# Patient Record
Sex: FEMALE | Race: WHITE | NOT HISPANIC OR LATINO | Employment: OTHER | URBAN - METROPOLITAN AREA
[De-identification: names, ages, dates, MRNs, and addresses within clinical notes are randomized per-mention and may not be internally consistent; named-entity substitution may affect disease eponyms.]

---

## 2017-01-31 ENCOUNTER — TELEPHONE (OUTPATIENT)
Dept: FAMILY MEDICINE | Facility: CLINIC | Age: 77
End: 2017-01-31

## 2017-01-31 ENCOUNTER — TRANSFERRED RECORDS (OUTPATIENT)
Dept: HEALTH INFORMATION MANAGEMENT | Facility: CLINIC | Age: 77
End: 2017-01-31

## 2017-01-31 NOTE — TELEPHONE ENCOUNTER
Reason for Call:  Other appointment    Detailed comments: Pt is Dr. Freitas and he is booked out until March.   Pt needs to be seen for a wound check within 48 hours.     Phone Number Patient can be reached at: Home number on file [phone] (home)    Best Time: anytime    Can we leave a detailed message on this number? YES    Call taken on 1/31/2017 at 2:34 PM by Kiya Ernandez

## 2017-01-31 NOTE — TELEPHONE ENCOUNTER
Call back to patient.  Patient reports she was in the kitchen and cut her right thumb near the base.  She states she went to the ED in [place] near where she lives this morning because it would not stop bleeding.  Patient reports that it was a cut unable to be stitched d/t the type of cut.  Patient reports she was advised to have a wound check within 48 hours with PCP office.  Patient wanting to schedule, team appointment made for Friday morning per patient request.  Patient has no additional concerns at this time.  Afsaneh Berry RN

## 2017-02-03 ENCOUNTER — OFFICE VISIT (OUTPATIENT)
Dept: FAMILY MEDICINE | Facility: CLINIC | Age: 77
End: 2017-02-03
Payer: COMMERCIAL

## 2017-02-03 VITALS
WEIGHT: 181 LBS | HEIGHT: 67 IN | HEART RATE: 66 BPM | TEMPERATURE: 97.1 F | SYSTOLIC BLOOD PRESSURE: 141 MMHG | BODY MASS INDEX: 28.41 KG/M2 | OXYGEN SATURATION: 99 % | DIASTOLIC BLOOD PRESSURE: 76 MMHG

## 2017-02-03 DIAGNOSIS — S61.209A AVULSION OF SKIN OF FINGER, INITIAL ENCOUNTER: Primary | ICD-10-CM

## 2017-02-03 PROCEDURE — 99212 OFFICE O/P EST SF 10 MIN: CPT | Performed by: NURSE PRACTITIONER

## 2017-02-03 NOTE — NURSING NOTE
"Chief Complaint   Patient presents with     Laceration     wound check.  Cut thumb, seen ER in [place], unable to stitch. TDAP 2/2015       Initial /76 mmHg  Pulse 66  Temp(Src) 97.1  F (36.2  C) (Oral)  Ht 5' 7\" (1.702 m)  Wt 181 lb (82.101 kg)  BMI 28.34 kg/m2  SpO2 99%  LMP 07/07/1992 Estimated body mass index is 28.34 kg/(m^2) as calculated from the following:    Height as of this encounter: 5' 7\" (1.702 m).    Weight as of this encounter: 181 lb (82.101 kg).  BP completed using cuff size: regular    "

## 2017-02-03 NOTE — PROGRESS NOTES
HPI      SUBJECTIVE:                                                    Jeannine Rod is a 76 year old female who presents to clinic today for the following health issues:      Cut right thumb on Monday, 4 days ago, on a mandolin   Went to ED next day as it continued to bleed. 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 Please review for accuracy     Past Surgical History   Procedure Laterality Date     Biopsy  8/29/2007     Right Breast Biopsy     Biopsy  9/12/2007     Right Breast Biopsy x2     Biopsy  9/13/2007     Left Breast Biopsy     Breast surgery  10/2/2007     Bilateral Mastectomy     Breast surgery  3/2008     Breast Reconstruction Surgery     Arthroplasty hip  3/5/2012     Procedure:ARTHROPLASTY HIP; LEFT TOTAL HIP ARTHROPLASTY (BIOMET)^; Surgeon:SABRINA CALLEJAS; Location:[facility] OR     Ent surgery       salivary gland removal     Colonoscopy  2011     repeat in 5 years     Social History   Substance Use Topics     Smoking status: Former Smoker -- 20 years     Types: Cigarettes     Quit date: 01/01/1982     Smokeless tobacco: Never Used     Alcohol Use: 0.0 oz/week     0 Standard drinks or equivalent per week      Comment: red wine socially, 2-3x week     Current Outpatient Prescriptions   Medication Sig Dispense Refill     simvastatin (ZOCOR) 40 MG tablet Take 1 tablet (40 mg) by mouth At Bedtime 90 tablet 3     Cholecalciferol (VITAMIN D3 PO) Take 1,000 Units by mouth daily       hydroxychloroquine (PLAQUENIL) 200 MG tablet Take 200 mg  "by mouth 2 times daily       OMEPRAZOLE PO Take 20 mg by mouth daily        calcium-vitamin D (OSCAL) 250-125 MG-UNIT TABS per tablet Take 1 tablet by mouth daily       AMLODIPINE BESYLATE PO Take 5 mg by mouth At Bedtime       Estrogens, Conjugated (PREMARIN VA) Place 0.5 applicators vaginally twice a week At bedtime       Multiple Vitamin (MULTI-VITAMIN) per tablet Take 1 tablet by mouth daily.       Coenzyme Q10 (CO Q 10 PO) Take 200 mg by mouth At Bedtime        ASPIRIN PO Take 81 mg by mouth At Bedtime       Allergies   Allergen Reactions     Other [Seasonal Allergies]      Preservative in eye drops       Reviewed PMH, med list and allergies.      ROS  Detailed as above       /76 mmHg  Pulse 66  Temp(Src) 97.1  F (36.2  C) (Oral)  Ht 5' 7\" (1.702 m)  Wt 181 lb (82.101 kg)  BMI 28.34 kg/m2  SpO2 99%  LMP 07/07/1992      Physical Exam   Constitutional: She is well-developed, well-nourished, and in no distress.   Pulmonary/Chest: Effort normal.   Neurological: She is alert.   Skin: Skin is warm and dry.   Small superficial avulsion of radial aspect of distal right thumb without sign of infection    Psychiatric: Mood and affect normal.   Vitals reviewed.      Assessment and Plan:       ICD-10-CM    1. Avulsion of skin of finger, initial encounter S61.209A        Wound check. Looks great. Continue great wound care. rtc prn       Jolene Hu APRN, CNP  [facility]    "

## 2017-03-14 DIAGNOSIS — I10 BENIGN ESSENTIAL HYPERTENSION: Primary | ICD-10-CM

## 2017-03-14 NOTE — TELEPHONE ENCOUNTER
AMLODIPINE BESYLATE PO      Last Written Prescription Date: ?  Last Fill Quantity: ?, # refills: ?  Last Office Visit with [facility] prescribing provider: 02/03/2017       Potassium   Date Value Ref Range Status   10/06/2016 4.5 3.4 - 5.3 mmol/L Final     Creatinine   Date Value Ref Range Status   10/06/2016 0.73 0.52 - 1.04 mg/dL Final     BP Readings from Last 3 Encounters:   02/03/17 141/76   10/06/16 129/76   10/14/14 116/66

## 2017-03-15 RX ORDER — AMLODIPINE BESYLATE 2.5 MG/1
5 TABLET ORAL AT BEDTIME
Qty: 90 TABLET | Refills: 3 | Status: SHIPPED | OUTPATIENT
Start: 2017-03-15 | End: 2017-10-09

## 2017-03-15 NOTE — TELEPHONE ENCOUNTER
Routing refill request to provider for review/approval because:  Medication is reported/historical  Regina Lock RN

## 2017-05-23 ENCOUNTER — TELEPHONE (OUTPATIENT)
Dept: FAMILY MEDICINE | Facility: CLINIC | Age: 77
End: 2017-05-23

## 2017-05-23 NOTE — TELEPHONE ENCOUNTER
Typically retesting UA is not needed if symptoms have resolved.  If symptom have not resolved, then office visit appointment would be most helpful, ok to use same day slot

## 2017-05-23 NOTE — TELEPHONE ENCOUNTER
PCP:    Called and spoke with the Pt. The Pt denies any ongoing UTI symptoms, however does report a new symptom: itching on the vulva.   No unusual discharge or odor present. No other symptoms. Pt did just complete antibiotics.   Continue to monitor? Start OTC yeast infection medication? Script?  Please advise.     Tanisha Maria RN

## 2017-05-23 NOTE — TELEPHONE ENCOUNTER
Reason for Call:  Other call back    Detailed comments: seen @ [facility] for uti pt was told to have urine test done to make sure meds worked . Does pt need appt with provider or just lab appt? Please advise    Phone Number Patient can be reached at: Home number on file [phone] (home)    Best Time: any    Can we leave a detailed message on this number? YES    Call taken on 5/23/2017 at 1:46 PM by Jelly Harvey

## 2017-05-26 ENCOUNTER — TELEPHONE (OUTPATIENT)
Dept: FAMILY MEDICINE | Facility: CLINIC | Age: 77
End: 2017-05-26

## 2017-05-26 NOTE — TELEPHONE ENCOUNTER
Called patient with advise from PCP.   Patient will go to URGENT CARE today---doesn't want to wait until next week for appointment.   Has been using OTC Hydrocortisone cream in vaginal area for itchiness----without relief.   Provided patient with URGENT CARE locations and times.     Dhara Jean RN, BSN

## 2017-05-26 NOTE — TELEPHONE ENCOUNTER
WORK-IN APPT TODAY with PCP ?  No TEAM appointments available in clinic today.   Or RX?    Patient developed intense vaginal itchiness while taking abx for UTI (completed abx yesterday).   Had been advised to try OTC vag yeast med.  Patient tried the one day Monistat without any improvement.     Calling today for advise.    Patient PCP message from the other day, if OTC tx failed, patient would need appointment.    No appts available today.      Symptoms:  Vaginal itchiness.  No discharge.      [facility] [id number] - [place], [place] - [address] AT [facility]      Dhara Jean RN, BSN

## 2017-05-26 NOTE — TELEPHONE ENCOUNTER
Reason for call:  Patient reporting a symptom    Symptom or request: yeast infection pt took a dose of monistat now into day and half on does and symptoms have not gotten any better . Should pt repeat dose?    Duration (how long have symptoms been present): few days    Have you been treated for this before? Yes    Additional comments:     Phone Number patient can be reached at:  Home number on file [phone] (home)    Best Time:  any    Can we leave a detailed message on this number:  YES    Call taken on 5/26/2017 at 8:56 AM by Jelly Harvey

## 2017-05-26 NOTE — TELEPHONE ENCOUNTER
It is a little optimistic to think that one day of over the counter cream is going to solve the problem  If she can't stand the symptoms she could be seen in urgent care  Or she could wait and see me or team provider next week for vaginal exam

## 2017-07-24 ENCOUNTER — TELEPHONE (OUTPATIENT)
Dept: FAMILY MEDICINE | Facility: CLINIC | Age: 77
End: 2017-07-24

## 2017-07-24 NOTE — TELEPHONE ENCOUNTER
E. Visit would be appropriate here  She should probably change simvastatin to atorvastatin to avoid drug drug interaction with amlodipine  Amlodipine improves Raynaud's symptoms in dose dependent fashion   Can you call her to arrange E. Visit?

## 2017-07-24 NOTE — TELEPHONE ENCOUNTER
I called patient and spoke with her.   She states that about 10-11 days ago she saw rheumatology specialist and they recommended that patient increase dose of Amlodipine from 5 mg to 10 mg due to Raynaud's    Patient states that following the increased dose; she started to experience muscle weakness and soreness.     Patient read online that taking Simvastatin 20 mg and Amlodipine can cause these symptoms, so yesterday she decreased her Amlodipine dose back down to 5 mg.  She has continued Simvastatin 20 mg daily  Symptoms of muscle weakness and soreness has not resolved since decreasing Amlodipine back down to 5 mg.      Patient wondering what Dr. Freitas would recommend?  Should she adjust the Simvastatin so that she can increase the Amlodipine dose or another option for medication?  Patient said that she does have cold feet due to Raynaud's     PCP: please advise.     Omayra Jeffries RN

## 2017-07-24 NOTE — TELEPHONE ENCOUNTER
Reason for Call:  Other call back    Detailed comments: questions and concerns about her meds. She is   Feeling weak, and a little dizy.    Phone Number Patient can be reached at: Cell number on file:    Telephone Information:   Mobile [phone]       Best Time: anytime     Can we leave a detailed message on this number? yes    Call taken on 7/24/2017 at 9:42 AM by Alyce Chacon

## 2017-07-25 NOTE — TELEPHONE ENCOUNTER
Pt does not have mychart, so would be appropriate for telephone visit. Will route to TC to schedule.  Regina Lock RN

## 2017-08-08 ENCOUNTER — TRANSFERRED RECORDS (OUTPATIENT)
Dept: HEALTH INFORMATION MANAGEMENT | Facility: CLINIC | Age: 77
End: 2017-08-08

## 2017-10-09 ENCOUNTER — OFFICE VISIT (OUTPATIENT)
Dept: FAMILY MEDICINE | Facility: CLINIC | Age: 77
End: 2017-10-09
Payer: COMMERCIAL

## 2017-10-09 VITALS
BODY MASS INDEX: 25.58 KG/M2 | DIASTOLIC BLOOD PRESSURE: 74 MMHG | TEMPERATURE: 97.4 F | SYSTOLIC BLOOD PRESSURE: 141 MMHG | WEIGHT: 163 LBS | HEIGHT: 67 IN | OXYGEN SATURATION: 100 % | HEART RATE: 66 BPM

## 2017-10-09 DIAGNOSIS — I10 BENIGN ESSENTIAL HYPERTENSION: ICD-10-CM

## 2017-10-09 DIAGNOSIS — R01.1 HEART MURMUR: ICD-10-CM

## 2017-10-09 DIAGNOSIS — Z23 NEED FOR PROPHYLACTIC VACCINATION AND INOCULATION AGAINST INFLUENZA: ICD-10-CM

## 2017-10-09 DIAGNOSIS — I73.00 RAYNAUD'S DISEASE WITHOUT GANGRENE: ICD-10-CM

## 2017-10-09 DIAGNOSIS — E78.5 HYPERLIPIDEMIA LDL GOAL <130: ICD-10-CM

## 2017-10-09 DIAGNOSIS — Z91.81 AT RISK FOR FALLING: ICD-10-CM

## 2017-10-09 DIAGNOSIS — C50.919 MALIGNANT NEOPLASM OF FEMALE BREAST, UNSPECIFIED ESTROGEN RECEPTOR STATUS, UNSPECIFIED LATERALITY, UNSPECIFIED SITE OF BREAST (H): ICD-10-CM

## 2017-10-09 DIAGNOSIS — M34.9 SYSTEMIC SCLEROSIS (H): ICD-10-CM

## 2017-10-09 DIAGNOSIS — Z00.00 ROUTINE GENERAL MEDICAL EXAMINATION AT A HEALTH CARE FACILITY: Primary | ICD-10-CM

## 2017-10-09 PROCEDURE — G0008 ADMIN INFLUENZA VIRUS VAC: HCPCS | Performed by: INTERNAL MEDICINE

## 2017-10-09 PROCEDURE — 99397 PER PM REEVAL EST PAT 65+ YR: CPT | Mod: 25 | Performed by: INTERNAL MEDICINE

## 2017-10-09 PROCEDURE — 90662 IIV NO PRSV INCREASED AG IM: CPT | Performed by: INTERNAL MEDICINE

## 2017-10-09 RX ORDER — ATORVASTATIN CALCIUM 20 MG/1
20 TABLET, FILM COATED ORAL DAILY
Qty: 90 TABLET | Refills: 3 | Status: SHIPPED | OUTPATIENT
Start: 2017-10-09 | End: 2018-09-28

## 2017-10-09 RX ORDER — AMLODIPINE BESYLATE 10 MG/1
10 TABLET ORAL AT BEDTIME
Qty: 90 TABLET | Refills: 3 | Status: SHIPPED | OUTPATIENT
Start: 2017-10-09 | End: 2018-12-26

## 2017-10-09 NOTE — PROGRESS NOTES
SUBJECTIVE:   Jeannine Rod is a 76 year old female who presents for Preventive Visit.  Are you in the first 12 months of your Medicare Part B coverage?  No    Healthy Habits:    Do you get at least three servings of calcium containing foods daily (dairy, green leafy vegetables, etc.)? yes    Amount of exercise or daily activities, outside of work: None    Problems taking medications regularly No    Medication side effects: No    Have you had an eye exam in the past two years? yes    Do you see a dentist twice per year? yes    Do you have sleep apnea, excessive snoring or daytime drowsiness?no    COGNITIVE SCREEN  1) Repeat 3 items (Banana, Sunrise, Chair)    2) Clock draw: NORMAL  3) 3 item recall: Recalls 2 objects   Results: NORMAL clock, 1-2 items recalled: COGNITIVE IMPAIRMENT LESS LIKELY    Mini-CogTM Copyright S Melvi. Licensed by the author for use in [facility]; reprinted with permission (delano@[facility]). All rights reserved.    Rheumatology increased amlodipine due to Raynauds symptoms  Had moderate muscle pain related side effects from simvastatin when she increased amlodipine  Resolved when she went back to previous dose of amlodpine    Reviewed and updated as needed this visit by clinical staff  Tobacco  Allergies  Meds  Med Hx  Surg Hx  Fam Hx  Soc Hx        Reviewed and updated as needed this visit by Provider  Tobacco  Med Hx  Surg Hx  Fam Hx  Soc Hx       Social History   Substance Use Topics     Smoking status: Former Smoker     Years: 20.00     Types: Cigarettes     Quit date: 1/1/1982     Smokeless tobacco: Never Used     Alcohol use 0.0 oz/week     0 Standard drinks or equivalent per week      Comment: red wine socially, 2-3x week       The patient does not drink >3 drinks per day nor >7 drinks per week.    Today's PHQ-2 Score:   PHQ-2 ( 1999 [employer]) 10/9/2017 10/6/2016   Q1: Little interest or pleasure in doing things 0 0   Q2: Feeling down, depressed or hopeless 0 0    PHQ-2 Score 0 0         Do you feel safe in your environment - Yes    Do you have a Health Care Directive?: Yes: Advance Directive has been received and scanned.    Current providers sharing in care for this patient include:   Patient Care Team:  Bebeto Freitas MD as PCP - General (Internal Medicine)      Hearing impairment: Yes, wears hearing aids - care managed by specialists    Ability to successfully perform activities of daily living: Yes, no assistance needed     Fall risk:  Fallen 2 or more times in the past year?: Yes  Any fall with injury in the past year?: No  Timed Up and Go Test (>13.5 is fall risk; contact physician) : 5      Home safety:  none identified      The following health maintenance items are reviewed in Epic and correct as of today:  Health Maintenance   Topic Date Due     ADVANCE DIRECTIVE PLANNING Q5 YRS  11/30/1997     FALL RISK ASSESSMENT  11/30/2007     MAMMO SCREEN Q2 YR (SYSTEM ASSIGNED)  09/07/2009     LIPID SCREEN Q5 YR FEMALE (SYSTEM ASSIGNED)  10/06/2021     TETANUS IMMUNIZATION (SYSTEM ASSIGNED)  02/17/2025     COLON CANCER SCREEN (SYSTEM ASSIGNED)  05/26/2025     INFLUENZA VACCINE (SYSTEM ASSIGNED)  Addressed     DEXA SCAN SCREENING (SYSTEM ASSIGNED)  Completed     PNEUMOCOCCAL  Completed     Patient Active Problem List   Diagnosis     Malignant neoplasm of breast (H)     Raynaud's syndrome     Status post hip replacement     Pulmonary embolism and infarction     Guillain-Aurora syndrome (H)     Systemic sclerosis (H)     Gastroesophageal reflux disease with esophagitis     Hyperlipidemia LDL goal <130     Calculus of gallbladder without cholecystitis     Radiculoplexus neuropathy     Past Surgical History:   Procedure Laterality Date     ARTHROPLASTY HIP  3/5/2012    Procedure:ARTHROPLASTY HIP; LEFT TOTAL HIP ARTHROPLASTY (BIOMET)^; Surgeon:SABRINA CALLEJAS; Location:[facility] OR     BIOPSY  8/29/2007    Right Breast Biopsy     BIOPSY  9/12/2007    Right Breast Biopsy x2     BIOPSY   9/13/2007    Left Breast Biopsy     BREAST SURGERY  10/2/2007    Bilateral Mastectomy     BREAST SURGERY  3/2008    Breast Reconstruction Surgery     COLONOSCOPY  2011    repeat in 5 years     ENT SURGERY      salivary gland removal       Social History   Substance Use Topics     Smoking status: Former Smoker     Years: 20.00     Types: Cigarettes     Quit date: 1/1/1982     Smokeless tobacco: Never Used     Alcohol use 0.0 oz/week     0 Standard drinks or equivalent per week      Comment: red wine socially, 2-3x week     Family History   Problem Relation Age of Onset     CANCER Mother      lung age 83     Unknown/Adopted Father      91 years     Eye Disorder Father      Macular Degeneration     Breast Cancer Maternal Grandmother      CANCER Paternal Grandfather      CANCER Paternal Uncle      Asthma Brother      C.A.D. Sister      elevated cholesterol         Current Outpatient Prescriptions   Medication Sig Dispense Refill     amLODIPine (NORVASC) 2.5 MG tablet Take 2 tablets (5 mg) by mouth At Bedtime 90 tablet 3     simvastatin (ZOCOR) 40 MG tablet Take 1 tablet (40 mg) by mouth At Bedtime 90 tablet 3     Cholecalciferol (VITAMIN D3 PO) Take 1,000 Units by mouth daily       hydroxychloroquine (PLAQUENIL) 200 MG tablet Take 200 mg by mouth 2 times daily       OMEPRAZOLE PO Take 20 mg by mouth daily        calcium-vitamin D (OSCAL) 250-125 MG-UNIT TABS per tablet Take 1 tablet by mouth daily       ASPIRIN PO Take 81 mg by mouth At Bedtime       Estrogens, Conjugated (PREMARIN VA) Place 0.5 applicators vaginally twice a week At bedtime       Multiple Vitamin (MULTI-VITAMIN) per tablet Take 1 tablet by mouth daily.       Coenzyme Q10 (CO Q 10 PO) Take 200 mg by mouth At Bedtime        Allergies   Allergen Reactions     Metoclopramide      Other reaction(s): Other - Describe In Comment Field  Severe redness in eyes from the preservative in certain eye medications     Other [Seasonal Allergies]      Preservative in  "eye drops             ROS:  A 12 organ systems ROS is negative    OBJECTIVE:   /74 (BP Location: Right arm, Cuff Size: Adult Regular)  Pulse 66  Temp 97.4  F (36.3  C) (Oral)  Ht 5' 7\" (1.702 m)  Wt 163 lb (73.9 kg)  LMP 07/07/1992  SpO2 100%  BMI 25.53 kg/m2 Estimated body mass index is 25.53 kg/(m^2) as calculated from the following:    Height as of this encounter: 5' 7\" (1.702 m).    Weight as of this encounter: 163 lb (73.9 kg).  EXAM:   GENERAL APPEARANCE: healthy, alert and no distress  EYES: Eyes grossly normal to inspection, PERRL and conjunctivae and sclerae normal  HENT: ear canals and TM's normal, nose and mouth without ulcers or lesions, oropharynx clear and oral mucous membranes moist  NECK: no adenopathy, no asymmetry, masses, or scars and thyroid normal to palpation  RESP: lungs clear to auscultation - no rales, rhonchi or wheezes  BREAST: bilateral implants, no masses adjacent to mastectomy sights   CV: regular rate and rhythm, normal S1 S2, no S3 or S4, unchanged systolic murmur, click or rub, no peripheral edema and peripheral pulses strong  ABDOMEN: soft, nontender, no hepatosplenomegaly, no masses and bowel sounds normal  MS: no musculoskeletal defects are noted and gait is age appropriate without ataxia  SKIN: no suspicious lesions or rashes  NEURO: Normal strength and tone, sensory exam grossly normal, mentation intact and speech normal  PSYCH: mentation appears normal and affect normal/bright    ASSESSMENT / PLAN:       ICD-10-CM    1. Routine general medical examination at a health care facility Z00.00    2. Systemic sclerosis (H) M34.9    3. Raynaud's disease without gangrene I73.00    4. Malignant neoplasm of female breast, unspecified estrogen receptor status, unspecified laterality, unspecified site of breast (H) C50.919    5. Hyperlipidemia LDL goal <130 E78.5 Comprehensive metabolic panel     CBC with platelets     Lipid panel reflex to direct LDL     TSH     atorvastatin " "(LIPITOR) 20 MG tablet   6. At risk for falling Z91.81    7. Benign essential hypertension I10 amLODIPine (NORVASC) 10 MG tablet   8. Heart murmur R01.1 Echocardiogram Complete     Blood pressure not well controlled  Raynauds not well controlled  Need to increase amlodipine  Cannot due to simvastatin interaction  Stop simvastatin  Replace with atorvastatin  Increase amlodipine  Recheck lipids when she returns for physical prior to cataract surgery next month    Cardiac murmur without recent echo, schedule echo           End of Life Planning:  Patient currently has an advanced directive: Yes.  Practitioner is supportive of decision.    COUNSELING:  Reviewed preventive health counseling, as reflected in patient instructions  Special attention given to:       Regular exercise       Healthy diet/nutrition       Immunizations    flu         Colon cancer screening          Estimated body mass index is 25.53 kg/(m^2) as calculated from the following:    Height as of this encounter: 5' 7\" (1.702 m).    Weight as of this encounter: 163 lb (73.9 kg).  Weight management plan: Discussed healthy diet and exercise guidelines and patient will follow up in 12 months in clinic to re-evaluate.   reports that she quit smoking about 35 years ago. Her smoking use included Cigarettes. She quit after 20.00 years of use. She has never used smokeless tobacco.        Appropriate preventive services were discussed with this patient, including applicable screening as appropriate for cardiovascular disease, diabetes, osteopenia/osteoporosis, and glaucoma.  As appropriate for age/gender, discussed screening for colorectal cancer, prostate cancer, breast cancer, and cervical cancer. Checklist reviewing preventive services available has been given to the patient.    Reviewed patients plan of care and provided an AVS. The Basic Care Plan (routine screening as documented in Health Maintenance) for Jeannine meets the Care Plan requirement. This Care Plan " has been established and reviewed with the Patient.    Counseling Resources:  ATP IV Guidelines  Pooled Cohorts Equation Calculator  Breast Cancer Risk Calculator  FRAX Risk Assessment  ICSI Preventive Guidelines  Dietary Guidelines for Americans, 2010  USDA's MyPlate  ASA Prophylaxis  Lung CA Screening    Bebeto Freitas MD  [facility]

## 2017-10-09 NOTE — NURSING NOTE
"Chief Complaint   Patient presents with     Wellness Visit       Initial /74 (BP Location: Right arm, Cuff Size: Adult Regular)  Pulse 66  Temp 97.4  F (36.3  C) (Oral)  Ht 5' 7\" (1.702 m)  Wt 163 lb (73.9 kg)  LMP 07/07/1992  SpO2 100%  BMI 25.53 kg/m2 Estimated body mass index is 25.53 kg/(m^2) as calculated from the following:    Height as of this encounter: 5' 7\" (1.702 m).    Weight as of this encounter: 163 lb (73.9 kg).  Medication Reconciliation: complete  Maryjo Ortega MA      "

## 2017-10-09 NOTE — MR AVS SNAPSHOT
After Visit Summary   10/9/2017    Jeannine Rod    MRN: [id number]           Patient Information     Date Of Birth          1940        Visit Information        Provider Department      10/9/2017 10:00 AM Bebeto Freitas MD [facility]        Today's Diagnoses     Routine general medical examination at a health care facility    -  1    Systemic sclerosis (H)        Raynaud's disease without gangrene        Malignant neoplasm of female breast, unspecified estrogen receptor status, unspecified laterality, unspecified site of breast (H)        Hyperlipidemia LDL goal <130        At risk for falling        Benign essential hypertension        Heart murmur        Need for prophylactic vaccination and inoculation against influenza          Care Instructions      Preventive Health Recommendations    Female Ages 65 +    Yearly exam:     See your health care provider every year in order to  o Review health changes.   o Discuss preventive care.    o Review your medicines if your doctor has prescribed any.      You no longer need a yearly Pap test unless you've had an abnormal Pap test in the past 10 years. If you have vaginal symptoms, such as bleeding or discharge, be sure to talk with your provider about a Pap test.      Every 1 to 2 years, have a mammogram.  If you are over 69, talk with your health care provider about whether or not you want to continue having screening mammograms.      Every 10 years, have a colonoscopy. Or, have a yearly FIT test (stool test). These exams will check for colon cancer.       Have a cholesterol test every 5 years, or more often if your doctor advises it.       Have a diabetes test (fasting glucose) every three years. If you are at risk for diabetes, you should have this test more often.       At age 65, have a bone density scan (DEXA) to check for osteoporosis (brittle bone disease).    Shots:    Get a flu shot each year.    Get a tetanus shot every 10  years.    Talk to your doctor about your pneumonia vaccines. There are now two you should receive - Pneumovax (PPSV 23) and Prevnar (PCV 13).    Talk to your doctor about the shingles vaccine.    Talk to your doctor about the hepatitis B vaccine.    Nutrition:     Eat at least 5 servings of fruits and vegetables each day.      Eat whole-grain bread, whole-wheat pasta and brown rice instead of white grains and rice.      Talk to your provider about Calcium and Vitamin D.     Lifestyle    Exercise at least 150 minutes a week (30 minutes a day, 5 days a week). This will help you control your weight and prevent disease.      Limit alcohol to one drink per day.      No smoking.       Wear sunscreen to prevent skin cancer.       See your dentist twice a year for an exam and cleaning.      See your eye doctor every 1 to 2 years to screen for conditions such as glaucoma, macular degeneration and cataracts.          Follow-ups after your visit        Your next 10 appointments already scheduled     Oct 23, 2017  9:00 AM CDT   [facility]    [address] [facility] [facility] [id number] [phone]              Future tests that were ordered for you today     Open Future Orders        Priority Expected Expires Ordered    Echocardiogram Complete Routine  10/9/2018 10/9/2017            Who to contact     If you have questions or need follow up information about today's clinic visit or your schedule please contact [facility] directly at [phone].  Normal or non-critical lab and imaging results will be communicated to you by MyChart, letter or phone within 4 business days after the clinic has received the results. If you do not hear from us within 7 days, please contact the clinic through MyChart or phone. If you have a critical or abnormal lab result, we will notify you by phone as soon as possible.  Submit refill requests through  "[employer] or call your pharmacy and they will forward the refill request to us. Please allow 3 business days for your refill to be completed.          Additional Information About Your Visit        MyChart Information     [facility] lets you send messages to your doctor, view your test results, renew your prescriptions, schedule appointments and more. To sign up, go to [email or web address] . Click on \"Log in\" on the left side of the screen, which will take you to the Welcome page. Then click on \"Sign up Now\" on the right side of the page.     You will be asked to enter the access code listed below, as well as some personal information. Please follow the directions to create your username and password.     Your access code is: [email or web address]  Expires: 2018 12:25 PM     Your access code will  in 90 days. If you need help or a new code, please call your [place] clinic or [phone].        Care EveryWhere ID     This is your Care EveryWhere ID. This could be used by other organizations to access your [place] medical records  [id number]        Your Vitals Were     Pulse Temperature Height Last Period Pulse Oximetry BMI (Body Mass Index)    66 97.4  F (36.3  C) (Oral) 5' 7\" (1.702 m) 1992 100% 25.53 kg/m2       Blood Pressure from Last 3 Encounters:   10/09/17 141/74   17 141/76   10/06/16 129/76    Weight from Last 3 Encounters:   10/09/17 163 lb (73.9 kg)   17 181 lb (82.1 kg)   10/06/16 181 lb (82.1 kg)              We Performed the Following     ADMIN INFLUENZA (For MEDICARE Patients ONLY) []     CBC with platelets     Comprehensive metabolic panel     FLU VACCINE, INCREASED ANTIGEN, PRESV FREE, AGE 65+ [22712]     Lipid panel reflex to direct LDL     TSH          Today's Medication Changes          These changes are accurate as of: 10/9/17 12:25 PM.  If you have any questions, ask your nurse or doctor.               Start taking these medicines.        Dose/Directions    " atorvastatin 20 MG tablet   Commonly known as:  LIPITOR   Used for:  Hyperlipidemia LDL goal <130   Started by:  Bebeto Freitas MD        Dose:  20 mg   Take 1 tablet (20 mg) by mouth daily   Quantity:  90 tablet   Refills:  3         These medicines have changed or have updated prescriptions.        Dose/Directions    amLODIPine 10 MG tablet   Commonly known as:  NORVASC   This may have changed:    - medication strength  - how much to take   Used for:  Benign essential hypertension   Changed by:  Bebeto Freitas MD        Dose:  10 mg   Take 1 tablet (10 mg) by mouth At Bedtime   Quantity:  90 tablet   Refills:  3         Stop taking these medicines if you haven't already. Please contact your care team if you have questions.     simvastatin 40 MG tablet   Commonly known as:  ZOCOR   Stopped by:  Bebeto Freitas MD                Where to get your medicines      These medications were sent to [id number] - [place], [place] - [address], [place] [address]     Phone:  [phone]     amLODIPine 10 MG tablet    atorvastatin 20 MG tablet                Primary Care Provider Office Phone # Fax #    Bebeto Freitas [phone]       [facility] [address]  [facility] [address]        Equal Access to Services     ERIK CARLTON AH: Hadii aad ku hadasho Soomaali, waaxda luqadaha, qaybta kaalmada adeegyada, waxay idiin hayaan shanell anderson labin fry. So [facility] [phone].    ATENCIÓN: Si habla español, tiene a holley disposición servicios gratuitos de asistencia lingüística. ame al [phone].    We comply with applicable federal civil rights laws and [place] laws. We do not discriminate on the basis of race, color, national origin, age, disability, sex, sexual orientation, or gender identity.            Thank you!     Thank you for choosing [facility]  for your care. Our goal is always to provide you  with excellent care. Hearing back from our patients is one way we can continue to improve our services. Please take a few minutes to complete the written survey that you may receive in the mail after your visit with us. Thank you!             Your Updated Medication List - Protect others around you: Learn how to safely use, store and throw away your medicines at www.disposemymeds.org.          This list is accurate as of: 10/9/17 12:25 PM.  Always use your most recent med list.                   Brand Name Dispense Instructions for use Diagnosis    amLODIPine 10 MG tablet    NORVASC    90 tablet    Take 1 tablet (10 mg) by mouth At Bedtime    Benign essential hypertension       ASPIRIN PO      Take 81 mg by mouth At Bedtime        atorvastatin 20 MG tablet    LIPITOR    90 tablet    Take 1 tablet (20 mg) by mouth daily    Hyperlipidemia LDL goal <130       calcium-vitamin D 250-125 MG-UNIT Tabs per tablet    OSCAL     Take 1 tablet by mouth daily        CO Q 10 PO      Take 200 mg by mouth At Bedtime        hydroxychloroquine 200 MG tablet    PLAQUENIL     Take 200 mg by mouth 2 times daily        Multi-vitamin Tabs tablet   Generic drug:  multivitamin, therapeutic with minerals      Take 1 tablet by mouth daily.        OMEPRAZOLE PO      Take 20 mg by mouth daily        PREMARIN VA      Place 0.5 applicators vaginally twice a week At bedtime        VITAMIN D3 PO      Take 1,000 Units by mouth daily

## 2017-10-09 NOTE — PROGRESS NOTES
Injectable Influenza Immunization Documentation    1.  Is the person to be vaccinated sick today?   No    2. Does the person to be vaccinated have an allergy to a component   of the vaccine?   No    3. Has the person to be vaccinated ever had a serious reaction   to influenza vaccine in the past?   No    4. Has the person to be vaccinated ever had Guillain-Barré syndrome?   No    Form completed by Joie Fernandez CMA  Prior to injection verified patient identity using patient's name and date of birth.

## 2017-10-16 ENCOUNTER — HOSPITAL ENCOUNTER (OUTPATIENT)
Dept: CARDIOLOGY | Facility: CLINIC | Age: 77
Discharge: HOME | End: 2017-10-16
Attending: INTERNAL MEDICINE | Admitting: INTERNAL MEDICINE
Payer: MEDICARE

## 2017-10-16 DIAGNOSIS — R01.1 HEART MURMUR: ICD-10-CM

## 2017-10-16 PROCEDURE — 93306 TTE W/DOPPLER COMPLETE: CPT

## 2017-10-16 PROCEDURE — 93306 TTE W/DOPPLER COMPLETE: CPT | Mod: 26 | Performed by: INTERNAL MEDICINE

## 2017-10-16 NOTE — PROGRESS NOTES
The following letter pertains to your most recent diagnostic tests:    Good news! Your heart ultrasound showed no dangerous explanations for your heart murmur.       Sincerely,    Dr. Freitas

## 2017-10-16 NOTE — LETTER
[facility]  [address] [facility]  [address]  [place]  [address]  Tel: [phone]    2017    Moose Lemus  [address]  [place] [address]        Dear Ms. Lemus,    The following letter pertains to your most recent diagnostic tests:    Good news! Your heart ultrasound showed no dangerous explanations for your heart murmur.     Resulted Orders   Echocardiogram Complete    Narrative    [id number]  [facility]  [id number]  [id number]^MARY^ANGELIKA^NATALYA        [facility]  [facility]  Echocardiography Laboratory  [address]  [address]  [place] [address]  Phone [phone]  Fax [phone]        Name: MOOSE LEMUS  MRN: [id number]  : 1940  Study Date: 10/16/2017 07:41 AM  Age: 76 yrs  Gender: Female  [place] [facility] [place]  Reason For Study: Cardiac murmur  Ordering Physician: ANGELIKA HERNANDEZ  Referring Physician: ANGELIKA HERNANDEZ  Performed By: Leila Acuña RDCS     BSA: 1.9 m2  Height: 67 in  Weight: 163 lb  _____________________________________________________________________________  __        Procedure  Complete Echo Adult.  _____________________________________________________________________________  __        Interpretation Summary     1. The left ventricle is normal in structure, function and size. The visual  ejection fraction is estimated at 60%.  2. The right ventricle is normal in structure, function and size.  3. No valve disease.     No changes from echo 3/2012.  _____________________________________________________________________________  __        Left Ventricle  The left ventricle is normal in structure, function and size. There is normal  left ventricular wall thickness. The visual ejection fraction is estimated at  60%. Normal left ventricular diastolic function. Normal left ventricular wall  motion.     Right Ventricle  The right ventricle is normal in structure, function and size.     Atria  Normal left atrial  size. Right atrial size is normal. There is no atrial shunt  seen.     Mitral Valve  The mitral valve is normal in structure and function. There is no mitral  regurgitation noted.     Tricuspid Valve  There is mild (1+) tricuspid regurgitation. The right ventricular systolic  pressure is approximated at 17mmHg plus the right atrial pressure.        Aortic Valve  The aortic valve is normal in structure and function. No aortic regurgitation  is present. No aortic stenosis is present.     Pulmonic Valve  The pulmonic valve is normal in structure and function.     Vessels  Normal ascending, transverse (arch), and descending aorta. The IVC is normal  in size and reactivity with respiration, suggesting normal central venous  pressure.     Pericardium  There is no pericardial effusion.     Rhythm  Sinus rhythm was noted.     _____________________________________________________________________________  __  MMode/2D Measurements & Calculations  IVSd: 1.1 cm  LVIDd: 3.8 cm  LVIDs: 2.4 cm  LVPWd: 1.1 cm  FS: 36.9 %  EDV(Teich): 61.1 ml  ESV(Teich): 19.8 ml     LV mass(C)d: 136.5 grams  LV mass(C)dI: 73.6 grams/m2  Ao root diam: 3.3 cm  LA dimension: 3.7 cm  LA/Ao: 1.1  LA Volume (BP): 53.9 ml  LA Volume Index (BP): 29.1 ml/m2        Doppler Measurements & Calculations  MV E max woody: 77.6 cm/sec  MV A max woody: 76.2 cm/sec  MV E/A: 1.0  MV dec time: 0.26 sec  TR max woody: 207.7 cm/sec  TR max P.3 mmHg     Lateral E/e': 8.6  Med E to E': 12.7  Medial E/e': 12.7           _____________________________________________________________________________  __           Report approved by: Ira Chen 10/16/2017 08:42 AM            Sincerely,    Dr. Freitas/ du

## 2017-10-23 ENCOUNTER — OFFICE VISIT (OUTPATIENT)
Dept: FAMILY MEDICINE | Facility: CLINIC | Age: 77
End: 2017-10-23
Payer: COMMERCIAL

## 2017-10-23 VITALS
TEMPERATURE: 96.7 F | OXYGEN SATURATION: 97 % | HEART RATE: 63 BPM | RESPIRATION RATE: 16 BRPM | WEIGHT: 163 LBS | SYSTOLIC BLOOD PRESSURE: 123 MMHG | HEIGHT: 67 IN | DIASTOLIC BLOOD PRESSURE: 71 MMHG | BODY MASS INDEX: 25.58 KG/M2

## 2017-10-23 DIAGNOSIS — H26.9 CATARACT OF BOTH EYES, UNSPECIFIED CATARACT TYPE: ICD-10-CM

## 2017-10-23 DIAGNOSIS — I73.00 RAYNAUD'S DISEASE WITHOUT GANGRENE: ICD-10-CM

## 2017-10-23 DIAGNOSIS — I26.99 PULMONARY EMBOLISM WITH INFARCTION (H): ICD-10-CM

## 2017-10-23 DIAGNOSIS — I10 BENIGN ESSENTIAL HYPERTENSION: ICD-10-CM

## 2017-10-23 DIAGNOSIS — C50.919 MALIGNANT NEOPLASM OF FEMALE BREAST, UNSPECIFIED ESTROGEN RECEPTOR STATUS, UNSPECIFIED LATERALITY, UNSPECIFIED SITE OF BREAST (H): ICD-10-CM

## 2017-10-23 DIAGNOSIS — Z01.818 PREOP GENERAL PHYSICAL EXAM: Primary | ICD-10-CM

## 2017-10-23 DIAGNOSIS — M34.9 SYSTEMIC SCLEROSIS (H): ICD-10-CM

## 2017-10-23 DIAGNOSIS — E78.5 HYPERLIPIDEMIA LDL GOAL <130: ICD-10-CM

## 2017-10-23 LAB
ALBUMIN SERPL-MCNC: 4 G/DL (ref 3.4–5)
ALP SERPL-CCNC: 68 U/L (ref 40–150)
ALT SERPL W P-5'-P-CCNC: 30 U/L (ref 0–50)
ANION GAP SERPL CALCULATED.3IONS-SCNC: 10 MMOL/L (ref 3–14)
AST SERPL W P-5'-P-CCNC: 18 U/L (ref 0–45)
BILIRUB SERPL-MCNC: 0.5 MG/DL (ref 0.2–1.3)
BUN SERPL-MCNC: 14 MG/DL (ref 7–30)
CALCIUM SERPL-MCNC: 9.2 MG/DL (ref 8.5–10.1)
CHLORIDE SERPL-SCNC: 103 MMOL/L (ref 94–109)
CHOLEST SERPL-MCNC: 171 MG/DL
CO2 SERPL-SCNC: 25 MMOL/L (ref 20–32)
CREAT SERPL-MCNC: 0.82 MG/DL (ref 0.52–1.04)
ERYTHROCYTE [DISTWIDTH] IN BLOOD BY AUTOMATED COUNT: 13.7 % (ref 10–15)
GFR SERPL CREATININE-BSD FRML MDRD: 68 ML/MIN/1.7M2
GLUCOSE SERPL-MCNC: 84 MG/DL (ref 70–99)
HCT VFR BLD AUTO: 39.7 % (ref 35–47)
HDLC SERPL-MCNC: 87 MG/DL
HGB BLD-MCNC: 12.8 G/DL (ref 11.7–15.7)
LDLC SERPL CALC-MCNC: 70 MG/DL
MCH RBC QN AUTO: 29.6 PG (ref 26.5–33)
MCHC RBC AUTO-ENTMCNC: 32.2 G/DL (ref 31.5–36.5)
MCV RBC AUTO: 92 FL (ref 78–100)
NONHDLC SERPL-MCNC: 84 MG/DL
PLATELET # BLD AUTO: 209 10E9/L (ref 150–450)
POTASSIUM SERPL-SCNC: 4.4 MMOL/L (ref 3.4–5.3)
PROT SERPL-MCNC: 7.3 G/DL (ref 6.8–8.8)
RBC # BLD AUTO: 4.33 10E12/L (ref 3.8–5.2)
SODIUM SERPL-SCNC: 138 MMOL/L (ref 133–144)
TRIGL SERPL-MCNC: 68 MG/DL
WBC # BLD AUTO: 5.5 10E9/L (ref 4–11)

## 2017-10-23 PROCEDURE — 36415 COLL VENOUS BLD VENIPUNCTURE: CPT | Performed by: INTERNAL MEDICINE

## 2017-10-23 PROCEDURE — 85027 COMPLETE CBC AUTOMATED: CPT | Performed by: INTERNAL MEDICINE

## 2017-10-23 PROCEDURE — 80053 COMPREHEN METABOLIC PANEL: CPT | Performed by: INTERNAL MEDICINE

## 2017-10-23 PROCEDURE — 80061 LIPID PANEL: CPT | Performed by: INTERNAL MEDICINE

## 2017-10-23 PROCEDURE — 99214 OFFICE O/P EST MOD 30 MIN: CPT | Performed by: INTERNAL MEDICINE

## 2017-10-23 NOTE — LETTER
[facility]  [address] [place], [place] [address]. [facility]  [address]  [place]  [address]  Tel: [phone]    October 24, 2017    Jeannine Rod  [address]  [place] [address]        Dear Ms. Rod,    The following letter pertains to your most recent diagnostic tests:    -Liver and gallbladder tests are normal for you. (ALT,AST, Alk phos, bilirubin), kidney function is normal for you (Creatinine, GFR), Sodium is normal, Potassium is normal for you, Calcium is normal for you, Glucose (blood sugar) is normal for you.      -Your cholesterol panel looks healthy.    -Your complete blood counts including your hemoglobin returned normal for you.           Bottom line:  Your labs results look stable, healthy and at goal      Follow up:  Schedule an appointment for a physical examination with fasting blood tests in one year's time, or return sooner if new questions, symptoms or problems arise.          Sincerely,    Bebeto Freitas MD/LUX      Enclosure: Lab Results  Results for orders placed or performed in visit on 10/23/17   Lipid panel reflex to direct LDL   Result Value Ref Range    Cholesterol 171 <200 mg/dL    Triglycerides 68 <150 mg/dL    HDL Cholesterol 87 >49 mg/dL    LDL Cholesterol Calculated 70 <100 mg/dL    Non HDL Cholesterol 84 <130 mg/dL   Comprehensive metabolic panel   Result Value Ref Range    Sodium 138 133 - 144 mmol/L    Potassium 4.4 3.4 - 5.3 mmol/L    Chloride 103 94 - 109 mmol/L    Carbon Dioxide 25 20 - 32 mmol/L    Anion Gap 10 3 - 14 mmol/L    Glucose 84 70 - 99 mg/dL    Urea Nitrogen 14 7 - 30 mg/dL    Creatinine 0.82 0.52 - 1.04 mg/dL    GFR Estimate 68 >60 mL/min/1.7m2    GFR Estimate If Black 83 >60 mL/min/1.7m2    Calcium 9.2 8.5 - 10.1 mg/dL    Bilirubin Total 0.5 0.2 - 1.3 mg/dL    Albumin 4.0 3.4 - 5.0 g/dL    Protein Total 7.3 6.8 - 8.8 g/dL    Alkaline Phosphatase 68 40 - 150 U/L    ALT 30 0 - 50 U/L    AST 18 0 - 45 U/L   CBC with platelets   Result Value Ref Range    WBC 5.5 4.0 - 11.0  10e9/L    RBC Count 4.33 3.8 - 5.2 10e12/L    Hemoglobin 12.8 11.7 - 15.7 g/dL    Hematocrit 39.7 35.0 - 47.0 %    MCV 92 78 - 100 fl    MCH 29.6 26.5 - 33.0 pg    MCHC 32.2 31.5 - 36.5 g/dL    RDW 13.7 10.0 - 15.0 %    Platelet Count 209 150 - 450 10e9/L

## 2017-10-23 NOTE — MR AVS SNAPSHOT
After Visit Summary   10/23/2017    Jeannine Rod    MRN: [id number]           Patient Information     Date Of Birth          1940        Visit Information        Provider Department      10/23/2017 9:00 AM Bebeto Freitas MD [facility]        Today's Diagnoses     Preop general physical exam    -  1    Cataract of both eyes, unspecified cataract type        Raynaud's disease without gangrene        Benign essential hypertension        Hyperlipidemia LDL goal <130        Systemic sclerosis (H)        Malignant neoplasm of female breast, unspecified estrogen receptor status, unspecified laterality, unspecified site of breast (H)        Pulmonary embolism and infarction          Care Instructions      Before Your Surgery      Call your surgeon if there is any change in your health. This includes signs of a cold or flu (such as a sore throat, runny nose, cough, rash or fever).    Do not smoke, drink alcohol or take over the counter medicine (unless your surgeon or primary care doctor tells you to) for the 24 hours before and after surgery.    If you take prescribed drugs: Follow your doctor s orders about which medicines to take and which to stop until after surgery.    Eating and drinking prior to surgery: follow the instructions from your surgeon    Take a shower or bath the night before surgery. Use the soap your surgeon gave you to gently clean your skin. If you do not have soap from your surgeon, use your regular soap. Do not shave or scrub the surgery site.  Wear clean pajamas and have clean sheets on your bed.           Follow-ups after your visit        Follow-up notes from your care team     Return in about 1 year (around 10/23/2018) for Physical Exam.      Who to contact     If you have questions or need follow up information about today's clinic visit or your schedule please contact [facility] directly at [phone].  Normal or non-critical lab and imaging  "results will be communicated to you by MyChart, letter or phone within 4 business days after the clinic has received the results. If you do not hear from us within 7 days, please contact the clinic through [facility] or phone. If you have a critical or abnormal lab result, we will notify you by phone as soon as possible.  Submit refill requests through [facility]EMS or call your pharmacy and they will forward the refill request to us. Please allow 3 business days for your refill to be completed.          Additional Information About Your Visit        [place] [place] Information     [facility] lets you send messages to your doctor, view your test results, renew your prescriptions, schedule appointments and more. To sign up, go to [email or web address] . Click on \"Log in\" on the left side of the screen, which will take you to the Welcome page. Then click on \"Sign up Now\" on the right side of the page.     You will be asked to enter the access code listed below, as well as some personal information. Please follow the directions to create your username and password.     Your access code is: [email or web address]  Expires: 2018 12:25 PM     Your access code will  in 90 days. If you need help or a new code, please call your [place] clinic or [phone].        Care EveryWhere ID     This is your Care EveryWhere ID. This could be used by other organizations to access your [place] medical records  [id number]        Your Vitals Were     Pulse Temperature Respirations Height Last Period Pulse Oximetry    63 96.7  F (35.9  C) (Oral) 16 5' 7\" (1.702 m) 1992 97%    BMI (Body Mass Index)                   25.53 kg/m2            Blood Pressure from Last 3 Encounters:   10/23/17 123/71   10/09/17 141/74   17 141/76    Weight from Last 3 Encounters:   10/23/17 163 lb (73.9 kg)   10/09/17 163 lb (73.9 kg)   17 181 lb (82.1 kg)              We Performed the Following     CBC with platelets     Comprehensive metabolic panel     " Lipid panel reflex to direct LDL          Today's Medication Changes          These changes are accurate as of: 10/23/17  9:35 AM.  If you have any questions, ask your nurse or doctor.               Stop taking these medicines if you haven't already. Please contact your care team if you have questions.     ASPIRIN PO   Stopped by:  Bebeto Freitas MD           calcium-vitamin D 250-125 MG-UNIT Tabs per tablet   Commonly known as:  OSCAL   Stopped by:  Bebeto Freitas MD           VITAMIN D3 PO   Stopped by:  Bebeto Freitas MD                    Primary Care Provider Office Phone # Fax #    Bebeto Freitas [phone]       [facility] [address] [facility] [address]  [facility] [address]        Equal Access to Services     [facility] : Hadii denisha ku hadasho Soomaali, waaxda luqadaha, qaybta kaalmada adeegyada, waxay idiin hayfidel gonzalez . So [facility] [phone].    ATENCIÓN: Si habla español, tiene a holley disposición servicios gratuitos de asistencia lingüística. [facility] [phone].    We comply with applicable federal civil rights laws and [place] laws. We do not discriminate on the basis of race, color, national origin, age, disability, sex, sexual orientation, or gender identity.            Thank you!     Thank you for choosing [facility]  for your care. Our goal is always to provide you with excellent care. Hearing back from our patients is one way we can continue to improve our services. Please take a few minutes to complete the written survey that you may receive in the mail after your visit with us. Thank you!             Your Updated Medication List - Protect others around you: Learn how to safely use, store and throw away your medicines at www.disposemymeds.org.          This list is accurate as of: 10/23/17  9:35 AM.  Always use your most recent med list.                   Brand Name Dispense Instructions for use Diagnosis    amLODIPine 10 MG tablet     NORVASC    90 tablet    Take 1 tablet (10 mg) by mouth At Bedtime    Benign essential hypertension       atorvastatin 20 MG tablet    LIPITOR    90 tablet    Take 1 tablet (20 mg) by mouth daily    Hyperlipidemia LDL goal <130       calcium-vitamin D 600-400 MG-UNIT per tablet    CALTRATE     Take 1 tablet by mouth 2 times daily        CO Q 10 PO      Take 200 mg by mouth At Bedtime        hydroxychloroquine 200 MG tablet    PLAQUENIL     Take 200 mg by mouth 2 times daily        Multi-vitamin Tabs tablet   Generic drug:  multivitamin, therapeutic with minerals      Take 1 tablet by mouth daily.        OMEPRAZOLE PO      Take 20 mg by mouth daily        PREMARIN VA      Place 0.5 applicators vaginally twice a week At bedtime

## 2017-10-23 NOTE — NURSING NOTE
"Chief Complaint   Patient presents with     Pre-Op Exam       Initial /71 (BP Location: Right arm, Patient Position: Chair, Cuff Size: Adult Regular)  Pulse 63  Temp 96.7  F (35.9  C) (Oral)  Resp 16  Ht 5' 7\" (1.702 m)  Wt 163 lb (73.9 kg)  LMP 07/07/1992  SpO2 97%  BMI 25.53 kg/m2 Estimated body mass index is 25.53 kg/(m^2) as calculated from the following:    Height as of this encounter: 5' 7\" (1.702 m).    Weight as of this encounter: 163 lb (73.9 kg).  Medication Reconciliation: complete   Marguerite Harman CMA (AAMA)      "

## 2017-10-23 NOTE — PROGRESS NOTES
"[facility]  [address] [address]  [phone]  Dept: [phone]    PRE-OP EVALUATION:  Today's date: 10/23/2017    Jeannine Rod (: 1940) presents for pre-operative evaluation assessment as requested by Dr. Hinson.  She requires evaluation and anesthesia risk assessment prior to undergoing surgery/procedure for treatment of Cataract .  Proposed procedure: Cataract    Date of Surgery/ Procedure: 2017 & 2017  Time of Surgery/ Procedure: [facility] Hospital/Surgical Facility: [facility]  Fax number for surgical facility: [phone]  Primary Physician: Bebeto Freitas  Type of Anesthesia Anticipated: Local    Patient has a Health Care Directive or Living Will:  YES     1. NO - Do you have a history of heart attack, stroke, stent, bypass or surgery on an artery in the head, neck, heart or legs?  2. NO - Do you ever have any pain or discomfort in your chest?  3. NO - Do you have a history of  Heart Failure?  4. YES - ARE YOUR TROUBLED BY SHORTNESS OF BREATH WHEN WALKING ON THE LEVEL, UP A SLIGHT HILL OR AT NIGHT? This is not a new symptom.  She attributes this to a \"lack of exercise\".  When questioned further she can easily 4 METS of physical activity without chest pain or dyspnea.   5. NO - Do you currently have a cold, bronchitis or other respiratory infection?  6. NO - Do you have a cough, shortness of breath or wheezing?  7. NO - Do you sometimes get pains in the calves of your legs when you walk?  8. NO - Do you or anyone in your family have previous history of blood clots?  9. NO - Do you or does anyone in your family have a serious bleeding problem such as prolonged bleeding following surgeries or cuts?  10. NO - Have you ever had problems with anemia or been told to take iron pills?  11. NO - Have you had any abnormal blood loss such as black, tarry or bloody stools, or abnormal vaginal bleeding?  12. NO - Have you ever had a blood " "transfusion?  13. NO - Have you or any of your relatives ever had problems with anesthesia?  14. NO - Do you have sleep apnea, excessive snoring or daytime drowsiness?  15. NO - Do you have any prosthetic heart valves?  16. YES - DO YOU HAVE PROSTHETIC JOINTS?   17. NO - Is there any chance that you may be pregnant?        HPI:                                                      Brief HPI related to upcoming procedure: Progressive vision loss and \"glare\" in both eyes and she has been found to have significant bilateral cataracts.  Symptoms are now severe.      Increased dose of norvasc is helping Raynaud's symptoms and no side effects from changing simvastatin to atorvastatin.      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 Please review for accuracy       Status post hip replacement 03/06/2012     Priority: Medium     (Problem list name updated by automated process. Provider to review and confirm.)       Raynaud's syndrome      Priority: Medium     Malignant neoplasm of breast (H) 03/09/2010     Priority: Medium     Do you wish to do the replacement in the background? yes          Past Medical History:   Diagnosis Date     Arthritis      Benign essential hypertension 10/23/2017     Benign essential hypertension 10/23/2017     Calculus of gallbladder without cholecystitis 10/6/2016     Gastroesophageal reflux disease with " esophagitis 10/6/2016     Hyperlipidemia      Malignant neoplasm (H)     breast bilat mastectomy, no rad, no chemobilat      Pulmonary embolism and infarction 3/9/2012    IMO update changed this record. 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     Raynaud's syndrome      Systemic sclerosis (H) 10/6/2016     Past Surgical History:   Procedure Laterality Date     ARTHROPLASTY HIP  3/5/2012    Procedure:ARTHROPLASTY HIP; LEFT TOTAL HIP ARTHROPLASTY (BIOMET)^; Surgeon:SABRINA CALLEJAS; Location:[facility] OR     BIOPSY  8/29/2007    Right Breast Biopsy     BIOPSY  9/12/2007    Right Breast Biopsy x2     BIOPSY  9/13/2007    Left Breast Biopsy     BREAST SURGERY  10/2/2007    Bilateral Mastectomy     BREAST SURGERY  3/2008    Breast Reconstruction Surgery     COLONOSCOPY  2011    repeat in 5 years     ENT SURGERY      salivary gland removal     Current Outpatient Prescriptions   Medication Sig Dispense Refill     calcium-vitamin D (CALTRATE) 600-400 MG-UNIT per tablet Take 1 tablet by mouth 2 times daily       amLODIPine (NORVASC) 10 MG tablet Take 1 tablet (10 mg) by mouth At Bedtime 90 tablet 3     atorvastatin (LIPITOR) 20 MG tablet Take 1 tablet (20 mg) by mouth daily 90 tablet 3     hydroxychloroquine (PLAQUENIL) 200 MG tablet Take 200 mg by mouth 2 times daily       OMEPRAZOLE PO Take 20 mg by mouth daily        Estrogens, Conjugated (PREMARIN VA) Place 0.5 applicators vaginally twice a week At bedtime       Multiple Vitamin (MULTI-VITAMIN) per tablet Take 1 tablet by mouth daily.       Coenzyme Q10 (CO Q 10 PO) Take 200 mg by mouth At Bedtime          Allergies   Allergen Reactions     Metoclopramide      Other reaction(s): Other - Describe In Comment Field  Severe redness in eyes from the preservative in certain eye medications     Other [Seasonal Allergies]      Preservative in eye drops        Social History   Substance Use Topics     Smoking status: Former Smoker     Years: 20.00     Types:  "Cigarettes     Quit date: 1/1/1982     Smokeless tobacco: Never Used     Alcohol use 0.0 oz/week     0 Standard drinks or equivalent per week      Comment: red wine socially, 2-3x week     History   Drug Use No       REVIEW OF SYSTEMS:                                                    Constitutional, neuro, ENT, endocrine, pulmonary, cardiac, gastrointestinal, genitourinary, musculoskeletal, integument and psychiatric systems are negative, except as otherwise noted.      EXAM:                                                    /71 (BP Location: Right arm, Patient Position: Chair, Cuff Size: Adult Regular)  Pulse 63  Temp 96.7  F (35.9  C) (Oral)  Resp 16  Ht 5' 7\" (1.702 m)  Wt 163 lb (73.9 kg)  LMP 07/07/1992  SpO2 97%  BMI 25.53 kg/m2    GENERAL APPEARANCE: healthy, alert and no distress     EYES: EOMI, PERRL     HENT: ear canals and TM's normal and nose and mouth without ulcers or lesions     NECK: no adenopathy, no asymmetry, masses, or scars and thyroid normal to palpation     RESP: lungs clear to auscultation - no rales, rhonchi or wheezes     CV: regular rates and rhythm, normal S1 S2, no S3 or S4 and unchanged 2/6 systolic murmur heard best at USB, no click or rub     ABDOMEN:  soft, nontender, no HSM or masses and bowel sounds normal     MS: extremities normal- no gross deformities noted, no evidence of inflammation in joints, FROM in all extremities.     SKIN: no suspicious lesions or rashes     NEURO: Normal strength and tone, sensory exam grossly normal, mentation intact and speech normal     PSYCH: mentation appears normal. and affect normal/bright     LYMPHATICS: No axillary, cervical, or supraclavicular nodes    DIAGNOSTICS:                                                    No labs or EKG required for low risk surgery (cataract, skin procedure, breast biopsy, etc)    Recent Labs   Lab Test  10/06/16   1021  10/13/14   1632  11/25/13   0910   03/09/12   0642  03/08/12   0650   HGB  12.7 "   --   13.0   < >  9.4*  9.5*   PLT  194   --   211   < >   --    --    INR   --    --    --    --   1.22*  1.38*   NA  138  141   --    < >   --    --    POTASSIUM  4.5  4.3   --    < >   --    --    CR  0.73  0.66  0.73   < >   --    --     < > = values in this interval not displayed.        IMPRESSION:                                                    Reason for surgery/procedure: Cataracts   Diagnosis/reason for consult: Preoperative evaluation     The proposed surgical procedure is considered LOW risk.    REVISED CARDIAC RISK INDEX  The patient has the following serious cardiovascular risks for perioperative complications such as (MI, PE, VFib and 3  AV Block):  No serious cardiac risks  INTERPRETATION: 0 risks: Class I (very low risk - 0.4% complication rate)    The patient has the following additional risks for perioperative complications:  No identified additional risks      ICD-10-CM    1. Preop general physical exam Z01.818    2. Cataract of both eyes, unspecified cataract type H26.9    3. Raynaud's disease without gangrene I73.00    4. Benign essential hypertension I10    5. Hyperlipidemia LDL goal <130 E78.5    6. Systemic sclerosis (H) M34.9    7. Malignant neoplasm of female breast, unspecified estrogen receptor status, unspecified laterality, unspecified site of breast (H) C50.919    8. Pulmonary embolism and infarction I26.99      Can proceed with cataract procedure  Raynauds is better on higher dose of norvasc  Blood pressure well controlled   On statin therapy without side effects recheck lipids   History of pulmonary embolism after orthopedic surgery in 2012     RECOMMENDATIONS:                                                          --Patient is to take all scheduled medications on the day of surgery     APPROVAL GIVEN to proceed with proposed procedure, without further diagnostic evaluation       Signed Electronically by: Bebeto Freitas MD    Copy of this evaluation report is provided to  requesting physician.    [place] Preop Guidelines

## 2017-11-10 ENCOUNTER — TELEPHONE (OUTPATIENT)
Dept: FAMILY MEDICINE | Facility: CLINIC | Age: 77
End: 2017-11-10

## 2017-11-10 NOTE — TELEPHONE ENCOUNTER
Reason for Call:  Other shingles    Detailed comments: patient was at the eye dr today for post op, had cataract surgery yesterday. she thought she had spider bites by eyes, eye Dr said it was shingles and to call primary.  No available appointments, Please advise.     Phone Number Patient can be reached at: Home number on file [phone] (home)    Best Time: any    Can we leave a detailed message on this number? YES    Call taken on 11/10/2017 at 9:52 AM by Deb Green

## 2017-11-28 ENCOUNTER — OFFICE VISIT (OUTPATIENT)
Dept: FAMILY MEDICINE | Facility: CLINIC | Age: 77
End: 2017-11-28
Payer: COMMERCIAL

## 2017-11-28 VITALS
SYSTOLIC BLOOD PRESSURE: 125 MMHG | WEIGHT: 156 LBS | HEART RATE: 67 BPM | DIASTOLIC BLOOD PRESSURE: 65 MMHG | BODY MASS INDEX: 24.48 KG/M2 | HEIGHT: 67 IN | OXYGEN SATURATION: 100 % | TEMPERATURE: 96.8 F

## 2017-11-28 DIAGNOSIS — H26.9 CATARACT OF LEFT EYE, UNSPECIFIED CATARACT TYPE: ICD-10-CM

## 2017-11-28 DIAGNOSIS — Z01.818 PREOP GENERAL PHYSICAL EXAM: Primary | ICD-10-CM

## 2017-11-28 PROCEDURE — 99214 OFFICE O/P EST MOD 30 MIN: CPT | Performed by: NURSE PRACTITIONER

## 2017-11-28 NOTE — NURSING NOTE
"Chief Complaint   Patient presents with     Pre-Op Exam       Initial /65 (Cuff Size: Adult Regular)  Pulse 67  Temp 96.8  F (36  C) (Oral)  Ht 5' 7\" (1.702 m)  Wt 156 lb (70.8 kg)  LMP 07/07/1992  SpO2 100%  Breastfeeding? No  BMI 24.43 kg/m2 Estimated body mass index is 24.43 kg/(m^2) as calculated from the following:    Height as of this encounter: 5' 7\" (1.702 m).    Weight as of this encounter: 156 lb (70.8 kg).  Medication Reconciliation: complete    "

## 2017-11-28 NOTE — PROGRESS NOTES
[address] [address]  [phone]  Dept: [phone]    PRE-OP EVALUATION:  Today's date: 2017    Jeannine Rod (: 1940) presents for pre-operative evaluation assessment as requested by Dr.Phillip Hewitt.  She requires evaluation and anesthesia risk asseSchneider ssment prior to undergoing surgery/procedure for treatment of Cataract .  Proposed procedure: LEFT Cataract    Date of Surgery/ Procedure: 2017  Time of Surgery/ Procedure: [facility]  Hospital/Surgical Facility: [facility]  Fax number for surgical facility: [phone]  Primary Physician: Bebeto Freitas  Type of Anesthesia Anticipated: Local with MAC    Patient has a Health Care Directive or Living Will:  YES , scanned into EPIC     1. NO - Do you have a history of heart attack, stroke, stent, bypass or surgery on an artery in the head, neck, heart or legs?  2. NO - Do you ever have any pain or discomfort in your chest?  3. NO - Do you have a history of  Heart Failure?  4. NO - Are you troubled by shortness of breath when: walking on the level, up a slight hill or at night? Chronic, completes 4 METS without issues   5. NO - Do you currently have a cold, bronchitis or other respiratory infection?  6. NO - Do you have a cough, shortness of breath or wheezing?  7. NO - Do you sometimes get pains in the calves of your legs when you walk?  8. YES - DO YOU OR ANYONE IN YOUR FAMILY HAVE PREVIOUS HISTORY OF BLOOD CLOTS? Self, PE after hip replacement     9. NO - Do you or does anyone in your family have a serious bleeding problem such as prolonged bleeding following surgeries or cuts?  10. NO - Have you ever had problems with anemia or been told to take iron pills?  11. NO - Have you had any abnormal blood loss such as black, tarry or bloody stools, or abnormal vaginal bleeding?  12. NO - Have you ever had a blood transfusion?  13. NO - Have you or any of your relatives ever  had problems with anesthesia?  14. NO - Do you have sleep apnea, excessive snoring or daytime drowsiness?  15. NO - Do you have any prosthetic heart valves?  16. YES - DO YOU HAVE PROSTHETIC JOINTS? Hip  17. NO - Is there any chance that you may be pregnant?        HPI:                                                      Brief HPI related to upcoming procedure: left cataract.      See problem list for active medical problems.  Problems all longstanding and stable, except as noted/documented.  See ROS for pertinent symptoms related to these conditions.                                                                                                      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 Please review for accuracy       Status post hip replacement 03/06/2012     Priority: Medium     (Problem list name updated by automated process. Provider to review and confirm.)       Raynaud's syndrome      Priority: Medium     Malignant neoplasm of breast (H) 03/09/2010     Priority: Medium     Do you wish to do the replacement in the background? yes          Past Medical History:   Diagnosis Date     Arthritis      Benign essential hypertension 10/23/2017     Benign essential hypertension 10/23/2017     Calculus of gallbladder without cholecystitis 10/6/2016     Gastroesophageal reflux disease with esophagitis 10/6/2016      Hyperlipidemia      Malignant neoplasm (H)     breast bilat mastectomy, no rad, no chemobilat      Pulmonary embolism and infarction 3/9/2012    IMO update changed this record. 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     Raynaud's syndrome      Systemic sclerosis (H) 10/6/2016     Past Surgical History:   Procedure Laterality Date     ARTHROPLASTY HIP  3/5/2012    Procedure:ARTHROPLASTY HIP; LEFT TOTAL HIP ARTHROPLASTY (BIOMET)^; Surgeon:SABRINA CALLEJAS; Location:[facility] OR     BIOPSY  8/29/2007    Right Breast Biopsy     BIOPSY  9/12/2007    Right Breast Biopsy x2     BIOPSY  9/13/2007    Left Breast Biopsy     BREAST SURGERY  10/2/2007    Bilateral Mastectomy     BREAST SURGERY  3/2008    Breast Reconstruction Surgery     COLONOSCOPY  2011    repeat in 5 years     ENT SURGERY      salivary gland removal     Current Outpatient Prescriptions   Medication Sig Dispense Refill     calcium-vitamin D (CALTRATE) 600-400 MG-UNIT per tablet Take 1 tablet by mouth 2 times daily       amLODIPine (NORVASC) 10 MG tablet Take 1 tablet (10 mg) by mouth At Bedtime 90 tablet 3     atorvastatin (LIPITOR) 20 MG tablet Take 1 tablet (20 mg) by mouth daily 90 tablet 3     hydroxychloroquine (PLAQUENIL) 200 MG tablet Take 200 mg by mouth 2 times daily       OMEPRAZOLE PO Take 20 mg by mouth daily        Estrogens, Conjugated (PREMARIN VA) Place 0.5 applicators vaginally twice a week At bedtime       Multiple Vitamin (MULTI-VITAMIN) per tablet Take 1 tablet by mouth daily.       Coenzyme Q10 (CO Q 10 PO) Take 200 mg by mouth At Bedtime        OTC products: None, except as noted above    Allergies   Allergen Reactions     Metoclopramide      Other reaction(s): Other - Describe In Comment Field  Severe redness in eyes from the preservative in certain eye medications     Other [Seasonal Allergies]      Preservative in eye drops      Latex Allergy: NO    Social History   Substance Use Topics     Smoking status: Former  "Smoker     Years: 20.00     Types: Cigarettes     Quit date: 1/1/1982     Smokeless tobacco: Never Used     Alcohol use 0.0 oz/week     0 Standard drinks or equivalent per week      Comment: red wine socially, 2-3x week     History   Drug Use No       REVIEW OF SYSTEMS:                                                    Constitutional, HEENT, cardiovascular, pulmonary, gi and gu systems are negative, except as otherwise noted.      EXAM:                                                    /65 (Cuff Size: Adult Regular)  Pulse 67  Temp 96.8  F (36  C) (Oral)  Ht 5' 7\" (1.702 m)  Wt 156 lb (70.8 kg)  LMP 07/07/1992  SpO2 100%  Breastfeeding? No  BMI 24.43 kg/m2  GENERAL APPEARANCE: healthy, alert and no distress  HENT: ear canals and TM's normal and nose and mouth without ulcers or lesions  RESP: lungs clear to auscultation - no rales, rhonchi or wheezes  CV: regular rate and rhythm, normal S1 S2, no S3 or S4 and no murmur, click or rub   NEURO: Normal strength and tone, sensory exam grossly normal, mentation intact and speech normal    DIAGNOSTICS:                                                    No labs or EKG required for low risk surgery (cataract, skin procedure, breast biopsy, etc)    Recent Labs   Lab Test  10/23/17   0936  10/06/16   1021   03/09/12   0642  03/08/12   0650   HGB  12.8  12.7   < >  9.4*  9.5*   PLT  209  194   < >   --    --    INR   --    --    --   1.22*  1.38*   NA  138  138   < >   --    --    POTASSIUM  4.4  4.5   < >   --    --    CR  0.82  0.73   < >   --    --     < > = values in this interval not displayed.        IMPRESSION:                                                    Reason for surgery/procedure: left cataract   Diagnosis/reason for consult: medical preop    The proposed surgical procedure is considered LOW risk.    REVISED CARDIAC RISK INDEX  The patient has the following serious cardiovascular risks for perioperative complications such as (MI, PE, VFib and 3  " AV Block):  No serious cardiac risks  INTERPRETATION: 0 risks: Class I (very low risk - 0.4% complication rate)    The patient has the following additional risks for perioperative complications:  No identified additional risks      ICD-10-CM    1. Preop general physical exam Z01.818    2. Cataract of left eye, unspecified cataract type H26.9        RECOMMENDATIONS:                                                          --Patient is to take all scheduled medications on the day of surgery EXCEPT for modifications listed below.      APPROVAL GIVEN to proceed with proposed procedure, without further diagnostic evaluation       Signed Electronically by: FLORENCIO Davis CNP    Copy of this evaluation report is provided to requesting physician.    [place] Preop Guidelines

## 2017-11-28 NOTE — MR AVS SNAPSHOT
After Visit Summary   11/28/2017    Jeannine Rod    MRN: [id number]           Patient Information     Date Of Birth          1940        Visit Information        Provider Department      11/28/2017 8:00 AM Jolene Hu APRN CNP [facility]        Today's Diagnoses     Preop general physical exam    -  1    Cataract of left eye, unspecified cataract type          Care Instructions      Before Your Surgery      Call your surgeon if there is any change in your health. This includes signs of a cold or flu (such as a sore throat, runny nose, cough, rash or fever).    Do not smoke, drink alcohol or take over the counter medicine (unless your surgeon or primary care doctor tells you to) for the 24 hours before and after surgery.    If you take prescribed drugs: Follow your doctor s orders about which medicines to take and which to stop until after surgery.    Eating and drinking prior to surgery: follow the instructions from your surgeon    Take a shower or bath the night before surgery. Use the soap your surgeon gave you to gently clean your skin. If you do not have soap from your surgeon, use your regular soap. Do not shave or scrub the surgery site.  Wear clean pajamas and have clean sheets on your bed.           Follow-ups after your visit        Who to contact     If you have questions or need follow up information about today's clinic visit or your schedule please contact [facility] directly at [phone].  Normal or non-critical lab and imaging results will be communicated to you by MyChart, letter or phone within 4 business days after the clinic has received the results. If you do not hear from us within 7 days, please contact the clinic through MyChart or phone. If you have a critical or abnormal lab result, we will notify you by phone as soon as possible.  Submit refill requests through [employer] or call your pharmacy and they will forward the refill request  "to us. Please allow 3 business days for your refill to be completed.          Additional Information About Your Visit        MyChart Information     [employer] lets you send messages to your doctor, view your test results, renew your prescriptions, schedule appointments and more. To sign up, go to [email or web address] . Click on \"Log in\" on the left side of the screen, which will take you to the Welcome page. Then click on \"Sign up Now\" on the right side of the page.     You will be asked to enter the access code listed below, as well as some personal information. Please follow the directions to create your username and password.     Your access code is: [email or web address]  Expires: 2018 11:25 AM     Your access code will  in 90 days. If you need help or a new code, please call your [place] clinic or [phone].        Care EveryWhere ID     This is your [facility] EveryWhere ID. This could be used by other organizations to access your [place] medical records  [id number]        Your Vitals Were     Pulse Temperature Height Last Period Pulse Oximetry Breastfeeding?    67 96.8  F (36  C) (Oral) 5' 7\" (1.702 m) 1992 100% No    BMI (Body Mass Index)                   24.43 kg/m2            Blood Pressure from Last 3 Encounters:   17 125/65   10/23/17 123/71   10/09/17 141/74    Weight from Last 3 Encounters:   17 156 lb (70.8 kg)   10/23/17 163 lb (73.9 kg)   10/09/17 163 lb (73.9 kg)              Today, you had the following     No orders found for display       Primary Care Provider Office Phone # Fax #    Bebeto Freitas [phone]       [facility] [address] [facility] [address]  [facility] [address]        Equal Access to Services     ERIK CARLTON : Judie bailon Somagaly, waaxda luqadaha, qaybta kaalannie frias . [facility] [phone].    ATENCIÓN: Si habla hattieañol, tiene a holley disposición servicios gratuitos de asistencia " lingüística. Thien al [phone].    We comply with applicable federal civil rights laws and [place] laws. We do not discriminate on the basis of race, color, national origin, age, disability, sex, sexual orientation, or gender identity.            Thank you!     Thank you for choosing [facility]  for your care. Our goal is always to provide you with excellent care. Hearing back from our patients is one way we can continue to improve our services. Please take a few minutes to complete the written survey that you may receive in the mail after your visit with us. Thank you!             Your Updated Medication List - Protect others around you: Learn how to safely use, store and throw away your medicines at www.disposemymeds.org.          This list is accurate as of: 11/28/17  4:21 PM.  Always use your most recent med list.                   Brand Name Dispense Instructions for use Diagnosis    amLODIPine 10 MG tablet    NORVASC    90 tablet    Take 1 tablet (10 mg) by mouth At Bedtime    Benign essential hypertension       atorvastatin 20 MG tablet    LIPITOR    90 tablet    Take 1 tablet (20 mg) by mouth daily    Hyperlipidemia LDL goal <130       calcium-vitamin D 600-400 MG-UNIT per tablet    CALTRATE     Take 1 tablet by mouth 2 times daily        CO Q 10 PO      Take 200 mg by mouth At Bedtime        hydroxychloroquine 200 MG tablet    PLAQUENIL     Take 200 mg by mouth 2 times daily        Multi-vitamin Tabs tablet   Generic drug:  multivitamin, therapeutic with minerals      Take 1 tablet by mouth daily.        OMEPRAZOLE PO      Take 20 mg by mouth daily        PREMARIN VA      Place 0.5 applicators vaginally twice a week At bedtime

## 2018-03-28 ENCOUNTER — OFFICE VISIT (OUTPATIENT)
Dept: FAMILY MEDICINE | Facility: CLINIC | Age: 78
End: 2018-03-28
Payer: COMMERCIAL

## 2018-03-28 VITALS
WEIGHT: 155.3 LBS | TEMPERATURE: 96.4 F | BODY MASS INDEX: 24.37 KG/M2 | HEART RATE: 72 BPM | SYSTOLIC BLOOD PRESSURE: 133 MMHG | OXYGEN SATURATION: 100 % | DIASTOLIC BLOOD PRESSURE: 72 MMHG | HEIGHT: 67 IN

## 2018-03-28 DIAGNOSIS — I10 BENIGN ESSENTIAL HYPERTENSION: ICD-10-CM

## 2018-03-28 DIAGNOSIS — R41.3 MEMORY LOSS: Primary | ICD-10-CM

## 2018-03-28 LAB
ANION GAP SERPL CALCULATED.3IONS-SCNC: 10 MMOL/L (ref 3–14)
BUN SERPL-MCNC: 19 MG/DL (ref 7–30)
CALCIUM SERPL-MCNC: 9.1 MG/DL (ref 8.5–10.1)
CHLORIDE SERPL-SCNC: 103 MMOL/L (ref 94–109)
CO2 SERPL-SCNC: 24 MMOL/L (ref 20–32)
CREAT SERPL-MCNC: 0.8 MG/DL (ref 0.52–1.04)
GFR SERPL CREATININE-BSD FRML MDRD: 69 ML/MIN/1.7M2
GLUCOSE SERPL-MCNC: 83 MG/DL (ref 70–99)
POTASSIUM SERPL-SCNC: 4.4 MMOL/L (ref 3.4–5.3)
SODIUM SERPL-SCNC: 137 MMOL/L (ref 133–144)
TSH SERPL DL<=0.005 MIU/L-ACNC: 4.99 MU/L (ref 0.4–4)
VIT B12 SERPL-MCNC: 793 PG/ML (ref 193–986)

## 2018-03-28 PROCEDURE — 36415 COLL VENOUS BLD VENIPUNCTURE: CPT | Performed by: INTERNAL MEDICINE

## 2018-03-28 PROCEDURE — 84443 ASSAY THYROID STIM HORMONE: CPT | Performed by: INTERNAL MEDICINE

## 2018-03-28 PROCEDURE — 99214 OFFICE O/P EST MOD 30 MIN: CPT | Performed by: INTERNAL MEDICINE

## 2018-03-28 PROCEDURE — 82607 VITAMIN B-12: CPT | Performed by: INTERNAL MEDICINE

## 2018-03-28 PROCEDURE — 80048 BASIC METABOLIC PNL TOTAL CA: CPT | Performed by: INTERNAL MEDICINE

## 2018-03-28 NOTE — LETTER
[facility]  [address] [place] [address] [address] [facility]  [address]  [place]  [address]  Tel: [phone]    March 29, 2018    Jeannine Rod  [address]  [place] [address]        Dear Ms. Rod,    The following letter pertains to your most recent diagnostic tests:    -Your vitamin B12 level is normal.     -Your thyroid test is just out of the normal range.  I would not recommend treatment based on this result, but it should be rechecked when you return to see me for your preventive exam.    -Kidney function is normal for you (Creatinine, GFR), Sodium is normal for you, Potassium is normal for you, Calcium is normal for you, Glucose (blood sugar) is normal for you.       Bottom line:  There are no dangerous or easily reversible causes for your memory complaints on these lab results.   We should recheck your thyroid level when you return to see me in a few months.        Follow up:  Schedule memory testing with Dr. Duke as soon as you can.  Return to see me for your preventive exam in October, or return sooner if needed.      Sincerely,    Bebeto Freitas MD/SML          Enclosure: Lab Results  Results for orders placed or performed in visit on 03/28/18   TSH   Result Value Ref Range    TSH 4.99 (H) 0.40 - 4.00 mU/L   Vitamin B12   Result Value Ref Range    Vitamin B12 793 193 - 986 pg/mL   Basic metabolic panel   Result Value Ref Range    Sodium 137 133 - 144 mmol/L    Potassium 4.4 3.4 - 5.3 mmol/L    Chloride 103 94 - 109 mmol/L    Carbon Dioxide 24 20 - 32 mmol/L    Anion Gap 10 3 - 14 mmol/L    Glucose 83 70 - 99 mg/dL    Urea Nitrogen 19 7 - 30 mg/dL    Creatinine 0.80 0.52 - 1.04 mg/dL    GFR Estimate 69 >60 mL/min/1.7m2    GFR Estimate If Black 84 >60 mL/min/1.7m2    Calcium 9.1 8.5 - 10.1 mg/dL

## 2018-03-28 NOTE — MR AVS SNAPSHOT
After Visit Summary   3/28/2018    Jeannine Rod    MRN: [id number]           Patient Information     Date Of Birth          1940        Visit Information        Provider Department      3/28/2018 9:00 AM Bebeto Freitas MD [facility]        Today's Diagnoses     Memory loss    -  1    Benign essential hypertension           Follow-ups after your visit        Additional Services     MEMORY CLINIC REFERRAL       [place] [facility] [phone]    Please answer the following questions to ensure a successful referral:    Has the patient been diagnosed with any positive neuro-cognitive impairment? No    Primary objective or goal of consultation (please provide some detail):  New symptoms     What is the name of the person that should be contacted to schedule the appt?  Jeannine (Patient)     What is the relationship to the patient? Self    What is the best number to reach them at?  Home      The patient/family will be contacted within 1-2 business days to schedule your appointment. If you haven't heard from us within that timeframe, please call the number above.     Please be aware that coverage of these services is subject to the terms and limitations of your health insurance plan.  Call member services at your health plan with any benefit or coverage questions.      Please bring the following to your appointment:  >>   Any x-rays, CTs or MRIs which have been performed.  Contact the facility where they were done to arrange for  prior to your scheduled appointment.  Any new CT, MRI or other procedures ordered by your specialist must be performed at a [place] facility or coordinated by your clinic's referral office.    >>   List of current medications   >>   This referral request   >>   Any documents/labs given to you for this referral                  Who to contact     If you have questions or need follow up information about  "today's clinic visit or your schedule please contact [facility] directly at [phone].  Normal or non-critical lab and imaging results will be communicated to you by MyChart, letter or phone within 4 business days after the clinic has received the results. If you do not hear from us within 7 days, please contact the clinic through [employer]Kick[employer] or phone. If you have a critical or abnormal lab result, we will notify you by phone as soon as possible.  Submit refill requests through [employer] or call your pharmacy and they will forward the refill request to us. Please allow 3 business days for your refill to be completed.          Additional Information About Your Visit        [employer] Information     [employer] lets you send messages to your doctor, view your test results, renew your prescriptions, schedule appointments and more. To sign up, go to [email or web address] . Click on \"Log in\" on the left side of the screen, which will take you to the Welcome page. Then click on \"Sign up Now\" on the right side of the page.     You will be asked to enter the access code listed below, as well as some personal information. Please follow the directions to create your username and password.     Your access code is: [email or web address]  Expires: 2018  9:55 AM     Your access code will  in 90 days. If you need help or a new code, please call your [place] clinic or [phone].        Care EveryWhere ID     This is your Care EveryWhere ID. This could be used by other organizations to access your [place] medical records  [id number]        Your Vitals Were     Pulse Temperature Height Last Period Pulse Oximetry BMI (Body Mass Index)    72 96.4  F (35.8  C) (Oral) 5' 7\" (1.702 m) 1992 100% 24.32 kg/m2       Blood Pressure from Last 3 Encounters:   18 133/72   17 125/65   10/23/17 123/71    Weight from Last 3 Encounters:   18 155 lb 4.8 oz (70.4 kg)   17 156 lb (70.8 kg)   10/23/17 163 lb " (73.9 kg)              We Performed the Following     Basic metabolic panel     MEMORY CLINIC REFERRAL     TSH     Vitamin B12        Primary Care Provider Office Phone # Fax #    Bebeto Freitas [phone]       [facility] [address] [facility] [address]  [facility] [address]        Equal Access to Services     ERIK CARLTON : Hadii aad ku hadasho Soomaali, waaxda luqadaha, qaybta kaalmada adeegyada, waxay idiin hayaan adeeg khfam labin . So [facility] [phone].    ATENCIÓN: Si habla español, tiene a holley disposición servicios gratuitos de asistencia lingüística. Llame al [phone].    We comply with applicable federal civil rights laws and [place] laws. We do not discriminate on the basis of race, color, national origin, age, disability, sex, sexual orientation, or gender identity.            Thank you!     Thank you for choosing [facility]  for your care. Our goal is always to provide you with excellent care. Hearing back from our patients is one way we can continue to improve our services. Please take a few minutes to complete the written survey that you may receive in the mail after your visit with us. Thank you!             Your Updated Medication List - Protect others around you: Learn how to safely use, store and throw away your medicines at www.disposemymeds.org.          This list is accurate as of 3/28/18  9:55 AM.  Always use your most recent med list.                   Brand Name Dispense Instructions for use Diagnosis    amLODIPine 10 MG tablet    NORVASC    90 tablet    Take 1 tablet (10 mg) by mouth At Bedtime    Benign essential hypertension       atorvastatin 20 MG tablet    LIPITOR    90 tablet    Take 1 tablet (20 mg) by mouth daily    Hyperlipidemia LDL goal <130       calcium-vitamin D 600-400 MG-UNIT per tablet    CALTRATE     Take 1 tablet by mouth 2 times daily        CO Q 10 PO      Take 200 mg by mouth At Bedtime        hydroxychloroquine 200 MG tablet     PLAQUENIL     Take 200 mg by mouth 2 times daily        Melatonin ER 1 MG Tbcr      Take 1 mg by mouth nightly as needed    Memory loss       Multi-vitamin Tabs tablet   Generic drug:  multivitamin, therapeutic with minerals      Take 1 tablet by mouth daily.        OMEPRAZOLE PO      Take 20 mg by mouth daily        PREMARIN VA      Place 0.5 applicators vaginally twice a week At bedtime
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
## 2018-03-28 NOTE — NURSING NOTE
"Chief Complaint   Patient presents with     Consult     Memory loss        Initial /72 (BP Location: Right arm, Patient Position: Chair, Cuff Size: Adult Regular)  Pulse 72  Temp 96.4  F (35.8  C) (Oral)  Ht 5' 7\" (1.702 m)  Wt 155 lb 4.8 oz (70.4 kg)  LMP 07/07/1992  SpO2 100%  BMI 24.32 kg/m2 Estimated body mass index is 24.32 kg/(m^2) as calculated from the following:    Height as of this encounter: 5' 7\" (1.702 m).    Weight as of this encounter: 155 lb 4.8 oz (70.4 kg)..    BP completed using cuff size: regular  MEDICATIONS REVIEWED  SOCIAL AND FAMILY HX REVIEWED  Edith Giang CMA  "

## 2018-03-29 NOTE — PROGRESS NOTES
The following letter pertains to your most recent diagnostic tests:    -Your vitamin B12 level is normal.     -Your thyroid test is just out of the normal range.  I would not recommend treatment based on this result, but it should be rechecked when you return to see me for your preventive exam.    -Kidney function is normal for you (Creatinine, GFR), Sodium is normal for you, Potassium is normal for you, Calcium is normal for you, Glucose (blood sugar) is normal for you.       Bottom line:  There are no dangerous or easily reversible causes for your memory complaints on these lab results.   We should recheck your thyroid level when you return to see me in a few months.        Follow up:  Schedule memory testing with Dr. Duke as soon as you can.  Return to see me for your preventive exam in October, or return sooner if needed.        Sincerely,    Dr. Freitas

## 2018-07-03 ENCOUNTER — OFFICE VISIT (OUTPATIENT)
Dept: FAMILY MEDICINE | Facility: CLINIC | Age: 78
End: 2018-07-03
Payer: COMMERCIAL

## 2018-07-03 VITALS
WEIGHT: 155 LBS | BODY MASS INDEX: 24.33 KG/M2 | HEART RATE: 69 BPM | TEMPERATURE: 97.5 F | HEIGHT: 67 IN | DIASTOLIC BLOOD PRESSURE: 73 MMHG | SYSTOLIC BLOOD PRESSURE: 125 MMHG

## 2018-07-03 DIAGNOSIS — Z17.0 MALIGNANT NEOPLASM OF BREAST IN FEMALE, ESTROGEN RECEPTOR POSITIVE, UNSPECIFIED LATERALITY, UNSPECIFIED SITE OF BREAST (H): ICD-10-CM

## 2018-07-03 DIAGNOSIS — R41.3 MEMORY CHANGE: Primary | ICD-10-CM

## 2018-07-03 DIAGNOSIS — N95.2 ATROPHIC VAGINITIS: ICD-10-CM

## 2018-07-03 DIAGNOSIS — C50.919 MALIGNANT NEOPLASM OF BREAST IN FEMALE, ESTROGEN RECEPTOR POSITIVE, UNSPECIFIED LATERALITY, UNSPECIFIED SITE OF BREAST (H): ICD-10-CM

## 2018-07-03 PROCEDURE — 99215 OFFICE O/P EST HI 40 MIN: CPT | Performed by: INTERNAL MEDICINE

## 2018-07-03 NOTE — PROGRESS NOTES
"MEMORY EVALUATION CLINIC - INITIAL EVALUATION    HPI: Jeannine Rod is a pleasant 77yoF patient of Dr. Freitas's referred for recent memory concerns; she estimates 4-5 years of gradual memory changes. Here today by herself. She reports that her kids haven't noticed changes (but doesn't see them often). Also her partner Pepe initially had a couple concerns with her short-term memory leading up to this visit but she reports he now hasn't noticed any new concerns.    She admits to some occasional word finding difficulty and one time put peanut butter jar in the wrong cabinet (but next to where it is supposed to be). Her partner only noticed these two things and it hasn't been pervasive. She also finds its more difficult to multi-task but is still able to function at a high level. Just in the past year she decided to stop doing some part-time jobs b/c didn't want to do it anymore and wanted to relax more and felt she deserved at the age of 77 to be retired.    As an aside, Premarin vaginal cream only used every 2-3 weeks so needs to get back to regularly use it. Having more UTIs recently now that hasn't had the cream. Otherwise Rx 5-6 years. H/o breast cancer dx about 12 years ago and used to see Dr. Cayla Huerta before she retired and she doesn't recall a discussion about Premarin. But she laughs, \"If I'm here for memory then who knows!\". But we discussed quality/quantity of life and she favors being on Premarin despite being ER and NE positive.    Social Hx (Employment/Schooling): Originally from [place] [facility], [facility], [employer] for philosophy, [employer] human resources. Worked as a  and in human relations. 2 children (one local and another [place]) who both work really hard and she doesn't see them regularly. She is  but lives with a male partner now (Pepe Vaughan).  PMH: Reviewed. Most notable for: HTN, Hyperlipidemia, Breast cancer s/p bilateral mastectomies, h/o " "Guillain barre syndrome  Is there a h/o delirium, CVA/TIA, TBI, substances, parkinsonism?: H/o Guillain barre syndrome; none other of the above. No significant alcohol abuse  Family Hx: Paternal grandmother may have had some cognitive impairment per stories  Advanced Directive: On file    ADLs: Independent  Driving: Denies difficulty  Finances: Denies difficulty  Shopping/housekeeping/meal prep: Dislikes doing house work but that is unchanged over the years  Medications: Sometimes forgets AM Omeprazole but no difficulty with evening medications  Home situation: with partner in single family home  Support: Family/Friends  Behaviors/Hallucinations/Delusions: She doesn't think there are any changes    REVIEW OF SYSTEMS: Detailed as above     OBJECTIVE: /73 (BP Location: Right arm, Cuff Size: Adult Regular)  Pulse 69  Temp 97.5  F (36.4  C) (Tympanic)  Ht 5' 7\" (1.702 m)  Wt 155 lb (70.3 kg)  LMP 07/07/1992  BMI 24.28 kg/m2  Bright, pleasant, NAD, well-groomed  Normal speech and gait  No tremor  MoCA 28/30 (5, 3, 2, 1, 3, 2, 1, 2, 3, 6). 3/5 delayed recall but otherwise within normal limits.     INVESTIGATIONS: March 2018 with normal B12 and slightly elevated TSH (normal in the past). No head imaging on file.      ASSESSMENT/PLAN: Jeannine is a pleasant 77 yoF here by herself without corroborative history with some slight memory changes that don't appear at this time to impact her day-to-day functioning. Her MoCA was within normal limits. Likely at this time she has some normal age-related memory changes versus the very start of cognitive impairment in an otherwise well-educated woman who has great cognitive reserve. Discussed keeping mind active as she is already doing and monitoring annually or sooner if questions/concerns. Though her partner Pepe isn't here today, she reports he hasn't been as concerned as he was initially when the concerns were brought up to Dr. Freitas. No additional medications indicated " for cognition at this time. Could also repeat TSH annually as it is trending upwards but that may just be with age too (was 4.99 in March).    Separately, she asked that I refill her topical vaginal premarin cream that has worked well in the past as she has significant vaginal dryness. This may also help to reduce her bladder infections. Noted past h/o breast cancer with bilateral mastectomies and we discussed quality/quantity of life and she favors being on Premarin despite being ER and NH positive.    Patient Instructions   Keep your mind active and we can repeat the cognitive test in about a year (looks good today)    I refilled your Premarin cream to use 1-2 times per week to hopefully help with vaginal dryness and reduction of bladder infections          Thank you for this consultation!    MDM: 45 minutes spent with patient, over 50% time counseling, coordinating care and explaining about nature of the patient's conditions.    Octavia Duke, DO  Internal Medicine and Geriatrics   [facility]

## 2018-07-03 NOTE — MR AVS SNAPSHOT
After Visit Summary   7/3/2018    Jeannine Rod    MRN: [id number]           Patient Information     Date Of Birth          1940        Visit Information        Provider Department      7/3/2018 10:00 AM Octavia Duke, DO [facility]        Today's Diagnoses     Memory change    -  1    Atrophic vaginitis          Care Instructions    Keep your mind active and we can repeat the cognitive test in about a year (looks good today)    I refilled your Premarin cream to use 1-2 times per week to hopefully help with vaginal dryness and reduction of bladder infections              Follow-ups after your visit        Who to contact     If you have questions or need follow up information about today's clinic visit or your schedule please contact [facility] directly at [phone].  Normal or non-critical lab and imaging results will be communicated to you by MyChart, letter or phone within 4 business days after the clinic has received the results. If you do not hear from us within 7 days, please contact the clinic through MyChart or phone. If you have a critical or abnormal lab result, we will notify you by phone as soon as possible.  Submit refill requests through [employer] or call your pharmacy and they will forward the refill request to us. Please allow 3 business days for your refill to be completed.          Additional Information About Your Visit        MyChart Information     [employer] gives you secure access to your electronic health record. If you see a primary care provider, you can also send messages to your care team and make appointments. If you have questions, please call your primary care clinic.  If you do not have a primary care provider, please call [phone] and they will assist you.        Care EveryWhere ID     This is your Care EveryWhere ID. This could be used by other organizations to access your [place] medical records  [id number]        Your Vitals  "Were     Pulse Temperature Height Last Period BMI (Body Mass Index)       69 97.5  F (36.4  C) (Tympanic) 5' 7\" (1.702 m) 07/07/1992 24.28 kg/m2        Blood Pressure from Last 3 Encounters:   07/03/18 125/73   03/28/18 133/72   11/28/17 125/65    Weight from Last 3 Encounters:   07/03/18 155 lb (70.3 kg)   03/28/18 155 lb 4.8 oz (70.4 kg)   11/28/17 156 lb (70.8 kg)              Today, you had the following     No orders found for display         Today's Medication Changes          These changes are accurate as of 7/3/18 11:04 AM.  If you have any questions, ask your nurse or doctor.               These medicines have changed or have updated prescriptions.        Dose/Directions    conjugated estrogens cream   Commonly known as:  PREMARIN   This may have changed:    - medication strength  - how much to take   Used for:  Atrophic vaginitis   Changed by:  Octavia Duke DO        Dose:  0.5 g   Start taking on:  7/5/2018   Place 0.5 g vaginally twice a week At bedtime   Quantity:  30 g   Refills:  1            Where to get your medicines      These medications were sent to [id number] - [place], [place] - [address], [place] [address]     Phone:  [phone]     conjugated estrogens cream                Primary Care Provider Office Phone # Fax #    Bebeto NATALYA Freitas [phone] [address] [address]        Equal Access to Services     [facility] AH: Hadii denisha sampson haddee Somagaly, waaxda luqadaha, qaybta kaalmada ivon, annie fry. So [facility] [phone].    ATENCIÓN: Si habla español, tiene a holley disposición servicios gratuitos de asistencia lingüística. Llame al [phone].    We comply with applicable federal civil rights laws and [place] laws. We do not discriminate on the basis of race, color, national origin, age, disability, sex, sexual orientation, or gender " identity.            Thank you!     Thank you for choosing [facility]  for your care. Our goal is always to provide you with excellent care. Hearing back from our patients is one way we can continue to improve our services. Please take a few minutes to complete the written survey that you may receive in the mail after your visit with us. Thank you!             Your Updated Medication List - Protect others around you: Learn how to safely use, store and throw away your medicines at www.disposemymeds.org.          This list is accurate as of 7/3/18 11:04 AM.  Always use your most recent med list.                   Brand Name Dispense Instructions for use Diagnosis    amLODIPine 10 MG tablet    NORVASC    90 tablet    Take 1 tablet (10 mg) by mouth At Bedtime    Benign essential hypertension       atorvastatin 20 MG tablet    LIPITOR    90 tablet    Take 1 tablet (20 mg) by mouth daily    Hyperlipidemia LDL goal <130       calcium-vitamin D 600-400 MG-UNIT per tablet    CALTRATE     Take 1 tablet by mouth 2 times daily        CO Q 10 PO      Take 200 mg by mouth At Bedtime        conjugated estrogens cream   Start taking on:  7/5/2018    PREMARIN    30 g    Place 0.5 g vaginally twice a week At bedtime    Atrophic vaginitis       hydroxychloroquine 200 MG tablet    PLAQUENIL     Take 200 mg by mouth 2 times daily        Melatonin ER 1 MG Tbcr      Take 1 mg by mouth nightly as needed    Memory loss       Multi-vitamin Tabs tablet   Generic drug:  multivitamin, therapeutic with minerals      Take 1 tablet by mouth daily.        OMEPRAZOLE PO      Take 20 mg by mouth daily

## 2018-07-03 NOTE — Clinical Note
CONRADO, thank you for this nice consult! She did well on MoCA; likely age related memory changes vs the very start of cognitive impairment in an otherwise well-educated woman who has great cognitive reserve.

## 2018-07-03 NOTE — PATIENT INSTRUCTIONS
Keep your mind active and we can repeat the cognitive test in about a year (looks good today)    I refilled your Premarin cream to use 1-2 times per week to hopefully help with vaginal dryness and reduction of bladder infections

## 2018-07-22 PROBLEM — N95.2 ATROPHIC VAGINITIS: Status: ACTIVE | Noted: 2018-07-22

## 2018-09-28 DIAGNOSIS — E78.5 HYPERLIPIDEMIA LDL GOAL <130: ICD-10-CM

## 2018-09-28 RX ORDER — ATORVASTATIN CALCIUM 20 MG/1
TABLET, FILM COATED ORAL
Qty: 90 TABLET | Refills: 0 | Status: SHIPPED | OUTPATIENT
Start: 2018-09-28 | End: 2018-12-26

## 2018-09-28 NOTE — TELEPHONE ENCOUNTER
"Requested Prescriptions   Pending Prescriptions Disp Refills     atorvastatin (LIPITOR) 20 MG tablet [Pharmacy Med Name: ATORVASTATIN 20MG TABLETS] 90 tablet 0    Last Written Prescription Date:  10/9/2017  Last Fill Quantity: 90,  # refills: 3   Last office visit: 7/3/2018 with prescribing provider:  Srikanth   Future Office Visit:     Sig: TAKE 1 TABLET(20 MG) BY MOUTH DAILY    Statins Protocol Passed    9/28/2018  1:56 PM       Passed - LDL on file in past 12 months    Recent Labs   Lab Test  10/23/17   0936   LDL  70            Passed - No abnormal creatine kinase in past 12 months    No lab results found.            Passed - Recent (12 mo) or future (30 days) visit within the authorizing provider's specialty    Patient had office visit in the last 12 months or has a visit in the next 30 days with authorizing provider or within the authorizing provider's specialty.  See \"Patient Info\" tab in inbasket, or \"Choose Columns\" in Meds & Orders section of the refill encounter.           Passed - Patient is age 18 or older       Passed - No active pregnancy on record       Passed - No positive pregnancy test in past 12 months        A 90 day supply is given, patient is due for an office visit.  Please call to  assist the patient in scheduling an appointment.  Jaclyn HAUSER RN  [employer] [facility]        "

## 2018-11-01 ENCOUNTER — OFFICE VISIT (OUTPATIENT)
Dept: FAMILY MEDICINE | Facility: CLINIC | Age: 78
End: 2018-11-01
Payer: COMMERCIAL

## 2018-11-01 VITALS
DIASTOLIC BLOOD PRESSURE: 67 MMHG | WEIGHT: 157 LBS | HEIGHT: 67 IN | TEMPERATURE: 97.3 F | SYSTOLIC BLOOD PRESSURE: 130 MMHG | BODY MASS INDEX: 24.64 KG/M2 | HEART RATE: 68 BPM

## 2018-11-01 DIAGNOSIS — I10 BENIGN ESSENTIAL HYPERTENSION: ICD-10-CM

## 2018-11-01 DIAGNOSIS — N94.10 DYSPAREUNIA IN FEMALE: ICD-10-CM

## 2018-11-01 DIAGNOSIS — C50.919 MALIGNANT NEOPLASM OF BREAST IN FEMALE, ESTROGEN RECEPTOR POSITIVE, UNSPECIFIED LATERALITY, UNSPECIFIED SITE OF BREAST (H): ICD-10-CM

## 2018-11-01 DIAGNOSIS — R10.13 ABDOMINAL PAIN, EPIGASTRIC: ICD-10-CM

## 2018-11-01 DIAGNOSIS — E78.5 HYPERLIPIDEMIA LDL GOAL <100: ICD-10-CM

## 2018-11-01 DIAGNOSIS — K21.00 GASTROESOPHAGEAL REFLUX DISEASE WITH ESOPHAGITIS: ICD-10-CM

## 2018-11-01 DIAGNOSIS — Z23 NEED FOR PROPHYLACTIC VACCINATION AND INOCULATION AGAINST INFLUENZA: ICD-10-CM

## 2018-11-01 DIAGNOSIS — Z17.0 MALIGNANT NEOPLASM OF BREAST IN FEMALE, ESTROGEN RECEPTOR POSITIVE, UNSPECIFIED LATERALITY, UNSPECIFIED SITE OF BREAST (H): ICD-10-CM

## 2018-11-01 DIAGNOSIS — Z00.00 ROUTINE GENERAL MEDICAL EXAMINATION AT A HEALTH CARE FACILITY: Primary | ICD-10-CM

## 2018-11-01 DIAGNOSIS — M34.9 SYSTEMIC SCLEROSIS (H): ICD-10-CM

## 2018-11-01 LAB
ALBUMIN SERPL-MCNC: 4.1 G/DL (ref 3.4–5)
ALP SERPL-CCNC: 67 U/L (ref 40–150)
ALT SERPL W P-5'-P-CCNC: 29 U/L (ref 0–50)
ANION GAP SERPL CALCULATED.3IONS-SCNC: 10 MMOL/L (ref 3–14)
AST SERPL W P-5'-P-CCNC: 16 U/L (ref 0–45)
BILIRUB SERPL-MCNC: 0.5 MG/DL (ref 0.2–1.3)
BUN SERPL-MCNC: 15 MG/DL (ref 7–30)
CALCIUM SERPL-MCNC: 9.4 MG/DL (ref 8.5–10.1)
CHLORIDE SERPL-SCNC: 104 MMOL/L (ref 94–109)
CHOLEST SERPL-MCNC: 167 MG/DL
CO2 SERPL-SCNC: 27 MMOL/L (ref 20–32)
CREAT SERPL-MCNC: 0.75 MG/DL (ref 0.52–1.04)
ERYTHROCYTE [DISTWIDTH] IN BLOOD BY AUTOMATED COUNT: 13.9 % (ref 10–15)
GFR SERPL CREATININE-BSD FRML MDRD: 76 ML/MIN/1.7M2
GLUCOSE SERPL-MCNC: 87 MG/DL (ref 70–99)
HCT VFR BLD AUTO: 39.5 % (ref 35–47)
HDLC SERPL-MCNC: 84 MG/DL
HGB BLD-MCNC: 12.7 G/DL (ref 11.7–15.7)
LDLC SERPL CALC-MCNC: 72 MG/DL
MCH RBC QN AUTO: 29.1 PG (ref 26.5–33)
MCHC RBC AUTO-ENTMCNC: 32.2 G/DL (ref 31.5–36.5)
MCV RBC AUTO: 91 FL (ref 78–100)
NONHDLC SERPL-MCNC: 83 MG/DL
PLATELET # BLD AUTO: 191 10E9/L (ref 150–450)
POTASSIUM SERPL-SCNC: 4 MMOL/L (ref 3.4–5.3)
PROT SERPL-MCNC: 7.4 G/DL (ref 6.8–8.8)
RBC # BLD AUTO: 4.36 10E12/L (ref 3.8–5.2)
SODIUM SERPL-SCNC: 141 MMOL/L (ref 133–144)
TRIGL SERPL-MCNC: 56 MG/DL
WBC # BLD AUTO: 5.4 10E9/L (ref 4–11)

## 2018-11-01 PROCEDURE — 85027 COMPLETE CBC AUTOMATED: CPT | Performed by: INTERNAL MEDICINE

## 2018-11-01 PROCEDURE — 99397 PER PM REEVAL EST PAT 65+ YR: CPT | Mod: 25 | Performed by: INTERNAL MEDICINE

## 2018-11-01 PROCEDURE — 83516 IMMUNOASSAY NONANTIBODY: CPT | Mod: 59 | Performed by: INTERNAL MEDICINE

## 2018-11-01 PROCEDURE — G0008 ADMIN INFLUENZA VIRUS VAC: HCPCS | Performed by: INTERNAL MEDICINE

## 2018-11-01 PROCEDURE — 90662 IIV NO PRSV INCREASED AG IM: CPT | Performed by: INTERNAL MEDICINE

## 2018-11-01 PROCEDURE — 99214 OFFICE O/P EST MOD 30 MIN: CPT | Mod: 25 | Performed by: INTERNAL MEDICINE

## 2018-11-01 PROCEDURE — 80053 COMPREHEN METABOLIC PANEL: CPT | Performed by: INTERNAL MEDICINE

## 2018-11-01 PROCEDURE — 36415 COLL VENOUS BLD VENIPUNCTURE: CPT | Performed by: INTERNAL MEDICINE

## 2018-11-01 PROCEDURE — 80061 LIPID PANEL: CPT | Performed by: INTERNAL MEDICINE

## 2018-11-01 NOTE — LETTER
[facility]  [address] [place] [address] [address] [facility]  [address]  [place]  [address]  Tel: [phone]    November 5, 2018    Jeannine Rod  [address]  [place] [address]        Dear Ms. Rod,    The following letter pertains to your most recent diagnostic tests:    Your tissue transglutaminase antibodies are negative which means you do not have celiac disease (sprue) which is an allergy/ intolerance to wheat gluten.       -Liver and gallbladder tests are normal for you. (ALT,AST, Alk phos, bilirubin), kidney function is normal for you (Creatinine, GFR), Sodium is normal, Potassium is normal for you, Calcium is normal for you, Glucose (blood sugar) is normal for you.      -Your cholesterol panel looks healthy.           Bottom line:  Your lab results look healthy and at goal.  See one of the gynecologist recommended at the time of clinic visit to investigate your new symptoms.   Also schedule the upper GI endoscopy as we discussed.     If you have any further questions or problems, please contact our office.      Sincerely,    Bebeto Freitas MD/ Kristen Ireland CMA  Results for orders placed or performed in visit on 11/01/18   Comprehensive metabolic panel   Result Value Ref Range    Sodium 141 133 - 144 mmol/L    Potassium 4.0 3.4 - 5.3 mmol/L    Chloride 104 94 - 109 mmol/L    Carbon Dioxide 27 20 - 32 mmol/L    Anion Gap 10 3 - 14 mmol/L    Glucose 87 70 - 99 mg/dL    Urea Nitrogen 15 7 - 30 mg/dL    Creatinine 0.75 0.52 - 1.04 mg/dL    GFR Estimate 76 >60 mL/min/1.7m2    GFR Estimate If Black >90 >60 mL/min/1.7m2    Calcium 9.4 8.5 - 10.1 mg/dL    Bilirubin Total 0.5 0.2 - 1.3 mg/dL    Albumin 4.1 3.4 - 5.0 g/dL    Protein Total 7.4 6.8 - 8.8 g/dL    Alkaline Phosphatase 67 40 - 150 U/L    ALT 29 0 - 50 U/L    AST 16 0 - 45 U/L   CBC with platelets   Result Value Ref Range    WBC 5.4 4.0 - 11.0 10e9/L    RBC Count 4.36 3.8 - 5.2 10e12/L    Hemoglobin 12.7 11.7 - 15.7 g/dL    Hematocrit 39.5 35.0 - 47.0 %    MCV  91 78 - 100 fl    MCH 29.1 26.5 - 33.0 pg    MCHC 32.2 31.5 - 36.5 g/dL    RDW 13.9 10.0 - 15.0 %    Platelet Count 191 150 - 450 10e9/L   Lipid panel reflex to direct LDL Fasting   Result Value Ref Range    Cholesterol 167 <200 mg/dL    Triglycerides 56 <150 mg/dL    HDL Cholesterol 84 >49 mg/dL    LDL Cholesterol Calculated 72 <100 mg/dL    Non HDL Cholesterol 83 <130 mg/dL   Tissue transglutaminase srinath IgA and IgG   Result Value Ref Range    Tissue Transglutaminase Antibody IgA <1 <7 U/mL    Tissue Transglutaminase Srinath IgG <1 <7 U/mL               Enclosure: Lab Results

## 2018-11-01 NOTE — PATIENT INSTRUCTIONS

## 2018-11-01 NOTE — MR AVS SNAPSHOT
After Visit Summary   11/1/2018    Jeannine Rod    MRN: [id number]           Patient Information     Date Of Birth          1940        Visit Information        Provider Department      11/1/2018 9:30 AM Bebeto Freitas MD [facility]        Today's Diagnoses     Routine general medical examination at a health care facility    -  1    Systemic sclerosis (H)        Malignant neoplasm of breast in female, estrogen receptor positive, unspecified laterality, unspecified site of breast (H)        Gastroesophageal reflux disease with esophagitis        Benign essential hypertension        Dyspareunia in female        Abdominal pain, epigastric        Hyperlipidemia LDL goal <100        Need for prophylactic vaccination and inoculation against influenza          Care Instructions      Preventive Health Recommendations    See your health care provider every year to    Review health changes.     Discuss preventive care.      Review your medicines if your doctor has prescribed any.      You no longer need a yearly Pap test unless you've had an abnormal Pap test in the past 10 years. If you have vaginal symptoms, such as bleeding or discharge, be sure to talk with your provider about a Pap test.      Every 1 to 2 years, have a mammogram.  If you are over 69, talk with your health care provider about whether or not you want to continue having screening mammograms.      Every 10 years, have a colonoscopy. Or, have a yearly FIT test (stool test). These exams will check for colon cancer.       Have a cholesterol test every 5 years, or more often if your doctor advises it.       Have a diabetes test (fasting glucose) every three years. If you are at risk for diabetes, you should have this test more often.       At age 65, have a bone density scan (DEXA) to check for osteoporosis (brittle bone disease).    Shots:    Get a flu shot each year.    Get a tetanus shot every 10 years.    Talk to your  doctor about your pneumonia vaccines. There are now two you should receive - Pneumovax (PPSV 23) and Prevnar (PCV 13).    Talk to your pharmacist about the shingles vaccine.    Talk to your doctor about the hepatitis B vaccine.    Nutrition:     Eat at least 5 servings of fruits and vegetables each day.      Eat whole-grain bread, whole-wheat pasta and brown rice instead of white grains and rice.      Get adequate Calcium and Vitamin D.     Lifestyle    Exercise at least 150 minutes a week (30 minutes a day, 5 days a week). This will help you control your weight and prevent disease.      Limit alcohol to one drink per day.      No smoking.       Wear sunscreen to prevent skin cancer.       See your dentist twice a year for an exam and cleaning.      See your eye doctor every 1 to 2 years to screen for conditions such as glaucoma, macular degeneration and cataracts.    Personalized Prevention Plan  You are due for the preventive services outlined below.  Your care team is available to assist you in scheduling these services.  If you have already completed any of these items, please share that information with your care team to update in your medical record.  Health Maintenance Due   Topic Date Due     Discuss Advance Directive Planning  11/30/1997     Flu Vaccine (1) 09/01/2018     FALL RISK ASSESSMENT  10/09/2018     Depression Assessment 2 - yearly  11/28/2018             Follow-ups after your visit        Additional Services     GASTROENTEROLOGY ADULT REF PROCEDURE ONLY Other; [facility] [phone]       Last Lab Result: Creatinine (mg/dL)       Date                     Value                 03/28/2018               0.80             ----------  Body mass index is 24.59 kg/(m^2).     Needed:  No  Language:  English    Patient will be contacted to schedule procedure.     Please be aware that coverage of these services is subject to the terms and limitations of your health insurance plan.  Call member  services at your health plan with any benefit or coverage questions.  Any procedures must be performed at a [facility] facility OR coordinated by your clinic's referral office.    Please bring the following with you to your appointment:    (1) Any X-Rays, CTs or MRIs which have been performed.  Contact the facility where they were done to arrange for  prior to your scheduled appointment.    (2) List of current medications   (3) This referral request   (4) Any documents/labs given to you for this referral            OB/GYN REFERRAL       Your provider has referred you to:  [facility]: [facility] [phone]  [email or web address]    Dr. Taran Gardner    Please be aware that coverage of these services is subject to the terms and limitations of your health insurance plan.  Call member services at your health plan with any benefit or coverage questions.      Please bring the following with you to your appointment:    (1) Any X-Rays, CTs or MRIs which have been performed.  Contact the facility where they were done to arrange for  prior to your scheduled appointment.   (2) List of current medications   (3) This referral request   (4) Any documents/labs given to you for this referral                  Follow-up notes from your care team     Return in about 1 year (around 11/1/2019) for Preventive Visit.      Who to contact     If you have questions or need follow up information about today's clinic visit or your schedule please contact [facility] directly at [phone].  Normal or non-critical lab and imaging results will be communicated to you by MyChart, letter or phone within 4 business days after the clinic has received the results. If you do not hear from us within 7 days, please contact the clinic through MyChart or phone. If you have a critical or abnormal lab result, we will notify you by phone as soon as  "possible.  Submit refill requests through [facility] or call your pharmacy and they will forward the refill request to us. Please allow 3 business days for your refill to be completed.          Additional Information About Your Visit        [facility] Information     [employer] gives you secure access to your electronic health record. If you see a primary care provider, you can also send messages to your care team and make appointments. If you have questions, please call your primary care clinic.  If you do not have a primary care provider, please call [phone] and they will assist you.        Care EveryWhere ID     This is your Care EveryWhere ID. This could be used by other organizations to access your [place] medical records  [id number]        Your Vitals Were     Pulse Temperature Height Last Period BMI (Body Mass Index)       68 97.3  F (36.3  C) (Tympanic) 5' 7\" (1.702 m) 07/07/1992 24.59 kg/m2        Blood Pressure from Last 3 Encounters:   11/01/18 130/67   07/03/18 125/73   03/28/18 133/72    Weight from Last 3 Encounters:   11/01/18 157 lb (71.2 kg)   07/03/18 155 lb (70.3 kg)   03/28/18 155 lb 4.8 oz (70.4 kg)              We Performed the Following     CBC with platelets     Comprehensive metabolic panel     GASTROENTEROLOGY ADULT REF PROCEDURE ONLY Other; [facility] [phone]     Lipid panel reflex to direct LDL Fasting     OB/GYN REFERRAL     OFFICE/OUTPT VISIT,EST,LEVL IV     Tissue transglutaminase srinath IgA and IgG        Primary Care Provider Office Phone # Fax #    Bebeto Freitas [phone] [address]  [place] [address]        Equal Access to Services     CHONG CARLTON : Hadii aad camilla Jenkins, waaxda luqadaha, qaybta kaalannie frias. So [facility] [phone].    ATENCIÓN: Si habla español, tiene a holley disposición servicios gratuitos de asistencia lingüística. Llame al [phone].    We comply with applicable federal " civil rights laws and [place] laws. We do not discriminate on the basis of race, color, national origin, age, disability, sex, sexual orientation, or gender identity.            Thank you!     Thank you for choosing [facility]  for your care. Our goal is always to provide you with excellent care. Hearing back from our patients is one way we can continue to improve our services. Please take a few minutes to complete the written survey that you may receive in the mail after your visit with us. Thank you!             Your Updated Medication List - Protect others around you: Learn how to safely use, store and throw away your medicines at www.disposemymeds.org.          This list is accurate as of 11/1/18 10:30 AM.  Always use your most recent med list.                   Brand Name Dispense Instructions for use Diagnosis    amLODIPine 10 MG tablet    NORVASC    90 tablet    Take 1 tablet (10 mg) by mouth At Bedtime    Benign essential hypertension       atorvastatin 20 MG tablet    LIPITOR    90 tablet    TAKE 1 TABLET(20 MG) BY MOUTH DAILY    Hyperlipidemia LDL goal <130       calcium carbonate 600 mg-vitamin D 400 units 600-400 MG-UNIT per tablet    CALTRATE     Take 1 tablet by mouth 2 times daily        CO Q 10 PO      Take 200 mg by mouth At Bedtime        conjugated estrogens cream    PREMARIN    30 g    Place 0.5 g vaginally twice a week At bedtime    Atrophic vaginitis       hydroxychloroquine 200 MG tablet    PLAQUENIL     Take 200 mg by mouth 2 times daily        Melatonin ER 1 MG Tbcr      Take 1 mg by mouth nightly as needed    Memory loss       Multi-vitamin Tabs tablet   Generic drug:  multivitamin, therapeutic with minerals      Take 1 tablet by mouth daily.        OMEPRAZOLE PO      Take 20 mg by mouth daily

## 2018-11-01 NOTE — NURSING NOTE
"Chief Complaint   Patient presents with     Wellness Visit        Initial /67 (BP Location: Right arm, Patient Position: Chair, Cuff Size: Adult Regular)  Pulse 68  Temp 97.3  F (36.3  C) (Tympanic)  Ht 5' 7\" (1.702 m)  Wt 157 lb (71.2 kg)  LMP 07/07/1992  BMI 24.59 kg/m2 Estimated body mass index is 24.59 kg/(m^2) as calculated from the following:    Height as of this encounter: 5' 7\" (1.702 m).    Weight as of this encounter: 157 lb (71.2 kg)..    BP completed using cuff size: regular  MEDICATIONS REVIEWED  SOCIAL AND FAMILY HX REVIEWED  Edith Giang CMA  "

## 2018-11-01 NOTE — PROGRESS NOTES
"    SUBJECTIVE:   Jeannine Rdo is a 77 year old female who presents for Preventive Visit; dyspareunia, abdominal pain     Are you in the first 12 months of your Medicare Part B coverage?  No    Physical Health:    In general, how would you rate your overall physical health? good    Outside of work, how many days during the week do you exercise? none    Outside of work, approximately how many minutes a day do you exercise?not applicable    If you drink alcohol do you typically have >3 drinks per day or >7 drinks per week? No    Do you usually eat at least 4 servings of fruit and vegetables a day, include whole grains & fiber and avoid regularly eating high fat or \"junk\" foods? Yes    Do you have any problems taking medications regularly?  No    Do you have any side effects from medications? none    Needs assistance for the following daily activities: no assistance needed    Which of the following safety concerns are present in your home?:  none identified     Hearing impairment: Yes, Need to ask people to speak up or repeat themselves.    Hearing aids    In the past 6 months, have you been bothered by leaking of urine? no    Mental Health:    In general, how would you rate your overall mental or emotional health? excellent  PHQ-2 Score:      Additional concerns to address?      Several recent UTI's   Started on vaginal estrogen by geriatric consultant  2 months of new dyspareunia without vaginal bleeding or vaginal discharge    Several months of epigastric abdominal pain with nausea without vomiting or diarrhea or weight loss   Symptoms do not seem to worsen with oral intake   No specific triggers  She takes omeprazole she has not tried additional antiacids to address current symptoms   No blood in stools or melena   She does report dysphagia to some solids but not liquids   In 2013 she did have an ultrasound noting cholelithiasis   She does however deny that her current symptom are exacerbated by fatty foods or " any oral intake for that matter     Fall risk:      Fallen 2 or more times in the past year?: No  Any fall with injury in the past year?: No      COGNITIVE SCREEN  1) Repeat 3 items (Leader, Season, Table)    2) Clock draw: NORMAL  3) 3 item recall: Recalls 3 objects  Results: 3 items recalled: COGNITIVE IMPAIRMENT LESS LIKELY    Mini-CogTM Copyright MURTAZA Ogden. Licensed by the author for use in [facility]; reprinted with permission (delano@[facility]). All rights reserved.        Reviewed and updated as needed this visit by clinical staff  Tobacco  Allergies  Meds  Med Hx  Surg Hx  Fam Hx  Soc Hx        Reviewed and updated as needed this visit by Provider  Tobacco  Med Hx  Surg Hx  Fam Hx  Soc Hx       Social History   Substance Use Topics     Smoking status: Former Smoker     Years: 20.00     Types: Cigarettes     Quit date: 1/1/1982     Smokeless tobacco: Never Used     Alcohol use 0.0 oz/week     0 Standard drinks or equivalent per week      Comment: red wine socially, 2-3x week                             Do you feel safe in your environment - Yes    Do you have a Health Care Directive?: Yes: Patient states has Advance Directive and will bring in a copy to clinic.    Current providers sharing in care for this patient include:   Patient Care Team:  Bebeto Freitas MD as PCP - General (Internal Medicine)    The following health maintenance items are reviewed in Epic and correct as of today:  Health Maintenance   Topic Date Due     ADVANCE DIRECTIVE PLANNING Q5 YRS  11/30/1997     INFLUENZA VACCINE (1) 09/01/2018     FALL RISK ASSESSMENT  10/09/2018     PHQ-2 Q1 YR  11/28/2018     TSH W/ FREE T4 REFLEX Q1 YEAR  03/28/2019     LIPID SCREEN Q5 YR FEMALE (SYSTEM ASSIGNED)  10/23/2022     TETANUS IMMUNIZATION (SYSTEM ASSIGNED)  02/17/2025     COLON CANCER SCREEN (SYSTEM ASSIGNED)  05/26/2025     DEXA SCAN SCREENING (SYSTEM ASSIGNED)  Completed     PNEUMOCOCCAL  Completed     Patient Active Problem  List   Diagnosis     Malignant neoplasm of breast (H)     Raynaud's syndrome     Status post hip replacement     Pulmonary embolism and infarction     Systemic sclerosis (H)     Gastroesophageal reflux disease with esophagitis     Hyperlipidemia LDL goal <130     Calculus of gallbladder without cholecystitis     Radiculoplexus neuropathy     Benign essential hypertension     Atrophic vaginitis     Past Surgical History:   Procedure Laterality Date     ARTHROPLASTY HIP  3/5/2012    Procedure:ARTHROPLASTY HIP; LEFT TOTAL HIP ARTHROPLASTY (BIOMET)^; Surgeon:SABRINA CALLEJAS; Location:[facility] OR     BIOPSY  8/29/2007    Right Breast Biopsy     BIOPSY  9/12/2007    Right Breast Biopsy x2     BIOPSY  9/13/2007    Left Breast Biopsy     BREAST SURGERY  10/2/2007    Bilateral Mastectomy     BREAST SURGERY  3/2008    Breast Reconstruction Surgery     COLONOSCOPY  2011    repeat in 5 years     ENT SURGERY      salivary gland removal       Social History   Substance Use Topics     Smoking status: Former Smoker     Years: 20.00     Types: Cigarettes     Quit date: 1/1/1982     Smokeless tobacco: Never Used     Alcohol use 0.0 oz/week     0 Standard drinks or equivalent per week      Comment: red wine socially, 2-3x week     Family History   Problem Relation Age of Onset     Cancer Mother      lung age 83     Unknown/Adopted Father      91 years     Eye Disorder Father      Macular Degeneration     Breast Cancer Maternal Grandmother      Cancer Paternal Grandfather      Cancer Paternal Uncle      Asthma Brother      C.A.D. Sister      elevated cholesterol         Current Outpatient Prescriptions   Medication Sig Dispense Refill     amLODIPine (NORVASC) 10 MG tablet Take 1 tablet (10 mg) by mouth At Bedtime 90 tablet 3     atorvastatin (LIPITOR) 20 MG tablet TAKE 1 TABLET(20 MG) BY MOUTH DAILY 90 tablet 0     calcium-vitamin D (CALTRATE) 600-400 MG-UNIT per tablet Take 1 tablet by mouth 2 times daily       Coenzyme Q10 (CO Q 10 PO)  "Take 200 mg by mouth At Bedtime        conjugated estrogens (PREMARIN) cream Place 0.5 g vaginally twice a week At bedtime 30 g 1     hydroxychloroquine (PLAQUENIL) 200 MG tablet Take 200 mg by mouth 2 times daily       Melatonin ER 1 MG TBCR Take 1 mg by mouth nightly as needed       Multiple Vitamin (MULTI-VITAMIN) per tablet Take 1 tablet by mouth daily.       OMEPRAZOLE PO Take 20 mg by mouth daily        Allergies   Allergen Reactions     Metoclopramide      Other reaction(s): Other - Describe In Comment Field  Severe redness in eyes from the preservative in certain eye medications     Other [Seasonal Allergies]      Preservative in eye drops           ROS:  Constitutional, HEENT, cardiovascular, pulmonary, gi and gu systems are negative, except as otherwise noted.    OBJECTIVE:   /67 (BP Location: Right arm, Patient Position: Chair, Cuff Size: Adult Regular)  Pulse 68  Temp 97.3  F (36.3  C) (Tympanic)  Ht 5' 7\" (1.702 m)  Wt 157 lb (71.2 kg)  LMP 07/07/1992  BMI 24.59 kg/m2 Estimated body mass index is 24.59 kg/(m^2) as calculated from the following:    Height as of this encounter: 5' 7\" (1.702 m).    Weight as of this encounter: 157 lb (71.2 kg).  EXAM:   GENERAL APPEARANCE: healthy, alert and no distress  EYES: Eyes grossly normal to inspection, PERRL and conjunctivae and sclerae normal  HENT: ear canals and TM's normal, nose and mouth without ulcers or lesions, oropharynx clear and oral mucous membranes moist  NECK: no adenopathy, no asymmetry, masses, or scars and thyroid normal to palpation  RESP: lungs clear to auscultation - no rales, rhonchi or wheezes  BREAST:  Declined by patient due to mastectomy status   CV: regular rate and rhythm, normal S1 S2, no S3 or S4, no murmur, click or rub, no peripheral edema and peripheral pulses strong  ABDOMEN: soft, nontender, no hepatosplenomegaly, no masses and bowel sounds normal  MS: no musculoskeletal defects are noted and gait is age appropriate " without ataxia  SKIN: no suspicious lesions or rashes  NEURO: Normal strength and tone, sensory exam grossly normal, mentation intact and speech normal  PSYCH: mentation appears normal and affect normal/bright    Diagnostic Test Results:  Labs pending     ASSESSMENT / PLAN:   1. Routine general medical examination at a health care facility      2. Systemic sclerosis (H)      3. Malignant neoplasm of breast in female, estrogen receptor positive, unspecified laterality, unspecified site of breast (H)      4. Gastroesophageal reflux disease with esophagitis  Could be cause of epigastric pain  EGD to confirm and to get suggestions for symptoms management particularly in light of her history of systemic sclerosis  Until EGD can double omeprazole dose to address symptoms     5. Benign essential hypertension  Under good control     6. Dyspareunia in female  Needs to see GYN for pelvic exam to evaluate for potential dangerous causes   - OB/GYN REFERRAL    7. Abdominal pain, epigastric  Exclude sprue  Check labs  EGD as described above   - Tissue transglutaminase srinath IgA and IgG    8. Hyperlipidemia LDL goal <100    - Comprehensive metabolic panel  - CBC with platelets  - Lipid panel reflex to direct LDL Fasting    9. Need for prophylactic vaccination and inoculation against influenza  Flu shot today       End of Life Planning:  Patient currently has an advanced directive: Yes.  Practitioner is supportive of decision.    COUNSELING:  Reviewed preventive health counseling, as reflected in patient instructions  Special attention given to:       Regular exercise       Healthy diet/nutrition       Immunizations    Vaccinated for:   Flu shot today; she already had shingrix vaccines            Consider lung cancer screening for ages 55-80 years and 30 pack-year smoking history ; quit smoking in 1982       Colon cancer screening; had colonoscopy in 2015; repeat in 2020     BP Readings from Last 1 Encounters:   11/01/18 130/67  "    Estimated body mass index is 24.59 kg/(m^2) as calculated from the following:    Height as of this encounter: 5' 7\" (1.702 m).    Weight as of this encounter: 157 lb (71.2 kg).           reports that she quit smoking about 36 years ago. Her smoking use included Cigarettes. She quit after 20.00 years of use. She has never used smokeless tobacco.      Appropriate preventive services were discussed with this patient, including applicable screening as appropriate for cardiovascular disease, diabetes, osteopenia/osteoporosis, and glaucoma.  As appropriate for age/gender, discussed screening for colorectal cancer, prostate cancer, breast cancer, and cervical cancer. Checklist reviewing preventive services available has been given to the patient.    Reviewed patients plan of care and provided an AVS. The Basic Care Plan (routine screening as documented in Health Maintenance) for Jeannine meets the Care Plan requirement. This Care Plan has been established and reviewed with the Patient.    Counseling Resources:  ATP IV Guidelines  Pooled Cohorts Equation Calculator  Breast Cancer Risk Calculator  FRAX Risk Assessment  ICSI Preventive Guidelines  Dietary Guidelines for Americans, 2010  USDA's MyPlate  ASA Prophylaxis  Lung CA Screening    Bebeto Freitas MD  [facility]  "

## 2018-11-02 LAB
TTG IGA SER-ACNC: <1 U/ML
TTG IGG SER-ACNC: <1 U/ML

## 2018-11-03 NOTE — PROGRESS NOTES
The following letter pertains to your most recent diagnostic tests:    Your tissue transglutaminase antibodies are negative which means you do not have celiac disease (sprue) which is an allergy/ intolerance to wheat gluten.       -Liver and gallbladder tests are normal for you. (ALT,AST, Alk phos, bilirubin), kidney function is normal for you (Creatinine, GFR), Sodium is normal, Potassium is normal for you, Calcium is normal for you, Glucose (blood sugar) is normal for you.      -Your cholesterol panel looks healthy.           Bottom line:  Your lab results look healthy and at goal.  See one of the gynecologist recommended at the time of clinic visit to investigate your new symptoms.   Also schedule the upper GI endoscopy as we discussed.       Follow up:        Sincerely,    Dr. Freitas

## 2018-11-08 ENCOUNTER — TELEPHONE (OUTPATIENT)
Dept: FAMILY MEDICINE | Facility: CLINIC | Age: 78
End: 2018-11-08

## 2018-11-14 ENCOUNTER — OFFICE VISIT (OUTPATIENT)
Dept: OBGYN | Facility: CLINIC | Age: 78
End: 2018-11-14
Payer: COMMERCIAL

## 2018-11-14 VITALS
DIASTOLIC BLOOD PRESSURE: 64 MMHG | HEART RATE: 72 BPM | BODY MASS INDEX: 24.48 KG/M2 | HEIGHT: 67 IN | SYSTOLIC BLOOD PRESSURE: 120 MMHG | WEIGHT: 156 LBS

## 2018-11-14 DIAGNOSIS — N94.10 DYSPAREUNIA IN FEMALE: ICD-10-CM

## 2018-11-14 DIAGNOSIS — N89.8 ITCHING OF VAGINA: Primary | ICD-10-CM

## 2018-11-14 LAB
SPECIMEN SOURCE: ABNORMAL
WET PREP SPEC: ABNORMAL

## 2018-11-14 PROCEDURE — 87210 SMEAR WET MOUNT SALINE/INK: CPT | Performed by: OBSTETRICS & GYNECOLOGY

## 2018-11-14 PROCEDURE — 99203 OFFICE O/P NEW LOW 30 MIN: CPT | Performed by: OBSTETRICS & GYNECOLOGY

## 2018-11-14 RX ORDER — METRONIDAZOLE 7.5 MG/G
1 GEL VAGINAL 2 TIMES DAILY
Qty: 70 G | Refills: 0 | Status: SHIPPED | OUTPATIENT
Start: 2018-11-14 | End: 2018-12-26

## 2018-11-14 NOTE — PROGRESS NOTES
SUBJECTIVE:                                                   Jeannine Rod is a 77 year old female who presents to clinic today for the following health issue(s):  Patient presents with:  Vaginal Problem: Painful IC      Additional information: denies any bleeding, 2-3 UTI and yeast infx this summer, dyspurnia after that    HPI:  Patient has been having some vaginal itching and burning for a few months.   Had UTI x 2, then yeast infection  She mainly feels itching just at outside of vaginal entry and has burning in entry and inside vagina during sex and next couple days after  They have intercourse about 2-3 times a week  Patient has history of breast cancer but is on vaginal estrogen cream 1 gm twice a week, started using it in July, says she has been regular  Has also started using lubricants for sex which is new for her, hasn't needed them in the past  She is aware there is some risk of using estrogen when there is a past history of breast cancer that is receptor + like hers.     Patient's last menstrual period was 07/07/1992..   Patient is sexually active, No obstetric history on file..  Using menopause for contraception.    reports that she quit smoking about 36 years ago. Her smoking use included Cigarettes. She quit after 20.00 years of use. She has never used smokeless tobacco.    STD testing offered?  Declined    Health maintenance updated:  yes    Today's PHQ-2 Score:   PHQ-2 ( 1999 Pfizer) 11/1/2018   Q1: Little interest or pleasure in doing things 0   Q2: Feeling down, depressed or hopeless 0   PHQ-2 Score 0     Today's PHQ-9 Score: No flowsheet data found.  Today's FELIX-7 Score: No flowsheet data found.    Problem list and histories reviewed & adjusted, as indicated.  Additional history: as documented.    Patient Active Problem List   Diagnosis     Malignant neoplasm of breast (H)     Raynaud's syndrome     Status post hip replacement     Pulmonary embolism and infarction     Systemic sclerosis  (H)     Gastroesophageal reflux disease with esophagitis     Hyperlipidemia LDL goal <100     Calculus of gallbladder without cholecystitis     Radiculoplexus neuropathy     Benign essential hypertension     Atrophic vaginitis     Past Surgical History:   Procedure Laterality Date     ARTHROPLASTY HIP  3/5/2012    Procedure:ARTHROPLASTY HIP; LEFT TOTAL HIP ARTHROPLASTY (BIOMET)^; Surgeon:SABRINA CALLEJAS; Location:[facility] OR     BIOPSY  8/29/2007    Right Breast Biopsy     BIOPSY  9/12/2007    Right Breast Biopsy x2     BIOPSY  9/13/2007    Left Breast Biopsy     BREAST SURGERY  10/2/2007    Bilateral Mastectomy     BREAST SURGERY  3/2008    Breast Reconstruction Surgery     COLONOSCOPY  2011    repeat in 5 years     ENT SURGERY      salivary gland removal      Social History   Substance Use Topics     Smoking status: Former Smoker     Years: 20.00     Types: Cigarettes     Quit date: 1/1/1982     Smokeless tobacco: Never Used     Alcohol use 0.0 oz/week     0 Standard drinks or equivalent per week      Comment: red wine socially, 2-3x week      Problem (# of Occurrences) Relation (Name,Age of Onset)    Asthma (1) Brother    Breast Cancer (1) Maternal Grandmother    C.A.D. (1) Sister: elevated cholesterol    Cancer (3) Mother: lung age 83, Paternal Grandfather, Paternal Uncle    Eye Disorder (1) Father: Macular Degeneration    Unknown/Adopted (1) Father: 91 years            Current Outpatient Prescriptions   Medication Sig     amLODIPine (NORVASC) 10 MG tablet Take 1 tablet (10 mg) by mouth At Bedtime     atorvastatin (LIPITOR) 20 MG tablet TAKE 1 TABLET(20 MG) BY MOUTH DAILY     calcium-vitamin D (CALTRATE) 600-400 MG-UNIT per tablet Take 1 tablet by mouth 2 times daily     Coenzyme Q10 (CO Q 10 PO) Take 200 mg by mouth At Bedtime      conjugated estrogens (PREMARIN) cream Place 0.5 g vaginally twice a week At bedtime     hydroxychloroquine (PLAQUENIL) 200 MG tablet Take 200 mg by mouth 2 times daily     Melatonin ER 1  "MG TBCR Take 1 mg by mouth nightly as needed     metroNIDAZOLE (METROGEL) 0.75 % vaginal gel Place 1 applicator (5 g) vaginally 2 times daily for 5 days     Multiple Vitamin (MULTI-VITAMIN) per tablet Take 1 tablet by mouth daily.     OMEPRAZOLE PO Take 20 mg by mouth daily      No current facility-administered medications for this visit.      Allergies   Allergen Reactions     Metoclopramide      Other reaction(s): Other - Describe In Comment Field  Severe redness in eyes from the preservative in certain eye medications     Other [Seasonal Allergies]      Preservative in eye drops       ROS:  12 point review of systems negative other than symptoms noted below.  Genitourinary: Vaginal Dryness and Vaginal Itching    OBJECTIVE:     /64  Pulse 72  Ht 5' 7\" (1.702 m)  Wt 156 lb (70.8 kg)  LMP 07/07/1992  Breastfeeding? No  BMI 24.43 kg/m2  Body mass index is 24.43 kg/(m^2).    Exam:  Constitutional:  Appearance: Well nourished, well developed alert, in no acute distress  Chest:  Respiratory Effort:  Breathing unlabored  Neurologic/Psychiatric:  Mental Status:  Oriented X3   Pelvic Exam:  External Genitalia:     Normal appearance for age, no discharge present, no tenderness present, no inflammatory lesions present, color normal    No cracking or lichen sclerosis seen, pelvic floor muscles didn't seem tender on palpation  Vagina:     Normal vaginal vault without central or paravaginal defects, ATROPHIC   Minimal discharge seen              No significant prolapse seen  Bladder:     Nontender to palpation  Urethra:   Urethral Body:  Urethra palpation normal, urethra structural support normal   Urethral Meatus:  No erythema or lesions present  Cervix:     Appearance healthy, no lesions present, nontender to palpation, no bleeding present  Uterus:     Nontender to palpation, no masses present, position anteflexed, mobility: normal  Adnexa:     No adnexal tenderness present, no adnexal masses present  Perineum:  "    Perineum within normal limits, no evidence of trauma, no rashes or skin lesions present  Inguinal Lymph Nodes:     No lymphadenopathy present       In-Clinic Test Results:  Results for orders placed or performed in visit on 11/14/18 (from the past 24 hour(s))   Wet prep   Result Value Ref Range    Specimen Description Vagina     Wet Prep No Trichomonas seen     Wet Prep Clue cells seen (A)     Wet Prep No yeast seen        ASSESSMENT/PLAN:                                                        ICD-10-CM    1. Itching of vagina N89.8 Wet prep     metroNIDAZOLE (METROGEL) 0.75 % vaginal gel   2. Dyspareunia in female N94.10        Patient had clue cells on wet prep so we will treat with metrogel bid for 5 days  I asked patient to abstain from intercourse for a few weeks to let things heal from vaginitis, increase estrogen to 3x/wk for 1 month then back down to twice a week  Consider trying coconut oil for lubricant when she resumes intercourse as it will last longer that the water soluble types  Discussed atrophic vaginitis in aging women and typical changes with aging    Discussed that dyspareunia is very common in late menopause even with the above measures  She has another neurologic condition but patient doesn't think she feels muscle pain in pelvic floor so that isn't high on the differential for now  If the above measures don't help, I may end up having her see my partner Dr Flores for evaluation of myofacial pain syndrome as a possibility  Sometimes vaginal valium or vaginal botox injections are used in that situation  Face to face 20 minute, greater than 50% counceling      Pat Gardner MD  [facility]

## 2018-11-14 NOTE — MR AVS SNAPSHOT
"              After Visit Summary   11/14/2018    Jeannine Rod    MRN: [id number]           Patient Information     Date Of Birth          1940        Visit Information        Provider Department      11/14/2018 10:30 AM Pat Gardner MD [facility]        Today's Diagnoses     Itching of vagina    -  1    Dyspareunia in female           Follow-ups after your visit        Who to contact     If you have questions or need follow up information about today's clinic visit or your schedule please contact [facility] directly at [phone].  Normal or non-critical lab and imaging results will be communicated to you by Social [employer] 1hart, letter or phone within 4 business days after the clinic has received the results. If you do not hear from us within 7 days, please contact the clinic through Social Strategy 1hart or phone. If you have a critical or abnormal lab result, we will notify you by phone as soon as possible.  Submit refill requests through [employer] or call your pharmacy and they will forward the refill request to us. Please allow 3 business days for your refill to be completed.          Additional Information About Your Visit        MyChart Information     [employer] gives you secure access to your electronic health record. If you see a primary care provider, you can also send messages to your care team and make appointments. If you have questions, please call your primary care clinic.  If you do not have a primary care provider, please call [phone] and they will assist you.        Care EveryWhere ID     This is your Care EveryWhere ID. This could be used by other organizations to access your [facility] medical records  [id number]        Your Vitals Were     Pulse Height Last Period Breastfeeding? BMI (Body Mass Index)       72 5' 7\" (1.702 m) 07/07/1992 No 24.43 kg/m2        Blood Pressure from Last 3 Encounters:   11/14/18 120/64   11/01/18 130/67   07/03/18 125/73    Weight from " Last 3 Encounters:   11/14/18 156 lb (70.8 kg)   11/01/18 157 lb (71.2 kg)   07/03/18 155 lb (70.3 kg)              We Performed the Following     Wet prep          Today's Medication Changes          These changes are accurate as of 11/14/18 11:26 AM.  If you have any questions, ask your nurse or doctor.               Start taking these medicines.        Dose/Directions    metroNIDAZOLE 0.75 % vaginal gel   Commonly known as:  METROGEL   Used for:  Itching of vagina   Started by:  Pat Gardner MD        Dose:  1 applicator   Place 1 applicator (5 g) vaginally 2 times daily for 5 days   Quantity:  70 g   Refills:  0            Where to get your medicines      These medications were sent to [id number] - [place], [place] - [address], [place] [address]     Phone:  [phone]     metroNIDAZOLE 0.75 % vaginal gel                Primary Care Provider Office Phone # Fax #    Bebeto Freitas [phone]  [facility] [address]        Equal Access to Services     [facility] AH: Hadii aad ku hadasho Soomaali, waaxda luqadaha, qaybta kaalmada adeegyada, annie gonzalez . So [facility] [phone].    ATENCIÓN: Si habla español, tiene a holley disposición servicios gratuitos de asistencia lingüística. [facility] [phone].    We comply with applicable federal civil rights laws and [place] laws. We do not discriminate on the basis of race, color, national origin, age, disability, sex, sexual orientation, or gender identity.            Thank you!     Thank you for choosing [facility]  for your care. Our goal is always to provide you with excellent care. Hearing back from our patients is one way we can continue to improve our services. Please take a few minutes to complete the written survey that you may receive in the mail after your visit with us. Thank you!              Your Updated Medication List - Protect others around you: Learn how to safely use, store and throw away your medicines at www.disposemymeds.org.          This list is accurate as of 11/14/18 11:26 AM.  Always use your most recent med list.                   Brand Name Dispense Instructions for use Diagnosis    amLODIPine 10 MG tablet    NORVASC    90 tablet    Take 1 tablet (10 mg) by mouth At Bedtime    Benign essential hypertension       atorvastatin 20 MG tablet    LIPITOR    90 tablet    TAKE 1 TABLET(20 MG) BY MOUTH DAILY    Hyperlipidemia LDL goal <130       calcium carbonate 600 mg-vitamin D 400 units 600-400 MG-UNIT per tablet    CALTRATE     Take 1 tablet by mouth 2 times daily        CO Q 10 PO      Take 200 mg by mouth At Bedtime        conjugated estrogens cream    PREMARIN    30 g    Place 0.5 g vaginally twice a week At bedtime    Atrophic vaginitis       hydroxychloroquine 200 MG tablet    PLAQUENIL     Take 200 mg by mouth 2 times daily        Melatonin ER 1 MG Tbcr      Take 1 mg by mouth nightly as needed    Memory loss       metroNIDAZOLE 0.75 % vaginal gel    METROGEL    70 g    Place 1 applicator (5 g) vaginally 2 times daily for 5 days    Itching of vagina       Multi-vitamin Tabs tablet   Generic drug:  multivitamin, therapeutic with minerals      Take 1 tablet by mouth daily.        OMEPRAZOLE PO      Take 20 mg by mouth daily

## 2018-11-27 ENCOUNTER — TRANSFERRED RECORDS (OUTPATIENT)
Dept: HEALTH INFORMATION MANAGEMENT | Facility: CLINIC | Age: 78
End: 2018-11-27

## 2018-12-26 ENCOUNTER — TELEPHONE (OUTPATIENT)
Dept: OBGYN | Facility: CLINIC | Age: 78
End: 2018-12-26

## 2018-12-26 DIAGNOSIS — E78.5 HYPERLIPIDEMIA LDL GOAL <130: ICD-10-CM

## 2018-12-26 DIAGNOSIS — N89.8 ITCHING OF VAGINA: ICD-10-CM

## 2018-12-26 DIAGNOSIS — I10 BENIGN ESSENTIAL HYPERTENSION: ICD-10-CM

## 2018-12-26 RX ORDER — METRONIDAZOLE 7.5 MG/G
1 GEL VAGINAL 2 TIMES DAILY
Qty: 70 G | Refills: 0 | Status: SHIPPED | OUTPATIENT
Start: 2018-12-26 | End: 2019-01-15

## 2018-12-26 NOTE — TELEPHONE ENCOUNTER
Called pt and informed her of Dr Gardner's response  Metrogel refill sent to preferred pharmacy  Encouraged to complete rx and make an apt if sx's do not resolve after tx.  Pt verbalized understanding and no further questions.

## 2018-12-26 NOTE — TELEPHONE ENCOUNTER
Pt calling with sx recurrence from 11/14/18  Treated with Metrogel at that time  Now c/o Itching, intense fishy odor to vagina, no discharge. Vaginal burning and anal burning    Still has some metrogel left at home  Inquiring if she should be seen again in office or take another round of Metrogel    Routing to Dr Gardner to advise and will call pt back with her response  Preferred pharmacy selected

## 2018-12-27 RX ORDER — ATORVASTATIN CALCIUM 20 MG/1
TABLET, FILM COATED ORAL
Qty: 90 TABLET | Refills: 0 | Status: SHIPPED | OUTPATIENT
Start: 2018-12-27 | End: 2019-04-17

## 2018-12-27 RX ORDER — AMLODIPINE BESYLATE 10 MG/1
TABLET ORAL
Qty: 90 TABLET | Refills: 0 | Status: SHIPPED | OUTPATIENT
Start: 2018-12-27 | End: 2019-03-15

## 2018-12-27 NOTE — TELEPHONE ENCOUNTER
"atorvastatin (LIPITOR) 20 MG tablet 90 tablet 0 9/28/2018     Last Written Prescription Date:  9/28/18  Last Fill Quantity: 90,  # refills: 0   Last office visit: 11/1/2018 with prescribing provider:  Fortino   Future Office Visit:  None    amLODIPine (NORVASC) 10 MG tablet 90 tablet 3 10/9/2017     Last Written Prescription Date:  9/28/18  Last Fill Quantity: 90,  # refills: 0   Last office visit: 11/1/2018 with prescribing provider:  Fortino   Future Office Visit:  None    Requested Prescriptions   Pending Prescriptions Disp Refills     atorvastatin (LIPITOR) 20 MG tablet [Pharmacy Med Name: ATORVASTATIN 20MG TABLETS] 90 tablet 0     Sig: TAKE 1 TABLET BY MOUTH EVERY DAY    Statins Protocol Passed - 12/26/2018 10:38 AM       Passed - LDL on file in past 12 months    Recent Labs   Lab Test 11/01/18  1041   LDL 72            Passed - No abnormal creatine kinase in past 12 months    No lab results found.            Passed - Recent (12 mo) or future (30 days) visit within the authorizing provider's specialty    Patient had office visit in the last 12 months or has a visit in the next 30 days with authorizing provider or within the authorizing provider's specialty.  See \"Patient Info\" tab in inbasket, or \"Choose Columns\" in Meds & Orders section of the refill encounter.             Passed - Patient is age 18 or older       Passed - No active pregnancy on record       Passed - No positive pregnancy test in past 12 months        amLODIPine (NORVASC) 10 MG tablet [Pharmacy Med Name: AMLODIPINE BESYLATE 10MG TABLETS] 90 tablet 0     Sig: TAKE 1 TABLET(10 MG) BY MOUTH AT BEDTIME    Calcium Channel Blockers Protocol  Passed - 12/26/2018 10:38 AM       Passed - Blood pressure under 140/90 in past 12 months    BP Readings from Last 3 Encounters:   11/14/18 120/64   11/01/18 130/67   07/03/18 125/73                Passed - Recent (12 mo) or future (30 days) visit within the authorizing provider's specialty    Patient had office " "visit in the last 12 months or has a visit in the next 30 days with authorizing provider or within the authorizing provider's specialty.  See \"Patient Info\" tab in inbasket, or \"Choose Columns\" in Meds & Orders section of the refill encounter.             Passed - Patient is age 18 or older       Passed - No active pregnancy on record       Passed - Normal serum creatinine on file in past 12 months    Recent Labs   Lab Test 11/01/18  1041   CR 0.75            Passed - No positive pregnancy test in past 12 months        No flowsheet data found.        "

## 2019-01-04 ENCOUNTER — TRANSFERRED RECORDS (OUTPATIENT)
Dept: HEALTH INFORMATION MANAGEMENT | Facility: CLINIC | Age: 79
End: 2019-01-04

## 2019-01-07 DIAGNOSIS — N95.2 ATROPHIC VAGINITIS: ICD-10-CM

## 2019-01-07 NOTE — TELEPHONE ENCOUNTER
"Pending Prescriptions:                       Disp   Refills    PREMARIN 0.625 MG/GM vaginal cream [Pharm*30 g   0            Sig: PLACE 0.5 GRAM VAGINALLY 2 TIMES A WEEK AT           BEDTIME    Last Written Prescription Date:  7/5/18  Last Fill Quantity: 30 g,  # refills: 1   Last office visit: 11/1/2018 with prescribing provider:     Future Office Visit:    Requested Prescriptions   Pending Prescriptions Disp Refills     PREMARIN 0.625 MG/GM vaginal cream [Pharmacy Med Name: PREMARIN VAGINAL CREAM 30GM] 30 g 0     Sig: PLACE 0.5 GRAM VAGINALLY 2 TIMES A WEEK AT BEDTIME    Hormone Replacement Therapy Passed - 1/7/2019 12:00 PM       Passed - Blood pressure under 140/90 in past 12 months    BP Readings from Last 3 Encounters:   11/14/18 120/64   11/01/18 130/67   07/03/18 125/73                Passed - Recent (12 mo) or future (30 days) visit within the authorizing provider's specialty    Patient had office visit in the last 12 months or has a visit in the next 30 days with authorizing provider or within the authorizing provider's specialty.  See \"Patient Info\" tab in inbasket, or \"Choose Columns\" in Meds & Orders section of the refill encounter.             Passed - Medication is active on med list       Passed - Patient is 18 years of age or older       Passed - No active pregnancy on record       Passed - No positive pregnancy test on record in past 12 months          "

## 2019-01-08 RX ORDER — CONJUGATED ESTROGENS 0.62 MG/G
CREAM VAGINAL
Qty: 30 G | Refills: 2 | Status: SHIPPED | OUTPATIENT
Start: 2019-01-08 | End: 2020-05-05

## 2019-01-10 ENCOUNTER — TELEPHONE (OUTPATIENT)
Dept: OBGYN | Facility: CLINIC | Age: 79
End: 2019-01-10

## 2019-01-10 NOTE — TELEPHONE ENCOUNTER
"Pt calling with continued painful intercourse with burning  Had recently been treated for BV which the patient states has cleared up and is no longer a concern    Pt has been taking Premarin as prescribed and feels that it is not helping at all    Pt wondering if Dr. Gardner has any other suggestions  Pt is agreeable to make an OV to discuss if needed.     Routing to Dr. Gardner to advise upon RTC  Rosemarie Jones RN on 1/10/2019 at 9:45 AM      Also FYI pt wanted Dr. Gardner to know she has shingles  again 3 spots on r leg, 5 spots on her r side they are isolated \"dots\" not bothersome to her at this time.    Rosemarie Jones RN on 1/10/2019 at 9:47 AM            "

## 2019-01-11 ENCOUNTER — TELEPHONE (OUTPATIENT)
Dept: FAMILY MEDICINE | Facility: CLINIC | Age: 79
End: 2019-01-11

## 2019-01-11 NOTE — TELEPHONE ENCOUNTER
Prior Authorization Retail Medication Request    Medication/Dose: Premarin 0.625 mg  ICD code (if different than what is on RX):  N95.2  Previously Tried and Failed:  None  Rationale:  Patient with history of recurrent bladder infections that have been preventable with medication    Insurance Name:  [id number]  Insurance ID:  [id number]      Pharmacy Information (if different than what is on RX)  Name:  [id number]  Phone:  [phone]

## 2019-01-13 NOTE — TELEPHONE ENCOUNTER
My last office visit note indicates that if she is still not getting improvement her next step was to make appt with Dr Flores  ( to evaluate for possible vulvodynia or myofascial pain syndrome.)  She is already on vaginal estrogen, using lubricants and was treated for possible infection.     Nothing further that I have to offer for her dyspareunia so please have her schedule consult with Dr Flores

## 2019-01-14 NOTE — TELEPHONE ENCOUNTER
Informed pt of Dr. Gardner's recommendation. Transferred to scheduling to make an appt with Dr. Sandra Jones RN on 1/14/2019 at 11:40 AM

## 2019-01-15 ENCOUNTER — OFFICE VISIT (OUTPATIENT)
Dept: OBGYN | Facility: CLINIC | Age: 79
End: 2019-01-15
Payer: MEDICARE

## 2019-01-15 ENCOUNTER — TELEPHONE (OUTPATIENT)
Dept: OBGYN | Facility: CLINIC | Age: 79
End: 2019-01-15

## 2019-01-15 VITALS
BODY MASS INDEX: 24.64 KG/M2 | HEART RATE: 66 BPM | DIASTOLIC BLOOD PRESSURE: 64 MMHG | SYSTOLIC BLOOD PRESSURE: 112 MMHG | HEIGHT: 67 IN | WEIGHT: 157 LBS

## 2019-01-15 DIAGNOSIS — L90.0 LICHEN SCLEROSUS: Primary | ICD-10-CM

## 2019-01-15 DIAGNOSIS — G62.9 NEUROPATHY: ICD-10-CM

## 2019-01-15 PROCEDURE — 99213 OFFICE O/P EST LOW 20 MIN: CPT | Performed by: OBSTETRICS & GYNECOLOGY

## 2019-01-15 RX ORDER — GABAPENTIN 100 MG/1
100 CAPSULE ORAL 3 TIMES DAILY
Qty: 270 CAPSULE | Refills: 3 | Status: SHIPPED | OUTPATIENT
Start: 2019-01-15 | End: 2019-04-17

## 2019-01-15 RX ORDER — CLOBETASOL PROPIONATE 0.5 MG/G
OINTMENT TOPICAL 2 TIMES DAILY
Qty: 45 G | Refills: 1 | Status: SHIPPED | OUTPATIENT
Start: 2019-01-15 | End: 2019-04-17

## 2019-01-15 ASSESSMENT — MIFFLIN-ST. JEOR: SCORE: 1234.78

## 2019-01-15 NOTE — TELEPHONE ENCOUNTER
Pt is requesting different meds or a PA for both Clobetasol cream and Premarin cream. Clobetasol is in top tier of formulary and would cost $98.00 and Premarin is not on the formulary for her new insurance. Pt will buy meds and use but would like to know if cost could be lower for next refill.  Routing to Dr. Flores.

## 2019-01-15 NOTE — PROGRESS NOTES
SUBJECTIVE:                                                   Jeannine Rod is a 78 year old female who presents to clinic today for the following health issue(s):  Patient presents with:  Consult: Discuss possible vulvodynia or myofascial pain syndrome      HPI:  Patient is seen in consultation regarding potential vulvodynia or myofascial pelvic pain.  Patient has a history of multiple vaginal infections along with itching and prickly pain sensation.  This is worse when she has intercourse.  Is been a problem for especially the past year.  She has been treated with MetroGel vaginal cream as well as been on Premarin vaginal cream.  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  She has had breast cancer.  She also has severe ray nods syndrome.    Patient's last menstrual period was 07/07/1992..   Patient is sexually active, No obstetric history on file..  Using menopause for contraception.    reports that she quit smoking about 37 years ago. Her smoking use included cigarettes. She quit after 20.00 years of use. she has never used smokeless tobacco.    STD testing offered?  Declined    Health maintenance updated:  yes    Problem list and histories reviewed & adjusted, as indicated.  Additional history: as documented.    Patient Active Problem List   Diagnosis     Malignant neoplasm of breast (H)     Raynaud's syndrome     Status post hip replacement     Pulmonary embolism and infarction     Systemic sclerosis (H)     Gastroesophageal reflux disease with esophagitis     Hyperlipidemia LDL goal <100     Calculus of gallbladder without cholecystitis     Radiculoplexus neuropathy     Benign essential hypertension     Atrophic vaginitis     Past Surgical History:   Procedure Laterality Date     ARTHROPLASTY HIP  3/5/2012    Procedure:ARTHROPLASTY HIP; LEFT TOTAL HIP ARTHROPLASTY (BIOMET)^; Surgeon:SABRINA CALLEJAS; Location:[facility]     BIOPSY  8/29/2007    Right Breast Biopsy     BIOPSY  9/12/2007     Right Breast Biopsy x2     BIOPSY  2007    Left Breast Biopsy     BREAST SURGERY  10/2/2007    Bilateral Mastectomy     BREAST SURGERY  3/2008    Breast Reconstruction Surgery     COLONOSCOPY  2011    repeat in 5 years     ENT SURGERY      salivary gland removal      Social History     Tobacco Use     Smoking status: Former Smoker     Years: 20.00     Types: Cigarettes     Last attempt to quit: 1982     Years since quittin.0     Smokeless tobacco: Never Used   Substance Use Topics     Alcohol use: Yes     Alcohol/week: 0.0 oz     Comment: red wine socially, 2-3x week      Problem (# of Occurrences) Relation (Name,Age of Onset)    Asthma (1) Brother    Breast Cancer (1) Maternal Grandmother    C.A.D. (1) Sister: elevated cholesterol    Cancer (3) Mother: lung age 83, Paternal Grandfather, Paternal Uncle    Eye Disorder (1) Father: Macular Degeneration    Unknown/Adopted (1) Father: 91 years            Current Outpatient Medications   Medication Sig     amLODIPine (NORVASC) 10 MG tablet TAKE 1 TABLET(10 MG) BY MOUTH AT BEDTIME     atorvastatin (LIPITOR) 20 MG tablet TAKE 1 TABLET BY MOUTH EVERY DAY     calcium-vitamin D (CALTRATE) 600-400 MG-UNIT per tablet Take 1 tablet by mouth 2 times daily     clobetasol (TEMOVATE) 0.05 % external ointment Apply topically 2 times daily     Coenzyme Q10 (CO Q 10 PO) Take 200 mg by mouth At Bedtime      gabapentin (NEURONTIN) 100 MG capsule Take 1 capsule (100 mg) by mouth 3 times daily     hydroxychloroquine (PLAQUENIL) 200 MG tablet Take 200 mg by mouth 2 times daily     Melatonin ER 1 MG TBCR Take 1 mg by mouth nightly as needed     Multiple Vitamin (MULTI-VITAMIN) per tablet Take 1 tablet by mouth daily.     omeprazole (PRILOSEC) 20 MG DR capsule TK 1 C PO QD 30 MIN B CAESAR AND 30 MIN B DINNER     PREMARIN 0.625 MG/GM vaginal cream PLACE 0.5 GRAM VAGINALLY 2 TIMES A WEEK AT BEDTIME     No current facility-administered medications for this visit.      Allergies  "  Allergen Reactions     Metoclopramide      Other reaction(s): Other - Describe In Comment Field  Severe redness in eyes from the preservative in certain eye medications     Other [Seasonal Allergies]      Preservative in eye drops       ROS:  12 point review of systems negative other than symptoms noted below.  Genitourinary: Vaginal Dryness and Vaginal Itching    OBJECTIVE:     /64   Pulse 66   Ht 1.702 m (5' 7\")   Wt 71.2 kg (157 lb)   LMP 07/07/1992   BMI 24.59 kg/m    Body mass index is 24.59 kg/m .    Exam:  Constitutional:  Appearance: Well nourished, well developed alert, in no acute distress  Chest:  Respiratory Effort:  Breathing unlabored  Skin:General Inspection: Purplish discoloration of fingers and toes.    Genitalia and Groin:  No rashes present, no lesions present, no areas of discoloration, no masses present.  Neurologic/Psychiatric:  Mental Status:  Oriented X3   Pelvic Exam:  External Genitalia:     No evidence of vulvodynia.  Patient does have irritated sensation after exam.    Vagina:     Pelvic sidewalls are nontender.  There is no significant discharge.    Bladder:     Nontender to palpation  Urethra:   Urethral Body:  Urethra palpation normal, urethra structural support normal   Urethral Meatus:  No erythema or lesions present  Perineum:     Perineum within normal limits, no evidence of trauma, no rashes or skin lesions present  Anus:     Anus within normal limits, no hemorrhoids present  Inguinal Lymph Nodes:     No lymphadenopathy present  Pubic Hair:     Normal pubic hair distribution for age  Genitalia and Groin:     No rashes present, no lesions present, no areas of discoloration, no masses present       In-Clinic Test Results:  No results found for this or any previous visit (from the past 24 hour(s)).    ASSESSMENT/PLAN:                                                        ICD-10-CM    1. Lichen sclerosus L90.0 clobetasol (TEMOVATE) 0.05 % external ointment   2. " Neuropathy G62.9 gabapentin (NEURONTIN) 100 MG capsule           Plan: Empirically I believe that the patient has lichen sclerosis.  She has significant itching externally which has not responded to other therapies.  I also believe that she is experiencing a neuropathy in the pelvic vaginal area.  She has taken gabapentin in the past for her leg neuropathy.  We will start with a low-dose of gabapentin.  If not improved will try increasing the dosage.    Chinedu Flores MD  [facility]

## 2019-01-16 NOTE — TELEPHONE ENCOUNTER
I think we can find a compounding pharmacy that will make a cheaper estrogen cream.  The clobetasol she would need to call other pharmacies to see if there is one that sells for less, otherwise she could check with [place] pharmacies.

## 2019-01-16 NOTE — TELEPHONE ENCOUNTER
Central Prior Authorization Team   Phone: [phone]      PA Initiation    Medication: Premarin 0.625 mg-Initiated  Insurance Company: [employer] - Phone [phone] Fax [phone]  Pharmacy Filling the Rx: [id number] - [place], [place] - [address] & [facility]  Filling Pharmacy Phone: [phone]  Filling Pharmacy Fax:    Start Date: 1/16/2019

## 2019-01-16 NOTE — TELEPHONE ENCOUNTER
Prior Authorization Not Needed   Medication: Premarin 0.625 mg-PA NOT NEEDED  Insurance Company: [employer] Review - Phone [phone] Fax [phone]  Expected CoPay:      Pharmacy Filling the Rx: [id number] - [place], [place] - [address] AT [facility]  Pharmacy Notified: Yes  Patient Notified: No    Got a call from [facility] and was informed patient does not have active coverage through them.  Called pharmacy to get new insurance information and was informed that patient's new insurance covered the medication.

## 2019-01-17 NOTE — TELEPHONE ENCOUNTER
Left message for pt regarding Dr. Flores's recommendations regarding medications. [facility] does not make estrogen cream. [facility] pharmacy in [place] [phone] does make estradiol cream for approx 30$ for 30g and 45$ for 40g. Asked pt to call back to discuss  Rosemarie Jones RN on 1/17/2019 at 1:13 PM

## 2019-01-23 NOTE — TELEPHONE ENCOUNTER
Pt does not need a refill of her premarin at this time. When she does she will call the clinic so the refill can be called into [facility]. (see TE 1/17/19)  Pt has already purchased her clobetasol cream so that is not an issue at this time as she feels this will last her quite awhile.  Rosemarie Jones RN on 1/23/2019 at 4:34 PM

## 2019-01-31 ENCOUNTER — TRANSFERRED RECORDS (OUTPATIENT)
Dept: HEALTH INFORMATION MANAGEMENT | Facility: CLINIC | Age: 79
End: 2019-01-31

## 2019-03-11 ENCOUNTER — TELEPHONE (OUTPATIENT)
Dept: FAMILY MEDICINE | Facility: CLINIC | Age: 79
End: 2019-03-11

## 2019-03-11 NOTE — TELEPHONE ENCOUNTER
Reason for Call:  Medication Question    Other request: her Rhumotologist recommend that she switch her BP medications,   she's had a lot of problems with Edma    Can we leave a detailed message on this number? YES    Phone number patient can be reached at: Home number on file [phone] (home)    Best Time: anytime    Call taken on 3/11/2019 at 11:27 AM by Jama Abraham

## 2019-03-11 NOTE — TELEPHONE ENCOUNTER
To PCP:     S: Rheumatologist recommends switching BP meds d/t edema     B: On amlodipine 10mg    Water aerobics 3x/week, walks frequently     A:   Denies SOB, CP, dizziness    Swelling:     Knees down are puffy, bilaterally. Are still puffy when she wakes in the morning.     Shoes feel tight     Ok to use hospital f/u or same day spot or Team for assessment/discussion?     Please advise,   Thank you,   Rand DUBOSE RN

## 2019-03-15 ENCOUNTER — OFFICE VISIT (OUTPATIENT)
Dept: FAMILY MEDICINE | Facility: CLINIC | Age: 79
End: 2019-03-15
Payer: MEDICARE

## 2019-03-15 VITALS
HEIGHT: 67 IN | SYSTOLIC BLOOD PRESSURE: 153 MMHG | BODY MASS INDEX: 24.96 KG/M2 | OXYGEN SATURATION: 99 % | DIASTOLIC BLOOD PRESSURE: 66 MMHG | TEMPERATURE: 97.2 F | HEART RATE: 69 BPM | WEIGHT: 159 LBS

## 2019-03-15 DIAGNOSIS — I10 BENIGN ESSENTIAL HYPERTENSION: ICD-10-CM

## 2019-03-15 PROCEDURE — 99213 OFFICE O/P EST LOW 20 MIN: CPT | Performed by: INTERNAL MEDICINE

## 2019-03-15 RX ORDER — TRIAMTERENE/HYDROCHLOROTHIAZID 37.5-25 MG
1 TABLET ORAL DAILY
Qty: 90 TABLET | Refills: 3 | Status: SHIPPED | OUTPATIENT
Start: 2019-03-15 | End: 2020-02-14

## 2019-03-15 RX ORDER — AMLODIPINE BESYLATE 5 MG/1
5 TABLET ORAL DAILY
Qty: 90 TABLET | Refills: 3 | Status: SHIPPED | OUTPATIENT
Start: 2019-03-15 | End: 2019-05-15

## 2019-03-15 ASSESSMENT — MIFFLIN-ST. JEOR: SCORE: 1243.85

## 2019-03-15 NOTE — PROGRESS NOTES
SUBJECTIVE:   Jeannine Rod is a 78 year old female who presents to clinic today for the following health issues:      Swollen ankles     78-year-old female with hypertension, systemic sclerosis, history of pulmonary embolism, gastroesophageal reflux disease, Raynaud's disease, hyperlipidemia, history of radicular plexus neuropathy, recent history of diagnosis of vaginal lichen sclerosis.  Questionable history of vaginal neuropathy symptoms?  She was started on Neurontin for vaginal complaints by a gynecologist.  The Neurontin seemed to help initially, but her pain is completely resolved now.  She did see her neurologist who agreed with the use of gabapentin if needed.  However, since starting gabapentin, she has had worsening lower extremity edema.  She complained to her rheumatologist about lower extremity edema in January as well.  She denies associated dyspnea on exertion, orthopnea, PND.  She has a history of ray nods for which she has been taking amlodipine.  On her own, she reduced her amlodipine dose from 10 mg to 5 mg daily.  Her blood pressure is high in clinic today.  She has tried to use compression hosiery for her swelling, but finds them intolerable.   She had an echocardiogram in October 2017 showing normal systolic function    Problem list and histories reviewed & adjusted, as indicated.  Additional history: as documented    Patient Active Problem List   Diagnosis     Malignant neoplasm of breast (H)     Raynaud's syndrome     Status post hip replacement     Pulmonary embolism and infarction     Systemic sclerosis (H)     Gastroesophageal reflux disease with esophagitis     Hyperlipidemia LDL goal <100     Calculus of gallbladder without cholecystitis     Radiculoplexus neuropathy     Benign essential hypertension     Atrophic vaginitis     Past Surgical History:   Procedure Laterality Date     ARTHROPLASTY HIP  3/5/2012    Procedure:ARTHROPLASTY HIP; LEFT TOTAL HIP ARTHROPLASTY (BIOMET)^;  Surgeon:SABRINA CALLEJAS; Location:[facility] OR     BIOPSY  2007    Right Breast Biopsy     BIOPSY  2007    Right Breast Biopsy x2     BIOPSY  2007    Left Breast Biopsy     BREAST SURGERY  10/2/2007    Bilateral Mastectomy     BREAST SURGERY  3/2008    Breast Reconstruction Surgery     COLONOSCOPY      repeat in 5 years     ENT SURGERY      salivary gland removal       Social History     Tobacco Use     Smoking status: Former Smoker     Years: 20.00     Types: Cigarettes     Last attempt to quit: 1982     Years since quittin.2     Smokeless tobacco: Never Used   Substance Use Topics     Alcohol use: Yes     Alcohol/week: 0.0 oz     Comment: red wine socially, 2-3x week     Family History   Problem Relation Age of Onset     Cancer Mother         lung age 83     Unknown/Adopted Father         91 years     Eye Disorder Father         Macular Degeneration     Breast Cancer Maternal Grandmother      Cancer Paternal Grandfather      Cancer Paternal Uncle      Asthma Brother      C.A.D. Sister         elevated cholesterol         Current Outpatient Medications   Medication Sig Dispense Refill     amLODIPine (NORVASC) 5 MG tablet Take 1 tablet (5 mg) by mouth daily 90 tablet 3     atorvastatin (LIPITOR) 20 MG tablet TAKE 1 TABLET BY MOUTH EVERY DAY 90 tablet 0     calcium-vitamin D (CALTRATE) 600-400 MG-UNIT per tablet Take 1 tablet by mouth 2 times daily       clobetasol (TEMOVATE) 0.05 % external ointment Apply topically 2 times daily 45 g 1     Coenzyme Q10 (CO Q 10 PO) Take 200 mg by mouth At Bedtime        gabapentin (NEURONTIN) 100 MG capsule Take 1 capsule (100 mg) by mouth 3 times daily 270 capsule 3     hydroxychloroquine (PLAQUENIL) 200 MG tablet Take 200 mg by mouth 2 times daily       Melatonin ER 1 MG TBCR Take 1 mg by mouth nightly as needed       Multiple Vitamin (MULTI-VITAMIN) per tablet Take 1 tablet by mouth daily.       omeprazole (PRILOSEC) 20 MG DR capsule TK 1 C PO QD 30 MIN B  "CAESAR AND 30 MIN B DINNER  5     PREMARIN 0.625 MG/GM vaginal cream PLACE 0.5 GRAM VAGINALLY 2 TIMES A WEEK AT BEDTIME 30 g 2     triamterene-HCTZ (MAXZIDE-25) 37.5-25 MG tablet Take 1 tablet by mouth daily 90 tablet 3     Allergies   Allergen Reactions     Metoclopramide      Other reaction(s): Other - Describe In Comment Field  Severe redness in eyes from the preservative in certain eye medications     Other [Seasonal Allergies]      Preservative in eye drops       Reviewed and updated as needed this visit by clinical staff       Reviewed and updated as needed this visit by Provider         ROS:  Constitutional, HEENT, cardiovascular, pulmonary, gi and gu systems are negative, except as otherwise noted.    OBJECTIVE:     /66 (BP Location: Right arm, Cuff Size: Adult Regular)   Pulse 69   Temp 97.2  F (36.2  C) (Oral)   Ht 1.702 m (5' 7\")   Wt 72.1 kg (159 lb)   LMP 07/07/1992   SpO2 99%   BMI 24.90 kg/m    Body mass index is 24.9 kg/m .  General: This is a well-appearing female in no acute distress.  Extremities: There is trace edema to about mid shin bilaterally in the bilateral lower extremities.    Diagnostic Test Results:  none     ASSESSMENT/PLAN:           1. Benign essential hypertension    Since her pain is resolved, she would like to stop taking gabapentin and believes that this might improve her swelling.  In addition, we will continue with the lower dose of amlodipine for the blood pressure benefit and also to help with her Raynuad's symptoms.  Additionally, we will add triamterene hydrochlorothiazide to further improve blood pressure control and to also help with swelling.  Short-term follow-up will be necessary to recheck blood pressure, follow-up on Raynaud's symptoms and to check basic metabolic panel.    - triamterene-HCTZ (MAXZIDE-25) 37.5-25 MG tablet; Take 1 tablet by mouth daily  Dispense: 90 tablet; Refill: 3  - amLODIPine (NORVASC) 5 MG tablet; Take 1 tablet (5 mg) by mouth daily  " Dispense: 90 tablet; Refill: 3        Bebeto Freitas MD  [facility] [place]

## 2019-04-17 ENCOUNTER — OFFICE VISIT (OUTPATIENT)
Dept: FAMILY MEDICINE | Facility: CLINIC | Age: 79
End: 2019-04-17
Payer: MEDICARE

## 2019-04-17 VITALS
HEART RATE: 75 BPM | BODY MASS INDEX: 24.43 KG/M2 | SYSTOLIC BLOOD PRESSURE: 123 MMHG | OXYGEN SATURATION: 98 % | WEIGHT: 156 LBS | DIASTOLIC BLOOD PRESSURE: 64 MMHG | TEMPERATURE: 97.1 F

## 2019-04-17 DIAGNOSIS — E78.5 HYPERLIPIDEMIA LDL GOAL <100: ICD-10-CM

## 2019-04-17 DIAGNOSIS — I10 BENIGN ESSENTIAL HYPERTENSION: Primary | ICD-10-CM

## 2019-04-17 DIAGNOSIS — C50.919 MALIGNANT NEOPLASM OF BREAST IN FEMALE, ESTROGEN RECEPTOR POSITIVE, UNSPECIFIED LATERALITY, UNSPECIFIED SITE OF BREAST (H): ICD-10-CM

## 2019-04-17 DIAGNOSIS — Z17.0 MALIGNANT NEOPLASM OF BREAST IN FEMALE, ESTROGEN RECEPTOR POSITIVE, UNSPECIFIED LATERALITY, UNSPECIFIED SITE OF BREAST (H): ICD-10-CM

## 2019-04-17 DIAGNOSIS — M34.9 SYSTEMIC SCLEROSIS (H): ICD-10-CM

## 2019-04-17 DIAGNOSIS — I26.99 PULMONARY EMBOLISM WITH INFARCTION (H): ICD-10-CM

## 2019-04-17 DIAGNOSIS — I73.00 RAYNAUD'S DISEASE WITHOUT GANGRENE: ICD-10-CM

## 2019-04-17 DIAGNOSIS — E03.9 HYPOTHYROIDISM, UNSPECIFIED TYPE: ICD-10-CM

## 2019-04-17 PROCEDURE — 80048 BASIC METABOLIC PNL TOTAL CA: CPT | Performed by: INTERNAL MEDICINE

## 2019-04-17 PROCEDURE — 99214 OFFICE O/P EST MOD 30 MIN: CPT | Performed by: INTERNAL MEDICINE

## 2019-04-17 PROCEDURE — 36415 COLL VENOUS BLD VENIPUNCTURE: CPT | Performed by: INTERNAL MEDICINE

## 2019-04-17 PROCEDURE — 84443 ASSAY THYROID STIM HORMONE: CPT | Performed by: INTERNAL MEDICINE

## 2019-04-17 RX ORDER — ATORVASTATIN CALCIUM 20 MG/1
20 TABLET, FILM COATED ORAL DAILY
Qty: 90 TABLET | Refills: 3 | Status: SHIPPED | OUTPATIENT
Start: 2019-04-17 | End: 2020-04-27

## 2019-04-17 NOTE — PROGRESS NOTES
SUBJECTIVE:   Jeannine Rod is a 78 year old female who presents to clinic today for the following   health issues:      Follow UP    Pleasant 78-year-old female here in follow-up after adding triamterene hydrochlorothiazide to amlodipine and reducing amlodipine dose secondary to swelling side effects associated with higher amlodipine dose and uncontrolled hypertension.  She has a history of systemic sclerosis, remote history of breast cancer and pulmonary embolism, hyperlipidemia, raynaud's disease and hypothyroidism characterized by a mildly elevated TSH checked for concerns about cognition about 1 year ago.  She is a non-smoker.  She states that the moderate to severe swelling in her bilateral legs has resolved completely with the reduction in dose of amlodipine and starting triamterene hydrochlorothiazide.  She is very pleased about this.  There have been no new symptoms since last visit.  Again, she denies orthopnea, PND, dyspnea on exertion.  Typically, she has moderate to severe Raynaud's symptoms characterized by pain in her fingertips associated with cold weather and present for many years.  Since reducing her amlodipine dose, there has not been any exacerbation of Raynaud's symptoms.    Additional history: as documented    Reviewed  and updated as needed this visit by clinical staff  Tobacco  Allergies  Meds         Reviewed and updated as needed this visit by Provider         Patient Active Problem List   Diagnosis     Malignant neoplasm of breast (H)     Raynaud's syndrome     Status post hip replacement     Pulmonary embolism and infarction     Systemic sclerosis (H)     Gastroesophageal reflux disease with esophagitis     Hyperlipidemia LDL goal <100     Calculus of gallbladder without cholecystitis     Radiculoplexus neuropathy     Benign essential hypertension     Atrophic vaginitis     Past Surgical History:   Procedure Laterality Date     ARTHROPLASTY HIP  3/5/2012    Procedure:ARTHROPLASTY  HIP; LEFT TOTAL HIP ARTHROPLASTY (BIOMET)^; Surgeon:SABRINA CALLEJAS; Location:[facility]     BIOPSY  2007    Right Breast Biopsy     BIOPSY  2007    Right Breast Biopsy x2     BIOPSY  2007    Left Breast Biopsy     BREAST SURGERY  10/2/2007    Bilateral Mastectomy     BREAST SURGERY  3/2008    Breast Reconstruction Surgery     COLONOSCOPY      repeat in 5 years     ENT SURGERY      salivary gland removal       Social History     Tobacco Use     Smoking status: Former Smoker     Years: 20.00     Types: Cigarettes     Last attempt to quit: 1982     Years since quittin.3     Smokeless tobacco: Never Used   Substance Use Topics     Alcohol use: Yes     Alcohol/week: 0.0 oz     Comment: red wine socially, 2-3x week     Family History   Problem Relation Age of Onset     Cancer Mother         lung age 83     Unknown/Adopted Father         91 years     Eye Disorder Father         Macular Degeneration     Breast Cancer Maternal Grandmother      Cancer Paternal Grandfather      Cancer Paternal Uncle      Asthma Brother      C.A.D. Sister         elevated cholesterol         Current Outpatient Medications   Medication Sig Dispense Refill     amLODIPine (NORVASC) 5 MG tablet Take 1 tablet (5 mg) by mouth daily 90 tablet 3     atorvastatin (LIPITOR) 20 MG tablet Take 1 tablet (20 mg) by mouth daily 90 tablet 3     calcium-vitamin D (CALTRATE) 600-400 MG-UNIT per tablet Take 1 tablet by mouth 2 times daily       hydroxychloroquine (PLAQUENIL) 200 MG tablet Take 200 mg by mouth 2 times daily       Melatonin ER 1 MG TBCR Take 1 mg by mouth nightly as needed       Multiple Vitamin (MULTI-VITAMIN) per tablet Take 1 tablet by mouth daily.       omeprazole (PRILOSEC) 20 MG DR capsule TK 1 C PO QD 30 MIN B CAESAR AND 30 MIN B DINNER  5     PREMARIN 0.625 MG/GM vaginal cream PLACE 0.5 GRAM VAGINALLY 2 TIMES A WEEK AT BEDTIME 30 g 2     triamterene-HCTZ (MAXZIDE-25) 37.5-25 MG tablet Take 1 tablet by mouth daily 90  tablet 3     Allergies   Allergen Reactions     Metoclopramide      Other reaction(s): Other - Describe In Comment Field  Severe redness in eyes from the preservative in certain eye medications     Other [Seasonal Allergies]      Preservative in eye drops       ROS:  Constitutional, HEENT, cardiovascular, pulmonary, gi and gu systems are negative, except as otherwise noted.    OBJECTIVE:     /64 (BP Location: Right arm, Patient Position: Chair, Cuff Size: Adult Regular)   Pulse 75   Temp 97.1  F (36.2  C) (Pulmonary Artery)   Wt 70.8 kg (156 lb)   LMP 07/07/1992   SpO2 98%   BMI 24.43 kg/m    Body mass index is 24.43 kg/m .  General: This is a well-appearing female in no acute distress.  She speaks in full sentences.  Extremities: There is complete resolution of the edema described at her previous visit.    Diagnostic Test Results:  Labs pending    ASSESSMENT/PLAN:       1. Benign essential hypertension  Well-controlled on new regimen, check labs  - Basic metabolic panel    2. Systemic sclerosis (H)  Stable, continue follow-up with rheumatology as directed    3. Malignant neoplasm of breast in female, estrogen receptor positive, unspecified laterality, unspecified site of breast (H)  Remote history of breast cancer    4. Pulmonary embolism with infarction (H)  Remote history of provoked pulmonary embolism following surgery    5. Hyperlipidemia LDL goal <100  On statin therapy, refill Lipitor today, LDL was well controlled in November 2018, recheck in November 2019  - atorvastatin (LIPITOR) 20 MG tablet; Take 1 tablet (20 mg) by mouth daily  Dispense: 90 tablet; Refill: 3    6. Raynaud's disease without gangrene  Stable symptoms despite reduction in amlodipine dose, continue to monitor    7. Hypothyroidism, unspecified type  Recheck TSH as per previous plan secondary to mild elevation noted in March 2018 when checked because of cognition complaints  - TSH WITH FREE T4 REFLEX        Bebeto Freitas,  MD  [facility]

## 2019-04-17 NOTE — LETTER
[address] [facility]  [address]  [place]  [address]  Tel: [phone]    April 18, 2019    Jeannine Rod  [address]  [place] [address]        Dear Ms. Rod,    Good news! -TSH (thyroid stimulating hormone) level is normal which indicates normal circulating thyroid hormone levels.      -Kidney function is normal for you (Creatinine, GFR), Sodium is normal for you, Potassium is normal for you, Calcium is normal for you, Glucose (blood sugar) is normal for you.           Bottom line:  Your labs indicate normal thyroid gland function and that you are tolerating the new blood pressure medication regimen without issues.        Follow up:  Return in November for a routine preventive exam or sooner if needed.   If you have any further questions or problems, please contact our office.      Sincerely,    Bebeto Freitas MD/[facility]          Enclosure: Lab Results  Results for orders placed or performed in visit on 04/17/19   TSH WITH FREE T4 REFLEX   Result Value Ref Range    TSH 3.37 0.40 - 4.00 mU/L   Basic metabolic panel   Result Value Ref Range    Sodium 133 133 - 144 mmol/L    Potassium 4.3 3.4 - 5.3 mmol/L    Chloride 99 94 - 109 mmol/L    Carbon Dioxide 27 20 - 32 mmol/L    Anion Gap 7 3 - 14 mmol/L    Glucose 83 70 - 99 mg/dL    Urea Nitrogen 17 7 - 30 mg/dL    Creatinine 0.93 0.52 - 1.04 mg/dL    GFR Estimate 59 (L) >60 mL/min/[1.73_m2]    GFR Estimate If Black 69 >60 mL/min/[1.73_m2]    Calcium 8.8 8.5 - 10.1 mg/dL

## 2019-04-18 LAB
ANION GAP SERPL CALCULATED.3IONS-SCNC: 7 MMOL/L (ref 3–14)
BUN SERPL-MCNC: 17 MG/DL (ref 7–30)
CALCIUM SERPL-MCNC: 8.8 MG/DL (ref 8.5–10.1)
CHLORIDE SERPL-SCNC: 99 MMOL/L (ref 94–109)
CO2 SERPL-SCNC: 27 MMOL/L (ref 20–32)
CREAT SERPL-MCNC: 0.93 MG/DL (ref 0.52–1.04)
GFR SERPL CREATININE-BSD FRML MDRD: 59 ML/MIN/{1.73_M2}
GLUCOSE SERPL-MCNC: 83 MG/DL (ref 70–99)
POTASSIUM SERPL-SCNC: 4.3 MMOL/L (ref 3.4–5.3)
SODIUM SERPL-SCNC: 133 MMOL/L (ref 133–144)
TSH SERPL DL<=0.005 MIU/L-ACNC: 3.37 MU/L (ref 0.4–4)

## 2019-04-18 NOTE — RESULT ENCOUNTER NOTE
The following letter pertains to your most recent diagnostic tests:    Good news! -TSH (thyroid stimulating hormone) level is normal which indicates normal circulating thyroid hormone levels.      -Kidney function is normal for you (Creatinine, GFR), Sodium is normal for you, Potassium is normal for you, Calcium is normal for you, Glucose (blood sugar) is normal for you.           Bottom line:  Your labs indicate normal thyroid gland function and that you are tolerating the new blood pressure medication regimen without issues.        Follow up:  Return in November for a routine preventive exam or sooner if needed.       Sincerely,    Dr. Freitas

## 2019-05-13 DIAGNOSIS — I10 BENIGN ESSENTIAL HYPERTENSION: ICD-10-CM

## 2019-05-13 NOTE — TELEPHONE ENCOUNTER
Patient calling to request medication refill for Amlodipine & Desylate.     Pharmacy will not supply the medication she needs without new RX.     Per patient This could be an insurance issue.     She would like a call back at [phone].     Ok to leave detailed voicemail at number above.     Patient uses [facility] on [facility] [phone].

## 2019-05-15 RX ORDER — AMLODIPINE BESYLATE 5 MG/1
5 TABLET ORAL DAILY
Qty: 90 TABLET | Refills: 1 | Status: SHIPPED | OUTPATIENT
Start: 2019-05-15 | End: 2019-10-31

## 2019-05-15 NOTE — TELEPHONE ENCOUNTER
"Requested Prescriptions   Pending Prescriptions Disp Refills     amLODIPine (NORVASC) 5 MG tablet 90 tablet 3     Sig: Take 1 tablet (5 mg) by mouth daily       Calcium Channel Blockers Protocol  Passed - 5/13/2019 12:58 PM        Passed - Blood pressure under 140/90 in past 12 months     BP Readings from Last 3 Encounters:   04/17/19 123/64   03/15/19 153/66   01/15/19 112/64                 Passed - Recent (12 mo) or future (30 days) visit within the authorizing provider's specialty     Patient had office visit in the last 12 months or has a visit in the next 30 days with authorizing provider or within the authorizing provider's specialty.  See \"Patient Info\" tab in inbasket, or \"Choose Columns\" in Meds & Orders section of the refill encounter.              Passed - Medication is active on med list        Passed - Patient is age 18 or older        Passed - No active pregnancy on record        Passed - Normal serum creatinine on file in past 12 months     Recent Labs   Lab Test 04/17/19  1137   CR 0.93             Passed - No positive pregnancy test in past 12 months          "

## 2019-05-15 NOTE — TELEPHONE ENCOUNTER
Prescription approved per [facility] Refill Protocol.  Brit MULLINS RN

## 2019-06-04 ENCOUNTER — TELEPHONE (OUTPATIENT)
Dept: OBGYN | Facility: CLINIC | Age: 79
End: 2019-06-04

## 2019-06-04 NOTE — TELEPHONE ENCOUNTER
Fax received from [facility] pharmacy, [facility] that clobetasol is not covered by insurance and to either do a PA or prescribe alternative. Suggested alternatives are betamethasone DP Aug 0.05% or Halobetasol Prop 0.05%. Called the patient to discuss, as she had paid cash previously at the high non-formulary price. (see Tel Enc 1/15/19)  Patient states she has more than enough left, thinks it will last for another year. She is aware that if/when she needs another RX to consider processing for the preferred alternatives.

## 2019-07-08 NOTE — PROGRESS NOTES
Subjective     Jeannine Rod is a 78 year old female who presents to clinic today for the following health issues:    HPI   RESPIRATORY SYMPTOMS      Duration: 4 weeks    Description  nasal congestion, cough and fatigue/malaise    Severity: severe    Accompanying signs and symptoms: None    History (predisposing factors):  none    Precipitating or alleviating factors: None    Therapies tried and outcome:  Sudafed, mucinex, benadryl,        Non productive cough for the past month.  Cough with dyspnea on exertion started about one week ago.  3 days ago woke up breathing heavy  Yesterday was standing in kitchen cooking after 15 minutes felt short of breath and tired  Post nasal drip for the past month causing some nausea no vomiting.  Fatigue began last week.  Denies any sick contact.  Denies fever, chills, headache or ear pain.  Denies any night sweats.      Past Medical History:   Diagnosis Date     Arthritis      Benign essential hypertension 10/23/2017     Benign essential hypertension 10/23/2017     Calculus of gallbladder without cholecystitis 10/6/2016     Gastroesophageal reflux disease with esophagitis 10/6/2016     Hyperlipidemia      Malignant neoplasm (H)     breast bilat mastectomy, no rad, no chemobilat      Pulmonary embolism and infarction 3/9/2012    IMO update changed this record. 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 Sister         elevated cholesterol     Past Surgical History:   Procedure Laterality Date     ARTHROPLASTY HIP  3/5/2012    Procedure:ARTHROPLASTY HIP; LEFT TOTAL HIP ARTHROPLASTY  (BIOMET)^; Surgeon:SABRINA CALLEJAS; Location:[facility] OR     BIOPSY  2007    Right Breast Biopsy     BIOPSY  2007    Right Breast Biopsy x2     BIOPSY  2007    Left Breast Biopsy     BREAST SURGERY  10/2/2007    Bilateral Mastectomy     BREAST SURGERY  3/2008    Breast Reconstruction Surgery     COLONOSCOPY  2011    repeat in 5 years     ENT SURGERY      salivary gland removal     Social History     Tobacco Use     Smoking status: Former Smoker     Years: 20.00     Types: Cigarettes     Last attempt to quit: 1982     Years since quittin.5     Smokeless tobacco: Never Used   Substance Use Topics     Alcohol use: Yes     Alcohol/week: 0.0 oz     Comment: red wine socially, 2-3x week     Current Outpatient Medications   Medication Sig Dispense Refill     amLODIPine (NORVASC) 5 MG tablet Take 1 tablet (5 mg) by mouth daily 90 tablet 1     atorvastatin (LIPITOR) 20 MG tablet Take 1 tablet (20 mg) by mouth daily 90 tablet 3     calcium-vitamin D (CALTRATE) 600-400 MG-UNIT per tablet Take 1 tablet by mouth 2 times daily       hydroxychloroquine (PLAQUENIL) 200 MG tablet Take 200 mg by mouth 2 times daily       Melatonin ER 1 MG TBCR Take 1 mg by mouth nightly as needed       mometasone-formoterol (DULERA) 100-5 MCG/ACT inhaler Inhale 2 puffs into the lungs 2 times daily 8.8 g 0     Multiple Vitamin (MULTI-VITAMIN) per tablet Take 1 tablet by mouth daily.       omeprazole (PRILOSEC) 20 MG DR capsule Take 1 capsule (20 mg) by mouth daily  5     PREMARIN 0.625 MG/GM vaginal cream PLACE 0.5 GRAM VAGINALLY 2 TIMES A WEEK AT BEDTIME 30 g 2     triamterene-HCTZ (MAXZIDE-25) 37.5-25 MG tablet Take 1 tablet by mouth daily 90 tablet 3     fluticasone-vilanterol (BREO ELLIPTA) 100-25 MCG/INH inhaler Inhale 1 puff into the lungs daily 1 Inhaler 0     Allergies   Allergen Reactions     Metoclopramide      Other reaction(s): Other - Describe In Comment Field  Severe redness in eyes from the preservative in certain eye  medications     Other [Seasonal Allergies]      Preservative in eye drops       Reviewed and updated as needed this visit by clinical staff and provider     Review of Systems   Detailed as above     Objective    /73 (BP Location: Right arm, Patient Position: Chair, Cuff Size: Adult Regular)   Pulse 78   Temp 98.2  F (36.8  C) (Oral)   Wt 69.9 kg (154 lb)   LMP 07/07/1992   SpO2 100%   BMI 24.12 kg/m    There is no height or weight on file to calculate BMI.  Physical Exam   Constitutional: She appears well-developed and well-nourished.   HENT:   Head: Normocephalic.   Right Ear: Tympanic membrane, external ear and ear canal normal.   Left Ear: Tympanic membrane, external ear and ear canal normal.   Nose: Rhinorrhea present.   Mouth/Throat: Oropharynx is clear and moist. No oropharyngeal exudate.   Bilateral pale nasal mucosa   Eyes: Conjunctivae are normal.   Neck: Normal range of motion.   Cardiovascular: Normal rate, regular rhythm and normal heart sounds.   Pulmonary/Chest: Effort normal and breath sounds normal. No respiratory distress.   Cough noted   Lymphadenopathy:     She has no cervical adenopathy.   Neurological: She is alert.   Skin: Skin is warm and dry.   Psychiatric: She has a normal mood and affect. Judgment normal.        Assessment and Plan:       ICD-10-CM    1. Cough R05 XR Chest 2 Views     Chest x-ray negative for pneumonia, suspect some aspect of viral illness. For her cough, patient did have previous PFT competed in 2013 that showed minor COPD disease. At that time her percentage of predicted FEV1 was 73%. This current episode maybe an exacerbation of this underling disease. Will start her on a trial of low dose ICS/LABA inhaler. She can also try OTC guaifenesin with dextromethorphan for the cough. With her runny nose and pale nasal membranes recommended she try taking a daily antihistamine such as Claritin.     She should follow up with her PCP to discuss continuation of inhaler,  or sooner for additional concerns or complaints.    Pt seen in conjunction with Desmond Ramos NP Student    FLORENCIO Burleson, CNP  [facility]

## 2019-07-09 ENCOUNTER — ANCILLARY PROCEDURE (OUTPATIENT)
Dept: GENERAL RADIOLOGY | Facility: CLINIC | Age: 79
End: 2019-07-09
Attending: NURSE PRACTITIONER
Payer: MEDICARE

## 2019-07-09 ENCOUNTER — OFFICE VISIT (OUTPATIENT)
Dept: FAMILY MEDICINE | Facility: CLINIC | Age: 79
End: 2019-07-09
Payer: MEDICARE

## 2019-07-09 VITALS
HEART RATE: 78 BPM | SYSTOLIC BLOOD PRESSURE: 135 MMHG | WEIGHT: 154 LBS | TEMPERATURE: 98.2 F | OXYGEN SATURATION: 100 % | DIASTOLIC BLOOD PRESSURE: 73 MMHG | BODY MASS INDEX: 24.12 KG/M2

## 2019-07-09 DIAGNOSIS — R05.9 COUGH: ICD-10-CM

## 2019-07-09 DIAGNOSIS — R05.9 COUGH: Primary | ICD-10-CM

## 2019-07-09 PROCEDURE — 99214 OFFICE O/P EST MOD 30 MIN: CPT | Performed by: NURSE PRACTITIONER

## 2019-07-09 PROCEDURE — 71046 X-RAY EXAM CHEST 2 VIEWS: CPT

## 2019-07-09 NOTE — PATIENT INSTRUCTIONS
Take claritin daily until symptoms resolve    Take Mucinex DM for the cough     Use 1-2 puffs of the inhaler. You can start at 2 puffs twice a day and you can decrease as you improve to 1 puff twice a day then to once a day and can stop

## 2019-07-10 ENCOUNTER — TELEPHONE (OUTPATIENT)
Dept: FAMILY MEDICINE | Facility: CLINIC | Age: 79
End: 2019-07-10

## 2019-07-10 DIAGNOSIS — R05.9 COUGH: Primary | ICD-10-CM

## 2019-07-10 NOTE — TELEPHONE ENCOUNTER
[facility] had sent over fax informing medication is not covered and alternatives were listed. Huddled with Jolene Hu in regards to Dulera not being covered. Will send new Rx for Breo Ellipta to pharmacy for patient.    Jair Orourke CMA on 7/10/2019 at 9:36 AM

## 2019-07-10 NOTE — TELEPHONE ENCOUNTER
Reason for Call:  Medication or medication refill: mometasone-formoterol (DULERA) 100-5 MCG/ACT inhaler    Do you use a [facility]?  Name of the pharmacy and phone number for the current request:         [id number] - [place], [place] - [address]      Name of the medication requested: mometasone-formoterol (DULERA) 100-5 MCG/ACT inhaler    Other request: Pt called stating that her insurance will not cover Rx for mometasone-formoterol (DULERA) 100-5 MCG/ACT inhaler. Pt is requesting a new Rx for medication that would be covered.     Can we leave a detailed message on this number? YES    Phone number patient can be reached at: Home number on file [phone] (home)    Best Time: Anytime     Call taken on 7/10/2019 at 9:11 AM by Ivette Espinoza

## 2019-08-03 DIAGNOSIS — R05.9 COUGH: ICD-10-CM

## 2019-08-03 NOTE — TELEPHONE ENCOUNTER
"Last Written Prescription Date:  7/10/19  Last Fill Quantity: 1 inhaler,  # refills: 0   Last office visit: 7/9/2019 with prescribing provider:  Mayte   Future Office Visit:      No flowsheet data found.    Requested Prescriptions   Pending Prescriptions Disp Refills     BREO ELLIPTA 100-25 MCG/INH inhaler [Pharmacy Med Name: BREO ELLIPTA 100-25 MCG INH]  0     Sig: TAKE 1 PUFF BY MOUTH EVERY DAY       Inhaled Steroids Protocol Passed - 8/3/2019  8:30 AM        Passed - Patient is age 12 or older        Passed - Recent (12 mo) or future (30 days) visit within the authorizing provider's specialty     Patient had office visit in the last 12 months or has a visit in the next 30 days with authorizing provider or within the authorizing provider's specialty.  See \"Patient Info\" tab in inbasket, or \"Choose Columns\" in Meds & Orders section of the refill encounter.              Passed - Medication is active on med list          "

## 2019-08-03 NOTE — LETTER
[place] [address] [address] [facility] [address]  Phone: [phone]        August 5, 2019      Jeannine Rod                                                                                                                                [address]  [place] [address]            Dear Ms. Rod,    We are concerned about your health care.  We recently provided you with a medication refill.  Many medications require routine follow-up with your Doctor.      At this time we ask that:you schedule an appointment with Dr. Freitas to follow up on your inhaler.    Your prescription: Has been refilled for 1 month so you may have time for the above noted follow-up.      Thank you,      Bebeto Freitas MD/ es

## 2019-10-01 ENCOUNTER — HEALTH MAINTENANCE LETTER (OUTPATIENT)
Age: 79
End: 2019-10-01

## 2019-10-31 DIAGNOSIS — I10 BENIGN ESSENTIAL HYPERTENSION: ICD-10-CM

## 2019-10-31 NOTE — TELEPHONE ENCOUNTER
"amLODIPine (NORVASC) 5 MG tablet 90 tablet 1 5/15/2019     Last Written Prescription Date:  5/15/2019  Last Fill Quantity: 90,  # refills: 1   Last office visit: 7/9/2019 with prescribing provider: EJ Hu    Future Office Visit:   Next 5 appointments (look out 90 days)    Nov 25, 2019  1:30 PM CST  PHYSICAL with Bebeto Freitas MD  [facility] [address] [address] [phone]         Requested Prescriptions   Pending Prescriptions Disp Refills     amLODIPine (NORVASC) 5 MG tablet [Pharmacy Med Name: AMLODIPINE BESYLATE 5 MG TAB] 90 tablet 1     Sig: TAKE 1 TABLET BY MOUTH EVERY DAY       Calcium Channel Blockers Protocol  Passed - 10/31/2019  4:16 PM        Passed - Blood pressure under 140/90 in past 12 months     BP Readings from Last 3 Encounters:   07/09/19 135/73   04/17/19 123/64   03/15/19 153/66                 Passed - Recent (12 mo) or future (30 days) visit within the authorizing provider's specialty     Patient has had an office visit with the authorizing provider or a provider within the authorizing providers department within the previous 12 mos or has a future within next 30 days. See \"Patient Info\" tab in inbasket, or \"Choose Columns\" in Meds & Orders section of the refill encounter.              Passed - Medication is active on med list        Passed - Patient is age 18 or older        Passed - No active pregnancy on record        Passed - Normal serum creatinine on file in past 12 months     Recent Labs   Lab Test 04/17/19  1137   CR 0.93             Passed - No positive pregnancy test in past 12 months          "

## 2019-11-01 RX ORDER — AMLODIPINE BESYLATE 5 MG/1
TABLET ORAL
Qty: 90 TABLET | Refills: 2 | Status: SHIPPED | OUTPATIENT
Start: 2019-11-01 | End: 2022-02-18
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
## 2019-11-25 ENCOUNTER — OFFICE VISIT (OUTPATIENT)
Dept: FAMILY MEDICINE | Facility: CLINIC | Age: 79
End: 2019-11-25
Payer: MEDICARE

## 2019-11-25 VITALS
BODY MASS INDEX: 24.17 KG/M2 | TEMPERATURE: 97.8 F | DIASTOLIC BLOOD PRESSURE: 70 MMHG | SYSTOLIC BLOOD PRESSURE: 122 MMHG | WEIGHT: 154 LBS | OXYGEN SATURATION: 98 % | HEART RATE: 76 BPM | HEIGHT: 67 IN

## 2019-11-25 DIAGNOSIS — C50.919 MALIGNANT NEOPLASM OF BREAST IN FEMALE, ESTROGEN RECEPTOR POSITIVE, UNSPECIFIED LATERALITY, UNSPECIFIED SITE OF BREAST (H): ICD-10-CM

## 2019-11-25 DIAGNOSIS — Z17.0 MALIGNANT NEOPLASM OF BREAST IN FEMALE, ESTROGEN RECEPTOR POSITIVE, UNSPECIFIED LATERALITY, UNSPECIFIED SITE OF BREAST (H): ICD-10-CM

## 2019-11-25 DIAGNOSIS — Z00.00 ROUTINE GENERAL MEDICAL EXAMINATION AT A HEALTH CARE FACILITY: Primary | ICD-10-CM

## 2019-11-25 DIAGNOSIS — E78.5 HYPERLIPIDEMIA LDL GOAL <100: ICD-10-CM

## 2019-11-25 DIAGNOSIS — M34.9 SYSTEMIC SCLEROSIS (H): ICD-10-CM

## 2019-11-25 DIAGNOSIS — H91.93 BILATERAL HEARING LOSS, UNSPECIFIED HEARING LOSS TYPE: ICD-10-CM

## 2019-11-25 DIAGNOSIS — K21.00 GASTROESOPHAGEAL REFLUX DISEASE WITH ESOPHAGITIS: ICD-10-CM

## 2019-11-25 DIAGNOSIS — I10 BENIGN ESSENTIAL HYPERTENSION: ICD-10-CM

## 2019-11-25 DIAGNOSIS — I73.00 RAYNAUD'S DISEASE WITHOUT GANGRENE: ICD-10-CM

## 2019-11-25 LAB
ERYTHROCYTE [DISTWIDTH] IN BLOOD BY AUTOMATED COUNT: 14.4 % (ref 10–15)
HCT VFR BLD AUTO: 37.1 % (ref 35–47)
HGB BLD-MCNC: 12 G/DL (ref 11.7–15.7)
MCH RBC QN AUTO: 29.8 PG (ref 26.5–33)
MCHC RBC AUTO-ENTMCNC: 32.3 G/DL (ref 31.5–36.5)
MCV RBC AUTO: 92 FL (ref 78–100)
PLATELET # BLD AUTO: 227 10E9/L (ref 150–450)
RBC # BLD AUTO: 4.03 10E12/L (ref 3.8–5.2)
WBC # BLD AUTO: 7.6 10E9/L (ref 4–11)

## 2019-11-25 PROCEDURE — G0439 PPPS, SUBSEQ VISIT: HCPCS | Performed by: INTERNAL MEDICINE

## 2019-11-25 PROCEDURE — 80053 COMPREHEN METABOLIC PANEL: CPT | Performed by: INTERNAL MEDICINE

## 2019-11-25 PROCEDURE — 36415 COLL VENOUS BLD VENIPUNCTURE: CPT | Performed by: INTERNAL MEDICINE

## 2019-11-25 PROCEDURE — 85027 COMPLETE CBC AUTOMATED: CPT | Performed by: INTERNAL MEDICINE

## 2019-11-25 PROCEDURE — 80061 LIPID PANEL: CPT | Performed by: INTERNAL MEDICINE

## 2019-11-25 ASSESSMENT — MIFFLIN-ST. JEOR: SCORE: 1221.17

## 2019-11-25 ASSESSMENT — ACTIVITIES OF DAILY LIVING (ADL): CURRENT_FUNCTION: NO ASSISTANCE NEEDED

## 2019-11-25 NOTE — PROGRESS NOTES
"SUBJECTIVE:   Jeannine Rod is a 78 year old female who presents for Preventive Visit.    Are you in the first 12 months of your Medicare coverage?  No    Healthy Habits:     In general, how would you rate your overall health?  Very good    Frequency of exercise:  4-5 days/week    Duration of exercise:  30-45 minutes    Do you usually eat at least 4 servings of fruit and vegetables a day, include whole grains    & fiber and avoid regularly eating high fat or \"junk\" foods?  Yes    Taking medications regularly:  Yes    Barriers to taking medications:  None    Medication side effects:  None    Ability to successfully perform activities of daily living:  No assistance needed    Home Safety:  No safety concerns identified    Hearing impairment symptoms: wears hearing aids - hearing is worsening even with hearing aids. Wants referral to hearing specialist.    In the past 6 months, have you been bothered by leaking of urine?  No    In general, how would you rate your overall mental or emotional health?  Very good      PHQ-2 Total Score: 0    Additional concerns today:  Yes    Do you feel safe in your environment? Yes    Have you ever done Advance Care Planning? (For example, a Health Directive, POLST, or a discussion with a medical provider or your loved ones about your wishes): Yes, advance care planning is on file.      Fall risk  Fallen 2 or more times in the past year?: No  Any fall with injury in the past year?: No    Cognitive Screening   1) Repeat 3 items (Leader, Season, Table)    2) Clock draw: NORMAL  3) 3 item recall: Recalls 3 objects  Results: 3 items recalled: COGNITIVE IMPAIRMENT LESS LIKELY    Mini-CogTM Copyright MURTAZA Ogden. Licensed by the author for use in [facility]; reprinted with permission (delano@.[facility] [place]). All rights reserved.      Do you have sleep apnea, excessive snoring or daytime drowsiness?: no    Reviewed and updated as needed this visit by clinical staff  Tobacco  Allergies  " Meds  Med Hx  Surg Hx  Fam Hx  Soc Hx        Reviewed and updated as needed this visit by Provider  Tobacco  Med Hx  Surg Hx  Fam Hx  Soc Hx       Social History     Tobacco Use     Smoking status: Former Smoker     Years: 20.00     Types: Cigarettes     Last attempt to quit: 1982     Years since quittin.9     Smokeless tobacco: Never Used   Substance Use Topics     Alcohol use: Yes     Alcohol/week: 0.0 standard drinks     Comment: red wine socially, 2-3x week     If you drink alcohol do you typically have >3 drinks per day or >7 drinks per week? No    Alcohol Use 2019   Prescreen: >3 drinks/day or >7 drinks/week? No         Current providers sharing in care for this patient include:   Patient Care Team:  Bebeto Freitas MD as PCP - General (Internal Medicine)  Bebeto Freitas MD as Assigned PCP  Danelle Aguayo PA-C as Physician Assistant (Physician Assistant)    The following health maintenance items are reviewed in Epic and correct as of today:  Health Maintenance   Topic Date Due     ADVANCE CARE PLANNING  1940     FALL RISK ASSESSMENT  2019     MEDICARE ANNUAL WELLNESS VISIT  2019     TSH W/FREE T4 REFLEX  2020     COLONOSCOPY  2020     LIPID  2023     DTAP/TDAP/TD IMMUNIZATION (4 - Td) 2025     DEXA  Completed     PHQ-2  Completed     INFLUENZA VACCINE  Completed     PNEUMOCOCCAL IMMUNIZATION 65+ HIGH/HIGHEST RISK  Completed     ZOSTER IMMUNIZATION  Completed     IPV IMMUNIZATION  Aged Out     MENINGITIS IMMUNIZATION  Aged Out     Patient Active Problem List   Diagnosis     Malignant neoplasm of breast (H)     Raynaud's syndrome     Status post hip replacement     Pulmonary embolism and infarction     Systemic sclerosis (H)     Gastroesophageal reflux disease with esophagitis     Hyperlipidemia LDL goal <100     Calculus of gallbladder without cholecystitis     Radiculoplexus neuropathy     Benign essential hypertension     Atrophic  vaginitis     Past Surgical History:   Procedure Laterality Date     ARTHROPLASTY HIP  3/5/2012    Procedure:ARTHROPLASTY HIP; LEFT TOTAL HIP ARTHROPLASTY (BIOMET)^; Surgeon:SABRINA CALLEJAS; Location:[facility] OR     BIOPSY  2007    Right Breast Biopsy     BIOPSY  2007    Right Breast Biopsy x2     BIOPSY  2007    Left Breast Biopsy     BREAST SURGERY  10/2/2007    Bilateral Mastectomy     BREAST SURGERY  3/2008    Breast Reconstruction Surgery     COLONOSCOPY      repeat in 5 years     ENT SURGERY      salivary gland removal       Social History     Tobacco Use     Smoking status: Former Smoker     Years: 20.00     Types: Cigarettes     Last attempt to quit: 1982     Years since quittin.9     Smokeless tobacco: Never Used   Substance Use Topics     Alcohol use: Yes     Alcohol/week: 0.0 standard drinks     Comment: red wine socially, 2-3x week     Family History   Problem Relation Age of Onset     Cancer Mother         lung age 83     Unknown/Adopted Father         91 years     Eye Disorder Father         Macular Degeneration     Breast Cancer Maternal Grandmother      Cancer Paternal Grandfather      Cancer Paternal Uncle      Asthma Brother      C.A.D. Sister         elevated cholesterol         Current Outpatient Medications   Medication Sig Dispense Refill     amLODIPine (NORVASC) 5 MG tablet TAKE 1 TABLET BY MOUTH EVERY DAY 90 tablet 2     atorvastatin (LIPITOR) 20 MG tablet Take 1 tablet (20 mg) by mouth daily 90 tablet 3     calcium-vitamin D (CALTRATE) 600-400 MG-UNIT per tablet Take 1 tablet by mouth 2 times daily       hydroxychloroquine (PLAQUENIL) 200 MG tablet Take 200 mg by mouth 2 times daily 2 tabs daily 5 days per week, 1 tab daily 2 days per week.       Melatonin ER 1 MG TBCR Take 1 mg by mouth nightly as needed       Multiple Vitamin (MULTI-VITAMIN) per tablet Take 1 tablet by mouth daily.       omeprazole (PRILOSEC) 20 MG DR capsule Take 1 capsule (20 mg) by mouth daily  5  "    PREMARIN 0.625 MG/GM vaginal cream PLACE 0.5 GRAM VAGINALLY 2 TIMES A WEEK AT BEDTIME 30 g 2     triamterene-HCTZ (MAXZIDE-25) 37.5-25 MG tablet Take 1 tablet by mouth daily 90 tablet 3     Allergies   Allergen Reactions     Metoclopramide      Other reaction(s): Other - Describe In Comment Field  Severe redness in eyes from the preservative in certain eye medications     No Clinical Screening - See Comments Other (See Comments)     Severe redness in eyes from the preservative in certain eye medications         Review of Systems  Constitutional, HEENT, cardiovascular, pulmonary, gi and gu systems are negative, except as otherwise noted.    OBJECTIVE:   /70 (BP Location: Right arm, Patient Position: Sitting, Cuff Size: Adult Regular)   Pulse 76   Temp 97.8  F (36.6  C) (Tympanic)   Ht 1.702 m (5' 7\")   Wt 69.9 kg (154 lb)   LMP 07/07/1992   SpO2 98%   Breastfeeding No   BMI 24.12 kg/m   Estimated body mass index is 24.12 kg/m  as calculated from the following:    Height as of this encounter: 1.702 m (5' 7\").    Weight as of this encounter: 69.9 kg (154 lb).  Physical Exam  GENERAL APPEARANCE: healthy, alert and no distress  EYES: Eyes grossly normal to inspection, PERRL and conjunctivae and sclerae normal  HENT: ear canals and TM's normal, nose and mouth without ulcers or lesions, oropharynx clear and oral mucous membranes moist with exception of telangiectasia on roof of mouth from CREST   NECK: no adenopathy, no asymmetry, masses, or scars and thyroid normal to palpation  RESP: lungs clear to auscultation - no rales, rhonchi or wheezes  BREAST:  Declined by patient due to mastectomy status   CV: regular rate and rhythm, normal S1 S2, no S3 or S4, unchanged systolic murmur, click or rub, no peripheral edema and peripheral pulses strong  ABDOMEN: soft, nontender, no hepatosplenomegaly, no masses and bowel sounds normal  MS: no musculoskeletal defects are noted and gait is age appropriate without " "ataxia  SKIN: no suspicious lesions or rashes  NEURO: Normal strength and tone, sensory exam grossly normal, mentation intact and speech normal  PSYCH: mentation appears normal and affect normal/bright    Labs pending     ASSESSMENT / PLAN:   1. Routine general medical examination at a health care facility      2. Systemic sclerosis (H)  Stable continue follow up with Dr. Shaffer from rheumatology     3. 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 Benign essential hypertension  Second check blood pressure reading OK    5. Gastroesophageal reflux disease with esophagitis  Stable control of symptoms on PPI     6. Hyperlipidemia LDL goal <100  On statin therapy; recheck lipids today   - Comprehensive metabolic panel  - CBC with platelets  - Lipid panel reflex to direct LDL Fasting    7. Raynaud's disease without gangrene  stable    8. Bilateral hearing loss, unspecified hearing loss type  Referral to ENT   - OTOLARYNGOLOGY REFERRAL    COUNSELING:  Reviewed preventive health counseling, as reflected in patient instructions  Special attention given to:       Regular exercise       Healthy diet/nutrition       Immunizations    Vaccines are up to date          Consider lung cancer screening for ages 55-80 years and 30 pack-year smoking history ; quit smoking 37 years ago       Colon cancer screening; due for follow up exam this spring; discussed with patient; She would like to return to [facility]        Mammography not indicated due to mastectomy status     Estimated body mass index is 24.12 kg/m  as calculated from the following:    Height as of this encounter: 1.702 m (5' 7\").    Weight as of this encounter: 69.9 kg (154 lb).         reports that she quit smoking about 37 years ago. Her smoking use included cigarettes. She quit after 20.00 years of use. She has never used smokeless " tobacco.      Appropriate preventive services were discussed with this patient, including applicable screening as appropriate for cardiovascular disease, diabetes, osteopenia/osteoporosis, and glaucoma.  As appropriate for age/gender, discussed screening for colorectal cancer, prostate cancer, breast cancer, and cervical cancer. Checklist reviewing preventive services available has been given to the patient.    Reviewed patients plan of care and provided an AVS. The Basic Care Plan (routine screening as documented in Health Maintenance) for Jeannine meets the Care Plan requirement. This Care Plan has been established and reviewed with the Patient.    Counseling Resources:  ATP IV Guidelines  Pooled Cohorts Equation Calculator  Breast Cancer Risk Calculator  FRAX Risk Assessment  ICSI Preventive Guidelines  Dietary Guidelines for Americans, 2010  USDA's MyPlate  ASA Prophylaxis  Lung CA Screening    Bebeto Freitas MD  [facility]    Identified Health Risks:

## 2019-11-26 LAB
ALBUMIN SERPL-MCNC: 3.9 G/DL (ref 3.4–5)
ALP SERPL-CCNC: 66 U/L (ref 40–150)
ALT SERPL W P-5'-P-CCNC: 31 U/L (ref 0–50)
ANION GAP SERPL CALCULATED.3IONS-SCNC: 5 MMOL/L (ref 3–14)
AST SERPL W P-5'-P-CCNC: 18 U/L (ref 0–45)
BILIRUB SERPL-MCNC: 0.4 MG/DL (ref 0.2–1.3)
BUN SERPL-MCNC: 24 MG/DL (ref 7–30)
CALCIUM SERPL-MCNC: 9 MG/DL (ref 8.5–10.1)
CHLORIDE SERPL-SCNC: 103 MMOL/L (ref 94–109)
CHOLEST SERPL-MCNC: 204 MG/DL
CO2 SERPL-SCNC: 27 MMOL/L (ref 20–32)
CREAT SERPL-MCNC: 0.98 MG/DL (ref 0.52–1.04)
GFR SERPL CREATININE-BSD FRML MDRD: 56 ML/MIN/{1.73_M2}
GLUCOSE SERPL-MCNC: 91 MG/DL (ref 70–99)
HDLC SERPL-MCNC: 93 MG/DL
LDLC SERPL CALC-MCNC: 98 MG/DL
NONHDLC SERPL-MCNC: 111 MG/DL
POTASSIUM SERPL-SCNC: 4.1 MMOL/L (ref 3.4–5.3)
PROT SERPL-MCNC: 7.1 G/DL (ref 6.8–8.8)
SODIUM SERPL-SCNC: 135 MMOL/L (ref 133–144)
TRIGL SERPL-MCNC: 66 MG/DL

## 2019-11-26 NOTE — RESULT ENCOUNTER NOTE
The following letter pertains to your most recent diagnostic tests:    -Your cholesterol panel looks healthy.     -Liver and gallbladder tests are normal for you. (ALT,AST, Alk phos, bilirubin), kidney function is normal for you (Creatinine, GFR), Sodium is normal, Potassium is normal for you, Calcium is normal for you, Glucose (blood sugar) is normal for you.      -Your complete blood counts including your hemoglobin returned normal for you.         Bottom line:  Lab results look stable and at goal.        Follow up:  Schedule an appointment for a physical examination with fasting blood tests in one year's time, or return sooner if new questions, symptoms or problems arise.       Sincerely,    Dr. Freitas

## 2020-01-16 ENCOUNTER — OFFICE VISIT (OUTPATIENT)
Dept: OBGYN | Facility: CLINIC | Age: 80
End: 2020-01-16
Payer: MEDICARE

## 2020-01-16 VITALS — DIASTOLIC BLOOD PRESSURE: 64 MMHG | SYSTOLIC BLOOD PRESSURE: 136 MMHG | WEIGHT: 152.1 LBS | BODY MASS INDEX: 23.82 KG/M2

## 2020-01-16 DIAGNOSIS — N94.819 VULVODYNIA: Primary | ICD-10-CM

## 2020-01-16 PROCEDURE — 99213 OFFICE O/P EST LOW 20 MIN: CPT | Performed by: OBSTETRICS & GYNECOLOGY

## 2020-01-16 NOTE — PROGRESS NOTES
"  SUBJECTIVE:                                                   Jeannine Rod is a 79 year old female who presents to clinic today for the following health issue(s):  Patient presents with:  Vaginal Problem: Onset 1 month ago with Lt vulvar lump.  Improved but continues.      HPI:  80yo P0 (2 adopted children) describes evolution of painful vulvar \"lump or pimple\" which began approx 1 month ago.  The lump became very tender then drained.  It has largely disappeared now and is no longer tender    Patient's last menstrual period was 1992..   Patient is sexually active, .  Using doesn't need for contraception.     Problem list and histories reviewed & adjusted, as indicated.  Additional history: as documented.    Patient Active Problem List   Diagnosis     Malignant neoplasm of breast (H)     Raynaud's syndrome     Status post hip replacement     Pulmonary embolism and infarction     Systemic sclerosis (H)     Gastroesophageal reflux disease with esophagitis     Hyperlipidemia LDL goal <100     Calculus of gallbladder without cholecystitis     Radiculoplexus neuropathy     Benign essential hypertension     Atrophic vaginitis     Past Surgical History:   Procedure Laterality Date     ARTHROPLASTY HIP  3/5/2012    Procedure:ARTHROPLASTY HIP; LEFT TOTAL HIP ARTHROPLASTY (BIOMET)^; Surgeon:SABRINA CALLEJAS; Location:[facility] OR     BIOPSY  2007    Right Breast Biopsy     BIOPSY  2007    Right Breast Biopsy x2     BIOPSY  2007    Left Breast Biopsy     BREAST SURGERY  10/2/2007    Bilateral Mastectomy     BREAST SURGERY  3/2008    Breast Reconstruction Surgery     COLONOSCOPY      repeat in 5 years     ENT SURGERY      salivary gland removal      Social History     Tobacco Use     Smoking status: Former Smoker     Years: 20.00     Types: Cigarettes     Last attempt to quit: 1982     Years since quittin.0     Smokeless tobacco: Never Used   Substance Use Topics     Alcohol use: Yes     " Alcohol/week: 0.0 standard drinks     Comment: red wine socially, 2-3x week      Problem (# of Occurrences) Relation (Name,Age of Onset)    Asthma (1) Brother    Breast Cancer (1) Maternal Grandmother    C.A.D. (1) Sister: elevated cholesterol    Cancer (3) Mother: lung age 83, Paternal Grandfather, Paternal Uncle    Eye Disorder (1) Father: Macular Degeneration    Unknown/Adopted (1) Father: 91 years            amLODIPine (NORVASC) 5 MG tablet, TAKE 1 TABLET BY MOUTH EVERY DAY  atorvastatin (LIPITOR) 20 MG tablet, Take 1 tablet (20 mg) by mouth daily  calcium-vitamin D (CALTRATE) 600-400 MG-UNIT per tablet, Take 1 tablet by mouth 2 times daily  hydroxychloroquine (PLAQUENIL) 200 MG tablet, Take 200 mg by mouth 2 times daily 2 tabs daily 5 days per week, 1 tab daily 2 days per week.  Melatonin ER 1 MG TBCR, Take 1 mg by mouth nightly as needed  Multiple Vitamin (MULTI-VITAMIN) per tablet, Take 1 tablet by mouth daily.  omeprazole (PRILOSEC) 20 MG DR capsule, Take 1 capsule (20 mg) by mouth daily  PREMARIN 0.625 MG/GM vaginal cream, PLACE 0.5 GRAM VAGINALLY 2 TIMES A WEEK AT BEDTIME  triamterene-HCTZ (MAXZIDE-25) 37.5-25 MG tablet, Take 1 tablet by mouth daily    No current facility-administered medications on file prior to visit.     Allergies   Allergen Reactions     Metoclopramide      Other reaction(s): Other - Describe In Comment Field  Severe redness in eyes from the preservative in certain eye medications     No Clinical Screening - See Comments Other (See Comments)     Severe redness in eyes from the preservative in certain eye medications       ROS:  5 point ROS negative except as noted above in HPI, including Gen., Resp., CV, GI &  system review.    OBJECTIVE:     /64   Wt 69 kg (152 lb 1.6 oz)   LMP 07/07/1992   BMI 23.82 kg/m     BMI: Body mass index is 23.82 kg/m .  General: Alert and oriented, no distress.  Psychiatric: Mood and affect within normal limits.    Vulva:  5mm sebaceous cyst left  labium, non tender, non erythematous, normal female hair distribution, no inguinal adenopathy.    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  No treatment indicated at this time  Patient reassured.    Reviewed normal biology of vagina/vulva     Dennis Narvaez MD  [facility]

## 2020-02-14 DIAGNOSIS — I10 BENIGN ESSENTIAL HYPERTENSION: ICD-10-CM

## 2020-02-14 RX ORDER — TRIAMTERENE/HYDROCHLOROTHIAZID 37.5-25 MG
TABLET ORAL
Qty: 90 TABLET | Refills: 2 | Status: SHIPPED | OUTPATIENT
Start: 2020-02-14 | End: 2020-09-29

## 2020-02-14 NOTE — TELEPHONE ENCOUNTER
"triamterene-HCTZ (MAXZIDE-25) 37.5-25 MG tablet    Last Written Prescription Date:  3/15/2019  Last Fill Quantity: 90,  # refills: 3   Last office visit: 11/25/2019 with prescribing provider:  Dr. Freitas    Future Office Visit:  Unknown     Requested Prescriptions   Pending Prescriptions Disp Refills     triamterene-HCTZ (MAXZIDE-25) 37.5-25 MG tablet [Pharmacy Med Name: TRIAMTERENE-HCTZ 37.5-25 MG TB] 90 tablet 3     Sig: TAKE 1 TABLET BY MOUTH EVERY DAY       Diuretics (Including Combos) Protocol Passed - 2/14/2020  7:37 AM        Passed - Blood pressure under 140/90 in past 12 months     BP Readings from Last 3 Encounters:   01/16/20 136/64   11/25/19 122/70   07/09/19 135/73                 Passed - Recent (12 mo) or future (30 days) visit within the authorizing provider's specialty     Patient has had an office visit with the authorizing provider or a provider within the authorizing providers department within the previous 12 mos or has a future within next 30 days. See \"Patient Info\" tab in inbasket, or \"Choose Columns\" in Meds & Orders section of the refill encounter.              Passed - Medication is active on med list        Passed - Patient is age 18 or older        Passed - No active pregancy on record        Passed - Normal serum creatinine on file in past 12 months     Recent Labs   Lab Test 11/25/19  1425   CR 0.98              Passed - Normal serum potassium on file in past 12 months     Recent Labs   Lab Test 11/25/19  1425   POTASSIUM 4.1                    Passed - Normal serum sodium on file in past 12 months     Recent Labs   Lab Test 11/25/19  1425                 Passed - No positive pregnancy test in past 12 months          "

## 2020-03-05 ENCOUNTER — OFFICE VISIT - HEALTHEAST (OUTPATIENT)
Dept: AUDIOLOGY | Facility: CLINIC | Age: 80
End: 2020-03-05

## 2020-03-05 DIAGNOSIS — H90.3 SENSORINEURAL HEARING LOSS, BILATERAL: ICD-10-CM

## 2020-03-10 DIAGNOSIS — R05.9 COUGH: ICD-10-CM

## 2020-03-10 NOTE — TELEPHONE ENCOUNTER
Mometasone -formoterol (Dulera) not on current med list)     Per [facility]  Dulera not covered and sent in Breo Ellipta    BOTH Dulera & Breo Ellipta taken off med list by rooming staff   11/25/19 1335  Discontinue  Maryjo Ortega, MEGAN  Reason: Therapy completed      Associated Diagnoses   Cough [R05]           Last office visit Dr. Freitas 11-

## 2020-03-12 ENCOUNTER — TELEPHONE (OUTPATIENT)
Dept: FAMILY MEDICINE | Facility: CLINIC | Age: 80
End: 2020-03-12

## 2020-03-12 DIAGNOSIS — J44.9 CHRONIC OBSTRUCTIVE PULMONARY DISEASE, UNSPECIFIED COPD TYPE (H): Primary | ICD-10-CM

## 2020-03-12 RX ORDER — BUDESONIDE AND FORMOTEROL FUMARATE DIHYDRATE 80; 4.5 UG/1; UG/1
2 AEROSOL RESPIRATORY (INHALATION) 2 TIMES DAILY
Qty: 10.2 G | Refills: 3 | Status: SHIPPED | OUTPATIENT
Start: 2020-03-12 | End: 2020-03-17

## 2020-03-12 NOTE — TELEPHONE ENCOUNTER
Per pharmacy insurance prefers Symbicort, Dulera not covered     Disp  Refills  Start  End  AMY    mometasone-formoterol (DULERA) 100-5 MCG/ACT inhaler  8.8 g  0  3/11/2020   No    Sig - Route: Inhale 2 puffs into the lungs 2 times daily - Inhalation

## 2020-03-15 ENCOUNTER — OFFICE VISIT (OUTPATIENT)
Dept: URGENT CARE | Facility: URGENT CARE | Age: 80
End: 2020-03-15
Payer: MEDICARE

## 2020-03-15 VITALS
BODY MASS INDEX: 22.76 KG/M2 | SYSTOLIC BLOOD PRESSURE: 135 MMHG | HEIGHT: 67 IN | TEMPERATURE: 98.3 F | DIASTOLIC BLOOD PRESSURE: 75 MMHG | HEART RATE: 89 BPM | OXYGEN SATURATION: 98 % | WEIGHT: 145 LBS

## 2020-03-15 DIAGNOSIS — B37.0 THRUSH: ICD-10-CM

## 2020-03-15 DIAGNOSIS — L71.0 PERIORAL DERMATITIS: Primary | ICD-10-CM

## 2020-03-15 PROCEDURE — 99214 OFFICE O/P EST MOD 30 MIN: CPT | Performed by: PHYSICIAN ASSISTANT

## 2020-03-15 RX ORDER — NYSTATIN 100000/ML
500000 SUSPENSION, ORAL (FINAL DOSE FORM) ORAL 4 TIMES DAILY
Qty: 140 ML | Refills: 0 | Status: SHIPPED | OUTPATIENT
Start: 2020-03-15 | End: 2020-03-22

## 2020-03-15 RX ORDER — METRONIDAZOLE 7.5 MG/G
GEL TOPICAL 2 TIMES DAILY
Qty: 45 G | Refills: 1 | Status: SHIPPED | OUTPATIENT
Start: 2020-03-15 | End: 2021-01-04

## 2020-03-15 ASSESSMENT — MIFFLIN-ST. JEOR: SCORE: 1167.41

## 2020-03-15 NOTE — PROGRESS NOTES
CHIEF COMPLAINT:   Chief Complaint   Patient presents with     Urgent Care     Pt in clinic to have eval for rash in and around mouth.     Mouth/Lip Problem       HPI: Jeannine Rod is a 79 year old female who presents to clinic today for evaluation of mouth issue. Patient complains of rash around her mouth for the past 10 days. Now for 3-4 days, has burning sensation in mouth of tongue.   No fever or chills. Has not had URI symptoms. No difficulty swallowing.     Past Medical History:   Diagnosis Date     Arthritis      Benign essential hypertension 10/23/2017     Benign essential hypertension 10/23/2017     Calculus of gallbladder without cholecystitis 10/6/2016     Gastroesophageal reflux disease with esophagitis 10/6/2016     Hyperlipidemia      Malignant neoplasm (H)     breast bilat mastectomy, no rad, no chemobilat      Pulmonary embolism and infarction 3/9/2012    IMO update changed this record. 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     Raynaud's syndrome      Systemic sclerosis (H) 10/6/2016     Past Surgical History:   Procedure Laterality Date     ARTHROPLASTY HIP  3/5/2012    Procedure:ARTHROPLASTY HIP; LEFT TOTAL HIP ARTHROPLASTY (BIOMET)^; Surgeon:SABRINA CALLEJAS; Location:[facility] OR     BIOPSY  2007    Right Breast Biopsy     BIOPSY  2007    Right Breast Biopsy x2     BIOPSY  2007    Left Breast Biopsy     BREAST SURGERY  10/2/2007    Bilateral Mastectomy     BREAST SURGERY  3/2008    Breast Reconstruction Surgery     COLONOSCOPY      repeat in 5 years     ENT SURGERY      salivary gland removal     Social History     Tobacco Use     Smoking status: Former Smoker     Years: 20.00     Types: Cigarettes     Last attempt to quit: 1982     Years since quittin.2     Smokeless tobacco: Never Used   Substance Use Topics     Alcohol use: Yes     Alcohol/week: 0.0 standard drinks     Comment: red wine socially, 2-3x week     Current Outpatient Medications  "  Medication     amLODIPine (NORVASC) 5 MG tablet     atorvastatin (LIPITOR) 20 MG tablet     budesonide-formoterol (SYMBICORT) 80-4.5 MCG/ACT Inhaler     calcium-vitamin D (CALTRATE) 600-400 MG-UNIT per tablet     hydroxychloroquine (PLAQUENIL) 200 MG tablet     Melatonin ER 1 MG TBCR     metroNIDAZOLE (METROGEL) 0.75 % external gel     mometasone-formoterol (DULERA) 100-5 MCG/ACT inhaler     Multiple Vitamin (MULTI-VITAMIN) per tablet     nystatin (MYCOSTATIN) 439668 UNIT/ML suspension     omeprazole (PRILOSEC) 20 MG DR capsule     PREMARIN 0.625 MG/GM vaginal cream     triamterene-HCTZ (MAXZIDE-25) 37.5-25 MG tablet     No current facility-administered medications for this visit.      Allergies   Allergen Reactions     Metoclopramide      Other reaction(s): Other - Describe In Comment Field  Severe redness in eyes from the preservative in certain eye medications     No Clinical Screening - See Comments Other (See Comments)     Severe redness in eyes from the preservative in certain eye medications       10 point ROS of systems including Constitutional, Eyes, Respiratory, Cardiovascular, Gastroenterology, Genitourinary, Integumentary, Muscularskeletal, Psychiatric were all negative except for pertinent positives noted in my HPI.        Exam:  /75   Pulse 89   Temp 98.3  F (36.8  C) (Oral)   Ht 1.689 m (5' 6.5\")   Wt 65.8 kg (145 lb)   LMP 07/07/1992   SpO2 98%   BMI 23.05 kg/m    Constitutional: healthy, alert and no distress  Head: Normocephalic, atraumatic.  Eyes: conjunctiva clear, no drainage  ENT: TMs clear and shiny cristhian, nasal mucosa pink and moist, throat without tonsillar hypertrophy or erythema  Tongue is mildly beefy, without swelling  Neck: neck is supple, no cervical lymphadenopathy or nuchal rigidity  Cardiovascular: RRR  Respiratory: CTA bilaterally, no rhonchi or rales  Skin: no rashes  Neurologic: Speech clear, gait normal. Moves all extremities.      ASSESSMENT/PLAN:  1. Perioral " "dermatitis  Unknown offending agent, but very mild disease  Advised to use two times per day for 4-8  Weeks, do not use anything else on your face  - metroNIDAZOLE (METROGEL) 0.75 % external gel; Apply topically 2 times daily  Dispense: 45 g; Refill: 1    2. Thrush  Patients tongue is mildly \"beefy\" looking without swelling.  No evidence of severe reaction  Will give her nystatin to use for thrush  - nystatin (MYCOSTATIN) 849607 UNIT/ML suspension; Swish and spit 5 mLs (500,000 Units) in mouth 4 times daily for 7 days  Dispense: 140 mL; Refill: 0        Diagnosis and treatment plan was reviewed with patient and/or family.   We went over any labs or imaging. Discussed worsening symptoms or little to no relief despite treatment plan to follow-up with PCP or return to clinic.  Patient verbalizes understanding. All questions were addressed and answered.   Octavia Cardona PA-C      "

## 2020-03-16 DIAGNOSIS — J44.9 CHRONIC OBSTRUCTIVE PULMONARY DISEASE, UNSPECIFIED COPD TYPE (H): ICD-10-CM

## 2020-03-16 NOTE — TELEPHONE ENCOUNTER
"Requesting 90 day supply     Disp  Refills  Start  End  AMY    budesonide-formoterol (SYMBICORT) 80-4.5 MCG/ACT Inhaler  10.2 g  3  3/12/2020   --    Sig - Route: Inhale 2 puffs into the lungs 2 times daily - Inhalation      Requested Prescriptions   Pending Prescriptions Disp Refills     budesonide-formoterol (SYMBICORT) 80-4.5 MCG/ACT Inhaler 10.2 g 3     Sig: Inhale 2 puffs into the lungs 2 times daily       Long-Acting Beta Agonist Inhalers Protocol  Failed - 3/16/2020  6:38 PM        Failed - Order for Serevent, Striverdi, or Foradil and pt has steroid inhaler        Passed - Patient is age 12 or older        Passed - Recent (12 mo) or future (30 days) visit within the authorizing provider's specialty     Patient has had an office visit with the authorizing provider or a provider within the authorizing providers department within the previous 12 mos or has a future within next 30 days. See \"Patient Info\" tab in inbasket, or \"Choose Columns\" in Meds & Orders section of the refill encounter.              Passed - Medication is active on med list       Inhaled Steroids Protocol Passed - 3/16/2020  6:38 PM        Passed - Patient is age 12 or older        Passed - Recent (12 mo) or future (30 days) visit within the authorizing provider's specialty     Patient has had an office visit with the authorizing provider or a provider within the authorizing providers department within the previous 12 mos or has a future within next 30 days. See \"Patient Info\" tab in inbasket, or \"Choose Columns\" in Meds & Orders section of the refill encounter.              Passed - Medication is active on med list             "

## 2020-03-17 RX ORDER — BUDESONIDE AND FORMOTEROL FUMARATE DIHYDRATE 80; 4.5 UG/1; UG/1
2 AEROSOL RESPIRATORY (INHALATION) 2 TIMES DAILY
Qty: 10.2 G | Refills: 3 | Status: SHIPPED | OUTPATIENT
Start: 2020-03-17 | End: 2020-12-02

## 2020-03-18 ENCOUNTER — TRANSFERRED RECORDS (OUTPATIENT)
Dept: HEALTH INFORMATION MANAGEMENT | Facility: CLINIC | Age: 80
End: 2020-03-18

## 2020-04-26 DIAGNOSIS — E78.5 HYPERLIPIDEMIA LDL GOAL <100: ICD-10-CM

## 2020-04-27 RX ORDER — ATORVASTATIN CALCIUM 20 MG/1
TABLET, FILM COATED ORAL
Qty: 90 TABLET | Refills: 1 | Status: SHIPPED | OUTPATIENT
Start: 2020-04-27 | End: 2020-05-12

## 2020-05-05 DIAGNOSIS — N95.2 ATROPHIC VAGINITIS: ICD-10-CM

## 2020-05-05 NOTE — TELEPHONE ENCOUNTER
Refill request:    PREMARIN vaginal cream.    Summary: PLACE 0.5 GRAM VAGINALLY 2 TIMES A WEEK AT BEDTIME, Disp-30 g, R-2, E-Prescribe   Start: 1/8/2019  Ord/Sold: 1/8/2019 (O

## 2020-05-06 ENCOUNTER — TELEPHONE (OUTPATIENT)
Dept: FAMILY MEDICINE | Facility: CLINIC | Age: 80
End: 2020-05-06

## 2020-05-06 DIAGNOSIS — N95.2 ATROPHIC VAGINITIS: Primary | ICD-10-CM

## 2020-05-06 NOTE — TELEPHONE ENCOUNTER
Premarin      Last Written Prescription Date:  1/8/19  Last Fill Quantity: 30 g,   # refills: 2  Last Office Visit: 7/9/19  Future Office visit:       Routing refill request to provider for review/approval because:  Previously Rx'd by Octavia Duke.  Please approve if appropriate  Nancy Smalls RN

## 2020-05-07 RX ORDER — ESTRADIOL 0.1 MG/G
2 CREAM VAGINAL
Qty: 42.5 G | Refills: 11 | Status: SHIPPED | OUTPATIENT
Start: 2020-05-07 | End: 2021-10-08

## 2020-05-07 NOTE — TELEPHONE ENCOUNTER
To PCP:     Please see message below.     Ins covers Estrace, not Premarin as ordered 5/6. PA or replace?     Pharmacy is pended,     Thank you,   Rand DUBOSE RN

## 2020-06-29 ENCOUNTER — TRANSFERRED RECORDS (OUTPATIENT)
Dept: HEALTH INFORMATION MANAGEMENT | Facility: CLINIC | Age: 80
End: 2020-06-29

## 2020-07-27 DIAGNOSIS — I10 BENIGN ESSENTIAL HYPERTENSION: ICD-10-CM

## 2020-07-28 RX ORDER — AMLODIPINE BESYLATE 5 MG/1
TABLET ORAL
Refills: 0
Start: 2020-07-28

## 2020-12-01 ASSESSMENT — ACTIVITIES OF DAILY LIVING (ADL): CURRENT_FUNCTION: NO ASSISTANCE NEEDED

## 2020-12-02 ENCOUNTER — OFFICE VISIT (OUTPATIENT)
Dept: FAMILY MEDICINE | Facility: CLINIC | Age: 80
End: 2020-12-02
Payer: MEDICARE

## 2020-12-02 VITALS
HEART RATE: 66 BPM | BODY MASS INDEX: 22.96 KG/M2 | SYSTOLIC BLOOD PRESSURE: 104 MMHG | WEIGHT: 146.3 LBS | TEMPERATURE: 97.3 F | DIASTOLIC BLOOD PRESSURE: 62 MMHG | HEIGHT: 67 IN

## 2020-12-02 DIAGNOSIS — I26.99 PULMONARY EMBOLISM WITH INFARCTION (H): ICD-10-CM

## 2020-12-02 DIAGNOSIS — K21.00 GASTROESOPHAGEAL REFLUX DISEASE WITH ESOPHAGITIS WITHOUT HEMORRHAGE: ICD-10-CM

## 2020-12-02 DIAGNOSIS — J44.9 CHRONIC OBSTRUCTIVE PULMONARY DISEASE, UNSPECIFIED COPD TYPE (H): ICD-10-CM

## 2020-12-02 DIAGNOSIS — I73.00 RAYNAUD'S DISEASE WITHOUT GANGRENE: ICD-10-CM

## 2020-12-02 DIAGNOSIS — E03.9 HYPOTHYROIDISM, UNSPECIFIED TYPE: ICD-10-CM

## 2020-12-02 DIAGNOSIS — N18.31 STAGE 3A CHRONIC KIDNEY DISEASE (H): ICD-10-CM

## 2020-12-02 DIAGNOSIS — C50.919 MALIGNANT NEOPLASM OF BREAST IN FEMALE, ESTROGEN RECEPTOR POSITIVE, UNSPECIFIED LATERALITY, UNSPECIFIED SITE OF BREAST (H): ICD-10-CM

## 2020-12-02 DIAGNOSIS — Z11.59 NEED FOR HEPATITIS C SCREENING TEST: ICD-10-CM

## 2020-12-02 DIAGNOSIS — Z00.00 ROUTINE GENERAL MEDICAL EXAMINATION AT A HEALTH CARE FACILITY: Primary | ICD-10-CM

## 2020-12-02 DIAGNOSIS — Z17.0 MALIGNANT NEOPLASM OF BREAST IN FEMALE, ESTROGEN RECEPTOR POSITIVE, UNSPECIFIED LATERALITY, UNSPECIFIED SITE OF BREAST (H): ICD-10-CM

## 2020-12-02 DIAGNOSIS — M34.9 SYSTEMIC SCLEROSIS (H): ICD-10-CM

## 2020-12-02 DIAGNOSIS — E78.5 HYPERLIPIDEMIA LDL GOAL <100: ICD-10-CM

## 2020-12-02 PROBLEM — N18.30 CHRONIC KIDNEY DISEASE, STAGE 3 (H): Status: ACTIVE | Noted: 2020-12-02

## 2020-12-02 LAB
ALBUMIN SERPL-MCNC: 3.8 G/DL (ref 3.4–5)
ALP SERPL-CCNC: 71 U/L (ref 40–150)
ALT SERPL W P-5'-P-CCNC: 30 U/L (ref 0–50)
ANION GAP SERPL CALCULATED.3IONS-SCNC: 5 MMOL/L (ref 3–14)
AST SERPL W P-5'-P-CCNC: 16 U/L (ref 0–45)
BILIRUB SERPL-MCNC: 0.4 MG/DL (ref 0.2–1.3)
BUN SERPL-MCNC: 30 MG/DL (ref 7–30)
CALCIUM SERPL-MCNC: 9.2 MG/DL (ref 8.5–10.1)
CHLORIDE SERPL-SCNC: 107 MMOL/L (ref 94–109)
CHOLEST SERPL-MCNC: 197 MG/DL
CO2 SERPL-SCNC: 26 MMOL/L (ref 20–32)
CREAT SERPL-MCNC: 1.15 MG/DL (ref 0.52–1.04)
ERYTHROCYTE [DISTWIDTH] IN BLOOD BY AUTOMATED COUNT: 13.8 % (ref 10–15)
GFR SERPL CREATININE-BSD FRML MDRD: 46 ML/MIN/{1.73_M2}
GLUCOSE SERPL-MCNC: 87 MG/DL (ref 70–99)
HCT VFR BLD AUTO: 37.1 % (ref 35–47)
HCV AB SERPL QL IA: NONREACTIVE
HDLC SERPL-MCNC: 84 MG/DL
HGB BLD-MCNC: 11.9 G/DL (ref 11.7–15.7)
LDLC SERPL CALC-MCNC: 100 MG/DL
MCH RBC QN AUTO: 29.6 PG (ref 26.5–33)
MCHC RBC AUTO-ENTMCNC: 32.1 G/DL (ref 31.5–36.5)
MCV RBC AUTO: 92 FL (ref 78–100)
NONHDLC SERPL-MCNC: 113 MG/DL
PLATELET # BLD AUTO: 211 10E9/L (ref 150–450)
POTASSIUM SERPL-SCNC: 4 MMOL/L (ref 3.4–5.3)
PROT SERPL-MCNC: 7.1 G/DL (ref 6.8–8.8)
RBC # BLD AUTO: 4.02 10E12/L (ref 3.8–5.2)
SODIUM SERPL-SCNC: 138 MMOL/L (ref 133–144)
TRIGL SERPL-MCNC: 63 MG/DL
TSH SERPL DL<=0.005 MIU/L-ACNC: 3.31 MU/L (ref 0.4–4)
WBC # BLD AUTO: 6.9 10E9/L (ref 4–11)

## 2020-12-02 PROCEDURE — 80061 LIPID PANEL: CPT | Performed by: INTERNAL MEDICINE

## 2020-12-02 PROCEDURE — 36415 COLL VENOUS BLD VENIPUNCTURE: CPT | Performed by: INTERNAL MEDICINE

## 2020-12-02 PROCEDURE — 84443 ASSAY THYROID STIM HORMONE: CPT | Performed by: INTERNAL MEDICINE

## 2020-12-02 PROCEDURE — 80053 COMPREHEN METABOLIC PANEL: CPT | Performed by: INTERNAL MEDICINE

## 2020-12-02 PROCEDURE — 86803 HEPATITIS C AB TEST: CPT | Performed by: INTERNAL MEDICINE

## 2020-12-02 PROCEDURE — G0439 PPPS, SUBSEQ VISIT: HCPCS | Performed by: INTERNAL MEDICINE

## 2020-12-02 PROCEDURE — 85027 COMPLETE CBC AUTOMATED: CPT | Performed by: INTERNAL MEDICINE

## 2020-12-02 ASSESSMENT — MIFFLIN-ST. JEOR: SCORE: 1169.49

## 2020-12-02 ASSESSMENT — ACTIVITIES OF DAILY LIVING (ADL): CURRENT_FUNCTION: NO ASSISTANCE NEEDED

## 2020-12-02 NOTE — PROGRESS NOTES
"SUBJECTIVE:   Jeannine Rod is a 80 year old female who presents for Preventive Visit.      Patient has been advised of split billing requirements and indicates understanding: Yes   Are you in the first 12 months of your Medicare coverage?  No    Healthy Habits:     In general, how would you rate your overall health?  Excellent    Frequency of exercise:  4-5 days/week    Duration of exercise:  30-45 minutes    Do you usually eat at least 4 servings of fruit and vegetables a day, include whole grains    & fiber and avoid regularly eating high fat or \"junk\" foods?  Yes    Taking medications regularly:  Yes    Medication side effects:  None    Ability to successfully perform activities of daily living:  No assistance needed    Home Safety:  No safety concerns identified    Hearing Impairment:  Difficulty following a conversation in a noisy restaurant or crowded room, find that men's voices are easier to understand than woman's and difficulty understanding speech on the telephone    In the past 6 months, have you been bothered by leaking of urine?  No    In general, how would you rate your overall mental or emotional health?  Excellent      PHQ-2 Total Score: 0    Additional concerns today:  Yes    Do you feel safe in your environment? Yes    Have you ever done Advance Care Planning? (For example, a Health Directive, POLST, or a discussion with a medical provider or your loved ones about your wishes): Yes, advance care planning is on file.      Fall risk  Fallen 2 or more times in the past year?: No  Any fall with injury in the past year?: No    Cognitive Screening   1) Repeat 3 items (Leader, Season, Table)    2) Clock draw: NORMAL  3) 3 item recall: Recalls 3 objects  Results: 3 items recalled: COGNITIVE IMPAIRMENT LESS LIKELY    Mini-CogTM Copyright MURTAZA Ogden. Licensed by the author for use in [facility]; reprinted with permission (delano@.[facility]). All rights reserved.      Do you have sleep apnea, " excessive snoring or daytime drowsiness?: no    Reviewed and updated as needed this visit by clinical staff  Tobacco  Allergies  Meds              Reviewed and updated as needed this visit by Provider                Social History     Tobacco Use     Smoking status: Former Smoker     Years: 20.00     Types: Cigarettes     Quit date: 1982     Years since quittin.9     Smokeless tobacco: Never Used   Substance Use Topics     Alcohol use: Yes     Alcohol/week: 0.0 standard drinks     Comment: red wine socially, 2-3x week     If you drink alcohol do you typically have >3 drinks per day or >7 drinks per week? No    Alcohol Use 2020   Prescreen: >3 drinks/day or >7 drinks/week? No   Prescreen: >3 drinks/day or >7 drinks/week? -               Current providers sharing in care for this patient include:   Patient Care Team:  Bebeto Freitas MD as PCP - General (Internal Medicine)  Bebeto Freitas MD as Assigned PCP  Danelle Augayo PA-C as Physician Assistant (Physician Assistant)  Dennis Narvaez MD as Assigned OBGYN Provider    The following health maintenance items are reviewed in Epic and correct as of today:  Health Maintenance   Topic Date Due     COPD ACTION PLAN  1940     HEPATITIS C SCREENING  1960     TSH W/FREE T4 REFLEX  2020     FALL RISK ASSESSMENT  2020     MEDICARE ANNUAL WELLNESS VISIT  2021     LIPID  2024     ADVANCE CARE PLANNING  2024     DTAP/TDAP/TD IMMUNIZATION (4 - Td) 2025     COLORECTAL CANCER SCREENING  2025     SPIROMETRY  Completed     PHQ-2  Completed     INFLUENZA VACCINE  Completed     Pneumococcal Vaccine: 65+ Years  Completed     ZOSTER IMMUNIZATION  Completed     Pneumococcal Vaccine: Pediatrics (0 to 5 Years) and At-Risk Patients (6 to 64 Years)  Aged Out     IPV IMMUNIZATION  Aged Out     MENINGITIS IMMUNIZATION  Aged Out     HEPATITIS B IMMUNIZATION  Aged Out     Patient Active Problem List    Diagnosis     Malignant neoplasm of breast (H)     Raynaud's syndrome     Status post hip replacement     Pulmonary embolism and infarction     Systemic sclerosis (H)     Gastroesophageal reflux disease with esophagitis     Hyperlipidemia LDL goal <100     Calculus of gallbladder without cholecystitis     Radiculoplexus neuropathy     Benign essential hypertension     Atrophic vaginitis     Past Surgical History:   Procedure Laterality Date     ARTHROPLASTY HIP  3/5/2012    Procedure:ARTHROPLASTY HIP; LEFT TOTAL HIP ARTHROPLASTY (BIOMET)^; Surgeon:SABRINA CALLEJAS; Location:[facility] OR     BIOPSY  2007    Right Breast Biopsy     BIOPSY  2007    Right Breast Biopsy x2     BIOPSY  2007    Left Breast Biopsy     BREAST SURGERY  10/2/2007    Bilateral Mastectomy     BREAST SURGERY  3/2008    Breast Reconstruction Surgery     COLONOSCOPY  2011    repeat in 5 years     ENT SURGERY      salivary gland removal       Social History     Tobacco Use     Smoking status: Former Smoker     Years: 20.00     Types: Cigarettes     Quit date: 1982     Years since quittin.9     Smokeless tobacco: Never Used   Substance Use Topics     Alcohol use: Yes     Alcohol/week: 0.0 standard drinks     Comment: red wine socially, 2-3x week     Family History   Problem Relation Age of Onset     Cancer Mother         lung age 83     Unknown/Adopted Father         91 years     Eye Disorder Father         Macular Degeneration     Breast Cancer Maternal Grandmother      Cancer Paternal Grandfather      Cancer Paternal Uncle      Asthma Brother      C.A.D. Sister         elevated cholesterol         Current Outpatient Medications   Medication Sig Dispense Refill     amLODIPine (NORVASC) 5 MG tablet TAKE 1 TABLET BY MOUTH EVERY DAY 90 tablet 2     atorvastatin (LIPITOR) 20 MG tablet Take 1 tablet (20 mg) by mouth daily 90 tablet 3     calcium-vitamin D (CALTRATE) 600-400 MG-UNIT per tablet Take 1 tablet by mouth 2 times daily        "estradiol (ESTRACE) 0.1 MG/GM vaginal cream Place 2 g vaginally twice a week 42.5 g 11     hydroxychloroquine (PLAQUENIL) 200 MG tablet Take 200 mg by mouth 2 times daily 2 tabs daily 5 days per week, 1 tab daily 2 days per week.       Melatonin ER 1 MG TBCR Take 1 mg by mouth nightly as needed       Multiple Vitamin (MULTI-VITAMIN) per tablet Take 1 tablet by mouth daily.       triamterene-HCTZ (MAXZIDE-25) 37.5-25 MG tablet TAKE 1 TABLET BY MOUTH EVERY DAY 90 tablet 0     budesonide-formoterol (SYMBICORT) 80-4.5 MCG/ACT Inhaler Inhale 2 puffs into the lungs 2 times daily 10.2 g 3     metroNIDAZOLE (METROGEL) 0.75 % external gel Apply topically 2 times daily 45 g 1     mometasone-formoterol (DULERA) 100-5 MCG/ACT inhaler Inhale 2 puffs into the lungs 2 times daily 8.8 g 0     omeprazole (PRILOSEC) 20 MG DR capsule Take 1 capsule (20 mg) by mouth daily  5     Allergies   Allergen Reactions     Metoclopramide      Other reaction(s): Other - Describe In Comment Field  Severe redness in eyes from the preservative in certain eye medications     No Clinical Screening - See Comments Other (See Comments)     Severe redness in eyes from the preservative in certain eye medications         Review of Systems  Constitutional, HEENT, cardiovascular, pulmonary, gi and gu systems are negative, except as otherwise noted.    OBJECTIVE:   /62 (BP Location: Right arm, Patient Position: Sitting, Cuff Size: Adult Regular)   Pulse 66   Temp 97.3  F (36.3  C) (Temporal)   Ht 1.691 m (5' 6.58\")   Wt 66.4 kg (146 lb 4.8 oz)   LMP 07/07/1992   Breastfeeding No   BMI 23.21 kg/m   Estimated body mass index is 23.21 kg/m  as calculated from the following:    Height as of this encounter: 1.691 m (5' 6.58\").    Weight as of this encounter: 66.4 kg (146 lb 4.8 oz).  Physical Exam  GENERAL APPEARANCE: healthy, alert and no distress  EYES: Eyes grossly normal to inspection, PERRL and conjunctivae and sclerae normal  HENT: ear canals and " TM's normal, nose and mouth without ulcers or lesions, oropharynx clear and oral mucous membranes moist  NECK: no adenopathy, no asymmetry, masses, or scars and thyroid normal to palpation  RESP: lungs clear to auscultation - no rales, rhonchi or wheezes  BREAST: she had bilateral mastectomy procedures in 2007  CV: regular rate and rhythm, normal S1 S2, no S3 or S4, no murmur, click or rub, no peripheral edema and peripheral pulses strong  ABDOMEN: soft, nontender, no hepatosplenomegaly, no masses and bowel sounds normal  MS: no musculoskeletal defects are noted and gait is age appropriate without ataxia  SKIN: no suspicious lesions or rashes  NEURO: Normal strength and tone, sensory exam grossly normal, mentation intact and speech normal  PSYCH: mentation appears normal and affect normal/bright    Labs pending     ASSESSMENT / PLAN:   1. Routine general medical examination at a health care facility      2. Hyperlipidemia LDL goal <100    - Lipid panel reflex to direct LDL Fasting  - Comprehensive metabolic panel  - CBC with platelets    3. Chronic obstructive pulmonary disease, unspecified COPD type (H)  Stable; she is not taking inhalers and is without symptoms     4. Systemic sclerosis (H)  Stable; continue follow up with Dr. Shaffer as directed     5. 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 Gastroesophageal reflux disease with esophagitis without hemorrhage  She stopped PPI due to concerns about memory and bone toxicities; she will consider an H2 blocker over the counter     7. Raynaud's disease without gangrene  Stable     8. Pulmonary embolism with infarction (H)  Remote history of     9. Hypothyroidism, unspecified type    - TSH with free T4 reflex    10. Need for hepatitis C screening test    - Hepatitis C Screen Reflex to HCV RNA Quant and Genotype    11. Stage 3a chronic kidney disease  rechecking      Patient  "has been advised of split billing requirements and indicates understanding: Yes  COUNSELING:  Reviewed preventive health counseling, as reflected in patient instructions  Special attention given to:       Regular exercise       Healthy diet/nutrition       Immunizations    Vaccines are up to date              Consider lung cancer screening for ages 55-80 years and 30 pack-year smoking history ; She quit smoking 38 years ago       Colon cancer screening; repeat in 2023       Hepatitis C screening; will do today       Mammogram:  Had mastectomies     Estimated body mass index is 23.21 kg/m  as calculated from the following:    Height as of this encounter: 1.691 m (5' 6.58\").    Weight as of this encounter: 66.4 kg (146 lb 4.8 oz).        She reports that she quit smoking about 38 years ago. Her smoking use included cigarettes. She quit after 20.00 years of use. She has never used smokeless tobacco.      Appropriate preventive services were discussed with this patient, including applicable screening as appropriate for cardiovascular disease, diabetes, osteopenia/osteoporosis, and glaucoma.  As appropriate for age/gender, discussed screening for colorectal cancer, prostate cancer, breast cancer, and cervical cancer. Checklist reviewing preventive services available has been given to the patient.    Reviewed patients plan of care and provided an AVS. The Basic Care Plan (routine screening as documented in Health Maintenance) for Jeannine meets the Care Plan requirement. This Care Plan has been established and reviewed with the Patient.    Counseling Resources:  ATP IV Guidelines  Pooled Cohorts Equation Calculator  Breast Cancer Risk Calculator  Breast Cancer: Medication to Reduce Risk  FRAX Risk Assessment  ICSI Preventive Guidelines  Dietary Guidelines for Americans, 2010  USDA's MyPlate  ASA Prophylaxis  Lung CA Screening    Bebeto Freitas MD  [facility]    Identified Health Risks:  "

## 2020-12-03 NOTE — RESULT ENCOUNTER NOTE
The following letter pertains to your most recent diagnostic tests:    -Your hepatitis C screening test is negative.  You do not have hepatitis C.     -TSH (thyroid stimulating hormone) level is normal which indicates normal circulating thyroid hormone levels.      -Liver and gallbladder tests are normal for you. (ALT,AST, Alk phos, bilirubin), kidney function is OK for you (Creatinine, GFR), Sodium is normal, Potassium is normal for you, Calcium is normal for you, Glucose (blood sugar) is normal for you.      -Your cholesterol panel looks healthy.     -Your complete blood counts including your hemoglobin returned normal for you.         Bottom line:  Labs look stable and at goal.       Follow up:  Schedule an appointment for a physical examination with fasting blood tests in one year's time, or return sooner if new questions, symptoms or problems arise.       Sincerely,    Dr. Freitas

## 2021-01-04 ENCOUNTER — OFFICE VISIT (OUTPATIENT)
Dept: URGENT CARE | Facility: URGENT CARE | Age: 81
End: 2021-01-04
Payer: MEDICARE

## 2021-01-04 VITALS
DIASTOLIC BLOOD PRESSURE: 62 MMHG | BODY MASS INDEX: 23.7 KG/M2 | HEART RATE: 63 BPM | WEIGHT: 151 LBS | TEMPERATURE: 97.6 F | HEIGHT: 67 IN | SYSTOLIC BLOOD PRESSURE: 114 MMHG

## 2021-01-04 DIAGNOSIS — L02.214 GROIN ABSCESS: Primary | ICD-10-CM

## 2021-01-04 PROCEDURE — 99213 OFFICE O/P EST LOW 20 MIN: CPT | Performed by: NURSE PRACTITIONER

## 2021-01-04 RX ORDER — SULFAMETHOXAZOLE/TRIMETHOPRIM 800-160 MG
1 TABLET ORAL 2 TIMES DAILY
Qty: 20 TABLET | Refills: 0 | Status: SHIPPED | OUTPATIENT
Start: 2021-01-04 | End: 2021-01-14

## 2021-01-04 ASSESSMENT — ENCOUNTER SYMPTOMS
COLOR CHANGE: 1
ABDOMINAL PAIN: 0
MYALGIAS: 0
PALPITATIONS: 0
NUMBNESS: 0
SLEEP DISTURBANCE: 0
PARESTHESIAS: 0
NAUSEA: 0
ARTHRALGIAS: 0
APPETITE CHANGE: 0
VOMITING: 0
DIAPHORESIS: 0
WOUND: 1
FEVER: 0
WEAKNESS: 0
FATIGUE: 0
CHILLS: 0
ACTIVITY CHANGE: 0

## 2021-01-04 ASSESSMENT — MIFFLIN-ST. JEOR: SCORE: 1189.62

## 2021-01-04 NOTE — PATIENT INSTRUCTIONS
Bactrim for groin abscess  Will hold on I&D since already spontaneously draining and quite firm without fluctuance  Epsom salt baths or hot soaks  Watchful waiting, reevaluation if no improvement in 2 days  ER if fever, nausea, vomiting, red streaking

## 2021-01-04 NOTE — PROGRESS NOTES
"  Assessment & Plan      Bactrim for groin abscess for 5-10 days depending on response  Will hold on I&D for now, already spontaneously draining and quite firm without fluctuance  Epsom salt baths or hot soaks  Watchful waiting, reevaluation if no improvement in 2 days  ER if fever, nausea, vomiting, red streaking    Groin abscess  - sulfamethoxazole-trimethoprim (BACTRIM DS) 800-160 MG tablet; Take 1 tablet by mouth 2 times daily for 10 days    Review of external notes completed, GFR 46.    20 minutes spent on the date of the encounter doing chart review, history and exam, documentation and further activities as noted above.         Follow-up with urgent care in 2 days if not improving.    Return in about 1 week (around 1/11/2021) for pcp visit if symptoms continue or worsen.    Kassidy Reagan NP  [facility] CARE [facility]     Jeannine Rod is a 80 year old female who presents to clinic today for the following health issues.    HPI     Groin abscess for a couple days. It is an eraser sized erythematous lesion with scant thick off white drainage. It is firm and warm to touch. Located on outer left lower labia majora. Denies history of groin abscess, fevers, red streaking, malaise.    Review of Systems   Constitutional: Negative for activity change, appetite change, chills, diaphoresis, fatigue and fever.   Cardiovascular: Negative for chest pain, palpitations and peripheral edema.   Gastrointestinal: Negative for abdominal pain, nausea and vomiting.   Genitourinary: Positive for genital sores and vaginal pain.   Musculoskeletal: Negative for arthralgias and myalgias.   Skin: Positive for color change and wound. Negative for pallor and rash.   Neurological: Negative for weakness, numbness and paresthesias.   Psychiatric/Behavioral: Negative for sleep disturbance.        Objective    /62   Pulse 63   Temp 97.6  F (36.4  C) (Oral)   Ht 1.689 m (5' 6.5\")   Wt 68.5 kg (151 " lb)   [facility] 07/07/1992   BMI 24.01 kg/m    Body mass index is 24.01 kg/m .     Physical Exam  Vitals signs reviewed.   Constitutional:       Appearance: Normal appearance.   HENT:      Head: Normocephalic and atraumatic.   Cardiovascular:      Rate and Rhythm: Normal rate.   Pulmonary:      Effort: Pulmonary effort is normal.   Musculoskeletal: Normal range of motion.         General: Tenderness present.   Skin:     General: Skin is warm and dry.      Findings: Erythema and lesion present. No rash.      Comments: Left outer labia majora with eraser sized erythematous abscess. Theres is scant thick off white drainage. Firm and warm to touch, no fluctuance.   Neurological:      General: No focal deficit present.      Mental Status: She is alert and oriented to person, place, and time.   Psychiatric:         Mood and Affect: Mood normal.         Behavior: Behavior normal.

## 2021-01-11 DIAGNOSIS — I10 BENIGN ESSENTIAL HYPERTENSION: ICD-10-CM

## 2021-01-12 RX ORDER — TRIAMTERENE/HYDROCHLOROTHIAZID 37.5-25 MG
TABLET ORAL
Qty: 90 TABLET | Refills: 0 | Status: SHIPPED | OUTPATIENT
Start: 2021-01-12 | End: 2021-04-19

## 2021-01-12 NOTE — TELEPHONE ENCOUNTER
Creatinine   Date Value Ref Range Status   12/02/2020 1.15 (H) 0.52 - 1.04 mg/dL Final     Routing refill request to provider for review/approval because:  Labs out of range    Brit MULLINS RN

## 2021-02-04 ENCOUNTER — OFFICE VISIT (OUTPATIENT)
Dept: URGENT CARE | Facility: URGENT CARE | Age: 81
End: 2021-02-04
Payer: MEDICARE

## 2021-02-04 VITALS
HEART RATE: 83 BPM | OXYGEN SATURATION: 97 % | TEMPERATURE: 98.6 F | SYSTOLIC BLOOD PRESSURE: 140 MMHG | DIASTOLIC BLOOD PRESSURE: 66 MMHG

## 2021-02-04 DIAGNOSIS — K62.89 PERIRECTAL SKIN IRRITATION: Primary | ICD-10-CM

## 2021-02-04 LAB
KOH PREP SPEC: NORMAL
SPECIMEN SOURCE: NORMAL

## 2021-02-04 PROCEDURE — 87077 CULTURE AEROBIC IDENTIFY: CPT | Performed by: NURSE PRACTITIONER

## 2021-02-04 PROCEDURE — 99214 OFFICE O/P EST MOD 30 MIN: CPT | Performed by: NURSE PRACTITIONER

## 2021-02-04 PROCEDURE — 87220 TISSUE EXAM FOR FUNGI: CPT | Performed by: NURSE PRACTITIONER

## 2021-02-04 PROCEDURE — 87186 SC STD MICRODIL/AGAR DIL: CPT | Performed by: NURSE PRACTITIONER

## 2021-02-04 PROCEDURE — 87070 CULTURE OTHR SPECIMN AEROBIC: CPT | Performed by: NURSE PRACTITIONER

## 2021-02-04 NOTE — PROGRESS NOTES
Chief Complaint   Patient presents with     Urgent Care     Derm Problem         ICD-10-CM    1. Perirectal skin irritation  K62.89 KOH prep (skin, hair or nails only)     Wound Culture Aerobic Bacterial GICH (FUTURE)   Even though the KOH is negative this does appear as a yeast type rash.  Patient will try over-the-counter fungal creams for several days and see if this improves the situation she will also complete sits baths twice a day for the next week.  We will await bacterial culture results.    32 minutes spent on the date of the encounter doing chart review, history and exam, documentation and further activities as noted above    Medical Decision Making    Differential Diagnosis:  Differential diagnosis includes but is not limited to: Herpes simplex, shingles, allergic dermatitis, contact dermatitis, acne, abscess, cellulitis, viral infections, viral exanthem, bacterial infection, scabies, parasitic infestation, measles, varicella, rubella, rubeola.      Results for orders placed or performed in visit on 02/04/21 (from the past 24 hour(s))   KOH prep (skin, hair or nails only)    Specimen: Skin   Result Value Ref Range    Specimen Description Skin     KOH Skin Hair Nails Test No fungal elements seen        Subjective     Jeannine Rod is an 80 year oldfemale who presents to clinic today for rash around the rectal area and into the gluteal folds.  She was treated for a groin abscess approximately 1 month ago with 10 days of Septra DS which she completed.  She has had some redness of the labia for a couple of months and then noticed her perirectal area was very red up into the gluteal fold.  She does have some itching with this but no drainage that she has noted.  She has been trying to treat this with hydrocortisone cream but it has not improved the rash.    ROS: 10 point ROS neg other than the symptoms noted above in the HPI.       Objective    BP (!) 140/66   Pulse 83   Temp 98.6  F (37  C) (Tympanic)    LMP 07/07/1992   SpO2 97%     Physical Exam     GENERAL APPEARANCE: healthy appearing, alert     ABDOMEN:  soft, nontender, no HSM or masses and bowel sounds normal     MS: extremities normal- no gross deformities noted; normal muscle tone.     SKIN: Large area of redness from the top of gluteal fold down and encompassing the perirectal area, edges have flaking, the area is not particularly hot, the labia appear normal but there is some redness between the labia and inner thigh     NEURO: Normal strength and tone, mentation intact and speech normal    Patient Instructions   Sirz baths for 20 minutes 2-3 times a day.    Do not apply any further creams until results of wound culture are back.      Camille Rivero APRN, CNP  [facility] Provider

## 2021-02-04 NOTE — PATIENT INSTRUCTIONS
Sirz baths for 20 minutes 2-3 times a day.    Do not apply any further creams until results of wound culture are back.

## 2021-02-06 ENCOUNTER — TELEPHONE (OUTPATIENT)
Dept: URGENT CARE | Facility: URGENT CARE | Age: 81
End: 2021-02-06

## 2021-02-06 DIAGNOSIS — L03.317 CELLULITIS OF BUTTOCK: Primary | ICD-10-CM

## 2021-02-06 RX ORDER — CEPHALEXIN 500 MG/1
500 CAPSULE ORAL 4 TIMES DAILY
Qty: 28 CAPSULE | Refills: 0 | Status: SHIPPED | OUTPATIENT
Start: 2021-02-06 | End: 2021-02-13

## 2021-02-06 NOTE — TELEPHONE ENCOUNTER
Please call patient and let her know that her wound culture came back with growth.   Patient should start taking Keflex empirically for infection.   The RX was sent to [facility] on [address]    Octavia Cardona PA-C

## 2021-02-07 LAB
BACTERIA SPEC CULT: ABNORMAL
BACTERIA SPEC CULT: ABNORMAL
Lab: ABNORMAL
SPECIMEN SOURCE: ABNORMAL

## 2021-02-07 NOTE — TELEPHONE ENCOUNTER
Left detailed message on pt's voicemail informing her of Valorie Cardona's instructions below.    Jeannine Tavares RN

## 2021-02-07 NOTE — TELEPHONE ENCOUNTER
Spoke with Jeannine and gave her the instructions below; she said she will  Rx.    Jeannine Tavares RN

## 2021-02-08 NOTE — RESULT ENCOUNTER NOTE
Pt was called and given results.  Pt states she has already picked up RX from pharmacy.  Miryam Marks/ MA

## 2021-02-09 ENCOUNTER — IMMUNIZATION (OUTPATIENT)
Dept: NURSING | Facility: CLINIC | Age: 81
End: 2021-02-09
Payer: MEDICARE

## 2021-02-09 PROCEDURE — 91300 PR COVID VAC PFIZER DIL RECON 30 MCG/0.3 ML IM: CPT

## 2021-02-09 PROCEDURE — 0001A PR COVID VAC PFIZER DIL RECON 30 MCG/0.3 ML IM: CPT

## 2021-03-02 ENCOUNTER — IMMUNIZATION (OUTPATIENT)
Dept: NURSING | Facility: CLINIC | Age: 81
End: 2021-03-02
Attending: FAMILY MEDICINE
Payer: MEDICARE

## 2021-03-02 PROCEDURE — 0002A PR COVID VAC PFIZER DIL RECON 30 MCG/0.3 ML IM: CPT

## 2021-03-02 PROCEDURE — 91300 PR COVID VAC PFIZER DIL RECON 30 MCG/0.3 ML IM: CPT

## 2021-03-04 ENCOUNTER — MYC MEDICAL ADVICE (OUTPATIENT)
Dept: FAMILY MEDICINE | Facility: CLINIC | Age: 81
End: 2021-03-04

## 2021-03-04 NOTE — TELEPHONE ENCOUNTER
FUTURE VISIT INFORMATION      FUTURE VISIT INFORMATION:    Date: 3.17.21    Time: 11:45    Location: Video  REFERRAL INFORMATION:    Referring provider:  na    Referring providers clinic:  na    Reason for visit/diagnosis  Hairloss, Self referred, No Recs, Per Pt    RECORDS REQUESTED FROM:       Clinic name Comments Records Status Imaging Status    No previous related records

## 2021-03-05 ENCOUNTER — OFFICE VISIT (OUTPATIENT)
Dept: FAMILY MEDICINE | Facility: CLINIC | Age: 81
End: 2021-03-05
Payer: MEDICARE

## 2021-03-05 VITALS
OXYGEN SATURATION: 90 % | WEIGHT: 152 LBS | HEART RATE: 70 BPM | TEMPERATURE: 98 F | DIASTOLIC BLOOD PRESSURE: 68 MMHG | SYSTOLIC BLOOD PRESSURE: 166 MMHG | BODY MASS INDEX: 24.17 KG/M2

## 2021-03-05 DIAGNOSIS — B37.2 MONILIAL RASH: Primary | ICD-10-CM

## 2021-03-05 PROCEDURE — 99213 OFFICE O/P EST LOW 20 MIN: CPT | Performed by: NURSE PRACTITIONER

## 2021-03-05 RX ORDER — NYSTATIN 100000 U/G
CREAM TOPICAL 2 TIMES DAILY
Qty: 30 G | Refills: 0 | Status: SHIPPED | OUTPATIENT
Start: 2021-03-05 | End: 2021-07-06

## 2021-03-05 NOTE — PATIENT INSTRUCTIONS
Wash twice a day with warm water and dry well - use a hair dryer  Then apply thin layer of mycostatin cream- going outside the border of the rash- do this twice a day  An hour after application , especially the evening application , apply a layer of desitin - moisture barrier    This may take as long as 3-4 weeks to finally clear

## 2021-03-05 NOTE — PROGRESS NOTES
Assessment & Plan     Monilial rash  - nystatin (MYCOSTATIN) 447273 UNIT/GM external cream; Apply topically 2 times daily     Patient Instructions   Wash twice a day with warm water and dry well - use a hair dryer  Then apply thin layer of mycostatin cream- going outside the border of the rash- do this twice a day  An hour after application , especially the evening application , apply a layer of desitin - moisture barrier    This may take as long as 3-4 weeks to finally clear         FLORENCIO Flores CNP  [facility] REINIER Paez is a 80 year old who presents for the following health issues  HPI     Chief Complaint   Patient presents with     Derm Problem     pt report rash/redness/pain on rectal area - had similar episode in Feb but came back 2 days ago; it is very itchy   previously she was treated for a light staph overgrowth of the rash with keflex  Advised in a call back not to use the antifungal cream      Review of Systems   Constitutional, HEENT, cardiovascular, pulmonary, gi and gu systems are negative, except as otherwise noted.      Objective    BP (!) 166/68 (BP Location: Left arm, Patient Position: Sitting, Cuff Size: Adult Regular)   Pulse 70   Temp 98  F (36.7  C) (Temporal)   Wt 68.9 kg (152 lb)   LMP 07/07/1992   SpO2 90%   Breastfeeding No   BMI 24.17 kg/m    Body mass index is 24.17 kg/m .  Physical Exam   GENERAL: healthy, alert and no distress  SKIN: no rash of the vulva or vagina, well demarcated moist appearing erythematous desquamation symmetrically affecting the gluteal cleft from the anus to the upper most portion of the cleft     PSYCH: mentation appears normal, affect normal/bright

## 2021-03-17 ENCOUNTER — VIRTUAL VISIT (OUTPATIENT)
Dept: DERMATOLOGY | Facility: CLINIC | Age: 81
End: 2021-03-17
Payer: MEDICARE

## 2021-03-17 ENCOUNTER — PRE VISIT (OUTPATIENT)
Dept: DERMATOLOGY | Facility: CLINIC | Age: 81
End: 2021-03-17

## 2021-03-17 DIAGNOSIS — L65.9 ALOPECIA: Primary | ICD-10-CM

## 2021-03-17 PROCEDURE — 99441 PR PHYSICIAN TELEPHONE EVALUATION 5-10 MIN: CPT | Mod: 95 | Performed by: DERMATOLOGY

## 2021-03-17 RX ORDER — KETOCONAZOLE 20 MG/ML
SHAMPOO TOPICAL
Qty: 60 ML | Refills: 1 | Status: SHIPPED | OUTPATIENT
Start: 2021-03-17 | End: 2021-05-18

## 2021-03-17 ASSESSMENT — PAIN SCALES - GENERAL: PAINLEVEL: NO PAIN (0)

## 2021-03-17 NOTE — PATIENT INSTRUCTIONS
[facility] Dermatology Visit    Thank you for allowing us to participate in your care. Your findings, instructions and follow-up plan are as follows:         When should I call my doctor?    If you are worsening or not improving, please, contact us or seek urgent care as noted below.     Who should I call with questions (adults)?    [facility] (adult and pediatric): [phone]     [facility] (adult): [phone]    For urgent needs outside of business hours call the [facility] at [phone] and ask for the dermatology resident on call    If this is a medical emergency and you are unable to reach an ER, Call 911      Who should I call with questions (pediatric)?  [facility]- Pediatric Dermatology  Dr. Marimar Riggs, Dr. El Cuellar, Dr. Aarti Loco, Danelle Aguayo, PA  Dr. Pilar Trevino, Dr. Pat Ignacio & Dr. Lang Chester  Non Urgent  Nurse Triage Line; [phone]- Roseann and Elisa RN Care Coordinators   Frannie (/Complex ) [phone]    If you need a prescription refill, please contact your pharmacy. Refills are approved or denied by our Physicians during normal business hours, Monday through Fridays  Per office policy, refills will not be granted if you have not been seen within the past year (or sooner depending on your child's condition)    Scheduling Information:  Pediatric Appointment Scheduling and Call Center [phone]  Radiology Scheduling- [phone]  Sedation Unit Scheduling- [phone]  [place] Scheduling- General [phone]; Pediatric Dermatology [phone]  Main  Services: [phone]  Georgian: [phone]  Trinidadian: [phone]  Hmong/Tamazight/Irish: [phone]  Preadmission Nursing Department Fax Number: [phone] (Fax all pre-operative paperwork to this number)    For urgent matters arising during evenings,  weekends, or holidays that cannot wait for normal business hours please call [phone] and ask for the Dermatology Resident On-Call to be paged.

## 2021-03-17 NOTE — NURSING NOTE
Chief Complaint   Patient presents with     Derm Problem     Jeannine is having a virtual visit for hairloss and a tingling sensation on her scalp.     JUVENAL Nunez

## 2021-03-17 NOTE — PROGRESS NOTES
[facility] Dermatology Note  Encounter Date: Mar 17, 2021  Store-and-Forward and Telephone ([phone]). Location of teledermatologist: [facility] [place].  Total time approximately ten minutes    Dermatology Problem List:  1. Hair loss  -1 year, then more rapidly laser 3 months of 2021    ____________________________________________    Assessment & Plan:     # Hair loss possibly breakage-nml cbcwith diff and tsf  -Vitamin d and zinc not covered, she will hold off an possibly add in person  -ketoxonazole 1-3 times weekly  -condition with each shampoo    #tingling, scalp      Hair metrix 8 weeks  Procedures Performed:    None    Follow-up: in person within 6 weeks hair metrixFarah    Staff:     Pat Méndez MD    Department of Dermatology  [facility]: Phone: [phone], Fax:[phone]  [facility]: Phone: [phone], Fax: [phone]      ____________________________________________    CC: Derm Problem (Jeannine is having a virtual visit for hairloss and a tingling sensation on her scalp.)    HPI:  Ms. Jeannine Rod is a(n) 80 year old female who presents today as a new patient for hair loss    The patient reports the hair loss has been going on for 1 year, more rapid 3 months. The hair loss started on top of scalp and the front. Never seen dermatology for this    NO changes in brows, but lashes have thinned    Changes in diet: no  New diet plan: no  Surgery in the last year:no  Hospitalization in the last year: mo  Coronavirus diagnosis: no      Taking a hair supplement: no  IF yes to above, what type: NA    Shampoo regimen: 1-2 times weekly  Condition regimen: 1-2times weekly    Shampoo product name: herbal essence  Conditioner product name: Pantene  Uses hair sprays: product  Uses hair gels: no  Uses hair dye: stopped  clarol  Performed heat styling: no  Perms hair: no  Uses formaldehyde straightening product: no  Sunscreen on face: no Brand no    Has the patient seen an outside dermatologist: no  Has the patient had a biopsy: no    Patient is otherwise feeling well, without additional skin concerns.    Labs Reviewed:  Component      Latest Ref Rng & Units 12/2/2020   Sodium      133 - 144 mmol/L 138   Potassium      3.4 - 5.3 mmol/L 4.0   Chloride      94 - 109 mmol/L 107   Carbon Dioxide      20 - 32 mmol/L 26   Anion Gap      3 - 14 mmol/L 5   Glucose      70 - 99 mg/dL 87   Urea Nitrogen      7 - 30 mg/dL 30   Creatinine      0.52 - 1.04 mg/dL 1.15 (H)   GFR Estimate      >60 mL/min/1.73:m2 46 (L)   GFR Estimate If Black      >60 mL/min/1.73:m2 53 (L)   Calcium      8.5 - 10.1 mg/dL 9.2   Bilirubin Total      0.2 - 1.3 mg/dL 0.4   Albumin      3.4 - 5.0 g/dL 3.8   Protein Total      6.8 - 8.8 g/dL 7.1   Alkaline Phosphatase      40 - 150 U/L 71   ALT      0 - 50 U/L 30   AST      0 - 45 U/L 16   WBC      4.0 - 11.0 10e9/L 6.9   RBC Count      3.8 - 5.2 10e12/L 4.02   Hemoglobin      11.7 - 15.7 g/dL 11.9   Hematocrit      35.0 - 47.0 % 37.1   MCV      78 - 100 fl 92   MCH      26.5 - 33.0 pg 29.6   MCHC      31.5 - 36.5 g/dL 32.1   RDW      10.0 - 15.0 % 13.8   Platelet Count      150 - 450 10e9/L 211   TSH      0.40 - 4.00 mU/L    :  Hypothyroidism, unspecified type    Ref Range & Units 3mo ago    TSH 0.40 - 4.00 mU/L 3.31              Physical Exam:  Vitals: LMP 07/07/1992   SKIN: Teledermatology photos were reviewed; image quality and interpretability: acceptable. Image date: 3/15/2021.  - normal nails  possibly hair breakage  - No other lesions of concern on areas examined.     Medications:  Current Outpatient Medications   Medication     amLODIPine (NORVASC) 5 MG tablet     atorvastatin (LIPITOR) 20 MG tablet     calcium-vitamin D (CALTRATE) 600-400 MG-UNIT per tablet     estradiol (ESTRACE) 0.1 MG/GM vaginal cream      hydroxychloroquine (PLAQUENIL) 200 MG tablet     Melatonin ER 1 MG TBCR     Multiple Vitamin (MULTI-VITAMIN) per tablet     nystatin (MYCOSTATIN) 176609 UNIT/GM external cream     triamterene-HCTZ (MAXZIDE-25) 37.5-25 MG tablet     No current facility-administered medications for this visit.       Past Medical/Surgical History:   Patient Active Problem List   Diagnosis     Malignant neoplasm of breast (H)     Raynaud's syndrome     Status post hip replacement     Pulmonary embolism and infarction     Systemic sclerosis (H)     Gastroesophageal reflux disease with esophagitis     Hyperlipidemia LDL goal <100     Calculus of gallbladder without cholecystitis     Radiculoplexus neuropathy     Benign essential hypertension     Atrophic vaginitis     Chronic kidney disease, stage 3     Past Medical History:   Diagnosis Date     Arthritis      Benign essential hypertension 10/23/2017     Benign essential hypertension 10/23/2017     Calculus of gallbladder without cholecystitis 10/6/2016     Gastroesophageal reflux disease with esophagitis 10/6/2016     Hyperlipidemia      Malignant neoplasm (H)     breast bilat mastectomy, no rad, no chemobilat      Pulmonary embolism and infarction 3/9/2012    IMO update changed this record. 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     Raynaud's syndrome      Systemic sclerosis (H) 10/6/2016       CC Referred Self, MD  No address on file on close of this encounter.

## 2021-03-17 NOTE — LETTER
3/17/2021       RE: Jeannine Rod  [address]  [place] [address]     Dear Colleague,    Thank you for referring your patient, Jeannine Rod, to the [facility] [place] at [facility]. Please see a copy of my visit note below.    [facility] Dermatology Note  Encounter Date: Mar 17, 2021  Store-and-Forward and Telephone ([phone]). Location of teledermatologist: [facility] [place].  Total time approximately ten minutes    Dermatology Problem List:  1. Hair loss  -1 year, then more rapidly laser 3 months of 2021    ____________________________________________    Assessment & Plan:     # Hair loss possibly breakage-nml cbcwith diff and tsf  -Vitamin d and zinc not covered, she will hold off an possibly add in person  -ketoxonazole 1-3 times weekly  -condition with each shampoo    #tingling, scalp      Hair metrix 8 weeks  Procedures Performed:    None    Follow-up: in person within 6 weeks hair metrixFarah    Staff:     Pat Méndez MD    Department of Dermatology  [facility]: Phone: [phone], Fax:[phone]  [facility]: Phone: [phone], Fax: [phone]      ____________________________________________    CC: Derm Problem (Jeannine is having a virtual visit for hairloss and a tingling sensation on her scalp.)    HPI:  Ms. Jeannine Rod is a(n) 80 year old female who presents today as a new patient for hair loss    The patient reports the hair loss has been going on for 1 year, more rapid 3 months. The hair loss started on top of scalp and the front. Never seen dermatology for this    NO changes in brows, but lashes have thinned    Changes in diet: no  New diet plan: no  Surgery in the last year:no  Hospitalization in the last year:  mo  Coronavirus diagnosis: no      Taking a hair supplement: no  IF yes to above, what type: NA    Shampoo regimen: 1-2 times weekly  Condition regimen: 1-2times weekly    Shampoo product name: herbal essence  Conditioner product name: Pantene  Uses hair sprays: product  Uses hair gels: no  Uses hair dye: stopped clarol  Performed heat styling: no  Perms hair: no  Uses formaldehyde straightening product: no  Sunscreen on face: no Brand no    Has the patient seen an outside dermatologist: no  Has the patient had a biopsy: no    Patient is otherwise feeling well, without additional skin concerns.    Labs Reviewed:  Component      Latest Ref Rng & Units 12/2/2020   Sodium      133 - 144 mmol/L 138   Potassium      3.4 - 5.3 mmol/L 4.0   Chloride      94 - 109 mmol/L 107   Carbon Dioxide      20 - 32 mmol/L 26   Anion Gap      3 - 14 mmol/L 5   Glucose      70 - 99 mg/dL 87   Urea Nitrogen      7 - 30 mg/dL 30   Creatinine      0.52 - 1.04 mg/dL 1.15 (H)   GFR Estimate      >60 mL/min/1.73:m2 46 (L)   GFR Estimate If Black      >60 mL/min/1.73:m2 53 (L)   Calcium      8.5 - 10.1 mg/dL 9.2   Bilirubin Total      0.2 - 1.3 mg/dL 0.4   Albumin      3.4 - 5.0 g/dL 3.8   Protein Total      6.8 - 8.8 g/dL 7.1   Alkaline Phosphatase      40 - 150 U/L 71   ALT      0 - 50 U/L 30   AST      0 - 45 U/L 16   WBC      4.0 - 11.0 10e9/L 6.9   RBC Count      3.8 - 5.2 10e12/L 4.02   Hemoglobin      11.7 - 15.7 g/dL 11.9   Hematocrit      35.0 - 47.0 % 37.1   MCV      78 - 100 fl 92   MCH      26.5 - 33.0 pg 29.6   MCHC      31.5 - 36.5 g/dL 32.1   RDW      10.0 - 15.0 % 13.8   Platelet Count      150 - 450 10e9/L 211   TSH      0.40 - 4.00 mU/L    :  Hypothyroidism, unspecified type    Ref Range & Units 3mo ago    TSH 0.40 - 4.00 mU/L 3.31              Physical Exam:  Vitals: LMP 07/07/1992   SKIN: Teledermatology photos were reviewed; image quality and interpretability: acceptable. Image date: 3/15/2021.  - normal nails  possibly  hair breakage  - No other lesions of concern on areas examined.     Medications:  Current Outpatient Medications   Medication     amLODIPine (NORVASC) 5 MG tablet     atorvastatin (LIPITOR) 20 MG tablet     calcium-vitamin D (CALTRATE) 600-400 MG-UNIT per tablet     estradiol (ESTRACE) 0.1 MG/GM vaginal cream     hydroxychloroquine (PLAQUENIL) 200 MG tablet     Melatonin ER 1 MG TBCR     Multiple Vitamin (MULTI-VITAMIN) per tablet     nystatin (MYCOSTATIN) 472962 UNIT/GM external cream     triamterene-HCTZ (MAXZIDE-25) 37.5-25 MG tablet     No current facility-administered medications for this visit.       Past Medical/Surgical History:   Patient Active Problem List   Diagnosis     Malignant neoplasm of breast (H)     Raynaud's syndrome     Status post hip replacement     Pulmonary embolism and infarction     Systemic sclerosis (H)     Gastroesophageal reflux disease with esophagitis     Hyperlipidemia LDL goal <100     Calculus of gallbladder without cholecystitis     Radiculoplexus neuropathy     Benign essential hypertension     Atrophic vaginitis     Chronic kidney disease, stage 3     Past Medical History:   Diagnosis Date     Arthritis      Benign essential hypertension 10/23/2017     Benign essential hypertension 10/23/2017     Calculus of gallbladder without cholecystitis 10/6/2016     Gastroesophageal reflux disease with esophagitis 10/6/2016     Hyperlipidemia      Malignant neoplasm (H)     breast bilat mastectomy, no rad, no chemobilat      Pulmonary embolism and infarction 3/9/2012    IMO update changed this record. 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     Raynaud's syndrome      Systemic sclerosis (H) 10/6/2016       CC Referred Self, MD  No address on file on close of this encounter.

## 2021-03-20 DIAGNOSIS — L65.9 LOSS OF HAIR: ICD-10-CM

## 2021-03-20 DIAGNOSIS — L65.9 ALOPECIA: ICD-10-CM

## 2021-03-20 LAB — FERRITIN SERPL-MCNC: 81 NG/ML (ref 8–252)

## 2021-03-20 PROCEDURE — 82728 ASSAY OF FERRITIN: CPT | Performed by: DERMATOLOGY

## 2021-03-20 PROCEDURE — 36415 COLL VENOUS BLD VENIPUNCTURE: CPT | Performed by: DERMATOLOGY

## 2021-04-06 ENCOUNTER — TELEPHONE (OUTPATIENT)
Dept: DERMATOLOGY | Facility: CLINIC | Age: 81
End: 2021-04-06

## 2021-04-06 NOTE — TELEPHONE ENCOUNTER
[facility] Call Center    Phone Message    May a detailed message be left on voicemail: yes     Reason for Call: Appointment Intake    Referring Provider Name:   Current Pt of Dr Méndez    Diagnosis and/or Symptoms:   Per Pt, 8 wk Follow-up to 3/17/21 appt, in person per appt notes    Action Taken: Message routed to:  [facility] Derm    Travel Screening: Not Applicable       Please call Pt to schedule Follow-up Hairloss appt for Alopecia. There are no templates available for Dr Méndez through summer 2021.    Thanks!

## 2021-04-07 ENCOUNTER — TELEPHONE (OUTPATIENT)
Dept: DERMATOLOGY | Facility: CLINIC | Age: 81
End: 2021-04-07

## 2021-04-07 NOTE — TELEPHONE ENCOUNTER
[facility] Call Center    Phone Message    May a detailed message be left on voicemail: yes     Reason for Call: Appointment Intake    Referring Provider Name: Dr. Méndez  Diagnosis and/or Symptoms: Hair loss Follow-up, Pt is disappointed that she never received a call as promised after her Appt 03/17. Please call back to schedule. Thank you      Action Taken: Message routed to:  [facility] Derm    Travel Screening: Not Applicable

## 2021-04-07 NOTE — TELEPHONE ENCOUNTER
[facility] Call Center    Phone Message    May a detailed message be left on voicemail: yes     Reason for Call: Appointment Intake    Referring Provider Name: LEVIA  Diagnosis and/or Symptoms: Hair loss Follow-up in May    Action Taken: Message routed to:  [facility] Derm    Travel Screening: Not Applicable

## 2021-04-07 NOTE — TELEPHONE ENCOUNTER
TOREY x2 and sent RECUPYL message.  Patient needs to scheduled a follow up appointment with Dr. Méndez, next available which is in June, please take the restrictions off when scheduling so you have access to all the available slots.     Angela Mondragon 4/7/21

## 2021-04-17 DIAGNOSIS — I10 BENIGN ESSENTIAL HYPERTENSION: ICD-10-CM

## 2021-04-19 RX ORDER — TRIAMTERENE/HYDROCHLOROTHIAZID 37.5-25 MG
TABLET ORAL
Qty: 90 TABLET | Refills: 0 | Status: SHIPPED | OUTPATIENT
Start: 2021-04-19 | End: 2021-10-04

## 2021-04-19 NOTE — TELEPHONE ENCOUNTER
Routing refill request to provider for review/approval because:  Labs out of range:  creatinine  BP out of range    Please review and authorize if appropriate.    Thank you,   Ulises MACK RN

## 2021-04-29 DIAGNOSIS — E78.5 HYPERLIPIDEMIA LDL GOAL <100: ICD-10-CM

## 2021-04-29 RX ORDER — ATORVASTATIN CALCIUM 20 MG/1
TABLET, FILM COATED ORAL
Qty: 90 TABLET | Refills: 2 | Status: SHIPPED | OUTPATIENT
Start: 2021-04-29 | End: 2021-11-22

## 2021-04-29 NOTE — TELEPHONE ENCOUNTER
Routing refill request to provider for review/approval because:  Drug interaction warning    Last office vist: 3/05/21    Pended 90 day supply & 2 refills.    Next office visit: none    Beverly Bronson RN  [facility]

## 2021-05-14 ENCOUNTER — TRANSFERRED RECORDS (OUTPATIENT)
Dept: HEALTH INFORMATION MANAGEMENT | Facility: CLINIC | Age: 81
End: 2021-05-14

## 2021-05-18 ENCOUNTER — OFFICE VISIT (OUTPATIENT)
Dept: DERMATOLOGY | Facility: CLINIC | Age: 81
End: 2021-05-18
Payer: MEDICARE

## 2021-05-18 DIAGNOSIS — L66.12 FRONTAL FIBROSING ALOPECIA: Primary | ICD-10-CM

## 2021-05-18 PROCEDURE — 99203 OFFICE O/P NEW LOW 30 MIN: CPT | Mod: 25 | Performed by: DERMATOLOGY

## 2021-05-18 PROCEDURE — 11901 INJECT SKIN LESIONS >7: CPT | Performed by: DERMATOLOGY

## 2021-05-18 PROCEDURE — 36415 COLL VENOUS BLD VENIPUNCTURE: CPT | Mod: GA | Performed by: DERMATOLOGY

## 2021-05-18 PROCEDURE — 99000 SPECIMEN HANDLING OFFICE-LAB: CPT | Mod: GA | Performed by: DERMATOLOGY

## 2021-05-18 PROCEDURE — 84630 ASSAY OF ZINC: CPT | Mod: 90 | Performed by: DERMATOLOGY

## 2021-05-18 RX ORDER — FLUOCINONIDE TOPICAL SOLUTION USP, 0.05% 0.5 MG/ML
SOLUTION TOPICAL
Qty: 60 ML | Refills: 3 | Status: SHIPPED | OUTPATIENT
Start: 2021-05-18 | End: 2022-05-25

## 2021-05-18 RX ORDER — TACROLIMUS 1 MG/G
OINTMENT TOPICAL
Qty: 60 G | Refills: 0 | Status: SHIPPED | OUTPATIENT
Start: 2021-05-18 | End: 2021-06-17

## 2021-05-18 RX ORDER — KETOCONAZOLE 20 MG/ML
SHAMPOO TOPICAL
Qty: 60 ML | Refills: 1 | Status: SHIPPED | OUTPATIENT
Start: 2021-05-18 | End: 2022-02-10

## 2021-05-18 NOTE — NURSING NOTE
The following medication was given:     MEDICATION: Kenalog 10 mg/ml  ROUTE: ID  SITE: scalp  DOSE: 1.8cc of kenalog 2.5mg/cc  LOT #: [id number]  :  [employer]  EXPIRATION DATE:  6.2022  Shelley Banda CMA on 5/18/2021 at 11:37 AM

## 2021-05-18 NOTE — NURSING NOTE
Jeannine Rod's goals for this visit include:   Chief Complaint   Patient presents with     Hair Loss     sudden hair loss/ ketoconazole shampoo is helping with tingling but has not gotten rid of the itching completely/ deep conditioner helping         She requests these members of her care team be copied on today's visit information:     PCP: Bebeto Freitas    Referring Provider:  No referring provider defined for this encounter.    LMP 07/07/1992     Do you need any medication refills at today's visit? No  Shelley Banda CMA on 5/18/2021 at 10:00 AM

## 2021-05-18 NOTE — PROGRESS NOTES
[facility] Dermatology Note  Encounter Date: May 18, 2021  Office Visit     Dermatology Problem List:  1. 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 Discussed the pathogenesis of FFA. Reviewed possible treatment options, including Protopic and ILK. Negative history of lymphoma.  -Start Lidex on the scalp three times per week  -Start protopic to the eyebrows daily. Reviewed black box warning.  -Continue ketoxonazole 1-3 times weekly  -Condition with each shampoo. Reviewed.    Hair labs:  TSH:  12/02/20 wnl  Cbc: 12/02/20 wnl  Ferritin: 03/20/21 wnl  Zinc: Ordered today  Vitamin D: Follow up with PCP, currently on supplement     # Mild Seborrheic Dermatitis  - Continue ketoconazole 1-3 times weekly, as above.        Procedures Performed:   -Kenalog intralesional injection procedure note: After verbal consent and discussion of risks including but not limited to atrophy, pain, and bruising, time out was performed, the patient underwent positioning, 1.8 total cc of Kenalog 2.5 mg/cc was injected into 9 sites on the scalp.  The patient tolerated the procedure well and left the Dermatology clinic in good condition.      Follow-up: As scheduled on 06/30 for Hair Metrix. Plan for kenalog 2.5mg/cc    Staff and Scribe:     Scribe Disclosure:   I, Robin Melo, am serving as a scribe to document services personally performed by this physician, Dr. Pat Méndez, based on data collection and the provider's statements to me.     Provider Disclosure:   The documentation recorded by the scribe accurately reflects the services I personally performed and the decisions made by me.    Pat Méndez MD    Department of Dermatology  [facility]:  "Phone: [phone], Fax:[phone]  [facility]: Phone: [phone], Fax: [phone]      ____________________________________________    CC: Hair Loss (sudden hair loss/ ketoconazole shampoo is helping with tingling but has not gotten rid of the itching completely/ deep conditioner helping  )    HPI:  Ms. Jeannine Rod is a(n) 80 year old female who presents today as a return patient for hair loss.    Last seen in a virtual visit on 03/17/21. At that time, she was started on ketoconazole 1-3 times weekly. She was also instructed to condition with each time she shampoos. Patient instructed to follow up in 6 weeks for hair matrix.    Today, she notes concerns about sudden hair loss. She believes her hair loss has worsened.She notes ketoconazole has helped with the tingling of her scalp, but has not completely resolved itching. Her itching has improved. She ntoes previous \"pin pricks\" sensation has resolved in the scalp. Deep conditioner has helped.    Patient is otherwise feeling well, without additional skin concerns.    Labs Reviewed:  N/A    Physical Exam:  Vitals: LMP 07/07/1992   SKIN: Focused examination of the scalp was performed.  - 7.5 cm of hair loss from the glabella  - 6.8 cm of hair loss along the right temporal hairline  - Hair part is 1.5 cm  - Temporal thinning  - Sparse regrowth of 1 cm along the frontal hairline  - Possibly loss of 1 cm of the frontal hairline  - Perifollicular erythema at the  - wid[place]  - Hair pull is negative  - Hair tug is negative   - Eyebrows with two patches of loss at the right lateral brow, thinning at left lateral brow  - Mild erythema of the posterior scalp  - No other lesions of concern on areas examined.     Medications:  Current Outpatient Medications   Medication     amLODIPine (NORVASC) 5 MG tablet     atorvastatin (LIPITOR) 20 MG tablet     calcium-vitamin D (CALTRATE) 600-400 MG-UNIT per tablet     " estradiol (ESTRACE) 0.1 MG/GM vaginal cream     hydroxychloroquine (PLAQUENIL) 200 MG tablet     ketoconazole (NIZORAL) 2 % external shampoo     Melatonin ER 1 MG TBCR     Multiple Vitamin (MULTI-VITAMIN) per tablet     nystatin (MYCOSTATIN) 678394 UNIT/GM external cream     triamterene-HCTZ (MAXZIDE-25) 37.5-25 MG tablet     No current facility-administered medications for this visit.       Past Medical History:   Patient Active Problem List   Diagnosis     Malignant neoplasm of breast (H)     Raynaud's syndrome     Status post hip replacement     Pulmonary embolism and infarction     Systemic sclerosis (H)     Gastroesophageal reflux disease with esophagitis     Hyperlipidemia LDL goal <100     Calculus of gallbladder without cholecystitis     Radiculoplexus neuropathy     Benign essential hypertension     Atrophic vaginitis     Chronic kidney disease, stage 3     Past Medical History:   Diagnosis Date     Arthritis      Benign essential hypertension 10/23/2017     Benign essential hypertension 10/23/2017     Calculus of gallbladder without cholecystitis 10/6/2016     Gastroesophageal reflux disease with esophagitis 10/6/2016     Hyperlipidemia      Malignant neoplasm (H)     breast bilat mastectomy, no rad, no chemobilat      Pulmonary embolism and infarction 3/9/2012    IMO update changed this record. 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     Raynaud's syndrome      Systemic sclerosis (H) 10/6/2016        CC No referring provider defined for this encounter. on close of this encounter.

## 2021-05-18 NOTE — LETTER
5/18/2021         RE: Jeannine Rod  [address]  [place] [address]        Dear Colleague,    Thank you for referring your patient, Jeannine Rod, to the [facility]. Please see a copy of my visit note below.    [facility] Dermatology Note  Encounter Date: May 18, 2021  Office Visit     Dermatology Problem List:  1. 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 Discussed the pathogenesis of FFA. Reviewed possible treatment options, including Protopic and ILK. Negative history of lymphoma.  -Start Lidex on the scalp three times per week  -Start protopic to the eyebrows daily. Reviewed black box warning.  -Continue ketoxonazole 1-3 times weekly  -Condition with each shampoo. Reviewed.    Hair labs:  TSH:  12/02/20 wnl  Cbc: 12/02/20 wnl  Ferritin: 03/20/21 wnl  Zinc: Ordered today  Vitamin D: Follow up with PCP, currently on supplement     # Mild Seborrheic Dermatitis  - Continue ketoconazole 1-3 times weekly, as above.        Procedures Performed:   -Kenalog intralesional injection procedure note: After verbal consent and discussion of risks including but not limited to atrophy, pain, and bruising, time out was performed, the patient underwent positioning, 1.8 total cc of Kenalog 2.5 mg/cc was injected into 9 sites on the scalp.  The patient tolerated the procedure well and left the Dermatology clinic in good condition.      Follow-up: As scheduled on 06/30 for Hair Metrix. Plan for kenalog 2.5mg/cc    Staff and Scribe:     Scribe Disclosure:   I, Robin Melo, am serving as a scribe to document services personally performed by this physician, Dr. Pat Méndez, based on data collection and the provider's statements to me.     Provider Disclosure:   The  "documentation recorded by the scribe accurately reflects the services I personally performed and the decisions made by me.    aPt Méndez MD    Department of Dermatology  [facility]: Phone: [phone], Fax:[phone]  [facility]: Phone: [phone], Fax: [phone]      ____________________________________________    CC: Hair Loss (sudden hair loss/ ketoconazole shampoo is helping with tingling but has not gotten rid of the itching completely/ deep conditioner helping  )    HPI:  Ms. Jeannine Rod is a(n) 80 year old female who presents today as a return patient for hair loss.    Last seen in a virtual visit on 03/17/21. At that time, she was started on ketoconazole 1-3 times weekly. She was also instructed to condition with each time she shampoos. Patient instructed to follow up in 6 weeks for hair matrix.    Today, she notes concerns about sudden hair loss. She believes her hair loss has worsened.She notes ketoconazole has helped with the tingling of her scalp, but has not completely resolved itching. Her itching has improved. She ntoes previous \"pin pricks\" sensation has resolved in the scalp. Deep conditioner has helped.    Patient is otherwise feeling well, without additional skin concerns.    Labs Reviewed:  N/A    Physical Exam:  Vitals: LMP 07/07/1992   SKIN: Focused examination of the scalp was performed.  - 7.5 cm of hair loss from the glabella  - 6.8 cm of hair loss along the right temporal hairline  - Hair part is 1.5 cm  - Temporal thinning  - Sparse regrowth of 1 cm along the frontal hairline  - Possibly loss of 1 cm of the frontal hairline  - Perifollicular erythema at the  - widows peak  - Hair pull is negative  - Hair tug is negative   - Eyebrows with two patches of loss at the right lateral brow, thinning at left lateral brow  - Mild erythema of the posterior " scalp  - No other lesions of concern on areas examined.     Medications:  Current Outpatient Medications   Medication     amLODIPine (NORVASC) 5 MG tablet     atorvastatin (LIPITOR) 20 MG tablet     calcium-vitamin D (CALTRATE) 600-400 MG-UNIT per tablet     estradiol (ESTRACE) 0.1 MG/GM vaginal cream     hydroxychloroquine (PLAQUENIL) 200 MG tablet     ketoconazole (NIZORAL) 2 % external shampoo     Melatonin ER 1 MG TBCR     Multiple Vitamin (MULTI-VITAMIN) per tablet     nystatin (MYCOSTATIN) 417640 UNIT/GM external cream     triamterene-HCTZ (MAXZIDE-25) 37.5-25 MG tablet     No current facility-administered medications for this visit.       Past Medical History:   Patient Active Problem List   Diagnosis     Malignant neoplasm of breast (H)     Raynaud's syndrome     Status post hip replacement     Pulmonary embolism and infarction     Systemic sclerosis (H)     Gastroesophageal reflux disease with esophagitis     Hyperlipidemia LDL goal <100     Calculus of gallbladder without cholecystitis     Radiculoplexus neuropathy     Benign essential hypertension     Atrophic vaginitis     Chronic kidney disease, stage 3     Past Medical History:   Diagnosis Date     Arthritis      Benign essential hypertension 10/23/2017     Benign essential hypertension 10/23/2017     Calculus of gallbladder without cholecystitis 10/6/2016     Gastroesophageal reflux disease with esophagitis 10/6/2016     Hyperlipidemia      Malignant neoplasm (H)     breast bilat mastectomy, no rad, no chemobilat      Pulmonary embolism and infarction 3/9/2012    IMO update changed this record. 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     Raynaud's syndrome      Systemic sclerosis (H) 10/6/2016        CC No referring provider defined for this encounter. on close of this encounter.      Again, thank you for allowing me to participate in the care of your patient.        Sincerely,        Pat Méndez MD

## 2021-05-18 NOTE — PATIENT INSTRUCTIONS
[facility] Dermatology Visit    Thank you for allowing us to participate in your care. 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    If you are worsening or not improving, please, contact us or seek urgent care as noted below.     Who should I call with questions (adults)?    [facility] (adult and pediatric): [phone]     [facility] (adult): [phone]    For urgent needs outside of business hours call the [facility] at [phone] and ask for the dermatology resident on call    If this is a medical emergency and you are unable to reach an ER, Call 911      Who should I call with questions (pediatric)?  [facility]- Pediatric Dermatology  Dr. Marimar Riggs, Dr. El Cuellar, Dr. Aarti Loco, Danelle Aguayo, PA  Dr. Pilar Trevino, Dr. Pat Ignacio & Dr. Lang Chester  Non Urgent  Nurse Triage Line; [phone]- Roseann and Elisa RN Care Coordinators   rFannie (/Complex ) [phone]    If you need a prescription refill, please contact your pharmacy. Refills are approved or denied by our Physicians during normal business hours, Monday through Fridays  Per office policy, refills will not be granted if you have not been seen within the past year (or sooner depending on your child's condition)    Scheduling Information:  Pediatric Appointment Scheduling and Call Center [phone]  Radiology Scheduling- [phone]  Sedation Unit Scheduling- [phone]  [place] Scheduling- General [phone]; Pediatric Dermatology [phone]  Main  Services: [phone]  Brazilian: [phone]  Ghanaian: [phone]  Hmong/Vietnamese/Dominic: [phone]  Preadmission Nursing Department Fax Number:  [phone] (Fax all pre-operative paperwork to this number)    For urgent matters arising during evenings, weekends, or holidays that cannot wait for normal business hours please call [phone] and ask for the Dermatology Resident On-Call to be paged.

## 2021-05-20 LAB — ZINC SERPL-MCNC: 62.8 UG/DL (ref 60–120)

## 2021-06-06 NOTE — PROGRESS NOTES
Audiology Report:      SUBJECTIVE: Jeannine Rod, 79 y.o. female, was seen 03/05/20 for a comprehensive hearing evaluation.     Jeannine reports a gradual decline in hearing and bilateral hearing aid use. She feels like her hearing has declined in the past year or two. She wears bilateral CRESENCIO hearing aids that are functioning well. Denies tinnitus, otalgia, otitis media, vertigo, otologic surgery, family history of hearing loss, and noise exposure.    OBJECTIVE:    Left Ear Right Ear   Otoscopy clear canals clear canals   Pure Tone Audiometry Normal sloping to severe sensorineural hearing loss   Normal sloping to moderately severe sensorineural hearing loss   Word Recognition 96% correct 92% correct   Tympanometry shallow (Type As)  normal (Type A)   Acoustic Reflexes (1kHz) CNT due to loss of pneumatic seal CNT due to loss of pneumatic seal     Transducer: Insert earphones and Circumaural headphones    Reliability was good  and there was good  SRT to PTA agreement.       ASSESSMENT: Normal sloping to severe sensorineural hearing loss bilaterally. Compared to 2017 audiogram, thresholds at the right ear have improved around 10 dB from 250-1kHz. Thresholds otherwise stable bilaterally. Audiogram provided to Jeannine.    PLAN: Jeannine will follow up with her hearing aid provider as needed. Jeannine should return in 1-2 years for audiologic monitoring, or sooner should concerns arise.    Please see audiogram under  media  and  audiogram  in the patient s chart.     Lia Leyva.  Clinical Audiologist  [place] #[id number]

## 2021-06-15 DIAGNOSIS — L66.12 FRONTAL FIBROSING ALOPECIA: ICD-10-CM

## 2021-06-17 RX ORDER — TACROLIMUS 1 MG/G
OINTMENT TOPICAL
Qty: 60 G | Refills: 2 | Status: SHIPPED | OUTPATIENT
Start: 2021-06-17 | End: 2023-04-26

## 2021-06-17 NOTE — TELEPHONE ENCOUNTER
Overridden by Pat Méndez MD on May 18, 2021 10:26 AM   Drug-Drug   1. TACROLIMUS / WEAK CY INHIBITORS [Level: Moderate]   Other Orders: amLODIPine (NORVASC) 5 MG tablet      2. TACROLIMUS / STRONG CY INHIBITORS [Level: Moderate]   Other Orders: ketoconazole (NIZORAL) 2 % external shampoo      3. TACROLIMUS / STRONG CY INHIBITORS [Level: Moderate]   Other Orders: ketoconazole (NIZORAL) 2 % external shampoo

## 2021-06-17 NOTE — TELEPHONE ENCOUNTER
5/18/2021 tacrolimus ointment refill process #2  [facility]     [FreeTextEntry1] : Since stenting of the circumflex /left main lesion last week he has been completely pain free.\par We discussed treating his diabetes and cholesterol more aggressively and I added Zetia to his regime.\par I will see him again in 4 months.

## 2021-06-29 NOTE — PROGRESS NOTES
[facility] Dermatology Note  Encounter Date: Jun 30, 2021  Office Visit   Dermatology Problem List:  1. Hair loss c/w FFA, start 2020  - Current tx: ketoconazole shampoo and Krysten deep conditioner two times per week, Lidex daily, protopic on eyebrows daily, calcium and a multivitamin  - HairMetrix: 6/30/2021  ____________________________________________    Assessment & Plan:    # Hair loss c/w FFA-last visit started on lidex three times per week and protopic and ketoconazole. Last visit also had kenalog. Doing very well. Hair growing  - Continue Lidex on the scalp three times per week  - Lessen protopic usage to the eyebrows to three times per week.   - Continue ketoconazole shampoo and Krysten deep conditioner two times per week  - Return in 3 months in-person for HairMetrix and ILK injections     Hair labs (safety kept):  TSH:  12/02/20 wnl  Cbc: 12/02/20 wnl  Ferritin: 03/20/21 wnl  Zinc: 62.8 5/18/2021 nnml  Vitamin D: Follow up with PCP, currently on supplement     # Mild Seborrheic Dermatitis- doing well but still active  - Continue ketoconazole 1-3 times weekly, as above.      Procedures Performed:   -Kenalog intralesional injection procedure note: After verbal consent and discussion of risks including but not limited to atrophy, pain, and bruising, time out was performed, the patient underwent positioning,   of Kenalog 2.75 mg  was injected into the frontal anterior scalp into 20 areas.  The patient tolerated the procedure well and left the Dermatology clinic in good condition.         Follow-up: As scheduled on 06/30 for Hair Metrix. Plan for kenalog 2.5mg/cc    Staff and Scribe:     Scribe Disclosure:   I, Robin Melo, am serving as a scribe to document services personally performed by this physician, Dr. Pat Méndez, based on data collection and the provider's statements to me.     Scribe Disclosure  I, Keri Katz, am serving as a scribe to document services personally performed  "by Dr. Pat Méndez MD, based on data collection and the provider's statements to me.      Scribe Disclosure:  I, Jessica Plummer, am serving as a scribe to document services personally performed by Pat Méndez MD at this visit, based upon the provider's statements to me. All documentation has been reviewed by the aforementioned provider prior to being entered into the official medical record.     I, Jessica Plummer, a scribe, prepared the chart for today's encounter.     Provider Disclosure:   The documentation recorded by the scribe accurately reflects the services I personally performed and the decisions made by me.    Pat Méndez MD    Department of Dermatology  [facility]: Phone: [phone], Fax:[phone]  [facility]: Phone: [phone], Fax: [phone]    ____________________________________________    CC: Hair Loss (Jeannine is here today for hair metrix.)    HPI:  Ms. Jeannine Rod is a(n) 80 year old female who presents today as a return patient for hair loss.    Last seen on 5/18/2021. At that time 1.8 ml of Kenalog 2.5 mg/cc was injected into the scalp for treatment of FFA. In addition, she was started on Lidex 3x per week and Protopic to the eyebrows daily for treatment of FFA. For treatment of seborrheic dermatitis she was advised to continue ketoconazole 1-3 times weekly.     Today, the patient feels her hair loss has stabilized. The \"pin prick\" sensation has resolved in the scalp, but she has been experiencing diffuse soreness for the past 4-5 days. She also describes a sensation of scalp tightness. She suspects it could be due to the ketoconazole shampoo which she uses 2x per week. She does not style her hair and denies use of hair dryers/curlers. She has been treating her hair with the Krysten deep conditioner and has been very happy with the results. She has noticed " less dryness and more body. She has been treating the scalp with Lidex solution 3x per week and applying Protopic to the eyebrows daily. She takes calcium and a multivitamin.     Patient is otherwise feeling well, without additional skin concerns.    Labs Reviewed:  N/A    Physical Exam:  Vitals: LMP 07/07/1992   SKIN: Focused examination of the scalp was performed.  LPPAI: 1.33  Bernardo part: 1.2  - Mild erythema  - Mild to moderate scale  - Eyebrows and eyelashes are intact  - Milium on frontal hairline  - Temporal thinning  - Hair pull is negative  - No other lesions of concern on areas examined.     HairMetrix:  - Perifollicular scale noted globally    Medications:  Current Outpatient Medications   Medication     amLODIPine (NORVASC) 5 MG tablet     atorvastatin (LIPITOR) 20 MG tablet     calcium-vitamin D (CALTRATE) 600-400 MG-UNIT per tablet     estradiol (ESTRACE) 0.1 MG/GM vaginal cream     fluocinonide (LIDEX) 0.05 % external solution     hydroxychloroquine (PLAQUENIL) 200 MG tablet     ketoconazole (NIZORAL) 2 % external shampoo     Melatonin ER 1 MG TBCR     Multiple Vitamin (MULTI-VITAMIN) per tablet     nystatin (MYCOSTATIN) 734540 UNIT/GM external cream     tacrolimus (PROTOPIC) 0.1 % external ointment     triamterene-HCTZ (MAXZIDE-25) 37.5-25 MG tablet     No current facility-administered medications for this visit.       Past Medical History:   Patient Active Problem List   Diagnosis     Malignant neoplasm of breast (H)     Raynaud's syndrome     Status post hip replacement     Pulmonary embolism and infarction     Systemic sclerosis (H)     Gastroesophageal reflux disease with esophagitis     Hyperlipidemia LDL goal <100     Calculus of gallbladder without cholecystitis     Radiculoplexus neuropathy     Benign essential hypertension     Atrophic vaginitis     Chronic kidney disease, stage 3     Past Medical History:   Diagnosis Date     Arthritis      Benign essential hypertension 10/23/2017      Benign essential hypertension 10/23/2017     Calculus of gallbladder without cholecystitis 10/6/2016     Gastroesophageal reflux disease with esophagitis 10/6/2016     Hyperlipidemia      Malignant neoplasm (H)     breast bilat mastectomy, no rad, no chemobilat      Pulmonary embolism and infarction 3/9/2012    IMO update changed this record. 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     Raynaud's syndrome      Systemic sclerosis (H) 10/6/2016     CC No referring provider defined for this encounter. on close of this encounter.

## 2021-06-30 ENCOUNTER — OFFICE VISIT (OUTPATIENT)
Dept: DERMATOLOGY | Facility: CLINIC | Age: 81
End: 2021-06-30
Payer: MEDICARE

## 2021-06-30 DIAGNOSIS — L66.12 FRONTAL FIBROSING ALOPECIA: Primary | ICD-10-CM

## 2021-06-30 PROCEDURE — 11901 INJECT SKIN LESIONS >7: CPT | Performed by: DERMATOLOGY

## 2021-06-30 PROCEDURE — 99214 OFFICE O/P EST MOD 30 MIN: CPT | Mod: 25 | Performed by: DERMATOLOGY

## 2021-06-30 NOTE — LETTER
6/30/2021       RE: Jeannine Rod  [address] [facility] [address]     Dear Colleague,    Thank you for referring your patient, Jeannine Rod, to the [facility] [place] at [facility]. Please see a copy of my visit note below.    [facility] Dermatology Note  Encounter Date: Jun 30, 2021  Office Visit   Dermatology Problem List:  1. Hair loss c/w FFA, start 2020  - Current tx: ketoconazole shampoo and Krysten deep conditioner two times per week, Lidex daily, protopic on eyebrows daily, calcium and a multivitamin  - HairMetrix: 6/30/2021  ____________________________________________    Assessment & Plan:    # Hair loss c/w FFA-last visit started on lidex three times per week and protopic and ketoconazole. Last visit also had kenalog. Doing very well. Hair growing  - Continue Lidex on the scalp three times per week  - Lessen protopic usage to the eyebrows to three times per week.   - Continue ketoconazole shampoo and Krysten deep conditioner two times per week  - Return in 3 months in-person for HairMetrix and ILK injections     Hair labs (safety kept):  TSH:  12/02/20 wnl  Cbc: 12/02/20 wnl  Ferritin: 03/20/21 wnl  Zinc: 62.8 5/18/2021 nnml  Vitamin D: Follow up with PCP, currently on supplement     # Mild Seborrheic Dermatitis- doing well but still active  - Continue ketoconazole 1-3 times weekly, as above.      Procedures Performed:   -Kenalog intralesional injection procedure note: After verbal consent and discussion of risks including but not limited to atrophy, pain, and bruising, time out was performed, the patient underwent positioning,   of Kenalog 2.75 mg  was injected into the frontal anterior scalp into 20 areas.  The patient tolerated the procedure well and left the Dermatology clinic in good condition.         Follow-up: As scheduled on 06/30 for Hair Metrix. Plan for kenalog 2.5mg/cc    Staff and  "Scribe:     Scribe Disclosure:   I, Robin Melo, am serving as a scribe to document services personally performed by this physician, Dr. Pat Médnez, based on data collection and the provider's statements to me.     Scribe Disclosure  I, Keri Katz, am serving as a scribe to document services personally performed by Dr. Pat Méndez MD, based on data collection and the provider's statements to me.      Scribe Disclosure:  I, Jessica Plummer, am serving as a scribe to document services personally performed by Pat Méndez MD at this visit, based upon the provider's statements to me. All documentation has been reviewed by the aforementioned provider prior to being entered into the official medical record.     IJessica, a scribe, prepared the chart for today's encounter.     Provider Disclosure:   The documentation recorded by the scribe accurately reflects the services I personally performed and the decisions made by me.    Pat Méndez MD    Department of Dermatology  [facility]: Phone: [phone], Fax:[phone]  [facility]: Phone: [phone], Fax: [phone]    ____________________________________________    CC: Hair Loss (Jeannine is here today for hair metrix.)    HPI:  Ms. Jeannine Rod is a(n) 80 year old female who presents today as a return patient for hair loss.    Last seen on 5/18/2021. At that time 1.8 ml of Kenalog 2.5 mg/cc was injected into the scalp for treatment of FFA. In addition, she was started on Lidex 3x per week and Protopic to the eyebrows daily for treatment of FFA. For treatment of seborrheic dermatitis she was advised to continue ketoconazole 1-3 times weekly.     Today, the patient feels her hair loss has stabilized. The \"pin prick\" sensation has resolved in the scalp, but she has been experiencing diffuse soreness for the past 4-5 days. She " also describes a sensation of scalp tightness. She suspects it could be due to the ketoconazole shampoo which she uses 2x per week. She does not style her hair and denies use of hair dryers/curlers. She has been treating her hair with the Krysten deep conditioner and has been very happy with the results. She has noticed less dryness and more body. She has been treating the scalp with Lidex solution 3x per week and applying Protopic to the eyebrows daily. She takes calcium and a multivitamin.     Patient is otherwise feeling well, without additional skin concerns.    Labs Reviewed:  N/A    Physical Exam:  Vitals: LMP 07/07/1992   SKIN: Focused examination of the scalp was performed.  LPPAI: 1.33  Bernardo part: 1.2  - Mild erythema  - Mild to moderate scale  - Eyebrows and eyelashes are intact  - Milium on frontal hairline  - Temporal thinning  - Hair pull is negative  - No other lesions of concern on areas examined.     HairMetrix:  - Perifollicular scale noted globally    Medications:  Current Outpatient Medications   Medication     amLODIPine (NORVASC) 5 MG tablet     atorvastatin (LIPITOR) 20 MG tablet     calcium-vitamin D (CALTRATE) 600-400 MG-UNIT per tablet     estradiol (ESTRACE) 0.1 MG/GM vaginal cream     fluocinonide (LIDEX) 0.05 % external solution     hydroxychloroquine (PLAQUENIL) 200 MG tablet     ketoconazole (NIZORAL) 2 % external shampoo     Melatonin ER 1 MG TBCR     Multiple Vitamin (MULTI-VITAMIN) per tablet     nystatin (MYCOSTATIN) 423136 UNIT/GM external cream     tacrolimus (PROTOPIC) 0.1 % external ointment     triamterene-HCTZ (MAXZIDE-25) 37.5-25 MG tablet     No current facility-administered medications for this visit.       Past Medical History:   Patient Active Problem List   Diagnosis     Malignant neoplasm of breast (H)     Raynaud's syndrome     Status post hip replacement     Pulmonary embolism and infarction     Systemic sclerosis (H)     Gastroesophageal reflux disease with  esophagitis     Hyperlipidemia LDL goal <100     Calculus of gallbladder without cholecystitis     Radiculoplexus neuropathy     Benign essential hypertension     Atrophic vaginitis     Chronic kidney disease, stage 3     Past Medical History:   Diagnosis Date     Arthritis      Benign essential hypertension 10/23/2017     Benign essential hypertension 10/23/2017     Calculus of gallbladder without cholecystitis 10/6/2016     Gastroesophageal reflux disease with esophagitis 10/6/2016     Hyperlipidemia      Malignant neoplasm (H)     breast bilat mastectomy, no rad, no chemobilat      Pulmonary embolism and infarction 3/9/2012    IMO update changed this record. 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     Raynaud's syndrome      Systemic sclerosis (H) 10/6/2016     CC No referring provider defined for this encounter. on close of this encounter.    HairMetrix Summary (6/30/2021)    Frontal anterior    Midscalp    Vertex    Occipital    Right temporal    Left temporal      Drug Administration Record    Prior to injection, verified patient identity using patient's name and date of birth.  Due to injection administration, patient instructed to remain in clinic for 15 minutes  afterwards, and to report any adverse reaction to me immediately.    Drug Name: triamcinolone acetonide(kenalog)  Dose: 2.75mL of triamcinolone 5mg/mL, 13.75mg dose  Route administered: ID  NDC #: Kenalog-10 (7610-7383-55)  Amount of waste(mL):  Reason for waste: Single use vial    LOT #: [id number]  SITE: scalp  : [employer]  EXPIRATION DATE: 09/2022

## 2021-06-30 NOTE — PROGRESS NOTES
HairMetrix Summary (6/30/2021)    Frontal anterior    Midscalp    Vertex    Occipital    Right temporal    Left temporal

## 2021-06-30 NOTE — PATIENT INSTRUCTIONS
[facility] Dermatology Visit Scalp Photography    Thank you for allowing us to participate in your care. Your findings, instructions and follow-up plan are as follows:  Today you had scalp and hair images taken using cameras and  artificial intelligence that gives us quantitative hair data to track your progress. This data describes your scalp/hair condition such as the number of fibers in a given area, the fiber s density, the distance of the fibers from one another, and more information. It can also give us information about scalp health visually. We use them as a clinical tool to objectively track progress.     When should I call my doctor?    If you are worsening or not improving, please, contact us or seek urgent care as noted below.     Who should I call with questions (adults)?    [facility] (adult and pediatric): [phone]     [facility] (adult): [phone]    For urgent needs outside of business hours call the [facility] at [phone] and ask for the dermatology resident on call    If this is a medical emergency and you are unable to reach an ER, Call [phone]      Who should I call with questions (pediatric)?  [facility]- Pediatric Dermatology  Dr. Marimar Riggs, Dr. El Cuellar, Dr. Aarti Loco, Danelle Aguayo, TANESHA Trevino, Dr. Pat Ignacio & Dr. Lang Chester  Non Urgent  Nurse Triage Line; [phone]- Roseann and Elisa OTT Care Coordinators   Frannie (/Complex ) [phone]    If you need a prescription refill, please contact your pharmacy. Refills are approved or denied by our Physicians during normal business hours, Monday through Fridays  Per office policy, refills will not be granted if you have not been seen within the past year (or sooner depending on your child's condition)    Scheduling Information:  Pediatric Appointment  Scheduling and Call Center [phone]  Radiology Scheduling- [phone]  Sedation Unit Scheduling- [phone]  [place] Scheduling- General [phone]; Pediatric Dermatology [phone]  Main  Services: [phone]  Kyrgyz: [phone]  Russian: [phone]  Hmong/Epifanio/Equatorial [place]inean: [phone]  Preadmission Nursing Department Fax Number: [phone] (Fax all pre-operative paperwork to this number)    For urgent matters arising during evenings, weekends, or holidays that cannot wait for normal business hours please call [phone] and ask for the Dermatology Resident On-Call to be paged.

## 2021-07-01 NOTE — PROGRESS NOTES
Drug Administration Record    Prior to injection, verified patient identity using patient's name and date of birth.  Due to injection administration, patient instructed to remain in clinic for 15 minutes  afterwards, and to report any adverse reaction to me immediately.    Drug Name: triamcinolone acetonide(kenalog)  Dose: 2.75mL of triamcinolone 5mg/mL, 13.75mg dose  Route administered: ID  NDC #: Kenalog-10 (7577-6719-94)  Amount of waste(mL):  Reason for waste: Single use vial    LOT #: [id number]  SITE: [facility]  : the grafter  EXPIRATION DATE: 09/2022

## 2021-07-05 DIAGNOSIS — B37.2 MONILIAL RASH: ICD-10-CM

## 2021-07-06 RX ORDER — NYSTATIN 100000 U/G
CREAM TOPICAL 2 TIMES DAILY
Qty: 30 G | Refills: 1 | Status: SHIPPED | OUTPATIENT
Start: 2021-07-06

## 2021-09-04 ENCOUNTER — HEALTH MAINTENANCE LETTER (OUTPATIENT)
Age: 81
End: 2021-09-04

## 2021-09-29 ENCOUNTER — OFFICE VISIT (OUTPATIENT)
Dept: DERMATOLOGY | Facility: CLINIC | Age: 81
End: 2021-09-29
Payer: MEDICARE

## 2021-09-29 DIAGNOSIS — L64.9 ANDROGENETIC ALOPECIA: Primary | ICD-10-CM

## 2021-09-29 DIAGNOSIS — L66.12 FRONTAL FIBROSING ALOPECIA: ICD-10-CM

## 2021-09-29 PROCEDURE — 11900 INJECT SKIN LESIONS </W 7: CPT | Performed by: DERMATOLOGY

## 2021-09-29 RX ORDER — BIMATOPROST 3 UG/ML
SOLUTION TOPICAL
Qty: 5 ML | Refills: 3 | Status: SHIPPED | OUTPATIENT
Start: 2021-09-29 | End: 2023-02-22

## 2021-09-29 ASSESSMENT — PAIN SCALES - GENERAL: PAINLEVEL: NO PAIN (0)

## 2021-09-29 NOTE — PROGRESS NOTES
[facility] Dermatology Note  Encounter Date: Sep 29, 2021  Office Visit     Dermatology Problem List:  1. 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 KEep away from face due to risk of hypertrichosis and irritation, can cause temporary shedding  - Continue protopic on the eyebrows daily   - Continue ketoconazole shampoo and Krysten deep conditioner two times per week  - Start latisse once daily to the brows, reviewed this is off label. . Discussed risks of used of medication including eye color change(iris pigmentation), skin darkening at site of application. Patient denies a hx of eye disease. Patient is not pregnant or breast feeding. Discussed that latisse results in a temporary improvement    - HairMetrix performed today  - See below for ILK procedure         Hair labs (safety kept):  TSH: 12/02/20 wnl  Cbc: 5/14/21 wnl (outside provider)  Ferritin: 03/20/21 wnl  Zinc: 5/18/2021 wnl  Vitamin D: has never been checked. Verified with patient that she has never been on a vitamin D supplement. Patient declined checking vitamin D  due to cost.      # Mild Seborrheic Dermatitis - Improved  - Continue ketoconazole shampoo 2 times weekly    Procedures Performed:   - Intra-lesional triamcinolone procedure note. After positioning and cleansing with isopropyl alcohol, 3 total mL of triamcinolone 2.5 mg/mL was injected into 30 lesion(s) on the scalp. The patient tolerated the procedure well and left the dermatology clinic in  good condition.      Follow-up. 3 months. Plan for 4cc of Kenalog 2.5    Staff and Scribe:        Scribe Disclosure  I, Twila Scott, am serving as a scribe to document services personally performed by Dr. Pat Méndez MD, based on data collection and the provider's statements to me.     Scribe Disclosure:  I, Yvrose Villa, am serving as a scribe to document services personally performed by Pat Méndez MD based on data collection and the provider's statements to me.       Provider Disclosure:   The documentation recorded by the scribe accurately reflects the services I personally performed and the decisions made by me.    Pat Méndez MD    Department of Dermatology  [facility]: Phone: [phone], Fax:[phone]  [facility]: Phone: [phone], Fax: [phone]        ____________________________________________    CC: Hair Loss (Follow up for hair loss. )    HPI:  Ms. Jeannine Rod is a 80 year old female who presents as a return patient for follow-up of hair loss, diagnosed as hair loss c/w FFA. The patient was last seen in-clinic on 6/30/2021 when she received ILK injections.     Today, the patients reports that she gets a sense that she is continuing to lose hair. The symptoms that initially presented prickling, stringing, and itching are now no longer present. The itching occurs occasionally on the scalp, but the itching is very frequent on the eyebrows, skin under the eyebrows and on the forehead. She cannot tell if the injections she received helped or not. For treatment, she is using the ketoconazole shampoo and Krysten deep conditioner every three days. She also uses curlers, but puts these in very gently and air dries her hair. No spray or heat application. She uses the Lidex solution on the scalp along the line of her injections three times week and protopic to the  eyebrows daily. No problems with use of protopic, but the eyebrows seem thinner in the last 3 months.    Patient reports that she is not on a vitamin D supplement, and has never been on one. She is taking calcium and a multivitamin, and eats lots of fruits and vegetables.    No other new medications or supplements since last seen.    - Current tx: ketoconazole shampoo and Krysten deep conditioner two times per week, Lidex daily, protopic on eyebrows 3x weekly, calcium, multivitamin, vitamin D supplement  No Any new medications, supplements, or products? (please list below)     No Scalp pain   No Scalp burning   Yes, occasional Scalp itching    Yes, thinner Eyebrow changes    Yes, thinner Eyelash changes   Yes, but for years. Not new Other body hair changes    Awful all the time due to daily activities Nail changes    No Additional symptoms? (please list below)     - COVID status: not that she knows of, but assuming she did have it at some point  - COVID vaccine: yes, Pfizer. Second dose in March 2021.     Patient is otherwise feeling well, in usual state of health, and has no additional skin concerns today.     Labs:  Reviewed above.     Physical Exam:  Vitals: LMP 07/07/1992   GEN: Well developed, well-nourished, in no acute distress, in a pleasant mood.    SKIN: Focused examination of the scalp, face and nails was performed.  - Rizvi type: II  - Bernardo part width of 1.1  - Mild erythema of the frontal scalp  - Moderate perifollicular erythema of the frontal scalp  - No perifollicular scale   - No scaling of the scalp   - negative hair pull test   - normal eyelash density  - normal eyebrow density  - no nail pitting or dystrophy   - no scalp folliculitis/pustules   - Follicular prominence in the frontal anterior scalp   - No other lesions of concern on areas examined.      HairMetrix summary:   - Frontal anterior: slight decrease in hair counts, improved scale  Midscalp: stable hair counts, improved scale  -  Vertex: stable, improved scale  - Occipital: decreased erythema, slight decrease in hair counts, increase in fiber diameter  - Right temporal: stable, improved scale  - Left temporal: increase in hair counts, improved scale    - Midline forehead vein: elevated    Medications:  Current Outpatient Medications   Medication     amLODIPine (NORVASC) 5 MG tablet     atorvastatin (LIPITOR) 20 MG tablet     bimatoprost (LATISSE) 0.03 % external opthalmic solution     calcium-vitamin D (CALTRATE) 600-400 MG-UNIT per tablet     estradiol (ESTRACE) 0.1 MG/GM vaginal cream     fluocinonide (LIDEX) 0.05 % external solution     hydroxychloroquine (PLAQUENIL) 200 MG tablet     ketoconazole (NIZORAL) 2 % external shampoo     Melatonin ER 1 MG TBCR     Multiple Vitamin (MULTI-VITAMIN) per tablet     nystatin (MYCOSTATIN) 686303 UNIT/GM external cream     tacrolimus (PROTOPIC) 0.1 % external ointment     triamterene-HCTZ (MAXZIDE-25) 37.5-25 MG tablet     Current Facility-Administered Medications   Medication     triamcinolone acetonide (KENALOG-10) injection 10 mg      Past Medical History:   Patient Active Problem List   Diagnosis     Malignant neoplasm of breast (H)     Raynaud's syndrome     Status post hip replacement     Pulmonary embolism and infarction     Systemic sclerosis (H)     Gastroesophageal reflux disease with esophagitis     Hyperlipidemia LDL goal <100     Calculus of gallbladder without cholecystitis     Radiculoplexus neuropathy     Benign essential hypertension     Atrophic vaginitis     Chronic kidney disease, stage 3     Past Medical History:   Diagnosis Date     Arthritis      Benign essential hypertension 10/23/2017     Benign essential hypertension 10/23/2017     Calculus of gallbladder without cholecystitis 10/6/2016     Gastroesophageal reflux disease with esophagitis 10/6/2016     Hyperlipidemia      Malignant neoplasm (H)     breast bilat mastectomy, no rad, no chemobilat      Pulmonary embolism and  infarction 3/9/2012    IMO update changed this record. 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     Raynaud's syndrome      Systemic sclerosis (H) 10/6/2016       CC Bebeto Freitas MD  [address] [place] [address]  [place] [id number] on close of this encounter.

## 2021-09-29 NOTE — PROGRESS NOTES
HairMetrix 9/29/2021:    From left to right: Frontal anterior scalp, midscalp, vertex    From left to right: Occipital, right temporal, left temporal

## 2021-09-29 NOTE — LETTER
9/29/2021       RE: Jeannine Rod  [address] [facility] [address]     Dear Colleague,    Thank you for referring your patient, Jeannine Rod, to the [facility] DERMATOLOGY [facility]INIC [place] at [facility]. Please see a copy of my visit note below.    [facility] Dermatology Note  Encounter Date: Sep 29, 2021  Office Visit     Dermatology Problem List:  1. 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 KEep away from face due to risk of hypertrichosis and irritation, can cause temporary shedding  - Continue protopic on the eyebrows daily   - Continue ketoconazole shampoo and Krysten deep conditioner two times per week  - Start latisse once daily to the brows, reviewed this is off label. . Discussed risks of used of medication including eye color change(iris pigmentation), skin darkening at site of application. Patient denies a hx of eye disease. Patient is not pregnant or breast feeding. Discussed that latisse results in a temporary improvement    - HairMetrix performed today  - See below for ILK procedure         Hair labs (safety kept):  TSH: 12/02/20 wnl  Cbc: 5/14/21 wnl (outside provider)  Ferritin: 03/20/21 wnl  Zinc: 5/18/2021 wnl  Vitamin D: has never been checked. Verified with patient that she has never been on a vitamin D supplement. Patient declined checking vitamin D  due to cost.      # Mild Seborrheic Dermatitis - Improved  - Continue  ketoconazole shampoo 2 times weekly    Procedures Performed:   - Intra-lesional triamcinolone procedure note. After positioning and cleansing with isopropyl alcohol, 3 total mL of triamcinolone 2.5 mg/mL was injected into 30 lesion(s) on the scalp. The patient tolerated the procedure well and left the dermatology clinic in good condition.      Follow-up. 3 months. Plan for 4cc of Kenalog 2.5    Staff and Scribe:        Scribe Disclosure  I, Twila Scott, am serving as a scribe to document services personally performed by Dr. Pat Méndez MD, based on data collection and the provider's statements to me.     Scribe Disclosure:  I, Yvrose Villa, am serving as a scribe to document services personally performed by Pat Méndez MD based on data collection and the provider's statements to me.       Provider Disclosure:   The documentation recorded by the scribe accurately reflects the services I personally performed and the decisions made by me.    Pat Méndez MD    Department of Dermatology  [facility]: Phone: [phone], Fax:[phone]  [facility]: Phone: [phone], Fax: [phone]        ____________________________________________    CC: Hair Loss (Follow up for hair loss. )    HPI:  Ms. Jeannine Rod is a 80 year old female who presents as a return patient for follow-up of hair loss, diagnosed as hair loss c/w FFA. The patient was last seen in-clinic on 6/30/2021 when she received ILK injections.     Today, the patients reports that she gets a sense that she is continuing to lose hair. The symptoms that initially presented prickling, stringing, and itching are now no longer present. The itching occurs occasionally on the scalp, but the itching is very frequent on the eyebrows, skin under the eyebrows and on the forehead. She cannot tell if the injections she received helped  or not. For treatment, she is using the ketoconazole shampoo and Krysten deep conditioner every three days. She also uses curlers, but puts these in very gently and air dries her hair. No spray or heat application. She uses the Lidex solution on the scalp along the line of her injections three times week and protopic to the eyebrows daily. No problems with use of protopic, but the eyebrows seem thinner in the last 3 months.    Patient reports that she is not on a vitamin D supplement, and has never been on one. She is taking calcium and a multivitamin, and eats lots of fruits and vegetables.    No other new medications or supplements since last seen.    - Current tx: ketoconazole shampoo and Krysten deep conditioner two times per week, Lidex daily, protopic on eyebrows 3x weekly, calcium, multivitamin, vitamin D supplement  No Any new medications, supplements, or products? (please list below)     No Scalp pain   No Scalp burning   Yes, occasional Scalp itching    Yes, thinner Eyebrow changes    Yes, thinner Eyelash changes   Yes, but for years. Not new Other body hair changes    Awful all the time due to daily activities Nail changes    No Additional symptoms? (please list below)     - COVID status: not that she knows of, but assuming she did have it at some point  - COVID vaccine: yes, Pfizer. Second dose in March 2021.     Patient is otherwise feeling well, in usual state of health, and has no additional skin concerns today.     Labs:  Reviewed above.     Physical Exam:  Vitals: [facility] 07[birth year]07/1990   GEN: Well developed, well-nourished, in no acute distress, in a pleasant mood.    SKIN: Focused examination of the scalp, face and nails was performed.  - Rizvi type: II  - Bernardo part width of 1.1  - Mild erythema of the frontal scalp  - Moderate perifollicular erythema of the frontal scalp  - No perifollicular scale   - No scaling of the scalp   - negative hair pull test   - normal eyelash density  - normal eyebrow  density  - no nail pitting or dystrophy   - no scalp folliculitis/pustules   - Follicular prominence in the frontal anterior scalp   - No other lesions of concern on areas examined.      HairMetrix summary:   - Frontal anterior: slight decrease in hair counts, improved scale  Midscalp: stable hair counts, improved scale  - Vertex: stable, improved scale  - Occipital: decreased erythema, slight decrease in hair counts, increase in fiber diameter  - Right temporal: stable, improved scale  - Left temporal: increase in hair counts, improved scale    - Midline forehead vein: elevated    Medications:  Current Outpatient Medications   Medication     amLODIPine (NORVASC) 5 MG tablet     atorvastatin (LIPITOR) 20 MG tablet     bimatoprost (LATISSE) 0.03 % external opthalmic solution     calcium-vitamin D (CALTRATE) 600-400 MG-UNIT per tablet     estradiol (ESTRACE) 0.1 MG/GM vaginal cream     fluocinonide (LIDEX) 0.05 % external solution     hydroxychloroquine (PLAQUENIL) 200 MG tablet     ketoconazole (NIZORAL) 2 % external shampoo     Melatonin ER 1 MG TBCR     Multiple Vitamin (MULTI-VITAMIN) per tablet     nystatin (MYCOSTATIN) 203536 UNIT/GM external cream     tacrolimus (PROTOPIC) 0.1 % external ointment     triamterene-HCTZ (MAXZIDE-25) 37.5-25 MG tablet     Current Facility-Administered Medications   Medication     triamcinolone acetonide (KENALOG-10) injection 10 mg      Past Medical History:   Patient Active Problem List   Diagnosis     Malignant neoplasm of breast (H)     Raynaud's syndrome     Status post hip replacement     Pulmonary embolism and infarction     Systemic sclerosis (H)     Gastroesophageal reflux disease with esophagitis     Hyperlipidemia LDL goal <100     Calculus of gallbladder without cholecystitis     Radiculoplexus neuropathy     Benign essential hypertension     Atrophic vaginitis     Chronic kidney disease, stage 3     Past Medical History:   Diagnosis Date     Arthritis      Benign  essential hypertension 10/23/2017     Benign essential hypertension 10/23/2017     Calculus of gallbladder without cholecystitis 10/6/2016     Gastroesophageal reflux disease with esophagitis 10/6/2016     Hyperlipidemia      Malignant neoplasm (H)     breast bilat mastectomy, no rad, no chemobilat      Pulmonary embolism and infarction 3/9/2012    IMO update changed this record. 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     Raynaud's syndrome      Systemic sclerosis (H) 10/6/2016       CC Bebeto Freitas MD  [address] [facility] [address]  [place] [address] on close of this encounter.        HairMetrix 9/29/2021:    From left to right: Frontal anterior scalp, midscalp, vertex    From left to right: Occipital, right temporal, left temporal

## 2021-09-29 NOTE — PATIENT INSTRUCTIONS
Topical Rogaine (Minoxidil) for Pattern Hair Loss      Minoxidil is an FDA approved over the counter topical for the treatment of hair loss and thinning hair in men and women.     Initially a 2% solution was available however this required application twice daily. A 5% solution is also now approved, only requiring application once per day.     Available Products:     Rogaine 5% solution: Packaged for men however can be used by men or women. Use dropper and apply directly to scalp at bedtime. This product can cause an allergy because of presence of propylene glycol. Stop this product if you develop a rash or itching and contact your physician.     Rogaine 5% foam: Packaged for men and women: Apply foam directly to the scalp once daily. This is less greasy compared to the solution. This formula is preferred for those who had a reaction to the solution product. If you develop rash or itching, stop the product and contact your physician.     What if I stop minoxidil topical?     After stopping minoxidil the hair will return to the usual pattern of thinning. Using the product 3-4 times per week is better than not using product at all.       Can I use generic minoxidil?     Yes, look for 5% minoxidil.     What are the side effects?    The most common side effect is rash or itching of the scalp. This can occur if a contact allergy develops with propylene glycol. A small group of patients noticed the appearance of facial hair if the product runs onto the face or with prolonged use.      Intralesional Kenalog (ILK) Injections    Intralesional steroid injection involves a corticosteroid, such as triamcinolone acetonide (brand name Kenalog), which is injected directly into a lesion on or immediately below the skin. Intralesional kenalog may be used to treat the following skin conditions:      Alopecia areata    Discoid lupus erythematosus    Keloids/hypertrophic scars    Granuloma annulare and other granulomatous  disorders    Hypertrophic lichen planus    Lichen simplex chronicus (neurodermatitis)    Localised psoriasis    Necrobiosis lipoidica    Acne cysts (nodulocystic acne)    Small infantile hemangiomas    Possible side-effects of intralesional Kenalog (ILK) injections include bleeding, pain, infection, contact dermatitis, nerve damage, scar formation and need for a repeat procedure.    Some people may experience delayed side-effects including:    Lipoatrophy, appearing as skin indentations or dimples around the injection sites a few weeks after treatment; these may be permanent.    White marks (leukoderma) or brown marks (postinflammatory pigmentation) at the site of injection or spreading from the site of injection - these may resolve or persist long term.    Telangiectasia, or small dilated blood vessels at the site of injection.     Increased hair growth at the site of injection - this resolves eventually.    Steroid acne: steroids increase growth hormone, leading to increased sebum (oil) production by the sebaceous glands. Steroid acne generally improves once the steroid has been stopped.      Who should I call with questions?    [facility]: [phone]     [facility]: [phone]    For urgent needs outside of business hours call the [facility] at [phone] and ask for the dermatology resident on call                      Start latisse once daily for 3 months to the brow.

## 2021-09-29 NOTE — NURSING NOTE
Chief Complaint   Patient presents with     Hair Loss     Follow up for hair loss.      Jayleen GATES CMA

## 2021-10-02 DIAGNOSIS — I10 BENIGN ESSENTIAL HYPERTENSION: ICD-10-CM

## 2021-10-04 ENCOUNTER — TRANSFERRED RECORDS (OUTPATIENT)
Dept: HEALTH INFORMATION MANAGEMENT | Facility: CLINIC | Age: 81
End: 2021-10-04

## 2021-10-04 RX ORDER — TRIAMTERENE/HYDROCHLOROTHIAZID 37.5-25 MG
TABLET ORAL
Qty: 90 TABLET | Refills: 0 | Status: SHIPPED | OUTPATIENT
Start: 2021-10-04 | End: 2022-04-05

## 2021-10-04 NOTE — TELEPHONE ENCOUNTER
Failed protocol.  please route to  team if patient needs an appointment     Lisa CARTERRN BSN  [facility]  [phone]

## 2021-10-07 DIAGNOSIS — N95.2 ATROPHIC VAGINITIS: ICD-10-CM

## 2021-10-08 RX ORDER — ESTRADIOL 0.1 MG/G
CREAM VAGINAL
Qty: 42.5 G | Refills: 11 | Status: SHIPPED | OUTPATIENT
Start: 2021-10-08 | End: 2023-08-03

## 2021-10-08 NOTE — TELEPHONE ENCOUNTER
Routing refill request to provider for review/approval because:  BP out of range:      BP Readings from Last 3 Encounters:   03/05/21 (!) 166/68   02/04/21 (!) 140/66   01/04/21 114/62

## 2021-10-11 ENCOUNTER — MYC MEDICAL ADVICE (OUTPATIENT)
Dept: DERMATOLOGY | Facility: CLINIC | Age: 81
End: 2021-10-11

## 2021-11-20 DIAGNOSIS — E78.5 HYPERLIPIDEMIA LDL GOAL <100: ICD-10-CM

## 2021-11-22 RX ORDER — ATORVASTATIN CALCIUM 20 MG/1
TABLET, FILM COATED ORAL
Qty: 90 TABLET | Refills: 0 | Status: SHIPPED | OUTPATIENT
Start: 2021-11-22 | End: 2022-08-26

## 2021-11-22 NOTE — TELEPHONE ENCOUNTER
Routing refill request to provider for review/approval because:  Very high interaction...but with topical   Regina Holguin RN

## 2021-11-24 ENCOUNTER — OFFICE VISIT (OUTPATIENT)
Dept: DERMATOLOGY | Facility: CLINIC | Age: 81
End: 2021-11-24
Payer: MEDICARE

## 2021-11-24 DIAGNOSIS — L65.9 ALOPECIA: Primary | ICD-10-CM

## 2021-11-24 DIAGNOSIS — L66.12 FRONTAL FIBROSING ALOPECIA: ICD-10-CM

## 2021-11-24 PROCEDURE — 11901 INJECT SKIN LESIONS >7: CPT | Performed by: DERMATOLOGY

## 2021-11-24 ASSESSMENT — PAIN SCALES - GENERAL: PAINLEVEL: NO PAIN (0)

## 2021-11-24 NOTE — PROGRESS NOTES
[facility] Dermatology Note  Encounter Date: Nov 24, 2021  Office Visit     Dermatology Problem List:  1. Hair loss c/w FFA, started 2020  - Current tx: Rogaine, ketoconazole shampoo and Krysten deep conditioner two times per week, Lidex 3x weekly, protopic on eyebrows daily, calcium, multivitamin  - ILK 5/18/2021, 6/30/2021, 9/29/2021, 11/24/21  - HairMetrix: 6/30/2021, 9/29/2021  ____________________________________________    Assessment & Plan:   # Hair loss, most consistent with FFA and AGA. 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 Hair stable, minimal active disease present today. We are moving in the right direction.   - Continue Lidex on the frontal scalp three times per week  - Continue minoxidil 5% foam or solution to the vertex and occipital scalp daily  - Continue protopic on the eyebrows daily   - Continue ketoconazole shampoo and Krysten deep conditioner 2x week, okay to add one additional day of shampooing with other shampooing   - Continue latisse to the brows daily, no change  - See below for ILK procedure      Hair labs (safety kept):  TSH: 12/02/20 wnl  Cbc: 5/14/21 wnl (outside provider)  Ferritin: 03/20/21 wnl  Zinc: 5/18/2021 wnl  Vitamin D: has never been checked. Patient previously declined checking vitamin D due to cost.      # Mild Seborrheic Dermatitis - improved  - Continue ketoconazole shampoo 2 times weekly      Procedures Performed:   - Intra-lesional triamcinolone procedure note. After positioning and cleansing with isopropyl alcohol, 4 total mL of triamcinolone 2.5 mg/mL was injected into 35 lesion(s) on the frontal scalp. The patient tolerated the procedure well and left the dermatology clinic in good condition.    Follow-up: 3 months. Plan for 4cc of Kenalog 2.5.     Staff and Scribe:     Kisha TAMAYO, am serving as a scribe to document services personally performed by Pat Méndez MD based on data collection and the provider's statements  to me.     Provider Disclosure:   The documentation recorded by the scribe accurately reflects the services I personally performed and the decisions made by me.    Staff Physician:  I was present with the medical student who participated in the service and in the documentation of the note. I have verified the history and personally performed the physical exam and medical decision making. I agree with the assessment and plan of care as documented in the note.   The medical student did 1cc of injections, I was present as she participated.     Pat Méndez MD    Department of Dermatology  [facility]: Phone: [phone], Fax:[phone]  [facility]: Phone: [phone], Fax: [phone]  11/26/2021        Pat Méndez MD    Department of Dermatology  [facility]: Phone: [phone], Fax:[phone]  [facility]: Phone: [phone], Fax: [phone]      ____________________________________________    CC: Hair Loss (Jeannine is here today for a hair loss follow up )    HPI:  Ms. Jeannine Rod is a 80 year old female who presents as a return patient for follow-up of hair loss, diagnosed as FFA with AGA. Patient feels her hair loss is worsening or stable. She notices her hair is straighter than previous and isnt sure if this why she is seeing more scalp.  Patient is using ketoconazole 2 x week. Patient is using milton deep conditioner (one for damaged and one for colored) 2x a week. Minoxidil foam 5% once a day and Lidex solution 3 x week. Patient notes improvement in scalp symptoms.     Patient feels protopic and latisse daily to the eyebrows. She feels the hair loss is stable.    Patient is taking multivitamin and is scheduled for her annual exam, where she is hoping  to get her Vitamin D checked.     - Last seen in-clinic on 21 when topical minoxidil and latisse were started.   - Shedding or thinning, or both: noticing increased part width, doesn't notice increased sheding   - Current tx: topical minoxidil foam, lidex, protopic, ketoconazole, and latisse   - If using Rogaine, 1 cannister lasts how lon.5 months   - Scalp or hair care habits/products: no heat styling and no hair dye.   No Any new medications, supplements, or products? (please list below)     No Scalp pain   No Scalp burning   No Scalp itching    No Eyebrow changes    No Eyelash changes   Yes- increased fuzzy hair along jawline- present for a couple of year  Beard changes    No Other body hair changes    No Nail changes    No Additional symptoms? (please list below)     - Overall course: stable   - COVID status: not that she knows of, but assuming she did have it at some point  - COVID vaccine: yes, Pfizer. Second dose in 2021.    Patient is otherwise feeling well, in usual state of health, and has no additional skin concerns today.     Labs:  TSH: 20 wnl  Cbc: 21 wnl (outside provider)  Ferritin: 21 wnl  Zinc: 2021 wnl  Vitamin D: has never been checked. Patient previously declined checking vitamin D due to cost. Now hoping to have     Physical Exam:  Vitals: LMP 1990   GEN: Well developed, well-nourished, in no acute distress, in a pleasant mood.    SKIN: Focused examination of scalp, face, and nails was performed.  - Rizvi type: II  - Bernardo part width of 1.2  - faint macular erythema along frontal hairline  - Forehead veins are prominent   - Mild erythema along the crown  - Hair pull negative  - Hair tug test negative    - No scaling of the scalp   -  normal eyelash density  -  normal eyebrow density  - nails painted   - no scalp folliculitis/pustules   - No other lesions of concern on areas examined.     Medications:  Current Outpatient Medications   Medication      amLODIPine (NORVASC) 5 MG tablet     atorvastatin (LIPITOR) 20 MG tablet     bimatoprost (LATISSE) 0.03 % external opthalmic solution     calcium-vitamin D (CALTRATE) 600-400 MG-UNIT per tablet     estradiol (ESTRACE) 0.1 MG/GM vaginal cream     fluocinonide (LIDEX) 0.05 % external solution     hydroxychloroquine (PLAQUENIL) 200 MG tablet     ketoconazole (NIZORAL) 2 % external shampoo     Melatonin ER 1 MG TBCR     Multiple Vitamin (MULTI-VITAMIN) per tablet     nystatin (MYCOSTATIN) 672946 UNIT/GM external cream     tacrolimus (PROTOPIC) 0.1 % external ointment     triamterene-HCTZ (MAXZIDE-25) 37.5-25 MG tablet     Current Facility-Administered Medications   Medication     triamcinolone acetonide (KENALOG-10) injection 10 mg      Past Medical History:   Patient Active Problem List   Diagnosis     Malignant neoplasm of breast (H)     Raynaud's syndrome     Status post hip replacement     Pulmonary embolism and infarction     Systemic sclerosis (H)     Gastroesophageal reflux disease with esophagitis     Hyperlipidemia LDL goal <100     Calculus of gallbladder without cholecystitis     Radiculoplexus neuropathy     Benign essential hypertension     Atrophic vaginitis     Chronic kidney disease, stage 3 (H)     Past Medical History:   Diagnosis Date     Arthritis      Benign essential hypertension 10/23/2017     Benign essential hypertension 10/23/2017     Calculus of gallbladder without cholecystitis 10/6/2016     Gastroesophageal reflux disease with esophagitis 10/6/2016     Hyperlipidemia      Malignant neoplasm (H)     breast bilat mastectomy, no rad, no chemobilat      Pulmonary embolism and infarction 3/9/2012    IMO update changed this record. 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     Raynaud's syndrome      Systemic sclerosis (H) 10/6/2016       CC Bebeto Freitas MD  [address]  [place] [address] on close of this encounter.

## 2021-11-24 NOTE — PATIENT INSTRUCTIONS
Intralesional Kenalog (ILK) Injections    Intralesional steroid injection involves a corticosteroid, such as triamcinolone acetonide (brand name Kenalog), which is injected directly into a lesion on or immediately below the skin. Intralesional kenalog may be used to treat the following skin conditions:      Alopecia areata    Discoid lupus erythematosus    Keloids/hypertrophic scars    Granuloma annulare and other granulomatous disorders    Hypertrophic lichen planus    Lichen simplex chronicus (neurodermatitis)    Localised psoriasis    Necrobiosis lipoidica    Acne cysts (nodulocystic acne)    Small infantile hemangiomas    Possible side-effects of intralesional Kenalog (ILK) injections include bleeding, pain, infection, contact dermatitis, nerve damage, scar formation and need for a repeat procedure.    Some people may experience delayed side-effects including:    Lipoatrophy, appearing as skin indentations or dimples around the injection sites a few weeks after treatment; these may be permanent.    White marks (leukoderma) or brown marks (postinflammatory pigmentation) at the site of injection or spreading from the site of injection - these may resolve or persist long term.    Telangiectasia, or small dilated blood vessels at the site of injection.     Increased hair growth at the site of injection - this resolves eventually.    Steroid acne: steroids increase growth hormone, leading to increased sebum (oil) production by the sebaceous glands. Steroid acne generally improves once the steroid has been stopped.      Who should I call with questions?    [facility]: [phone]     [facility]: [phone]    For urgent needs outside of business hours call the [facility] at [phone] and ask for the dermatology resident on call

## 2021-11-24 NOTE — LETTER
11/24/2021       RE: Jeannine Rod  [address]  [place] [address]     Dear Colleague,    Thank you for referring your patient, Jeannine Rod, to the [facility] [place] at [facility]. Please see a copy of my visit note below.    [facility] Dermatology Note  Encounter Date: Nov 24, 2021  Office Visit     Dermatology Problem List:  1. Hair loss c/w FFA, started 2020  - Current tx: Rogaine, ketoconazole shampoo and Krysten deep conditioner two times per week, Lidex 3x weekly, protopic on eyebrows daily, calcium, multivitamin  - ILK 5/18/2021, 6/30/2021, 9/29/2021, 11/24/21  - HairMetrix: 6/30/2021, 9/29/2021  ____________________________________________    Assessment & Plan:   # Hair loss, most consistent with FFA and AGA. 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 Hair stable, minimal active disease present today. We are moving in the right direction.   - Continue Lidex on the frontal scalp three times per week  - Continue minoxidil 5% foam or solution to the vertex and occipital scalp daily  - Continue protopic on the eyebrows daily   - Continue ketoconazole shampoo and Krysten deep conditioner 2x week, okay to add one additional day of shampooing with other shampooing   - Continue latisse to the brows daily, no change  - See below for ILK procedure      Hair labs (safety kept):  TSH: 12/02/20 wnl  Cbc: 5/14/21 wnl (outside provider)  Ferritin: 03/20/21 wnl  Zinc: 5/18/2021 wnl  Vitamin D: has never been checked. Patient previously declined checking vitamin D due to cost.      # Mild Seborrheic Dermatitis - improved  - Continue ketoconazole shampoo 2 times weekly      Procedures Performed:   - Intra-lesional triamcinolone procedure note. After positioning and cleansing with isopropyl alcohol, 4 total mL of triamcinolone 2.5 mg/mL was injected into 35 lesion(s) on the frontal scalp. The  patient tolerated the procedure well and left the dermatology clinic in good condition.    Follow-up: 3 months. Plan for 4cc of Kenalog 2.5.     Staff and Scribe:     I, Kisha Aggarwal, am serving as a scribe to document services personally performed by Pat Méndez MD based on data collection and the provider's statements to me.     Provider Disclosure:   The documentation recorded by the scribe accurately reflects the services I personally performed and the decisions made by me.    Staff Physician:  I was present with the medical student who participated in the service and in the documentation of the note. I have verified the history and personally performed the physical exam and medical decision making. I agree with the assessment and plan of care as documented in the note.   The medical student did 1cc of injections, I was present as she participated.     Pat Méndez MD    Department of Dermatology  [facility]: Phone: [phone], Fax:[phone]  [facility]: Phone: [phone], Fax: [phone]  11/26/2021        Pat Méndez MD    Department of Dermatology  [facility]: Phone: [phone], Fax:[phone]  [facility]: Phone: [phone], Fax: [phone]      ____________________________________________    CC: Hair Loss (Jeannine is here today for a hair loss follow up )    HPI:  Ms. Jeannine Rod is a 80 year old female who presents as a return patient for follow-up of hair loss, diagnosed as FFA with AGA. Patient feels her hair loss is worsening or stable. She notices her hair is straighter than previous and isnt sure if this why she is seeing more scalp.  Patient is using ketoconazole 2 x week. Patient is using milton deep conditioner (one for  damaged and one for colored) 2x a week. Minoxidil foam 5% once a day and Lidex solution 3 x week. Patient notes improvement in scalp symptoms.     Patient feels protopic and latisse daily to the eyebrows. She feels the hair loss is stable.    Patient is taking multivitamin and is scheduled for her annual exam, where she is hoping to get her Vitamin D checked.     - Last seen in-clinic on 21 when topical minoxidil and latisse were started.   - Shedding or thinning, or both: noticing increased part width, doesn't notice increased sheding   - Current tx: topical minoxidil foam, lidex, protopic, ketoconazole, and latisse   - If using Rogaine, 1 cannister lasts how lon.5 months   - Scalp or hair care habits/products: no heat styling and no hair dye.   No Any new medications, supplements, or products? (please list below)     No Scalp pain   No Scalp burning   No Scalp itching    No Eyebrow changes    No Eyelash changes   Yes- increased fuzzy hair along jawline- present for a couple of year  Beard changes    No Other body hair changes    No Nail changes    No Additional symptoms? (please list below)     - Overall course: stable   - COVID status: not that she knows of, but assuming she did have it at some point  - COVID vaccine: yes, Pfizer. Second dose in 2021.    Patient is otherwise feeling well, in usual state of health, and has no additional skin concerns today.     Labs:  TSH: 20 wnl  Cbc: 21 wnl (outside provider)  Ferritin: 21 wnl  Zinc: 2021 wnl  Vitamin D: has never been checked. Patient previously declined checking vitamin D due to cost. Now hoping to have     Physical Exam:  Vitals: LMP 1992   GEN: Well developed, well-nourished, in no acute distress, in a pleasant mood.    SKIN: Focused examination of scalp, face, and nails was performed.  - Rizvi type: II  - Bernardo part width of 1.2  - faint macular erythema along frontal hairline  - Forehead veins are prominent    - Mild erythema along the crown  - Hair pull negative  - Hair tug test negative    - No scaling of the scalp   -  normal eyelash density  -  normal eyebrow density  - nails painted   - no scalp folliculitis/pustules   - No other lesions of concern on areas examined.     Medications:  Current Outpatient Medications   Medication     amLODIPine (NORVASC) 5 MG tablet     atorvastatin (LIPITOR) 20 MG tablet     bimatoprost (LATISSE) 0.03 % external opthalmic solution     calcium-vitamin D (CALTRATE) 600-400 MG-UNIT per tablet     estradiol (ESTRACE) 0.1 MG/GM vaginal cream     fluocinonide (LIDEX) 0.05 % external solution     hydroxychloroquine (PLAQUENIL) 200 MG tablet     ketoconazole (NIZORAL) 2 % external shampoo     Melatonin ER 1 MG TBCR     Multiple Vitamin (MULTI-VITAMIN) per tablet     nystatin (MYCOSTATIN) 215643 UNIT/GM external cream     tacrolimus (PROTOPIC) 0.1 % external ointment     triamterene-HCTZ (MAXZIDE-25) 37.5-25 MG tablet     Current Facility-Administered Medications   Medication     triamcinolone acetonide (KENALOG-10) injection 10 mg      Past Medical History:   Patient Active Problem List   Diagnosis     Malignant neoplasm of breast (H)     Raynaud's syndrome     Status post hip replacement     Pulmonary embolism and infarction     Systemic sclerosis (H)     Gastroesophageal reflux disease with esophagitis     Hyperlipidemia LDL goal <100     Calculus of gallbladder without cholecystitis     Radiculoplexus neuropathy     Benign essential hypertension     Atrophic vaginitis     Chronic kidney disease, stage 3 (H)     Past Medical History:   Diagnosis Date     Arthritis      Benign essential hypertension 10/23/2017     Benign essential hypertension 10/23/2017     Calculus of gallbladder without cholecystitis 10/6/2016     Gastroesophageal reflux disease with esophagitis 10/6/2016     Hyperlipidemia      Malignant neoplasm (H)     breast bilat mastectomy, no rad, no chemobilat      Pulmonary  embolism and infarction 3/9/2012    IMO update changed this record. 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     Raynaud's syndrome      Systemic sclerosis (H) 10/6/2016       CC Bebeto Freitas MD  [address] [facility]  [place] [id number] on close of this encounter.

## 2021-11-24 NOTE — NURSING NOTE
Dermatology Rooming Note    Jeannine Rod's goals for this visit include:   Chief Complaint   Patient presents with     Hair Loss     Jeannine is here today for a hair loss follow up      JUVENAL Garrett

## 2021-12-07 ENCOUNTER — MYC MEDICAL ADVICE (OUTPATIENT)
Dept: DERMATOLOGY | Facility: CLINIC | Age: 81
End: 2021-12-07
Payer: MEDICARE

## 2022-02-08 DIAGNOSIS — L66.12 FRONTAL FIBROSING ALOPECIA: ICD-10-CM

## 2022-02-10 RX ORDER — KETOCONAZOLE 20 MG/ML
SHAMPOO TOPICAL
Qty: 120 ML | Refills: 1 | Status: SHIPPED | OUTPATIENT
Start: 2022-02-10 | End: 2022-05-24

## 2022-02-18 DIAGNOSIS — I10 BENIGN ESSENTIAL HYPERTENSION: ICD-10-CM

## 2022-02-18 RX ORDER — AMLODIPINE BESYLATE 5 MG/1
5 TABLET ORAL DAILY
Qty: 90 TABLET | Refills: 0 | Status: SHIPPED | OUTPATIENT
Start: 2022-02-18

## 2022-02-18 NOTE — TELEPHONE ENCOUNTER
Dr. Freitas       Pt called for amlodipine 5 mg  We last ordered in 2019, but pt has been receiving it.  Reconciled with chart and pt and was being ordered by Rheumatology?   Discussed should probably get through primary, double checked dose, pt is scheduled for yearly follow up in April.  Routing for review

## 2022-02-19 ENCOUNTER — HEALTH MAINTENANCE LETTER (OUTPATIENT)
Age: 82
End: 2022-02-19

## 2022-02-23 ENCOUNTER — OFFICE VISIT (OUTPATIENT)
Dept: DERMATOLOGY | Facility: CLINIC | Age: 82
End: 2022-02-23
Payer: MEDICARE

## 2022-02-23 DIAGNOSIS — L64.9 ANDROGENETIC ALOPECIA: Primary | ICD-10-CM

## 2022-02-23 PROCEDURE — 99214 OFFICE O/P EST MOD 30 MIN: CPT | Performed by: DERMATOLOGY

## 2022-02-23 RX ORDER — FLUOCINOLONE ACETONIDE 0.11 MG/ML
OIL TOPICAL WEEKLY
Qty: 118.28 ML | Refills: 3 | Status: SHIPPED | OUTPATIENT
Start: 2022-02-23 | End: 2022-02-23

## 2022-02-23 RX ORDER — BIMATOPROST 3 UG/ML
1 SOLUTION TOPICAL AT BEDTIME
Qty: 5 ML | Refills: 3 | Status: SHIPPED | OUTPATIENT
Start: 2022-02-23 | End: 2022-04-14

## 2022-02-23 NOTE — LETTER
2/23/2022       RE: Jeannine Rod  [address]  [place] [address]     Dear Colleague,    Thank you for referring your patient, Jeannine Rod, to the [facility] [place] at [facility]. Please see a copy of my visit note below.    [facility] Dermatology Note  Encounter Date: Feb 23, 2022  Office Visit     Dermatology Problem List:  1. Hair loss c/w FFA, started 2020  - Current tx: Rogaine, ketoconazole shampoo and Krysten deep conditioner two times per week, Lidex 3x weekly, protopic on eyebrows daily, calcium, multivitamin, latisse to eyebrows, topical minoxidil daily, fluocinolone oil  - ILK 5/18/2021, 6/30/2021, 9/29/2021, 11/24/21  - HairMetrix: 6/30/2021, 9/29/2021, 2/23/22  ____________________________________________    Assessment & Plan:    # Hair loss, most consistent with FFA and AGA. 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 Patient is improving. She has a very small area with perifollicular erythema at the 's peak. Increased scale on hairmetrix and clinical exam.   - Increase lidex to daily for area of active disease for 2 weeks then back to 3x/week.   - Continue minoxidil 5% foam or solution to the vertex and occipital scalp daily. No changes today.   - Continue protopic on the eyebrows daily. No changes today. IF itchy use this  - Continue ketoconazole    - Continue latisse to the brows daily. No changes today. Skin is normal     # Mild Seborrheic Dermatitis - improved  - Continue ketoconazole shampoo 2 times weekly. No changes today.     Procedures Performed:   None    Follow-up: 3 month(s) in-person, or earlier for new or changing lesions    Staff and Scribe:     Scribe Disclosure:  I, Henry Siddiqui, am serving as a scribe to document services personally performed by Pat Méndez MD based on data collection and the provider's statements to me.     Kisha TAMAYO  Jasen, zack serving as a scribe to document services personally performed by Pat Méndez MD based on data collection and the provider's statements to me.       Provider Disclosure:   The documentation recorded by the scribe accurately reflects the services I personally performed and the decisions made by me.    Pat Méndez MD    Department of Dermatology  [facility] [place]: Phone: [phone], Fax:[phone]  [facility]: Phone: [phone], Fax: [phone]      ____________________________________________    CC: Hair Loss    HPI:  Ms. Jeannine Rod is a(n) 81 year old female who presents today as a return patient for follow-up. Last seen by me on 2021, at which time patient underwent ILK for treatment of hair loss. Patient noticies increased hair growth, switched from foam to solution. Hair wont hold a curl, might be lack of humidity and decreased hair strength.   - Shedding or thinning, or both: thinning   - Current tx: lidex 3x per week, ketoconazole 2x per week, latisse and protopic to brows, topical minoxidil, ilk injections   - If using Rogaine, 1 cannister lasts how lon month   - Scalp or hair care habits/products: ketoconazole and milton deep conditioner     Patient notes itchy eyebrows and requests refill on the Latisse.     No Any new medications, supplements, or products? (please list below)     No Scalp pain   No Scalp burning   No Scalp itching    Yes increased  Eyebrow changes    No  Eyelash changes   No Beard changes    No Other body hair changes    No Nail changes    No Additional symptoms? (please list below)     - Overall course: improved   - COVID vaccine: yes, Pfizer. Second dose in 2021.    Patient is otherwise feeling well, in usual state of health, and has no additional skin concerns today.     Labs:  TSH: 20 wnl  Cbc: 21 wnl (outside  provider)  Ferritin: 03/20/21 wnl  Zinc: 5/18/2021 wnl  Vitamin D: has never been checked. Patient previously declined checking vitamin D due to cost.     Physical Exam:  Vitals: LMP 07/07/1992   GEN: Well developed, well-nourished, in no acute distress, in a pleasant mood.    SKIN: Focused examination of scalp, face, nails was performed.  - Rizvi type: II  - Bernardo part width of 2 anteriorly 2-3 posteriorly  - The layers of hair regrowth layers were noted to be  - 1-2 cm for the first  - Veins prominent  - Mild erythema at 's peak  - No erythema of brows  - eyebrows intact  - eyelashes intact  - No other lesions of concern on areas examined.       Medications:  Current Outpatient Medications   Medication     amLODIPine (NORVASC) 5 MG tablet     atorvastatin (LIPITOR) 20 MG tablet     bimatoprost (LATISSE) 0.03 % external opthalmic solution     calcium-vitamin D (CALTRATE) 600-400 MG-UNIT per tablet     estradiol (ESTRACE) 0.1 MG/GM vaginal cream     fluocinonide (LIDEX) 0.05 % external solution     hydroxychloroquine (PLAQUENIL) 200 MG tablet     ketoconazole (NIZORAL) 2 % external shampoo     Melatonin ER 1 MG TBCR     Multiple Vitamin (MULTI-VITAMIN) per tablet     nystatin (MYCOSTATIN) 436596 UNIT/GM external cream     tacrolimus (PROTOPIC) 0.1 % external ointment     triamterene-HCTZ (MAXZIDE-25) 37.5-25 MG tablet     Current Facility-Administered Medications   Medication     triamcinolone acetonide (KENALOG-10) injection 10 mg     triamcinolone acetonide (KENALOG-10) injection 10 mg      Past Medical History:   Patient Active Problem List   Diagnosis     Malignant neoplasm of breast (H)     Raynaud's syndrome     Status post hip replacement     Pulmonary embolism and infarction     Systemic sclerosis (H)     Gastroesophageal reflux disease with esophagitis     Hyperlipidemia LDL goal <100     Calculus of gallbladder without cholecystitis     Radiculoplexus neuropathy     Benign essential  hypertension     Atrophic vaginitis     Chronic kidney disease, stage 3 (H)     Past Medical History:   Diagnosis Date     Arthritis      Benign essential hypertension 10/23/2017     Benign essential hypertension 10/23/2017     Calculus of gallbladder without cholecystitis 10/6/2016     Gastroesophageal reflux disease with esophagitis 10/6/2016     Hyperlipidemia      Malignant neoplasm (H)     breast bilat mastectomy, no rad, no chemobilat      Pulmonary embolism and infarction 3/9/2012    IMO update changed this record. 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     Raynaud's syndrome      Systemic sclerosis (H) 10/6/2016        CC Bebeto Freitas MD  [address]  [place] [address] on close of this encounter.

## 2022-02-23 NOTE — PATIENT INSTRUCTIONS
[facility] Dermatology Visit    Thank you for allowing us to participate in your care. Your findings, instructions and follow-up plan are as follows:         When should I call my doctor?    If you are worsening or not improving, please, contact us or seek urgent care as noted below.     Who should I call with questions (adults)?    [facility] (adult and pediatric): [phone]    [facility] (adult): [phone]    For urgent needs outside of business hours call the [facility] at [phone] and ask for the dermatology resident on call    If this is a medical emergency and you are unable to reach an ER, Call 911    Who should I call with questions (pediatric)?  [facility]- Pediatric Dermatology  Dr. Marimar Riggs, Dr. El Cuellar, Dr. Aarti Loco, Danelle Aguayo, PA  Dr. Pilar Trevino, Dr. Pat Ignacio & Dr. Lang Chester  Non Urgent  Nurse Triage Line; [phone]- Roseann and Elisa OTT Care Coordinators   Frannie (/Complex ) [phone]    If you need a prescription refill, please contact your pharmacy. Refills are approved or denied by our physicians during normal business hours, Monday through Fridays  Per office policy, refills will not be granted if you have not been seen within the past year (or sooner depending on your child's condition).    Scheduling Information:  Pediatric Appointment Scheduling and Call Center [phone]  Radiology Scheduling- [phone]  Sedation Unit Scheduling- [phone]  [place] Scheduling- General [phone]; Pediatric Dermatology [phone]  Main  Services: [phone]  Indonesian: [phone]  Dominican: [phone]  Hmong/Arabic/Danish: [phone]  Preadmission Nursing Department Fax Number: [phone] (fax all pre-operative paperwork to this number)    For urgent matters arising during evenings,  weekends, or holidays that cannot wait for normal business hours please call [phone] and ask for the dermatology resident on call to be paged.

## 2022-03-04 ENCOUNTER — TRANSFERRED RECORDS (OUTPATIENT)
Dept: HEALTH INFORMATION MANAGEMENT | Facility: CLINIC | Age: 82
End: 2022-03-04
Payer: MEDICARE

## 2022-03-08 NOTE — PROGRESS NOTES
HairMetrix Summary (2/23/22)    Frontal anterior      Mid scalp      Vertex      Occipital      Right temporal      Left temporal      Summary        Gl[employer]als

## 2022-03-23 ENCOUNTER — NURSE TRIAGE (OUTPATIENT)
Dept: NURSING | Facility: CLINIC | Age: 82
End: 2022-03-23

## 2022-03-23 ENCOUNTER — ANCILLARY PROCEDURE (OUTPATIENT)
Dept: GENERAL RADIOLOGY | Facility: CLINIC | Age: 82
End: 2022-03-23
Attending: FAMILY MEDICINE
Payer: MEDICARE

## 2022-03-23 ENCOUNTER — OFFICE VISIT (OUTPATIENT)
Dept: URGENT CARE | Facility: URGENT CARE | Age: 82
End: 2022-03-23
Payer: MEDICARE

## 2022-03-23 VITALS
HEART RATE: 66 BPM | TEMPERATURE: 97.3 F | SYSTOLIC BLOOD PRESSURE: 125 MMHG | RESPIRATION RATE: 20 BRPM | WEIGHT: 142 LBS | OXYGEN SATURATION: 98 % | BODY MASS INDEX: 22.82 KG/M2 | HEIGHT: 66 IN | DIASTOLIC BLOOD PRESSURE: 66 MMHG

## 2022-03-23 DIAGNOSIS — M54.6 ACUTE BILATERAL THORACIC BACK PAIN: Primary | ICD-10-CM

## 2022-03-23 DIAGNOSIS — M54.6 ACUTE BILATERAL THORACIC BACK PAIN: ICD-10-CM

## 2022-03-23 PROCEDURE — 93000 ELECTROCARDIOGRAM COMPLETE: CPT | Performed by: FAMILY MEDICINE

## 2022-03-23 PROCEDURE — 71046 X-RAY EXAM CHEST 2 VIEWS: CPT | Performed by: RADIOLOGY

## 2022-03-23 PROCEDURE — 99214 OFFICE O/P EST MOD 30 MIN: CPT | Performed by: FAMILY MEDICINE

## 2022-03-23 NOTE — TELEPHONE ENCOUNTER
Having mild back pain for one week. Went to urgent care this morning. Friend has pluerisy. She looked at chart and talked about blood clot from ten years ago. She did EKG. Some abnormalities but no change. Nothing on CXR. Told she should be seen in ER or by MD at clinic tomorrow. Due in April 14th for annual physical. Has the appointment set up. Triage results in be seen today or tomorrow. I connected with scheduling for an appointment.  She fell three weeks ago but had no pain with that. Pain in lower back comes and goes and is minor.  Janette Gold RN  [facility]      Reason for Disposition    Age > 50 and no history of prior similar back pain    Additional Information    Negative: Passed out (i.e., fainted, collapsed and was not responding)     Fell three weeks ago and didn't have any pain with that.    Negative: Shock suspected (e.g., cold/pale/clammy skin, too weak to stand, low BP, rapid pulse)    Negative: Sounds like a life-threatening emergency to the triager    Negative: Major injury to the back (e.g., MVA, fall > 10 feet or 3 meters, penetrating injury, etc.)    Negative: Pain in the upper back over the ribs (rib cage) that radiates (travels) into the chest    Negative: Pain in the upper back over the ribs (rib cage) and worsened by coughing (or clearly increases with breathing)    Negative: SEVERE back pain of sudden onset and age > 60     Pain at lower back, comes and goes, it's minor but there.    Negative: SEVERE abdominal pain (e.g., excruciating)    Negative: Abdominal pain and age > 60    Negative: Unable to urinate (or only a few drops) and bladder feels very full    Negative: Loss of bladder or bowel control (urine or bowel incontinence; wetting self, leaking stool) of new onset    Negative: Numbness (loss of sensation) in groin or rectal area    Negative: Pain radiates into groin, scrotum    Negative: Blood in urine (red, pink, or tea-colored)    Negative: Vomiting and pain over  lower ribs of back (i.e., flank - kidney area)    Negative: Weakness of a leg or foot (e.g., unable to bear weight, dragging foot)    Negative: Patient sounds very sick or weak to the triager    Negative: Fever > 100.4 F (38.0 C) and flank pain    Negative: Pain or burning with passing urine (urination)    Negative: SEVERE back pain (e.g., excruciating, unable to do any normal activities) and not improved after pain medicine and CARE ADVICE    Negative: Numbness in an arm or hand (i.e., loss of sensation) and upper back pain    Negative: Numbness in a leg or foot (i.e., loss of sensation)    Negative: High-risk adult (e.g., history of cancer, history of HIV, or history of IV drug abuse)    Negative: Painful rash with multiple small blisters grouped together (i.e., dermatomal distribution or 'band' or 'stripe')    Negative: Pain radiates into the thigh or further down the leg, and in both legs    Protocols used: BACK PAIN-A-OH

## 2022-03-23 NOTE — PATIENT INSTRUCTIONS
I recommend that you proceed on to the emergency room now for further evaluation.  if you don't do that, then follow up tomorrow with your primary provider for further evaluation.  Call now to make that appointment.    If any new or worsening symptoms develop today/overnight, go to the emergency room right away.

## 2022-04-02 DIAGNOSIS — I10 BENIGN ESSENTIAL HYPERTENSION: ICD-10-CM

## 2022-04-05 ENCOUNTER — TRANSFERRED RECORDS (OUTPATIENT)
Dept: HEALTH INFORMATION MANAGEMENT | Facility: CLINIC | Age: 82
End: 2022-04-05
Payer: MEDICARE

## 2022-04-05 RX ORDER — TRIAMTERENE/HYDROCHLOROTHIAZID 37.5-25 MG
TABLET ORAL
Qty: 90 TABLET | Refills: 1 | Status: SHIPPED | OUTPATIENT
Start: 2022-04-05 | End: 2022-10-06

## 2022-04-13 ENCOUNTER — VIRTUAL VISIT (OUTPATIENT)
Dept: URGENT CARE | Facility: CLINIC | Age: 82
End: 2022-04-13
Payer: MEDICARE

## 2022-04-13 DIAGNOSIS — U07.1 COVID-19: Primary | ICD-10-CM

## 2022-04-13 PROCEDURE — 99207 PR NO CHARGE LOS: CPT | Performed by: NURSE PRACTITIONER

## 2022-04-14 ENCOUNTER — VIRTUAL VISIT (OUTPATIENT)
Dept: URGENT CARE | Facility: CLINIC | Age: 82
End: 2022-04-14
Payer: MEDICARE

## 2022-04-14 DIAGNOSIS — U07.1 SARS-COV-2 POSITIVE: Primary | ICD-10-CM

## 2022-04-14 DIAGNOSIS — R05.9 COUGH: ICD-10-CM

## 2022-04-14 PROCEDURE — 99442 PR PHYSICIAN TELEPHONE EVALUATION 11-20 MIN: CPT | Mod: 95

## 2022-04-14 RX ORDER — BENZONATATE 100 MG/1
100 CAPSULE ORAL 3 TIMES DAILY PRN
Qty: 30 CAPSULE | Refills: 0 | Status: SHIPPED | OUTPATIENT
Start: 2022-04-14 | End: 2022-05-25

## 2022-04-14 RX ORDER — ALBUTEROL SULFATE 90 UG/1
2 AEROSOL, METERED RESPIRATORY (INHALATION) EVERY 6 HOURS
Qty: 18 G | Refills: 0 | Status: SHIPPED | OUTPATIENT
Start: 2022-04-14 | End: 2023-06-23

## 2022-04-14 NOTE — PROGRESS NOTES
Assessment:     COVID-19 positive patient.  Encounter for consideration of medication intervention.    Patient does qualify for a prescription.   Full discussion with patient including medication options, risks and benefits.   Potential drug interactions reviewed with patient.      Plan:  Treatment planned -  Paxlovid, Rx sent to [place] pharmacy  [facility] [phone]  [address] [place] [address]    Hours:  Monday - Friday: 8am - 6pm  Saturday - Sunday: 8am - 4pm    [facility] Delivery: Patient to call [phone] to set up  in the cul de sac of the professional building    Temporary change to home medications:   Paxlovid renally dosed  HOLD LIPITOR  Monitor BP daily    See patient instructions    Virtual Urgent Care    Patient preference to obtain AVS:  Sulaiman Paez  is a 81 year old  who has a confirmed new positive COVID-19 diagnosis.    She has been identified as high risk for complications of this infection, and is being evaluated via a billable Phone visit.      Phone call duration: 16 minutes    Concern for COVID-19  Exactly how many days ago did these symptoms start? 4     Are any of the following symptoms significant for you?  New or worsening difficulty breathing? Yes    Please describe what kind of difficulty you are having breathing:Mild dyspnea (able to do ADLs without difficulty, mild shortness of breath with activities such as climbing one or two flights of stairs or walking briskly)    Worsening cough? Yes, I am coughing up mucus.    Fever or chills? Yes, the highest temperature was 100.7F    Headache: no    Sore throat: no    Chest pain: no    Diarrhea: no    Body aches? YES    What treatments has patient tried? Nonsteroidals   Does patient live in a nursing home, group home, or shelter? YES  Does patient have a way to get food/medications during quarantined? Yes, I have a friend or family member who can help me.        Constitutional, HEENT,  "cardiovascular, pulmonary, GI, , musculoskeletal, neuro, skin, endocrine and psych systems are negative, except as otherwise noted.    Objective    Vitals:  No vitals were obtained today due to virtual visit.  Estimated body mass index is 22.92 kg/m  as calculated from the following:    Height as of 3/23/22: 1.676 m (5' 6\").    Weight as of 3/23/22: 64.4 kg (142 lb).   General: Alert, no apparent distress  PSYCH: Alert; coherent speech, normal rate and volume, able to articulate logical thoughts, appropriate insight, no tangential thoughts, no hallucination or delusions.  Affect is appropriate  RESP: Coughing while on phone, no audible wheezing, able to talk in full sentences  Remainder of exam unable to be completed due to telephone visit  GFR Estimate   Date Value Ref Range Status   12/02/2020 46 (L) >60 mL/min/[1.73_m2] Final     Comment:     Non  GFR Calc  Starting 12/18/2018, serum creatinine based estimated GFR (eGFR) will be   calculated using the Chronic Kidney Disease Epidemiology Collaboration   (CKD-EPI) equation.                  The patient has been notified of following:     \"This telephone visit will be conducted via a call between you and your physician/provider. We have found that certain health care needs can be provided without the need for a physical exam.  This service lets us provide the care you need with a short phone conversation.  If a prescription is necessary we can send it directly to your pharmacy.  If lab work is needed we can place an order for that and you can then stop by our lab to have the test done at a later time.    Telephone visits are billed at different rates depending on your insurance coverage. During this emergency period, for some insurers they may be billed the same as an in-person visit.  Please reach out to your insurance provider with any questions.    If during the course of the call the physician/provider feels a telephone visit is not " "appropriate, you will not be charged for this service.\"    Patient has given verbal consent for Telephone visit?  Yes    How would you like to obtain your AVS? MyChart          "

## 2022-04-14 NOTE — PATIENT INSTRUCTIONS
[facility] [phone]  [address] [place] [address]    Hours:  Monday - Friday: 8am - 6pm  Saturday - Sunday: 8am - 4pm    [facility] Delivery: Patient to call [phone] to set up  in the cul de sac of the professional building

## 2022-04-26 ENCOUNTER — TRANSFERRED RECORDS (OUTPATIENT)
Dept: HEALTH INFORMATION MANAGEMENT | Facility: CLINIC | Age: 82
End: 2022-04-26
Payer: MEDICARE

## 2022-05-12 ENCOUNTER — TRANSFERRED RECORDS (OUTPATIENT)
Dept: HEALTH INFORMATION MANAGEMENT | Facility: CLINIC | Age: 82
End: 2022-05-12
Payer: MEDICARE

## 2022-05-19 NOTE — PROGRESS NOTES
[facility] Dermatology Note  Encounter Date: May 25, 2022  Office Visit     Dermatology Problem List:  1. Hair loss c/w FFA and AGA, started 2020. Also has CREAT  - Current tx: Rogaine daily, ketoconazole shampoo and Krysten deep conditioner 2x weekly, Lidex  3x weekly, protopic on eyebrows daily, calcium, multivitamin, latisse to eyebrows  - Prior tx: fluocinolone oil  - ILK 5/18/2021, 6/30/2021, 9/29/2021, 11/24/21  - HairMetrix: 6/30/2021, 9/29/2021, 2/23/22, 5/25/22    Family hx: Sister with history of skin cancer   ____________________________________________    Assessment & Plan:     # Hair loss, most consistent with FFA and AGA. 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 Patient is improving.   - Reduce Lidex solution to every other day   - Continue minoxidil 5% solution daily    - Continue protopic on the eyebrows daily  - Continue ketoconazole shampoo 3x weekly and Krysten deep conditioner  - Reduce latisse to the brows every other day due to risk of periorbitopathy  - Ok to continue calcium and multivitamin supplements   - HairMetrix performed today  plaquenil is scripted and run by rheumatology     Hair labs:  Ferritin: wnl on 3/20/21  Zinc: wnl on 5/18/2021    # Mild Seborrheic Dermatitis - improved  - Continue ketoconazole shampoo 3x weekly      Procedures Performed:   None    Follow-up: 3 months, earlier for new or changing lesions. And skin exam and no hair metrix but hair metrix due nov or dec    Staff and Scribe:     Scribe Disclosure  I, Twila Scott, am serving as a scribe to document services personally performed by Dr. Pat Méndez MD, based on data collection and the provider's statements to me.     Provider Disclosure:   The documentation recorded by the scribe accurately reflects the services I personally performed and the decisions made by me.    Jose Pond present and also  Corbin Ellsworth MD and Dr. Candelario also present observing after  patient consent    Pat Méndez MD    Department of Dermatology  [facility]: Phone: [phone], Fax:[phone]  [facility]: Phone: [phone], Fax: [phone]          ____________________________________________    CC: Hair Loss    HPI:  Ms. Jeannine Rod is a 81 year old female who presents as a return patient for follow-up of hair loss, most consistent with FFA and AGA.   - Last seen in-clinic on 2022.   - Shedding or thinning, or both: Minimal shedding while shampooing    - Current tx: ketoconazole shampoo 3x weekly, Latisse and protopic to the eyebrows daily, minoxidil 5% solution daily, Lidex solution daily to the frontal hair line, calcium and multivitamin supplements   - If using Rogaine, 1 cannister lasts how lon month   - Scalp or hair care habits/products: None   No Any new medications, supplements, or products? (please list below)     No Scalp pain   No Scalp burning   Skin feels dry  Scalp itching    Improvement Eyebrow changes    Improvement Eyelash changes   No Other body hair changes    No Nail changes    No Additional symptoms? (please list below)     - Overall course: Patient reports her hair seems to be growing, noticing the most growth along the front. Notes change in hair texture and ability to style hair.     Patient is otherwise feeling well, in usual state of health, and has no additional skin concerns today.     Labs:  Zinc and ferritin reviewed.    Physical Exam:  Vitals: LMP 1992   GEN: Well developed, well-nourished, in no acute distress, in a pleasant mood.    SKIN: Focused examination of the scalp, face and nails was performed.  - Bernardo part width of 1 anteriorly and 1.5 posteriorly   - The layers of hair regrowth layers were noted to be  - 1 cm for the first  - 2 cm at the second  - 3 cm at the third  - Mild scalp erythema  - Follicular  prominence   - Normal eyelash density  - Normal eyebrow density  - No nail pitting or dystrophy    - 1 cm loss of the frontal hairline   - Forehead veins prominent   - Small hair fibers along the frontal hairline   - No other lesions of concern on areas examined.     Medications:  Current Outpatient Medications   Medication     albuterol (PROAIR HFA/PROVENTIL HFA/VENTOLIN HFA) 108 (90 Base) MCG/ACT inhaler     amLODIPine (NORVASC) 5 MG tablet     atorvastatin (LIPITOR) 20 MG tablet     benzonatate (TESSALON) 100 MG capsule     bimatoprost (LATISSE) 0.03 % external opthalmic solution     calcium-vitamin D (CALTRATE) 600-400 MG-UNIT per tablet     estradiol (ESTRACE) 0.1 MG/GM vaginal cream     fluocinonide (LIDEX) 0.05 % external solution     hydroxychloroquine (PLAQUENIL) 200 MG tablet     ketoconazole (NIZORAL) 2 % external shampoo     Melatonin ER 1 MG TBCR     Multiple Vitamin (MULTI-VITAMIN) per tablet     nirmatrelvir and ritonavir (PAXLOVID) therapy pack     nystatin (MYCOSTATIN) 335898 UNIT/GM external cream     tacrolimus (PROTOPIC) 0.1 % external ointment     triamterene-HCTZ (MAXZIDE-25) 37.5-25 MG tablet     Current Facility-Administered Medications   Medication     triamcinolone acetonide (KENALOG-10) injection 10 mg     triamcinolone acetonide (KENALOG-10) injection 10 mg      Past Medical History:   Patient Active Problem List   Diagnosis     Malignant neoplasm of breast (H)     Raynaud's syndrome     Status post hip replacement     Pulmonary embolism and infarction     Systemic sclerosis (H)     Gastroesophageal reflux disease with esophagitis     Hyperlipidemia LDL goal <100     Calculus of gallbladder without cholecystitis     Radiculoplexus neuropathy     Benign essential hypertension     Atrophic vaginitis     Chronic kidney disease, stage 3 (H)     Past Medical History:   Diagnosis Date     Arthritis      Benign essential hypertension 10/23/2017     Benign essential hypertension 10/23/2017      Calculus of gallbladder without cholecystitis 10/6/2016     Gastroesophageal reflux disease with esophagitis 10/6/2016     Hyperlipidemia      Malignant neoplasm (H)     breast bilat mastectomy, no rad, no chemobilat      Pulmonary embolism and infarction 3/9/2012    IMO update changed this record. 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     Raynaud's syndrome      Systemic sclerosis (H) 10/6/2016       CC Bebeto Freitas MD  [address] [facility] [address]  [place] [address] on close of this encounter.

## 2022-05-24 NOTE — PATIENT INSTRUCTIONS
[facility] Dermatology Visit    Thank you for allowing us to participate in your care. Your findings, instructions and follow-up plan are as follows:     Reduce latisse to 3 times weekly.   Reduce Lidex solution to every other day   Continue Rogaine 5% daily   Continue ketoconazole shampoo   Continue protopic to the eyebrows daily   Continue calcium and multivitamin       Try head and shoulder (but can be drying)  Otherwise try this:          When should I call my doctor?  If you are worsening or not improving, please, contact us or seek urgent care as noted below.     Who should I call with questions (adults)?  [facility] (adult and pediatric): [phone]  [facility] (adult): [phone]  For urgent needs outside of business hours call the [facility] at [phone] and ask for the dermatology resident on call  If this is a medical emergency and you are unable to reach an ER, Call [phone]    Who should I call with questions (pediatric)?  [facility]- Pediatric Dermatology  Dr. Marimar Riggs, Dr. El Cuellar, Dr. Aarti Loco, Danelle Aguayo, PA  Dr. Pilar Trevino, Dr. Pat Ignacio & Dr. Lang Chester  Non Urgent  Nurse Triage Line; [phone]- Roseann and Elisa OTT Care Coordinators   Frannie (/Complex ) [phone]    If you need a prescription refill, please contact your pharmacy. Refills are approved or denied by our physicians during normal business hours, Monday through Fridays  Per office policy, refills will not be granted if you have not been seen within the past year (or sooner depending on your child's condition).    Scheduling Information:  Pediatric Appointment Scheduling and Call Center [phone]  Radiology Scheduling- [phone]  Sedation Unit Scheduling- [phone]  [place] Scheduling- General [phone]; Pediatric Dermatology  [phone]  Main  Services: [phone]  Hungarian: [phone]  Namibian: [phone]  Hmong/Danish/Dominic: [phone]  Preadmission Nursing Department Fax Number: [phone] (fax all pre-operative paperwork to this number)    For urgent matters arising during evenings, weekends, or holidays that cannot wait for normal business hours please call [phone] and ask for the dermatology resident on call to be paged.

## 2022-05-25 ENCOUNTER — OFFICE VISIT (OUTPATIENT)
Dept: DERMATOLOGY | Facility: CLINIC | Age: 82
End: 2022-05-25
Payer: MEDICARE

## 2022-05-25 DIAGNOSIS — L64.9 ANDROGENETIC ALOPECIA: Primary | ICD-10-CM

## 2022-05-25 DIAGNOSIS — L66.12 FRONTAL FIBROSING ALOPECIA: ICD-10-CM

## 2022-05-25 DIAGNOSIS — L21.9 DERMATITIS, SEBORRHEIC: ICD-10-CM

## 2022-05-25 PROCEDURE — 99214 OFFICE O/P EST MOD 30 MIN: CPT | Mod: GC | Performed by: DERMATOLOGY

## 2022-05-25 RX ORDER — KETOCONAZOLE 20 MG/ML
SHAMPOO TOPICAL
Qty: 120 ML | Refills: 3 | Status: SHIPPED | OUTPATIENT
Start: 2022-05-25 | End: 2023-10-02

## 2022-05-25 RX ORDER — FLUOCINONIDE TOPICAL SOLUTION USP, 0.05% 0.5 MG/ML
SOLUTION TOPICAL
Qty: 60 ML | Refills: 3 | Status: SHIPPED | OUTPATIENT
Start: 2022-05-25

## 2022-05-25 ASSESSMENT — PAIN SCALES - GENERAL: PAINLEVEL: NO PAIN (0)

## 2022-05-25 NOTE — NURSING NOTE
Drug Administration Record    Prior to injection, verified patient identity using patient's name and date of birth.  Due to injection administration, patient instructed to remain in clinic for 15 minutes  afterwards, and to report any adverse reaction to me immediately.    Drug Name: triamcinolone acetonide(kenalog)  Dose: 1mL of triamcinolone 2.5mg/mL, 2.5mg dose  Route administered: ID  NDC #: Kenalog-10 (1003-6380-88)  Amount of waste(mL):4.75  Reason for waste: Multi dose vial    LOT #: [id number]  SITE: see provider note  : [employer]  EXPIRATION DATE: 09/2023

## 2022-05-25 NOTE — NURSING NOTE
Dermatology Rooming Note    Mia Hinz's goals for this visit include:   Chief Complaint   Patient presents with     Hair Loss     Jeannine is here today for hair loss and possible ILK injections.     Joellen Marsh, Facilitator

## 2022-05-25 NOTE — LETTER
5/25/2022       RE: Jeannine Rod  [address]  [place] [address]     Dear Colleague,    Thank you for referring your patient, Jeannine Rod, to the [facility] [place] at [facility]. Please see a copy of my visit note below.    [facility] Dermatology Note  Encounter Date: May 25, 2022  Office Visit     Dermatology Problem List:  1. Hair loss c/w FFA and AGA, started 2020. Also has CREAT  - Current tx: Rogaine daily, ketoconazole shampoo and Krysten deep conditioner 2x weekly, Lidex  3x weekly, protopic on eyebrows daily, calcium, multivitamin, latisse to eyebrows  - Prior tx: fluocinolone oil  - ILK 5/18/2021, 6/30/2021, 9/29/2021, 11/24/21  - HairMetrix: 6/30/2021, 9/29/2021, 2/23/22, 5/25/22    Family hx: Sister with history of skin cancer   ____________________________________________    Assessment & Plan:     # Hair loss, most consistent with FFA and AGA. 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 Patient is improving.   - Reduce Lidex solution to every other day   - Continue minoxidil 5% solution daily    - Continue protopic on the eyebrows daily  - Continue ketoconazole shampoo 3x weekly and Krysten deep conditioner  - Reduce latisse to the brows every other day due to risk of periorbitopathy  - Ok to continue calcium and multivitamin supplements   - HairMetrix performed today  plaquenil is scripted and run by rheumatology     Hair labs:  Ferritin: wnl on 3/20/21  Zinc: wnl on 5/18/2021    # Mild Seborrheic Dermatitis - improved  - Continue ketoconazole shampoo 3x weekly      Procedures Performed:   None    Follow-up: 3 months, earlier for new or changing lesions. And skin exam and no hair metrix but hair metrix due nov or dec    Staff and Scribe:     Scribe Disclosure  I, Twila Scott, am serving as a scribe to document services personally performed by Dr. Pat Méndez MD,  based on data collection and the provider's statements to me.     Provider Disclosure:   The documentation recorded by the scribe accurately reflects the services I personally performed and the decisions made by me.    Jose Pond present and also  Corbin Ellsworth MD and Dr. Candelario also present observing after patient consent    Pat Méndez MD    Department of Dermatology  [facility]: Phone: [phone], Fax:[phone]  [facility]: Phone: [phone], Fax: [phone]          ____________________________________________    CC: Hair Loss    HPI:  Ms. Jeannine Rod is a 81 year old female who presents as a return patient for follow-up of hair loss, most consistent with FFA and AGA.   - Last seen in-clinic on 2022.   - Shedding or thinning, or both: Minimal shedding while shampooing    - Current tx: ketoconazole shampoo 3x weekly, Latisse and protopic to the eyebrows daily, minoxidil 5% solution daily, Lidex solution daily to the frontal hair line, calcium and multivitamin supplements   - If using Rogaine, 1 cannister lasts how lon month   - Scalp or hair care habits/products: None   No Any new medications, supplements, or products? (please list below)     No Scalp pain   No Scalp burning   Skin feels dry  Scalp itching    Improvement Eyebrow changes    Improvement Eyelash changes   No Other body hair changes    No Nail changes    No Additional symptoms? (please list below)     - Overall course: Patient reports her hair seems to be growing, noticing the most growth along the front. Notes change in hair texture and ability to style hair.     Patient is otherwise feeling well, in usual state of health, and has no additional skin concerns today.     Labs:  Zinc and ferritin reviewed.    Physical Exam:  Vitals: LMP 1992   GEN: Well developed, well-nourished, in no acute  distress, in a pleasant mood.    SKIN: Focused examination of the scalp, face and nails was performed.  - Bernardo part width of 1 anteriorly and 1.5 posteriorly   - The layers of hair regrowth layers were noted to be  - 1 cm for the first  - 2 cm at the second  - 3 cm at the third  - Mild scalp erythema  - Follicular prominence   - Normal eyelash density  - Normal eyebrow density  - No nail pitting or dystrophy    - 1 cm loss of the frontal hairline   - Forehead veins prominent   - Small hair fibers along the frontal hairline   - No other lesions of concern on areas examined.     Medications:  Current Outpatient Medications   Medication     albuterol (PROAIR HFA/PROVENTIL HFA/VENTOLIN HFA) 108 (90 Base) MCG/ACT inhaler     amLODIPine (NORVASC) 5 MG tablet     atorvastatin (LIPITOR) 20 MG tablet     benzonatate (TESSALON) 100 MG capsule     bimatoprost (LATISSE) 0.03 % external opthalmic solution     calcium-vitamin D (CALTRATE) 600-400 MG-UNIT per tablet     estradiol (ESTRACE) 0.1 MG/GM vaginal cream     fluocinonide (LIDEX) 0.05 % external solution     hydroxychloroquine (PLAQUENIL) 200 MG tablet     ketoconazole (NIZORAL) 2 % external shampoo     Melatonin ER 1 MG TBCR     Multiple Vitamin (MULTI-VITAMIN) per tablet     nirmatrelvir and ritonavir (PAXLOVID) therapy pack     nystatin (MYCOSTATIN) 509609 UNIT/GM external cream     tacrolimus (PROTOPIC) 0.1 % external ointment     triamterene-HCTZ (MAXZIDE-25) 37.5-25 MG tablet     Current Facility-Administered Medications   Medication     triamcinolone acetonide (KENALOG-10) injection 10 mg     triamcinolone acetonide (KENALOG-10) injection 10 mg      Past Medical History:   Patient Active Problem List   Diagnosis     Malignant neoplasm of breast (H)     Raynaud's syndrome     Status post hip replacement     Pulmonary embolism and infarction     Systemic sclerosis (H)     Gastroesophageal reflux disease with esophagitis     Hyperlipidemia LDL goal <100     Calculus  of gallbladder without cholecystitis     Radiculoplexus neuropathy     Benign essential hypertension     Atrophic vaginitis     Chronic kidney disease, stage 3 (H)     Past Medical History:   Diagnosis Date     Arthritis      Benign essential hypertension 10/23/2017     Benign essential hypertension 10/23/2017     Calculus of gallbladder without cholecystitis 10/6/2016     Gastroesophageal reflux disease with esophagitis 10/6/2016     Hyperlipidemia      Malignant neoplasm (H)     breast bilat mastectomy, no rad, no chemobilat      Pulmonary embolism and infarction 3/9/2012    IMO update changed this record. 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     Raynaud's syndrome      Systemic sclerosis (H) 10/6/2016     CC Bebeto Freitas MD  [address] [address]  [place] [address] on close of this encounter.

## 2022-06-02 ENCOUNTER — OFFICE VISIT (OUTPATIENT)
Dept: FAMILY MEDICINE | Facility: CLINIC | Age: 82
End: 2022-06-02
Payer: MEDICARE

## 2022-06-02 VITALS
SYSTOLIC BLOOD PRESSURE: 116 MMHG | HEART RATE: 71 BPM | HEIGHT: 67 IN | DIASTOLIC BLOOD PRESSURE: 71 MMHG | BODY MASS INDEX: 21.67 KG/M2 | OXYGEN SATURATION: 98 % | RESPIRATION RATE: 16 BRPM | TEMPERATURE: 97.8 F | WEIGHT: 138.1 LBS

## 2022-06-02 DIAGNOSIS — C50.919 MALIGNANT NEOPLASM OF BREAST IN FEMALE, ESTROGEN RECEPTOR POSITIVE, UNSPECIFIED LATERALITY, UNSPECIFIED SITE OF BREAST (H): ICD-10-CM

## 2022-06-02 DIAGNOSIS — N18.31 STAGE 3A CHRONIC KIDNEY DISEASE (H): ICD-10-CM

## 2022-06-02 DIAGNOSIS — I10 BENIGN ESSENTIAL HYPERTENSION: ICD-10-CM

## 2022-06-02 DIAGNOSIS — Z17.0 MALIGNANT NEOPLASM OF BREAST IN FEMALE, ESTROGEN RECEPTOR POSITIVE, UNSPECIFIED LATERALITY, UNSPECIFIED SITE OF BREAST (H): ICD-10-CM

## 2022-06-02 DIAGNOSIS — Z23 HIGH PRIORITY FOR 2019-NCOV VACCINE: ICD-10-CM

## 2022-06-02 DIAGNOSIS — E78.5 HYPERLIPIDEMIA LDL GOAL <100: ICD-10-CM

## 2022-06-02 DIAGNOSIS — Z86.711 HISTORY OF PULMONARY EMBOLISM: ICD-10-CM

## 2022-06-02 DIAGNOSIS — Z86.0100 HISTORY OF COLONIC POLYPS: ICD-10-CM

## 2022-06-02 DIAGNOSIS — M34.9 SYSTEMIC SCLEROSIS (H): Primary | ICD-10-CM

## 2022-06-02 DIAGNOSIS — J44.9 CHRONIC OBSTRUCTIVE PULMONARY DISEASE, UNSPECIFIED COPD TYPE (H): ICD-10-CM

## 2022-06-02 DIAGNOSIS — E83.52 HYPERCALCEMIA: ICD-10-CM

## 2022-06-02 DIAGNOSIS — E03.9 HYPOTHYROIDISM, UNSPECIFIED TYPE: ICD-10-CM

## 2022-06-02 LAB
ANION GAP SERPL CALCULATED.3IONS-SCNC: 3 MMOL/L (ref 3–14)
BUN SERPL-MCNC: 30 MG/DL (ref 7–30)
CALCIUM SERPL-MCNC: 10.3 MG/DL (ref 8.5–10.1)
CHLORIDE BLD-SCNC: 107 MMOL/L (ref 94–109)
CHOLEST SERPL-MCNC: 171 MG/DL
CO2 SERPL-SCNC: 29 MMOL/L (ref 20–32)
CREAT SERPL-MCNC: 1.13 MG/DL (ref 0.52–1.04)
ERYTHROCYTE [DISTWIDTH] IN BLOOD BY AUTOMATED COUNT: 13.5 % (ref 10–15)
FASTING STATUS PATIENT QL REPORTED: YES
GFR SERPL CREATININE-BSD FRML MDRD: 49 ML/MIN/1.73M2
GLUCOSE BLD-MCNC: 95 MG/DL (ref 70–99)
HCT VFR BLD AUTO: 38.4 % (ref 35–47)
HDLC SERPL-MCNC: 97 MG/DL
HGB BLD-MCNC: 12 G/DL (ref 11.7–15.7)
LDLC SERPL CALC-MCNC: 66 MG/DL
MCH RBC QN AUTO: 28.2 PG (ref 26.5–33)
MCHC RBC AUTO-ENTMCNC: 31.3 G/DL (ref 31.5–36.5)
MCV RBC AUTO: 90 FL (ref 78–100)
NONHDLC SERPL-MCNC: 74 MG/DL
PLATELET # BLD AUTO: 205 10E3/UL (ref 150–450)
POTASSIUM BLD-SCNC: 4.5 MMOL/L (ref 3.4–5.3)
RBC # BLD AUTO: 4.26 10E6/UL (ref 3.8–5.2)
SODIUM SERPL-SCNC: 139 MMOL/L (ref 133–144)
TRIGL SERPL-MCNC: 38 MG/DL
TSH SERPL DL<=0.005 MIU/L-ACNC: 3.69 MU/L (ref 0.4–4)
WBC # BLD AUTO: 5.8 10E3/UL (ref 4–11)

## 2022-06-02 PROCEDURE — 80048 BASIC METABOLIC PNL TOTAL CA: CPT | Performed by: INTERNAL MEDICINE

## 2022-06-02 PROCEDURE — 84443 ASSAY THYROID STIM HORMONE: CPT | Performed by: INTERNAL MEDICINE

## 2022-06-02 PROCEDURE — 99214 OFFICE O/P EST MOD 30 MIN: CPT | Mod: 25 | Performed by: INTERNAL MEDICINE

## 2022-06-02 PROCEDURE — 0054A COVID-19,PF,PFIZER (12+ YRS): CPT | Performed by: INTERNAL MEDICINE

## 2022-06-02 PROCEDURE — 91305 COVID-19,PF,PFIZER (12+ YRS): CPT | Performed by: INTERNAL MEDICINE

## 2022-06-02 PROCEDURE — 36415 COLL VENOUS BLD VENIPUNCTURE: CPT | Performed by: INTERNAL MEDICINE

## 2022-06-02 PROCEDURE — 80061 LIPID PANEL: CPT | Performed by: INTERNAL MEDICINE

## 2022-06-02 PROCEDURE — 85027 COMPLETE CBC AUTOMATED: CPT | Performed by: INTERNAL MEDICINE

## 2022-06-02 ASSESSMENT — PAIN SCALES - GENERAL: PAINLEVEL: NO PAIN (0)

## 2022-06-02 NOTE — RESULT ENCOUNTER NOTE
The following letter pertains to your most recent diagnostic tests:    -Your cholesterol panel looks healthy.     -TSH (thyroid stimulating hormone) level is normal which indicates normal circulating thyroid hormone levels.      -Kidney function is normal for you (Creatinine, GFR), Sodium is normal for you, Potassium is normal for you,  Glucose (blood sugar) is normal for you.     -The calcium level is slightly elevated from your baseline.  I recommend we recheck this in 2 or 3 weeks.  You can schedule a lab appointment for that purpose.      -Your complete blood counts including your hemoglobin returned normal for you.           Bottom line:  Labs look fine except for the mildly high calcium level.  I recommend we recheck this in 2-3 weeks. You can schedule a lab appointment for that purpose.            Follow up:  lab appointment in 2-3 weeks to recheck calcium level.        Sincerely,    Dr. Freitas

## 2022-06-02 NOTE — LETTER
Allison 3, 2022      Jeannine Rod  [address]  [place] [address]        Dear ,    The following letter pertains to your most recent diagnostic tests:     -Your cholesterol panel looks healthy.     -TSH (thyroid stimulating hormone) level is normal which indicates normal circulating thyroid hormone levels.       -Kidney function is normal for you (Creatinine, GFR), Sodium is normal for you, Potassium is normal for you,  Glucose (blood sugar) is normal for you.     -The calcium level is slightly elevated from your baseline.  I recommend we recheck this in 2 or 3 weeks.  You can schedule a lab appointment for that purpose.       -Your complete blood counts including your hemoglobin returned normal for you.             Bottom line:  Labs look fine except for the mildly high calcium level.  I recommend we recheck this in 2-3 weeks. You can schedule a lab appointment for that purpose.               Follow up:  lab appointment in 2-3 weeks to recheck calcium level.            Resulted Orders   BASIC METABOLIC PANEL   Result Value Ref Range    Sodium 139 133 - 144 mmol/L    Potassium 4.5 3.4 - 5.3 mmol/L    Chloride 107 94 - 109 mmol/L    Carbon Dioxide (CO2) 29 20 - 32 mmol/L    Anion Gap 3 3 - 14 mmol/L    Urea Nitrogen 30 7 - 30 mg/dL    Creatinine 1.13 (H) 0.52 - 1.04 mg/dL    Calcium 10.3 (H) 8.5 - 10.1 mg/dL    Glucose 95 70 - 99 mg/dL    GFR Estimate 49 (L) >60 mL/min/1.73m2      Comment:      Effective December 21, 2021 eGFRcr in adults is calculated using the 2021 CKD-EPI creatinine equation which includes age and gender (Carolyn celaya al., NEJM, DOI: 10.1056/HMHDww1598948)   Lipid panel reflex to direct LDL Fasting   Result Value Ref Range    Cholesterol 171 <200 mg/dL    Triglycerides 38 <150 mg/dL    Direct Measure HDL 97 >=50 mg/dL    LDL Cholesterol Calculated 66 <=100 mg/dL    Non HDL Cholesterol 74 <130 mg/dL    Patient Fasting > 8hrs? Yes     Narrative    Cholesterol  Desirable:  <200  mg/dL    Triglycerides  Normal:  Less than 150 mg/dL  Borderline High:  150-199 mg/dL  High:  200-499 mg/dL  Very High:  Greater than or equal to 500 mg/dL    Direct Measure HDL  Female:  Greater than or equal to 50 mg/dL   Male:  Greater than or equal to 40 mg/dL    LDL Cholesterol  Desirable:  <100mg/dL  Above Desirable:  100-129 mg/dL   Borderline High:  130-159 mg/dL   High:  160-189 mg/dL   Very High:  >= 190 mg/dL    Non HDL Cholesterol  Desirable:  130 mg/dL  Above Desirable:  130-159 mg/dL  Borderline High:  160-189 mg/dL  High:  190-219 mg/dL  Very High:  Greater than or equal to 220 mg/dL   TSH WITH FREE T4 REFLEX   Result Value Ref Range    TSH 3.69 0.40 - 4.00 mU/L   CBC with platelets   Result Value Ref Range    WBC Count 5.8 4.0 - 11.0 10e3/uL    RBC Count 4.26 3.80 - 5.20 10e6/uL    Hemoglobin 12.0 11.7 - 15.7 g/dL    Hematocrit 38.4 35.0 - 47.0 %    MCV 90 78 - 100 fL    MCH 28.2 26.5 - 33.0 pg    MCHC 31.3 (L) 31.5 - 36.5 g/dL    RDW 13.5 10.0 - 15.0 %    Platelet Count 205 150 - 450 10e3/uL       If you have any questions or concerns, please call the clinic at the number listed above.       Sincerely,      Bebeto Freitas MD        du

## 2022-06-02 NOTE — PROGRESS NOTES
Assessment & Plan     Systemic sclerosis (H)  Seems stable  Continue follow up with Dr. 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, estrogen receptor positive, unspecified laterality, unspecified site of breast (H)  Post mastectomies, stable     Stage 3a chronic kidney disease (H)  Recheck   - BASIC METABOLIC PANEL; Future  - CBC with platelets; Future    Benign essential hypertension  Well controlled     Hyperlipidemia LDL goal <100  On statin therapy   - Lipid panel reflex to direct LDL Fasting; Future    History of pulmonary embolism      Hypothyroidism, unspecified type  Recheck   - TSH WITH FREE T4 REFLEX; Future    History of colonic polyps  Plan for colonoscopy next July 3 year interval         17 minutes spent on the date of the encounter doing chart review, history and exam, documentation and further activities per the note           Return in about 1 year (around 6/2/2023) for Preventive Visit.  Patient instructed to return to clinic or contact us sooner if symptoms worsen or new symptoms develop.     Bebeto Freitas MD  [facility] REINIER Paez is a 81 year old who presents for the following health issues     History of Present Illness       CKD: She uses over the counter pain medication, including acetaminophin, a few times a month.    Hypertension: She presents for follow up of hypertension.  She does not check blood pressure  regularly outside of the clinic. Outside blood pressures have been over 140/90. She does not follow a low salt diet.       She has systemic sclerosis seeing rheum, and eye MD twice yearly due to plaquenil          Review of Systems   Constitutional, HEENT, cardiovascular, pulmonary, gi and gu systems are negative, except as otherwise noted.      Objective    /71 (BP Location: Left arm, Cuff Size: Adult Regular)   Pulse 71   Temp 97.8  F (36.6  C) (Tympanic)    "Resp 16   Ht 1.689 m (5' 6.5\")   Wt 62.6 kg (138 lb 1.6 oz)   LMP 07/07/1992   SpO2 98%   BMI 21.96 kg/m    Body mass index is 21.96 kg/m .  Physical Exam   GENERAL: healthy, alert and no distress  EYES: Eyes grossly normal to inspection, PERRL and conjunctivae and sclerae normal  HENT: ear canals and TM's normal, nose and mouth without ulcers or lesions  NECK: no adenopathy, no asymmetry, masses, or scars and thyroid normal to palpation  RESP: lungs clear to auscultation - no rales, rhonchi or wheezes  CV: regular rate and rhythm, normal S1 S2, no S3 or S4, no murmur, click or rub, no peripheral edema and peripheral pulses strong  ABDOMEN: soft, nontender, no hepatosplenomegaly, no masses and bowel sounds normal  MS: no gross musculoskeletal defects noted, no edema  SKIN: no suspicious lesions or rashes  NEURO: Normal strength and tone, mentation intact and speech normal  PSYCH: mentation appears normal, affect normal/bright          "

## 2022-06-24 ENCOUNTER — LAB (OUTPATIENT)
Dept: LAB | Facility: CLINIC | Age: 82
End: 2022-06-24
Payer: MEDICARE

## 2022-06-24 DIAGNOSIS — E83.52 HYPERCALCEMIA: ICD-10-CM

## 2022-06-24 LAB
DEPRECATED CALCIDIOL+CALCIFEROL SERPL-MC: 45 UG/L (ref 20–75)
PTH-INTACT SERPL-MCNC: 61 PG/ML (ref 15–65)

## 2022-06-24 PROCEDURE — 82306 VITAMIN D 25 HYDROXY: CPT

## 2022-06-24 PROCEDURE — 83970 ASSAY OF PARATHORMONE: CPT

## 2022-06-24 PROCEDURE — 82310 ASSAY OF CALCIUM: CPT

## 2022-06-24 PROCEDURE — 36415 COLL VENOUS BLD VENIPUNCTURE: CPT

## 2022-06-24 NOTE — LETTER
June 27, 2022      Jeannine Rod  [address]  [place] [address]        Dear ,    We are writing to inform you of your test results.    The following letter pertains to your most recent diagnostic tests:     Good news! The follow up calcium level is notally normal.  The parathyroid hormone and vitamin D levels are healthy.  We can simply recheck the calcium at your next routine lab draw.          Resulted Orders   Calcium   Result Value Ref Range    Calcium 8.8 8.5 - 10.1 mg/dL   Parathyroid Hormone Intact   Result Value Ref Range    Parathyroid Hormone Intact 61 15 - 65 pg/mL    Narrative    This result was obtained with the Roche Elecsys PTH STAT assay.   This reference range differs from PTH assays used in other [facility] laboratories.   Vitamin D Deficiency   Result Value Ref Range    Vitamin D, Total (25-Hydroxy) 45 20 - 75 ug/L    Narrative    Season, race, dietary intake, and treatment affect the concentration of 25-hydroxy-Vitamin D. Values may decrease during winter months and increase during summer months. Values 20-29 ug/L may indicate Vitamin D insufficiency and values <20 ug/L may indicate Vitamin D deficiency.    Vitamin D determination is routinely performed by an immunoassay specific for 25 hydroxyvitamin D3.  If an individual is on vitamin D2(ergocalciferol) supplementation, please specify 25 OH vitamin D2 and D3 level determination by LCMSMS test VITD23.         If you have any questions or concerns, please call the clinic at the number listed above.       Sincerely,      Bebeto Freitas MD

## 2022-06-26 LAB — CALCIUM SERPL-MCNC: 8.8 MG/DL (ref 8.5–10.1)

## 2022-06-27 NOTE — RESULT ENCOUNTER NOTE
The following letter pertains to your most recent diagnostic tests:    Good news! The follow up calcium level is notally normal.  The parathyroid hormone and vitamin D levels are healthy.  We can simply recheck the calcium at your next routine lab draw.        Sincerely,    Dr. Freitas

## 2022-08-17 NOTE — PROGRESS NOTES
[facility] Dermatology Note  Encounter Date: Aug 24, 2022  Office Visit     Dermatology Problem List:  1. 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  Will start with clotrimazole and recheck at follow-up.  - Start clotrimazole nightly to buttocks    # Actinic purpura, forearms  # Seborrheic keratoses, trunk and extremities  Cbc reviewed    # Multiple benign nevi.   - No concerning lesions today  - Monitor for ABCDEs of melanoma   - Continue sun protection - recommend SPF 30 or higher with frequent application   - Return sooner if noticing changing or symptomatic lesions     #hair loss, no change, see prior record, hair metric at next visit. =not addressed today    Procedures Performed:   - Shave biopsy procedure note, location(s): see above. After discussion of benefits and risks including but not limited to bleeding, infection, scar, incomplete removal, recurrence, and non-diagnostic biopsy, written consent and photographs were obtained. The area was cleaned with isopropyl alcohol. 0.5mL of 1% lidocaine with epinephrine was injected to obtain adequate anesthesia of lesion(s). Shave biopsy at site(s) performed. Hemostasis was achieved with aluminium chloride. Petrolatum ointment and a sterile dressing were applied. The patient tolerated the procedure and no complications were noted. The patient was provided  with verbal and written post care instructions.     Follow-up: 3 month(s) for hair, recheck lower legs at follow as skin is tan and also recheck buttocks. NEeds hairs metrix at follow up. Next skin exam in 1 year    Staff and Scribe:     I, Masha Pittman, am serving as a scribe to document services personally performed by Pat Méndez MD based on data collection and the provider's statements to me.      Scribe Disclosure:  I, Henry Siddiqui, am serving as a scribe to document services personally performed by Pat Méndez MD based on data collection and the provider's statements to me.     Provider Disclosure:   The documentation recorded by the scribe accurately reflects the services I personally performed and the decisions made by me.    Pta Méndez MD    Department of Dermatology  [facility]: Phone: [phone], Fax:[phone]  [facility]: Phone: [phone], Fax: [phone]      ____________________________________________    CC: Skin Check (Jeannine is here for a skin check. She is also follow up on hair loss.)    HPI:  Ms. Jeannine Rod is a(n) 81 year old female who presents today as a return patient for a follow up of FBSE. Last seen by me on 5/25/2022, at which time patient was decreased on Lidex solution to every other day and reduced on latisse to every other day for treatment of hair loss.    Today, patient reports spots on her forearms that she would like examined.    Patient is otherwise feeling well, without additional skin concerns.    Labs Reviewed:  N/A    Physical Exam:  Vitals: LMP 07/07/1992   SKIN: Full skin, which includes the head/face, both arms, chest, back, abdomen,both legs, genitalia and/or groin buttocks, digits and/or nails, was examined.  - Right posterior thigh, 5 mm erythematous acneiform macule.  - There are waxy stuck on tan to brown papules on  the trunk and extremities.  - Multiple regular brown pigmented macules and papules are identified on the trunk and extremities.   - Violaceous macules on the forearms.  - Erythema and white discoloration in the gluteal cleft.  - No other lesions of concern on areas examined.     Medications:  Current Outpatient Medications   Medication     albuterol (PROAIR HFA/PROVENTIL HFA/VENTOLIN HFA) 108 (90 Base) MCG/ACT inhaler     amLODIPine (NORVASC) 5 MG tablet     atorvastatin (LIPITOR) 20 MG tablet     bimatoprost (LATISSE) 0.03 % external opthalmic solution     calcium-vitamin D (CALTRATE) 600-400 MG-UNIT per tablet     estradiol (ESTRACE) 0.1 MG/GM vaginal cream     fluocinonide (LIDEX) 0.05 % external solution     hydroxychloroquine (PLAQUENIL) 200 MG tablet     ketoconazole (NIZORAL) 2 % external shampoo     Melatonin ER 1 MG TBCR     Multiple Vitamin (MULTI-VITAMIN) per tablet     nystatin (MYCOSTATIN) 840464 UNIT/GM external cream     tacrolimus (PROTOPIC) 0.1 % external ointment     triamterene-HCTZ (MAXZIDE-25) 37.5-25 MG tablet     Current Facility-Administered Medications   Medication     triamcinolone acetonide (KENALOG-10) injection 10 mg     triamcinolone acetonide (KENALOG-10) injection 10 mg      Past Medical History:   Patient Active Problem List   Diagnosis     Malignant neoplasm of breast (H)     Raynaud's syndrome     Status post hip replacement     Systemic sclerosis (H)     Gastroesophageal reflux disease with esophagitis     Hyperlipidemia LDL goal <100     Calculus of gallbladder without cholecystitis     Radiculoplexus neuropathy     Benign essential hypertension     Atrophic vaginitis     Chronic kidney disease, stage 3 (H)     Chronic obstructive pulmonary disease, unspecified COPD type (H)     History of colonic polyps     Past Medical History:   Diagnosis Date     Arthritis      Benign essential hypertension 10/23/2017     Benign essential hypertension 10/23/2017     Calculus of gallbladder  without cholecystitis 10/6/2016     Gastroesophageal reflux disease with esophagitis 10/6/2016     Hyperlipidemia      Malignant neoplasm (H)     breast bilat mastectomy, no rad, no chemobilat      Pulmonary embolism and infarction 3/9/2012    IMO update changed this record. 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     Raynaud's syndrome      Systemic sclerosis (H) 10/6/2016        CC No referring provider defined for this encounter. on close of this encounter

## 2022-08-24 ENCOUNTER — OFFICE VISIT (OUTPATIENT)
Dept: DERMATOLOGY | Facility: CLINIC | Age: 82
End: 2022-08-24
Payer: MEDICARE

## 2022-08-24 DIAGNOSIS — L30.4 INTERTRIGO: ICD-10-CM

## 2022-08-24 DIAGNOSIS — L82.1 SEBORRHEIC KERATOSIS: Primary | ICD-10-CM

## 2022-08-24 DIAGNOSIS — D49.2 NEOPLASM OF UNSPECIFIED BEHAVIOR OF BONE, SOFT TISSUE, AND SKIN: ICD-10-CM

## 2022-08-24 DIAGNOSIS — D22.9 MULTIPLE BENIGN NEVI: ICD-10-CM

## 2022-08-24 DIAGNOSIS — D69.2 SOLAR PURPURA (H): ICD-10-CM

## 2022-08-24 PROCEDURE — 88305 TISSUE EXAM BY PATHOLOGIST: CPT | Mod: TC | Performed by: DERMATOLOGY

## 2022-08-24 PROCEDURE — 88305 TISSUE EXAM BY PATHOLOGIST: CPT | Mod: 26 | Performed by: DERMATOLOGY

## 2022-08-24 PROCEDURE — 99213 OFFICE O/P EST LOW 20 MIN: CPT | Mod: 25 | Performed by: DERMATOLOGY

## 2022-08-24 PROCEDURE — 11102 TANGNTL BX SKIN SINGLE LES: CPT | Performed by: DERMATOLOGY

## 2022-08-24 RX ORDER — CLOTRIMAZOLE 1 %
CREAM (GRAM) TOPICAL
Qty: 60 G | Refills: 0 | Status: SHIPPED | OUTPATIENT
Start: 2022-08-24

## 2022-08-24 ASSESSMENT — PAIN SCALES - GENERAL: PAINLEVEL: NO PAIN (0)

## 2022-08-24 NOTE — PATIENT INSTRUCTIONS
Wound Care After a Biopsy    What is a skin biopsy?  A skin biopsy allows the doctor to examine a very small piece of tissue under the microscope to determine the diagnosis and the best treatment for the skin condition. A local anesthetic (numbing medicine)  is injected with a very small needle into the skin area to be tested. A small piece of skin is taken from the area. Sometimes a suture (stitch) is used.     What are the risks of a skin biopsy?  I will experience scar, bleeding, swelling, pain, crusting and redness. I may experience incomplete removal or recurrence. Risks of this procedure are excessive bleeding, bruising, infection, nerve damage, numbness, thick (hypertrophic or keloidal) scar and non-diagnostic biopsy.    How should I care for my wound for the first 24 hours?  Keep the wound dry and covered for 24 hours  If it bleeds, hold direct pressure on the area for 15 minutes. If bleeding does not stop then go to the emergency room  Avoid strenuous exercise the first 1-2 days or as your doctor instructs you    How should I care for the wound after 24 hours?  After 24 hours, remove the bandage  You may bathe or shower as normal  If you had a scalp biopsy, you can shampoo as usual and can use shower water to clean the biopsy site daily  Clean the wound twice a day with gentle soap and water  Do not scrub, be gentle  Apply white petroleum/Vaseline after cleaning the wound with a cotton swab or a clean finger, and keep the site covered with a Bandaid /bandage. Bandages are not necessary with a scalp biopsy  If you are unable to cover the site with a Bandaid /bandage, re-apply ointment 2-3 times a day to keep the site moist. Moisture will help with healing  Avoid strenuous activity for first 1-2 days  Avoid lakes, rivers, pools, and oceans until the stitches are removed or the site is healed    How do I clean my wound?  Wash hands thoroughly with soap or use hand  before all wound care  Clean the  wound with gentle soap and water  Apply white petroleum/Vaseline  to wound after it is clean  Replace the Bandaid /bandage to keep the wound covered for the first few days or as instructed by your doctor  If you had a scalp biopsy, warm shower water to the area on a daily basis should suffice    What should I use to clean my wound?   Cotton-tipped applicators (Qtips )  White petroleum jelly (Vaseline ). Use a clean new container and use Q-tips to apply.  Bandaids   as needed  Gentle soap     How should I care for my wound long term?  Do not get your wound dirty  Keep up with wound care for one week or until the area is healed.  A small scab will form and fall off by itself when the area is completely healed. The area will be red and will become pink in color as it heals. Sun protection is very important for how your scar will turn out. Sunscreen with an SPF 30 or greater is recommended once the area is healed.  You should have some soreness but it should be mild and slowly go away over several days. Talk to your doctor about using tylenol for pain,    When should I call my doctor?  If you have increased:   Pain or swelling  Pus or drainage (clear or slightly yellow drainage is ok)  Temperature over 100F  Spreading redness or warmth around wound    When will I hear about my results?  The biopsy results can take 2 weeks to come back.  Your results will automatically release to [employer] before your provider has even reviewed them.  The clinic will call you with the results, send you a [employer] message, or have you schedule a follow-up clinic or phone time to discuss the results.  Contact our clinics if you do not hear from us in 2 weeks.    Who should I call with questions?  [facility]: [phone]  [facility]: [phone]  For urgent needs outside of business hours call the [facility] at [phone] and ask for the dermatology resident on call      Patient Education     Checking for Skin Cancer  You can find cancer early by checking your skin each month. There are 3 kinds of skin cancer. They are melanoma, basal cell carcinoma, and squamous cell carcinoma. Doing monthly skin checks is the best way to find new marks or skin changes. Follow the instructions below for checking your skin.   The ABCDEs of checking moles for melanoma   Check your moles or growths for signs of melanoma using ABCDE:   Asymmetry: the sides of the mole or growth don t match  Border: the edges are ragged, notched, or blurred  Color: the color within the mole or growth varies  Diameter: the mole or growth is larger than 6 mm (size of a pencil eraser)  Evolving: the size, shape, or color of the mole or growth is changing (evolving is not shown in the images below)    Checking for other types of skin cancer  Basal cell carcinoma or squamous cell carcinoma have symptoms such as:     A spot or mole that looks different from all other marks on your skin  Changes in how an area feels, such as itching, tenderness, or pain  Changes in the skin's surface, such as oozing, bleeding, or scaliness  A sore that does not heal  New swelling or redness beyond the border of a mole    Who s at risk?  Anyone can get skin cancer. But you are at greater risk if you have:   Fair skin, light-colored hair, or light-colored eyes  Many moles or abnormal moles on your skin  A history of sunburns from sunlight or tanning beds  A family history of skin cancer  A history of exposure to radiation or chemicals  A weakened immune system  If you have had skin cancer in the past, you are at risk for recurring skin cancer.   How to check your skin  Do your monthly skin checkups in front of a full-length mirror. Check all parts of your body, including your:   Head (ears, face, neck, and scalp)  Torso (front, back, and sides)  Arms (tops, undersides, upper, and lower armpits)  Hands (palms, backs, and fingers, including  under the nails)  Buttocks and genitals  Legs (front, back, and sides)  Feet (tops, soles, toes, including under the nails, and between toes)  If you have a lot of moles, take digital photos of them each month. Make sure to take photos both up close and from a distance. These can help you see if any moles change over time.   Most skin changes are not cancer. But if you see any changes in your skin, call your doctor right away. Only he or she can diagnose a problem. If you have skin cancer, seeing your doctor can be the first step toward getting the treatment that could save your life.   [employer] last reviewed this educational content on 4/1/2019 2000-2020 [employer]. [address] [place] [address], [place] [address]. All rights reserved. This information is not intended as a substitute for professional medical care. Always follow your healthcare professional's instructions.       When should I call my doctor?  If you are worsening or not improving, please, contact us or seek urgent care as noted below.     Who should I call with questions (adults)?  [facility] (adult and pediatric): [phone]  [facility] (adult): [phone]  For urgent needs outside of business hours call the [facility] at [phone] and ask for the dermatology resident on call to be paged  If this is a medical emergency and you are unable to reach an ER, Call [phone]    Who should I call with questions (pediatric)?  [facility]- Pediatric Dermatology  Dr. Marimar Riggs, Dr. El Cuellar, Dr. Aarti Loco, TANESHA Kidd, Dr. Pilar Trevino, Dr. Pat Ignacio & Dr. Lang Chester  Non-urgent nurse triage line; [phone]- Roseann and Elisa OTT Care Coordinatorpili Kathleen (/Complex ) [phone]    If you need a prescription refill, please contact your pharmacy. Refills are approved or denied by our  Physicians during normal business hours, Monday through Fridays  Per office policy, refills will not be granted if you have not been seen within the past year (or sooner depending on your child's condition)    Scheduling Information:  Pediatric Appointment Scheduling and Call Center [phone]  Radiology Scheduling- [phone]  Sedation Unit Scheduling- [phone]  [place] Scheduling- General [phone]; Pediatric Dermatology [phone]  Main  Services: [phone]  Amharic: [phone]  Bhutanese: [phone]  Hmong/Cypriot/Kiswahili: [phone]  Preadmission Nursing Department Fax Number: [phone] (Fax all pre-operative paperwork to this number)    For urgent matters arising during evenings, weekends, or holidays that cannot wait for normal business hours please call [phone] and ask for the dermatology resident on call to be paged.

## 2022-08-24 NOTE — LETTER
8/24/2022       RE: Jeannine Rod  [address]  [place] [address]     Dear Colleague,    Thank you for referring your patient, Jeannine Rod, to the [facility]MAT[facility] [place] at [facility]. Please see a copy of my visit note below.    [facility] Dermatology Note  Encounter Date: Aug 24, 2022  Office Visit     Dermatology Problem List:  1. 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  Will start with clotrimazole and recheck at follow-up.  - Start clotrimazole nightly to buttocks    # Actinic purpura, forearms  # Seborrheic keratoses, trunk and extremities  Cbc reviewed    # Multiple benign nevi.   - No concerning lesions today  - Monitor for ABCDEs of melanoma   - Continue sun protection - recommend SPF 30 or higher with frequent application   - Return sooner if noticing changing or symptomatic lesions     #hair loss, no change, see prior record, hair metric at next visit. =not addressed today    Procedures Performed:   - Shave biopsy procedure note, location(s): see above. After discussion of benefits and risks including but not limited to bleeding, infection, scar, incomplete removal, recurrence, and non-diagnostic biopsy, written consent and photographs were obtained. The area was cleaned with isopropyl alcohol. 0.5mL of 1%  lidocaine with epinephrine was injected to obtain adequate anesthesia of lesion(s). Shave biopsy at site(s) performed. Hemostasis was achieved with aluminium chloride. Petrolatum ointment and a sterile dressing were applied. The patient tolerated the procedure and no complications were noted. The patient was provided with verbal and written post care instructions.     Follow-up: 3 month(s) for hair, recheck lower legs at follow as skin is tan and also recheck buttocks. NEeds hairs metrix at follow up. Next skin exam in 1 year    Staff and Scribe:     IMasha, am serving as a scribe to document services personally performed by Pat Méndez MD based on data collection and the provider's statements to me.      Scribe Disclosure:  I, Henry Siddiqui, am serving as a scribe to document services personally performed by Pat Méndez MD based on data collection and the provider's statements to me.     Provider Disclosure:   The documentation recorded by the scribe accurately reflects the services I personally performed and the decisions made by me.    Pat Méndez MD    Department of Dermatology  [facility]: Phone: [phone], Fax:[phone]  [facility]: Phone: [phone], Fax: [phone]      ____________________________________________    CC: Skin Check (Jeannine is here for a skin check. She is also follow up on hair loss.)    HPI:  Ms. Jeannine Rod is a(n) 81 year old female who presents today as a return patient for a follow up of FBSE. Last seen by me on 5/25/2022, at which time patient was decreased on Lidex solution to every other day and reduced on latisse to every other day for treatment of hair loss.    Today, patient reports spots on her forearms that she would like examined.    Patient is otherwise feeling well, without additional skin concerns.    Labs  Reviewed:  N/A    Physical Exam:  Vitals: LMP 07/07/1992   SKIN: Full skin, which includes the head/face, both arms, chest, back, abdomen,both legs, genitalia and/or groin buttocks, digits and/or nails, was examined.  - Right posterior thigh, 5 mm erythematous acneiform macule.  - There are waxy stuck on tan to brown papules on the trunk and extremities.  - Multiple regular brown pigmented macules and papules are identified on the trunk and extremities.   - Violaceous macules on the forearms.  - Erythema and white discoloration in the gluteal cleft.  - No other lesions of concern on areas examined.     Medications:  Current Outpatient Medications   Medication     albuterol (PROAIR HFA/PROVENTIL HFA/VENTOLIN HFA) 108 (90 Base) MCG/ACT inhaler     amLODIPine (NORVASC) 5 MG tablet     atorvastatin (LIPITOR) 20 MG tablet     bimatoprost (LATISSE) 0.03 % external opthalmic solution     calcium-vitamin D (CALTRATE) 600-400 MG-UNIT per tablet     estradiol (ESTRACE) 0.1 MG/GM vaginal cream     fluocinonide (LIDEX) 0.05 % external solution     hydroxychloroquine (PLAQUENIL) 200 MG tablet     ketoconazole (NIZORAL) 2 % external shampoo     Melatonin ER 1 MG TBCR     Multiple Vitamin (MULTI-VITAMIN) per tablet     nystatin (MYCOSTATIN) 799410 UNIT/GM external cream     tacrolimus (PROTOPIC) 0.1 % external ointment     triamterene-HCTZ (MAXZIDE-25) 37.5-25 MG tablet     Current Facility-Administered Medications   Medication     triamcinolone acetonide (KENALOG-10) injection 10 mg     triamcinolone acetonide (KENALOG-10) injection 10 mg      Past Medical History:   Patient Active Problem List   Diagnosis     Malignant neoplasm of breast (H)     Raynaud's syndrome     Status post hip replacement     Systemic sclerosis (H)     Gastroesophageal reflux disease with esophagitis     Hyperlipidemia LDL goal <100     Calculus of gallbladder without cholecystitis     Radiculoplexus neuropathy     Benign essential hypertension      Atrophic vaginitis     Chronic kidney disease, stage 3 (H)     Chronic obstructive pulmonary disease, unspecified COPD type (H)     History of colonic polyps     Past Medical History:   Diagnosis Date     Arthritis      Benign essential hypertension 10/23/2017     Benign essential hypertension 10/23/2017     Calculus of gallbladder without cholecystitis 10/6/2016     Gastroesophageal reflux disease with esophagitis 10/6/2016     Hyperlipidemia      Malignant neoplasm (H)     breast bilat mastectomy, no rad, no chemobilat      Pulmonary embolism and infarction 3/9/2012    IMO update changed this record. 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     Raynaud's syndrome      Systemic sclerosis (H) 10/6/2016        CC No referring provider defined for this encounter. on close of this encounter       Admission

## 2022-08-24 NOTE — NURSING NOTE
Dermatology Rooming Note    Jeannine Rod's goals for this visit include:   Chief Complaint   Patient presents with     Skin Check     Jeannine is here for a skin check. She is also follow up on hair loss.     Joellen Marsh, Facilitator

## 2022-08-25 DIAGNOSIS — E78.5 HYPERLIPIDEMIA LDL GOAL <100: ICD-10-CM

## 2022-08-25 LAB
PATH REPORT.COMMENTS IMP SPEC: NORMAL
PATH REPORT.COMMENTS IMP SPEC: NORMAL
PATH REPORT.FINAL DX SPEC: NORMAL
PATH REPORT.GROSS SPEC: NORMAL
PATH REPORT.MICROSCOPIC SPEC OTHER STN: NORMAL
PATH REPORT.RELEVANT HX SPEC: NORMAL

## 2022-08-26 RX ORDER — ATORVASTATIN CALCIUM 20 MG/1
TABLET, FILM COATED ORAL
Qty: 90 TABLET | Refills: 1 | Status: SHIPPED | OUTPATIENT
Start: 2022-08-26 | End: 2023-02-16

## 2022-08-26 NOTE — TELEPHONE ENCOUNTER
Prescription approved per [facility] Refill Protocol.    Megan Delaney RN  [facility] Triage Team

## 2022-10-04 DIAGNOSIS — I10 BENIGN ESSENTIAL HYPERTENSION: ICD-10-CM

## 2022-10-06 RX ORDER — TRIAMTERENE/HYDROCHLOROTHIAZID 37.5-25 MG
TABLET ORAL
Qty: 90 TABLET | Refills: 1 | Status: SHIPPED | OUTPATIENT
Start: 2022-10-06 | End: 2023-04-05

## 2022-10-06 NOTE — TELEPHONE ENCOUNTER
Routing refill request to provider for review/approval because:  Labs out of range:  CR      Creatinine   Date Value Ref Range Status   06/02/2022 1.13 (H) 0.52 - 1.04 mg/dL Final   12/02/2020 1.15 (H) 0.52 - 1.04 mg/dL Final

## 2022-10-11 ENCOUNTER — TRANSFERRED RECORDS (OUTPATIENT)
Dept: HEALTH INFORMATION MANAGEMENT | Facility: CLINIC | Age: 82
End: 2022-10-11

## 2022-10-11 LAB
ALT SERPL-CCNC: 21 IU/L (ref 5–35)
AST SERPL-CCNC: 20 U/L (ref 5–34)
CREATININE (EXTERNAL): 0.92 MG/DL (ref 0.5–1.3)
TSH SERPL-ACNC: 3.47 MIU/L (ref 0.4–4.5)

## 2022-10-16 ENCOUNTER — HEALTH MAINTENANCE LETTER (OUTPATIENT)
Age: 82
End: 2022-10-16

## 2022-11-16 ENCOUNTER — TRANSFERRED RECORDS (OUTPATIENT)
Dept: HEALTH INFORMATION MANAGEMENT | Facility: CLINIC | Age: 82
End: 2022-11-16

## 2022-11-18 ENCOUNTER — MYC MEDICAL ADVICE (OUTPATIENT)
Dept: DERMATOLOGY | Facility: CLINIC | Age: 82
End: 2022-11-18
Payer: MEDICARE

## 2022-11-19 NOTE — PROGRESS NOTES
[facility] Dermatology Note  Encounter Date: Nov 30, 2022  Office Visit     Dermatology Problem List:  1. 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 BLK, right posterior thigh, s/p shave bx 8/24/2022  ____________________________________________    Assessment & Plan:     # Hair loss, most consistent with FFA and AGA. 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 Patient is improving but disease is still active.   - Continue Lidex solution every other day  - Continue minoxidil 5% solution daily    - Continue protopic on the eyebrows daily  - Continue ketoconazole shampoo 3x weekly and Krysten deep conditioner  - Continue Latisse  - Ok to continue calcium and multivitamin supplements   - HairMetrix performed today  - Plaquenil is scripted and run by rheumatology  - Declines oral minoxidil  - ILK today, se procedure below     Hair labs:  Ferritin: wnl on 3/20/21  Zinc: wnl on 5/18/2021     # Mild Seborrheic Dermatitis - this continues to improve but requires additional treatment  - Continue ketoconazole shampoo 3x weekly    # Skin check  - Patient declines FBSE today  - Will schedule for FBSE in general derm clinic     Procedures Performed:   - Intra-lesional triamcinolone procedure note. After verbal consent and review of risk of pain and skin thinning/atrophy, positioning and cleansing with isopropyl alcohol, 1.5 total mL of triamcinolone 2.5 mg/mL was injected into 15 lesion(s) on the scalp. The patient tolerated the procedure well and left the dermatology clinic in good condition.    Follow-up: 3 months, injections only no hairmetrix.      Staff and Scribe:     Scribe Disclosure:  I, Henry Siddiqui, am serving as a scribe to document services personally performed by Pat Méndez MD based on data collection and the provider's statements to me.     I, Masha Pittman, am serving as a scribe to document services personally performed by Pat Méndez MD based on data collection and the provider's statements to me.      Provider Disclosure:   The documentation recorded by the scribe accurately reflects the services I personally performed and the decisions made by me.    Pat Méndez MD    Department of Dermatology  [facility]: Phone: [phone], Fax:[phone]  [facility]: Phone: [phone], Fax: [phone]    ____________________________________________    CC: No chief complaint on file.  hair loss  HPI:  Ms. Jeannine Rod is a 82 year old female who presents as a return patient for follow-up of hair loss, diagnosed as hair loss most consistent with FFA and AGA. She is using ketoconazole shampoo and Krysten deep condition 3 x weekly, Lidex 3 x weekly, Protopic and Latisse on eyebrows. She is also taking calcium and a multivitamin.  - Last seen in-clinic on 8/24/2022  - Shedding or thinning, or both: She has not noticed much shedding lately, but she does feel that she has thinning on her temples, left>right.  She feels that her hair is dry, although this is more recent with the weather.    Patient is otherwise feeling well, in usual state of health, and has no additional skin concerns today.     Labs:  Last cbc bmp and alt and ast reviewed and last derm path    Physical Exam:  Vitals: LMP 07/07/1992   GEN: Well developed, well-nourished, in no acute distress, in a pleasant mood.    SKIN: Focused examination of scalp was performed.  - Bernardo part width of 1.2  - Moderate erythema along frontal hairline  - Moderate  erythema.  - Prominent forehead veins.  - Hair pull test negative.  - Hair tug test negative  - No other lesions of concern on areas examined.     Medications:  Current Outpatient Medications   Medication     albuterol (PROAIR HFA/PROVENTIL HFA/VENTOLIN HFA) 108 (90 Base) MCG/ACT inhaler     amLODIPine (NORVASC) 5 MG tablet     atorvastatin (LIPITOR) 20 MG tablet     bimatoprost (LATISSE) 0.03 % external opthalmic solution     calcium-vitamin D (CALTRATE) 600-400 MG-UNIT per tablet     clotrimazole (LOTRIMIN) 1 % external cream     estradiol (ESTRACE) 0.1 MG/GM vaginal cream     fluocinonide (LIDEX) 0.05 % external solution     hydroxychloroquine (PLAQUENIL) 200 MG tablet     ketoconazole (NIZORAL) 2 % external shampoo     Melatonin ER 1 MG TBCR     Multiple Vitamin (MULTI-VITAMIN) per tablet     nystatin (MYCOSTATIN) 571357 UNIT/GM external cream     tacrolimus (PROTOPIC) 0.1 % external ointment     triamterene-HCTZ (MAXZIDE-25) 37.5-25 MG tablet     Current Facility-Administered Medications   Medication     triamcinolone acetonide (KENALOG-10) injection 10 mg     triamcinolone acetonide (KENALOG-10) injection 10 mg     triamcinolone acetonide (KENALOG-10) injection 3.8 mg      Past Medical History:   Patient Active Problem List   Diagnosis     Malignant neoplasm of breast (H)     Raynaud's syndrome     Status post hip replacement     Systemic sclerosis (H)     Gastroesophageal reflux disease with esophagitis     Hyperlipidemia LDL goal <100     Calculus of gallbladder without cholecystitis     Radiculoplexus neuropathy     Benign essential hypertension     Atrophic vaginitis     Chronic kidney disease, stage 3 (H)     Chronic obstructive pulmonary disease, unspecified COPD type (H)     History of colonic polyps     Past Medical History:   Diagnosis Date     Arthritis      Benign essential hypertension 10/23/2017     Benign essential hypertension 10/23/2017     Calculus of gallbladder without cholecystitis  10/6/2016     Gastroesophageal reflux disease with esophagitis 10/6/2016     Hyperlipidemia      Malignant neoplasm (H)     breast bilat mastectomy, no rad, no chemobilat      Pulmonary embolism and infarction 3/9/2012    IMO update changed this record. 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     Raynaud's syndrome      Systemic sclerosis (H) 10/6/2016       CC No referring provider defined for this encounter. on close of this encounter.

## 2022-11-30 ENCOUNTER — OFFICE VISIT (OUTPATIENT)
Dept: DERMATOLOGY | Facility: CLINIC | Age: 82
End: 2022-11-30
Payer: MEDICARE

## 2022-11-30 DIAGNOSIS — L21.9 DERMATITIS, SEBORRHEIC: ICD-10-CM

## 2022-11-30 DIAGNOSIS — L66.12 FRONTAL FIBROSING ALOPECIA: ICD-10-CM

## 2022-11-30 DIAGNOSIS — L64.9 ANDROGENETIC ALOPECIA: Primary | ICD-10-CM

## 2022-11-30 PROCEDURE — 99213 OFFICE O/P EST LOW 20 MIN: CPT | Mod: 25 | Performed by: DERMATOLOGY

## 2022-11-30 PROCEDURE — 11901 INJECT SKIN LESIONS >7: CPT | Performed by: DERMATOLOGY

## 2022-11-30 NOTE — LETTER
11/30/2022       RE: Jeannine Rod  [address]  [place] [address]     Dear Colleague,    Thank you for referring your patient, Jeannine Rod, to the [facility]MAT[facility] [place] at [facility]. Please see a copy of my visit note below.    [facility] Dermatology Note  Encounter Date: Nov 30, 2022  Office Visit     Dermatology Problem List:  1. 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 BLK, right posterior thigh, s/p shave bx 8/24/2022  ____________________________________________    Assessment & Plan:     # Hair loss, most consistent with FFA and AGA. 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 Patient is improving but disease is still active.   - Continue Lidex solution every other day  - Continue minoxidil 5% solution daily    - Continue protopic on the eyebrows daily  - Continue ketoconazole shampoo 3x weekly and Krysten deep conditioner  - Continue Latisse  - Ok to continue calcium and multivitamin supplements   - HairMetrix performed today  - Plaquenil is scripted and run by rheumatology  - Declines oral minoxidil  - ILK today, se procedure below     Hair labs:  Ferritin: wnl on 3/20/21  Zinc: wnl on 5/18/2021     # Mild Seborrheic Dermatitis - this continues to improve but requires additional treatment  - Continue ketoconazole shampoo 3x weekly    # Skin check  - Patient declines FBSE today  - Will schedule for FBSE in general derm clinic     Procedures Performed:   - Intra-lesional triamcinolone procedure note. After verbal consent and review of  risk of pain and skin thinning/atrophy, positioning and cleansing with isopropyl alcohol, 1.5 total mL of triamcinolone 2.5 mg/mL was injected into 15 lesion(s) on the scalp. The patient tolerated the procedure well and left the dermatology clinic in good condition.    Follow-up: 3 months, injections only no hairmetrix.     Staff and Scribe:     Scribe Disclosure:  I, Henry Siddiqui, am serving as a scribe to document services personally performed by Pat Méndez MD based on data collection and the provider's statements to me.     I, Masha Pittman, am serving as a scribe to document services personally performed by Pat Méndez MD based on data collection and the provider's statements to me.      Provider Disclosure:   The documentation recorded by the scribe accurately reflects the services I personally performed and the decisions made by me.    Pat Méndez MD    Department of Dermatology  [facility]: Phone: [phone], Fax:[phone]  [facility]: Phone: [phone], Fax: [phone]    ____________________________________________    CC: No chief complaint on file.  hair loss  HPI:  Ms. Jeannine Rod is a 82 year old female who presents as a return patient for follow-up of hair loss, diagnosed as hair loss most consistent with FFA and AGA. She is using ketoconazole shampoo and Krysten deep condition 3 x weekly, Lidex 3 x weekly, Protopic and Latisse on eyebrows. She is also taking calcium and a multivitamin.  - Last seen in-clinic on 8/24/2022  - Shedding or thinning, or both: She has not noticed much shedding lately, but she does feel that she has thinning on her temples, left>right.  She feels that her hair is dry, although this is more recent with the weather.    Patient is otherwise feeling well, in usual state of health, and has no additional skin concerns today.      Labs:  Last cbc bmp and alt and ast reviewed and last derm path    Physical Exam:  Vitals: LMP 07/07/1992   GEN: Well developed, well-nourished, in no acute distress, in a pleasant mood.    SKIN: Focused examination of scalp was performed.  - Bernardo part width of 1.2  - Moderate erythema along frontal hairline  - Moderate erythema.  - Prominent forehead veins.  - Hair pull test negative.  - Hair tug test negative  - No other lesions of concern on areas examined.     Medications:  Current Outpatient Medications   Medication     albuterol (PROAIR HFA/PROVENTIL HFA/VENTOLIN HFA) 108 (90 Base) MCG/ACT inhaler     amLODIPine (NORVASC) 5 MG tablet     atorvastatin (LIPITOR) 20 MG tablet     bimatoprost (LATISSE) 0.03 % external opthalmic solution     calcium-vitamin D (CALTRATE) 600-400 MG-UNIT per tablet     clotrimazole (LOTRIMIN) 1 % external cream     estradiol (ESTRACE) 0.1 MG/GM vaginal cream     fluocinonide (LIDEX) 0.05 % external solution     hydroxychloroquine (PLAQUENIL) 200 MG tablet     ketoconazole (NIZORAL) 2 % external shampoo     Melatonin ER 1 MG TBCR     Multiple Vitamin (MULTI-VITAMIN) per tablet     nystatin (MYCOSTATIN) 293134 UNIT/GM external cream     tacrolimus (PROTOPIC) 0.1 % external ointment     triamterene-HCTZ (MAXZIDE-25) 37.5-25 MG tablet     Current Facility-Administered Medications   Medication     triamcinolone acetonide (KENALOG-10) injection 10 mg     triamcinolone acetonide (KENALOG-10) injection 10 mg     triamcinolone acetonide (KENALOG-10) injection 3.8 mg      Past Medical History:   Patient Active Problem List   Diagnosis     Malignant neoplasm of breast (H)     Raynaud's syndrome     Status post hip replacement     Systemic sclerosis (H)     Gastroesophageal reflux disease with esophagitis     Hyperlipidemia LDL goal <100     Calculus of gallbladder without cholecystitis     Radiculoplexus neuropathy     Benign essential hypertension     Atrophic vaginitis     Chronic  kidney disease, stage 3 (H)     Chronic obstructive pulmonary disease, unspecified COPD type (H)     History of colonic polyps     Past Medical History:   Diagnosis Date     Arthritis      Benign essential hypertension 10/23/2017     Benign essential hypertension 10/23/2017     Calculus of gallbladder without cholecystitis 10/6/2016     Gastroesophageal reflux disease with esophagitis 10/6/2016     Hyperlipidemia      Malignant neoplasm (H)     breast bilat mastectomy, no rad, no chemobilat      Pulmonary embolism and infarction 3/9/2012    IMO update changed this record. 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     Raynaud's syndrome      Systemic sclerosis (H) 10/6/2016       CC No referring provider defined for this encounter. on close of this encounter.

## 2023-02-22 ENCOUNTER — TRANSFERRED RECORDS (OUTPATIENT)
Dept: HEALTH INFORMATION MANAGEMENT | Facility: CLINIC | Age: 83
End: 2023-02-22

## 2023-02-22 ENCOUNTER — OFFICE VISIT (OUTPATIENT)
Dept: DERMATOLOGY | Facility: CLINIC | Age: 83
End: 2023-02-22
Payer: MEDICARE

## 2023-02-22 ENCOUNTER — TELEPHONE (OUTPATIENT)
Dept: DERMATOLOGY | Facility: CLINIC | Age: 83
End: 2023-02-22

## 2023-02-22 DIAGNOSIS — L81.9 DYSPIGMENTATION: ICD-10-CM

## 2023-02-22 DIAGNOSIS — L65.9 LOSS OF HAIR: Primary | ICD-10-CM

## 2023-02-22 DIAGNOSIS — R21 RASH: ICD-10-CM

## 2023-02-22 PROCEDURE — 88312 SPECIAL STAINS GROUP 1: CPT | Mod: TC | Performed by: DERMATOLOGY

## 2023-02-22 PROCEDURE — 11901 INJECT SKIN LESIONS >7: CPT | Performed by: DERMATOLOGY

## 2023-02-22 PROCEDURE — 88312 SPECIAL STAINS GROUP 1: CPT | Mod: 26 | Performed by: DERMATOLOGY

## 2023-02-22 PROCEDURE — 11102 TANGNTL BX SKIN SINGLE LES: CPT | Performed by: DERMATOLOGY

## 2023-02-22 PROCEDURE — 99214 OFFICE O/P EST MOD 30 MIN: CPT | Mod: 25 | Performed by: DERMATOLOGY

## 2023-02-22 PROCEDURE — 88305 TISSUE EXAM BY PATHOLOGIST: CPT | Mod: 26 | Performed by: DERMATOLOGY

## 2023-02-22 ASSESSMENT — PAIN SCALES - GENERAL: PAINLEVEL: NO PAIN (0)

## 2023-02-22 NOTE — NURSING NOTE
Clinic Administered Medication Documentation    Administrations This Visit     triamcinolone acetonide (KENALOG-10) injection 5 mg     Admin Date  02/22/2023 Action  $Given Dose  5 mg Route  Intra-Lesional Site   Administered By  Karen Brown RN    Ordering Provider: Pat Méndez MD    NDC: 2759-3342-28    Lot#: [id number]    : [employer] (PRIMARY CARE)    Patient Supplied?: No

## 2023-02-22 NOTE — LETTER
2/22/2023       RE: Jeannine Rod  [address]  [place] [address]     Dear Colleague,    Thank you for referring your patient, Jeannine Rod, to the [facility] [place] at [facility]. Please see a copy of my visit note below.    [facility] Dermatology Note  Encounter Date: Feb 22, 2023  Office Visit     Dermatology Problem List:  1. Hair loss c/w FFA and AGA, started 2020. Also has CREST  - Current tx: Rogaine daily, ketoconazole shampoo and Krysten deep conditioner 3x weekly, Lidex  3x weekly, protopic on eyebrows daily, calcium, multivitamin, latisse to eyebrows  - Prior tx: fluocinolone oil  - ILK 5/18/2021, 6/30/2021, 9/29/2021, 11/24/21, 11/30/2022, 2/22/2023  - HairMetrix: 6/30/2021, 9/29/2021, 2/23/22, 5/25/22, 11/30/2022  2. BLK, right posterior thigh, s/p shave bx 8/24/2022  3. Hypertrophic lichen planus vs psoriasis, s/p shave bx 2/22/2023  - betamethasone ointment   ____________________________________________    Assessment & Plan:    # Hair loss, most consistent with FFA and AGA. 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 Patient is improving but disease is still active.   - Continue Lidex solution every other day  - Continue minoxidil 5% solution daily    - Continue protopic on the eyebrows daily  - Continue ketoconazole shampoo 3x weekly and Krysten deep conditioner  - Hold Latisse  - Ok to continue calcium and multivitamin supplements   - Plaquenil is scripted and run by rheumatology.   - Declines oral minoxidil  - ILK today, see procedure below    # Plaquenil discoloration   - Plaquenil is scripted and run by rheumatology. Advised the leg discoloration is caused by Plaquenil and patient should follow up with rheumatology.     # Hypertrophic lichen planus vs psoriasis, right shin- rash  - Shave biopsy today, see procedure note below  - Continue augmented betamethasone ointment  for four weeks, these are very thick    Procedures Performed:   - Intra-lesional triamcinolone procedure note. After verbal consent and review of risk of pain and skin thinning/atrophy, positioning and cleansing with isopropyl alcohol, 2 total mL of triamcinolone 2.5 mg/mL was injected into 40 lesion(s) on the crown and frontal scalp. The patient tolerated the procedure well and left the dermatology clinic in good condition.  - Shave biopsy procedure note, location(s): right shin. After discussion of benefits and risks including but not limited to bleeding, infection, scar, incomplete removal, recurrence, and non-diagnostic biopsy, written consent and photographs were obtained. The area was cleaned with isopropyl alcohol. 0.5mL of 1% lidocaine with epinephrine was injected to obtain adequate anesthesia of lesion(s). Shave biopsy at site(s) performed. Hemostasis was achieved with aluminium chloride. Petrolatum ointment and a sterile dressing were applied. The patient tolerated the procedure and no complications were noted. The patient was provided with verbal and written post care instructions.     Follow-up: 8 week(s) in-person, or earlier for new or changing lesions. Put in gown for skin check. Have kenalog ready.     Staff and Scribe:     Scribe Disclosure:  I, SKINNY LOPEZ, am serving as a scribe to document services personally performed by Pat Méndez MD based on data collection and the provider's statements to me.     Provider Disclosure:   The documentation recorded by the scribe accurately reflects the services I personally performed and the decisions made by me.    Pat Méndez MD    Department of Dermatology  [facility]: Phone: [phone], Fax:[phone]  [facility]: Phone: [phone], Fax: [phone]    ____________________________________________    CC: Hair Loss (Jeannine  is here for ILK injections for hair loss. )    HPI:  Ms. Jeannine Rod is a(n) 82 year old female who presents today as a return patient for hair loss.    Today, the patient reports that she is sensing lose some more hair along the hairline. She is using the fluocinolone 3x weekly and Rogaine daily. Not using the latisse due to darkening. Still using protopic on the eyebrows.    Additionally, was recently diagnosed with psoriasis at an outside skin clinic. Itchy lesions on her legs first appeared 2-3 months ago. Was given betamethasone.    Patient is otherwise feeling well, without additional skin concerns.    Labs Reviewed:  N/A    Physical Exam:  Vitals: LMP 07/07/1992   SKIN: Focused examination of the arms, scalp and legs was performed.  - Perifollicular erythema on the frontal hairline.  - Prominent hairline veins.   - Hair loss stable when compared to prior photos.   - Lichenified plaques on the right shin calf and ankle.   - Grey brown discoloration of the shins.  - No other lesions of concern on areas examined.     Medications:  Current Outpatient Medications   Medication     albuterol (PROAIR HFA/PROVENTIL HFA/VENTOLIN HFA) 108 (90 Base) MCG/ACT inhaler     amLODIPine (NORVASC) 5 MG tablet     atorvastatin (LIPITOR) 20 MG tablet     bimatoprost (LATISSE) 0.03 % external opthalmic solution     calcium-vitamin D (CALTRATE) 600-400 MG-UNIT per tablet     clotrimazole (LOTRIMIN) 1 % external cream     estradiol (ESTRACE) 0.1 MG/GM vaginal cream     fluocinonide (LIDEX) 0.05 % external solution     hydroxychloroquine (PLAQUENIL) 200 MG tablet     ketoconazole (NIZORAL) 2 % external shampoo     Melatonin ER 1 MG TBCR     Multiple Vitamin (MULTI-VITAMIN) per tablet     nystatin (MYCOSTATIN) 421564 UNIT/GM external cream     tacrolimus (PROTOPIC) 0.1 % external ointment     triamterene-HCTZ (MAXZIDE-25) 37.5-25 MG tablet     Current Facility-Administered Medications   Medication     triamcinolone acetonide  (KENALOG-10) injection 10 mg     triamcinolone acetonide (KENALOG-10) injection 10 mg      Past Medical History:   Patient Active Problem List   Diagnosis     Malignant neoplasm of breast (H)     Raynaud's syndrome     Status post hip replacement     Systemic sclerosis (H)     Gastroesophageal reflux disease with esophagitis     Hyperlipidemia LDL goal <100     Calculus of gallbladder without cholecystitis     Radiculoplexus neuropathy     Benign essential hypertension     Atrophic vaginitis     Chronic kidney disease, stage 3 (H)     Chronic obstructive pulmonary disease, unspecified COPD type (H)     History of colonic polyps     Past Medical History:   Diagnosis Date     Arthritis      Benign essential hypertension 10/23/2017     Benign essential hypertension 10/23/2017     Calculus of gallbladder without cholecystitis 10/6/2016     Gastroesophageal reflux disease with esophagitis 10/6/2016     Hyperlipidemia      Malignant neoplasm (H)     breast bilat mastectomy, no rad, no chemobilat      Pulmonary embolism and infarction 3/9/2012    IMO update changed this record. 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     Raynaud's syndrome      Systemic sclerosis (H) 10/6/2016        CC No referring provider defined for this encounter. on close of this encounter.

## 2023-02-22 NOTE — PROGRESS NOTES
[facility] Dermatology Note  Encounter Date: Feb 22, 2023  Office Visit     Dermatology Problem List:  1. Hair loss c/w FFA and AGA, started 2020. Also has CREST  - Current tx: Rogaine daily, ketoconazole shampoo and Krysten deep conditioner 3x weekly, Lidex  3x weekly, protopic on eyebrows daily, calcium, multivitamin, latisse to eyebrows  - Prior tx: fluocinolone oil  - ILK 5/18/2021, 6/30/2021, 9/29/2021, 11/24/21, 11/30/2022, 2/22/2023  - HairMetrix: 6/30/2021, 9/29/2021, 2/23/22, 5/25/22, 11/30/2022  2. BLK, right posterior thigh, s/p shave bx 8/24/2022  3. Hypertrophic lichen planus vs psoriasis, s/p shave bx 2/22/2023  - betamethasone ointment   ____________________________________________    Assessment & Plan:    # Hair loss, most consistent with FFA and AGA. 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 Patient is improving but disease is still active.   - Continue Lidex solution every other day  - Continue minoxidil 5% solution daily    - Continue protopic on the eyebrows daily  - Continue ketoconazole shampoo 3x weekly and Krysten deep conditioner  - Hold Latisse  - Ok to continue calcium and multivitamin supplements   - Plaquenil is scripted and run by rheumatology.   - Declines oral minoxidil  - ILK today, see procedure below    # Plaquenil discoloration   - Plaquenil is scripted and run by rheumatology. Advised the leg discoloration is caused by Plaquenil and patient should follow up with rheumatology.     # Hypertrophic lichen planus vs psoriasis, right shin- rash  - Shave biopsy today, see procedure note below  - Continue augmented betamethasone ointment for four weeks, these are very thick    Procedures Performed:   - Intra-lesional triamcinolone procedure note. After verbal consent and review of risk of pain and skin thinning/atrophy, positioning and cleansing with isopropyl alcohol, 2 total mL of triamcinolone 2.5 mg/mL was injected into 40 lesion(s) on the crown  and frontal scalp. The patient tolerated the procedure well and left the dermatology clinic in good condition.  - Shave biopsy procedure note, location(s): right shin. After discussion of benefits and risks including but not limited to bleeding, infection, scar, incomplete removal, recurrence, and non-diagnostic biopsy, written consent and photographs were obtained. The area was cleaned with isopropyl alcohol. 0.5mL of 1% lidocaine with epinephrine was injected to obtain adequate anesthesia of lesion(s). Shave biopsy at site(s) performed. Hemostasis was achieved with aluminium chloride. Petrolatum ointment and a sterile dressing were applied. The patient tolerated the procedure and no complications were noted. The patient was provided with verbal and written post care instructions.     Follow-up: 8 week(s) in-person, or earlier for new or changing lesions. Put in gown for skin check. Have kenalog ready.     Staff and Scribe:     Scribe Disclosure:  I, SKINNY LOPEZ, am serving as a scribe to document services personally performed by Pat Méndez MD based on data collection and the provider's statements to me.     Provider Disclosure:   The documentation recorded by the scribe accurately reflects the services I personally performed and the decisions made by me.    Pat Méndez MD    Department of Dermatology  [facility]: Phone: [phone], Fax:[phone]  [facility]: Phone: [phone], Fax: [phone]    ____________________________________________    CC: Hair Loss (Jeannine is here for ILK injections for hair loss. )    HPI:  Ms. Jeannine Rod is a(n) 82 year old female who presents today as a return patient for hair loss.    Today, the patient reports that she is sensing lose some more hair along the hairline. She is using the fluocinolone 3x weekly and Rogaine daily. Not using the  latisse due to darkening. Still using protopic on the eyebrows.    Additionally, was recently diagnosed with psoriasis at an outside skin clinic. Itchy lesions on her legs first appeared 2-3 months ago. Was given betamethasone.    Patient is otherwise feeling well, without additional skin concerns.    Labs Reviewed:  N/A    Physical Exam:  Vitals: LMP 07/07/1992   SKIN: Focused examination of the arms, scalp and legs was performed.  - Perifollicular erythema on the frontal hairline.  - Prominent hairline veins.   - Hair loss stable when compared to prior photos.   - Lichenified plaques on the right shin calf and ankle.   - Grey brown discoloration of the shins.  - No other lesions of concern on areas examined.     Medications:  Current Outpatient Medications   Medication     albuterol (PROAIR HFA/PROVENTIL HFA/VENTOLIN HFA) 108 (90 Base) MCG/ACT inhaler     amLODIPine (NORVASC) 5 MG tablet     atorvastatin (LIPITOR) 20 MG tablet     bimatoprost (LATISSE) 0.03 % external opthalmic solution     calcium-vitamin D (CALTRATE) 600-400 MG-UNIT per tablet     clotrimazole (LOTRIMIN) 1 % external cream     estradiol (ESTRACE) 0.1 MG/GM vaginal cream     fluocinonide (LIDEX) 0.05 % external solution     hydroxychloroquine (PLAQUENIL) 200 MG tablet     ketoconazole (NIZORAL) 2 % external shampoo     Melatonin ER 1 MG TBCR     Multiple Vitamin (MULTI-VITAMIN) per tablet     nystatin (MYCOSTATIN) 621806 UNIT/GM external cream     tacrolimus (PROTOPIC) 0.1 % external ointment     triamterene-HCTZ (MAXZIDE-25) 37.5-25 MG tablet     Current Facility-Administered Medications   Medication     triamcinolone acetonide (KENALOG-10) injection 10 mg     triamcinolone acetonide (KENALOG-10) injection 10 mg      Past Medical History:   Patient Active Problem List   Diagnosis     Malignant neoplasm of breast (H)     Raynaud's syndrome     Status post hip replacement     Systemic sclerosis (H)     Gastroesophageal reflux disease with  esophagitis     Hyperlipidemia LDL goal <100     Calculus of gallbladder without cholecystitis     Radiculoplexus neuropathy     Benign essential hypertension     Atrophic vaginitis     Chronic kidney disease, stage 3 (H)     Chronic obstructive pulmonary disease, unspecified COPD type (H)     History of colonic polyps     Past Medical History:   Diagnosis Date     Arthritis      Benign essential hypertension 10/23/2017     Benign essential hypertension 10/23/2017     Calculus of gallbladder without cholecystitis 10/6/2016     Gastroesophageal reflux disease with esophagitis 10/6/2016     Hyperlipidemia      Malignant neoplasm (H)     breast bilat mastectomy, no rad, no chemobilat      Pulmonary embolism and infarction 3/9/2012    IMO update changed this record. 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     Raynaud's syndrome      Systemic sclerosis (H) 10/6/2016        CC No referring provider defined for this encounter. on close of this encounter.

## 2023-02-22 NOTE — TELEPHONE ENCOUNTER
" Health Call Center    Phone Message    May a detailed message be left on voicemail: yes     Reason for Call: Other: Pt called and stated she can not get out of her driveway and can not get a ride from lift - pt states \" i am so sorry but i need to cancel and r/s\" please call pt to further discuss, thanks!     Action Taken: Message routed to:  [facility] Derm    Travel Screening: Not Applicable                                                                      "

## 2023-02-22 NOTE — NURSING NOTE
Dermatology Rooming Note    Mia Marcel's goals for this visit include:   Chief Complaint   Patient presents with     Hair Loss     Jeannine is here for ILK injections for hair loss.      Joyce Rene, Visit Facilitator

## 2023-02-22 NOTE — PATIENT INSTRUCTIONS
[facility] Dermatology Visit    Thank you for allowing us to participate in your care. Your findings, instructions and follow-up plan are as follows:     Wound Care After a Biopsy    What is a skin biopsy?  A skin biopsy allows the doctor to examine a very small piece of tissue under the microscope to determine the diagnosis and the best treatment for the skin condition. A local anesthetic (numbing medicine)  is injected with a very small needle into the skin area to be tested. A small piece of skin is taken from the area. Sometimes a suture (stitch) is used.     What are the risks of a skin biopsy?  I will experience scar, bleeding, swelling, pain, crusting and redness. I may experience incomplete removal or recurrence. Risks of this procedure are excessive bleeding, bruising, infection, nerve damage, numbness, thick (hypertrophic or keloidal) scar and non-diagnostic biopsy.    How should I care for my wound for the first 24 hours?  Keep the wound dry and covered for 24 hours  If it bleeds, hold direct pressure on the area for 15 minutes. If bleeding does not stop then go to the emergency room  Avoid strenuous exercise the first 1-2 days or as your doctor instructs you    How should I care for the wound after 24 hours?  After 24 hours, remove the bandage  You may bathe or shower as normal  If you had a scalp biopsy, you can shampoo as usual and can use shower water to clean the biopsy site daily  Clean the wound twice a day with gentle soap and water  Do not scrub, be gentle  Apply white petroleum/Vaseline after cleaning the wound with a cotton swab or a clean finger, and keep the site covered with a Bandaid /bandage. Bandages are not necessary with a scalp biopsy  If you are unable to cover the site with a Bandaid /bandage, re-apply ointment 2-3 times a day to keep the site moist. Moisture will help with healing  Avoid strenuous activity for first 1-2 days  Avoid lakes, rivers, pools, and  oceans until the stitches are removed or the site is healed    How do I clean my wound?  Wash hands thoroughly with soap or use hand  before all wound care  Clean the wound with gentle soap and water  Apply white petroleum/Vaseline  to wound after it is clean  Replace the Bandaid /bandage to keep the wound covered for the first few days or as instructed by your doctor  If you had a scalp biopsy, warm shower water to the area on a daily basis should suffice    What should I use to clean my wound?   Cotton-tipped applicators (Qtips )  White petroleum jelly (Vaseline ). Use a clean new container and use Q-tips to apply.  Bandaids   as needed  Gentle soap     How should I care for my wound long term?  Do not get your wound dirty  Keep up with wound care for one week or until the area is healed.  A small scab will form and fall off by itself when the area is completely healed. The area will be red and will become pink in color as it heals. Sun protection is very important for how your scar will turn out. Sunscreen with an SPF 30 or greater is recommended once the area is healed.       You should have some soreness but it should be mild and slowly go away over several days. Talk to your doctor about using tylenol for pain,    When should I call my doctor?  If you have increased:   Pain or swelling  Pus or drainage (clear or slightly yellow drainage is ok)  Temperature over 100F  Spreading redness or warmth around wound    When will I hear about my results?  The biopsy results can take 2 weeks to come back.  Your results will automatically release to [facility] before your provider has even reviewed them.  The clinic will call you with the results, send you a [employer] message, or have you schedule a follow-up clinic or phone time to discuss the results.  Contact our clinics if you do not hear from us in 2 weeks.    Who should I call with questions?  [facility]: [phone]  [place]  [facility]: [phone]  For urgent needs outside of business hours call the [facility] at [phone] and ask for the dermatology resident on call     When should I call my doctor?  If you are worsening or not improving, please, contact us or seek urgent care as noted below.     Who should I call with questions (adults)?  [facility] (adult and pediatric): [phone]  [facility] (adult): [phone]  For urgent needs outside of business hours call the [facility] at [phone] and ask for the dermatology resident on call  If this is a medical emergency and you are unable to reach an ER, Call 911    Who should I call with questions (pediatric)?  [facility] Pediatric Dermatology  Dr. Marimar Riggs, Dr. El Cuellar, Dr. Aarti Loco, Danelle Aguayo, PA  Dr. Pilar Trevino, Dr. Pat Ignacio & Dr. Lang Chester  Non Urgent  Nurse Triage Line; [phone]- Roseann and Elisa [facility] Care Coordinators   Frannie (/Complex ) [phone]    If you need a prescription refill, please contact your pharmacy. Refills are approved or denied by our physicians during normal business hours, Monday through Fridays  Per office policy, refills will not be granted if you have not been seen within the past year (or sooner depending on your child's condition).    Scheduling Information:  Pediatric Appointment Scheduling and Call Center [phone]  Radiology Scheduling- [phone]  Sedation Unit Scheduling- [phone]  [place] Scheduling- General [phone]; Pediatric Dermatology [phone]  Main  Services: [phone]  Azeri: [phone]  German: [phone]  Hmong/Liberian/Khmer: [phone]  Preadmission Nursing Department Fax Number: [phone] (fax all pre-operative paperwork to this number)    For urgent matters arising during evenings, weekends,  or holidays that cannot wait for normal business hours please call [phone] and ask for the dermatology resident on call to be paged.

## 2023-02-24 LAB
PATH REPORT.COMMENTS IMP SPEC: NORMAL
PATH REPORT.FINAL DX SPEC: NORMAL
PATH REPORT.GROSS SPEC: NORMAL
PATH REPORT.MICROSCOPIC SPEC OTHER STN: NORMAL
PATH REPORT.RELEVANT HX SPEC: NORMAL

## 2023-03-10 ENCOUNTER — TRANSFERRED RECORDS (OUTPATIENT)
Dept: HEALTH INFORMATION MANAGEMENT | Facility: CLINIC | Age: 83
End: 2023-03-10

## 2023-03-10 LAB
ALT SERPL-CCNC: 22 IU/L (ref 5–35)
AST SERPL-CCNC: 21 U/L (ref 5–34)
CREATININE (EXTERNAL): 0.95 MG/DL (ref 0.5–1.3)
GFR ESTIMATED (EXTERNAL): NORMAL ML/MIN/1.73M2
GFR ESTIMATED (IF AFRICAN AMERICAN) (EXTERNAL): NORMAL ML/MIN/1.73M2

## 2023-03-15 ENCOUNTER — TRANSFERRED RECORDS (OUTPATIENT)
Dept: HEALTH INFORMATION MANAGEMENT | Facility: CLINIC | Age: 83
End: 2023-03-15
Payer: MEDICARE

## 2023-04-01 ENCOUNTER — HEALTH MAINTENANCE LETTER (OUTPATIENT)
Age: 83
End: 2023-04-01

## 2023-04-05 DIAGNOSIS — I10 BENIGN ESSENTIAL HYPERTENSION: ICD-10-CM

## 2023-04-05 RX ORDER — TRIAMTERENE/HYDROCHLOROTHIAZID 37.5-25 MG
TABLET ORAL
Qty: 90 TABLET | Refills: 0 | Status: SHIPPED | OUTPATIENT
Start: 2023-04-05 | End: 2023-07-03

## 2023-04-19 NOTE — PROGRESS NOTES
[facility] Dermatology Note  Encounter Date: Apr 26, 2023  Office Visit     Dermatology Problem List:  1. Hair loss c/w FFA and AGA, started 2020. Also has CREST  - Current tx: Rogaine daily, ketoconazole shampoo and Krysten deep conditioner 3x weekly, Lidex  3x weekly, protopic on eyebrows daily, calcium, multivitamin, latisse to eyebrows  - Prior tx: fluocinolone oil  - ILK 5/18/2021, 6/30/2021, 9/29/2021, 11/24/21, 11/30/2022, 2/22/2023  - HairMetrix: 6/30/2021, 9/29/2021, 2/23/22, 5/25/22, 11/30/2022  2. BLK, right posterior thigh, s/p shave bx 8/24/2022  3. Superficial portions of irregular acanthosis and patchy interface dermatitis, s/p shave bx 2/22/2023  - betamethasone ointment     Sister with skin cancer     ____________________________________________    Assessment & Plan:    # Hair loss, most consistent with FF/LPPA and AGA. 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 Patient is improving but disease is still active. injections   - Continue Lidex solution every other day  - Continue minoxidil 5% solution daily/protopic  -injections   -plquenil from rheum     # Plaquenil discoloration   -not addressed today      # Hypertrophic lichen planus- biopsy proven-   -lidex alternating with protopic every 2 weeks, injections today  -recommend dental exam    Procedures Performed:   - Intra-lesional triamcinolone procedure note. After verbal consent and review of risk of pain and skin thinning/atrophy, positioning and cleansing with isopropyl alcohol, 3 total mL of triamcinolone 2.5 mg/mL was injected into 25 lesion(s) on the scalp and the lower extremity. The patient tolerated the procedure well and left the dermatology clinic in good condition.       Follow-up:  In 3 months    Staff and Scribe:     Scribe Disclosure:  RONI, Henry Siddiqui, am serving as a scribe to document services personally performed by Pat Méndez MD based on data collection and the provider's statements  to me.     Provider Disclosure:   The documentation recorded by the scribe accurately reflects the services I personally performed and the decisions made by me.    Pat Méndez MD    Department of Dermatology  [facility]: Phone: [phone], Fax:[phone]  [facility]: Phone: [phone], Fax: [phone]    ____________________________________________    CC: Hair Loss (Jeannine is here for a skin check. )    HPI:  Ms. Jeannine Rod is a(n) 82 year old female who presents today as a return patient for hair loss follow-up and skin check. Last seen by me on 2/22/2023, at which time patient underwent ILK for treatment of hair loss. Additionally, patient underwent shave biopsy on right shin which returned as superficial portions of irregular acanthosis and patchy interface dermatitis.        Today, she is here for skin check, has hyeprtrophic lichen palnus.     No itching, but she does still have spreading    Scalp doing well    Patient is otherwise feeling well, without additional skin concerns.    Labs Reviewed:  A(1). Right shin:  - Superficial portions of irregular acanthosis and patchy interface dermatitis - (see comment and description)   Electronically signed by Desmond Echeverria MD on 2/24/2023 at 12:52 PM   Comment  UUMAYO   Together with the clinical information in the electronic medical record (including review of notes/history and photographs, where available), the findings present in this specimen are favored to represent the superficial aspect of hypertrophic lichen planus.          Physical Exam:  Vitals: LMP 07/07/1992   SKIN: Total skin excluding the undergarment areas was performed. The exam included the head/face, neck, both arms, chest, back, abdomen, buttocks, both legs, digits and/or nails.     1 scaly patch on the left thigh   Hypertrophic plaques on the right shin and  left lower leg  -thinning frontal scalp stable, loss of ostia  - No other lesions of concern on areas examined.     Medications:  Current Outpatient Medications   Medication     albuterol (PROAIR HFA/PROVENTIL HFA/VENTOLIN HFA) 108 (90 Base) MCG/ACT inhaler     amLODIPine (NORVASC) 5 MG tablet     atorvastatin (LIPITOR) 20 MG tablet     calcium-vitamin D (CALTRATE) 600-400 MG-UNIT per tablet     clotrimazole (LOTRIMIN) 1 % external cream     estradiol (ESTRACE) 0.1 MG/GM vaginal cream     fluocinonide (LIDEX) 0.05 % external solution     hydroxychloroquine (PLAQUENIL) 200 MG tablet     ketoconazole (NIZORAL) 2 % external shampoo     Melatonin ER 1 MG TBCR     Multiple Vitamin (MULTI-VITAMIN) per tablet     nystatin (MYCOSTATIN) 717565 UNIT/GM external cream     tacrolimus (PROTOPIC) 0.1 % external ointment     triamterene-HCTZ (MAXZIDE-25) 37.5-25 MG tablet     Current Facility-Administered Medications   Medication     triamcinolone acetonide (KENALOG-10) injection 10 mg     triamcinolone acetonide (KENALOG-10) injection 10 mg      Past Medical History:   Patient Active Problem List   Diagnosis     Malignant neoplasm of breast (H)     Raynaud's syndrome     Status post hip replacement     Systemic sclerosis (H)     Gastroesophageal reflux disease with esophagitis     Hyperlipidemia LDL goal <100     Calculus of gallbladder without cholecystitis     Radiculoplexus neuropathy     Benign essential hypertension     Atrophic vaginitis     Chronic kidney disease, stage 3 (H)     Chronic obstructive pulmonary disease, unspecified COPD type (H)     History of colonic polyps     Past Medical History:   Diagnosis Date     Arthritis      Benign essential hypertension 10/23/2017     Benign essential hypertension 10/23/2017     Calculus of gallbladder without cholecystitis 10/6/2016     Gastroesophageal reflux disease with esophagitis 10/6/2016     Hyperlipidemia      Malignant neoplasm (H)     breast bilat mastectomy, no rad,  no chemobilat      Pulmonary embolism and infarction 3/9/2012    IMO update changed this record. 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     Raynaud's syndrome      Systemic sclerosis (H) 10/6/2016        CC No referring provider defined for this encounter. on close of this encounter.

## 2023-04-26 ENCOUNTER — OFFICE VISIT (OUTPATIENT)
Dept: DERMATOLOGY | Facility: CLINIC | Age: 83
End: 2023-04-26
Payer: MEDICARE

## 2023-04-26 DIAGNOSIS — L43.0 HYPERTROPHIC LICHEN PLANUS: Primary | ICD-10-CM

## 2023-04-26 DIAGNOSIS — L66.10 LICHEN PLANOPILARIS: ICD-10-CM

## 2023-04-26 DIAGNOSIS — L66.12 FRONTAL FIBROSING ALOPECIA: ICD-10-CM

## 2023-04-26 PROCEDURE — 99214 OFFICE O/P EST MOD 30 MIN: CPT | Mod: 25 | Performed by: DERMATOLOGY

## 2023-04-26 PROCEDURE — 11901 INJECT SKIN LESIONS >7: CPT | Performed by: DERMATOLOGY

## 2023-04-26 RX ORDER — TACROLIMUS 1 MG/G
OINTMENT TOPICAL
Qty: 120 G | Refills: 2 | Status: SHIPPED | OUTPATIENT
Start: 2023-04-26 | End: 2024-03-06

## 2023-04-26 ASSESSMENT — PAIN SCALES - GENERAL: PAINLEVEL: NO PAIN (0)

## 2023-04-26 NOTE — PATIENT INSTRUCTIONS
[facility] Dermatology Visit    Thank you for allowing us to participate in your care. Your findings, instructions and follow-up plan are as follows:         When should I call my doctor?  If you are worsening or not improving, please, contact us or seek urgent care as noted below.     Who should I call with questions (adults)?  [facility] (adult and pediatric): [phone]  [facility] (adult): [phone]  For urgent needs outside of business hours call the [facility] at [phone] and ask for the dermatology resident on call  If this is a medical emergency and you are unable to reach an ER, Call 911    Who should I call with questions (pediatric)?  [facility]- Pediatric Dermatology  Dr. Marimar Riggs, Dr. El Cuellar, Dr. Aarti Loco, Danelle Aguayo, PA  Dr. Pilar Trevino, Dr. Pat Igncaio & Dr. Lang Chester  Non Urgent  Nurse Triage Line; [phone]- Roseann and Elisa RN Care Coordinators   Frannie (/Complex ) [phone]    If you need a prescription refill, please contact your pharmacy. Refills are approved or denied by our physicians during normal business hours, Monday through Fridays  Per office policy, refills will not be granted if you have not been seen within the past year (or sooner depending on your child's condition).    Scheduling Information:  Pediatric Appointment Scheduling and Call Center [phone]  Radiology Scheduling- [phone]  Sedation Unit Scheduling- [phone]  [place] Scheduling- General [phone]; Pediatric Dermatology [phone]  Main  Services: [phone]  Montenegrin: [phone]  Swedish: [phone]  Hmong/Epifanio/Uruguayan: [phone]  Preadmission Nursing Department Fax Number: [phone] (fax all pre-operative paperwork to this number)    For urgent matters arising during evenings, weekends, or  holidays that cannot wait for normal business hours please call [phone] and ask for the dermatology resident on call to be paged.

## 2023-04-26 NOTE — LETTER
4/26/2023       RE: Jeannine Rod  [address]  [place] [address]     Dear Colleague,    Thank you for referring your patient, Jeannine Rod, to the [facility]MAT[facility] [place] at [facility]. Please see a copy of my visit note below.    [facility] Dermatology Note  Encounter Date: Apr 26, 2023  Office Visit     Dermatology Problem List:  1. Hair loss c/w FFA and AGA, started 2020. Also has CREST  - Current tx: Rogaine daily, ketoconazole shampoo and Krysten deep conditioner 3x weekly, Lidex  3x weekly, protopic on eyebrows daily, calcium, multivitamin, latisse to eyebrows  - Prior tx: fluocinolone oil  - ILK 5/18/2021, 6/30/2021, 9/29/2021, 11/24/21, 11/30/2022, 2/22/2023  - HairMetrix: 6/30/2021, 9/29/2021, 2/23/22, 5/25/22, 11/30/2022  2. BLK, right posterior thigh, s/p shave bx 8/24/2022  3. Superficial portions of irregular acanthosis and patchy interface dermatitis, s/p shave bx 2/22/2023  - betamethasone ointment     Sister with skin cancer     ____________________________________________    Assessment & Plan:    # Hair loss, most consistent with FF/LPPA and AGA. 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 Patient is improving but disease is still active. injections   - Continue Lidex solution every other day  - Continue minoxidil 5% solution daily/protopic  -injections   -plquenil from rheum     # Plaquenil discoloration   -not addressed today      # Hypertrophic lichen planus- biopsy proven-   -lidex alternating with protopic every 2 weeks, injections today  -recommend dental exam    Procedures Performed:   - Intra-lesional triamcinolone procedure note. After verbal consent and review of risk of pain and skin thinning/atrophy, positioning and cleansing with isopropyl alcohol, 3 total mL of triamcinolone 2.5 mg/mL was injected into 25 lesion(s) on the scalp and the lower extremity. The patient  tolerated the procedure well and left the dermatology clinic in good condition.       Follow-up:  In 3 months    Staff and Scribe:     Scribe Disclosure:  I, Henry Siddiqui, am serving as a scribe to document services personally performed by Pat Méndez MD based on data collection and the provider's statements to me.     Provider Disclosure:   The documentation recorded by the scribe accurately reflects the services I personally performed and the decisions made by me.    Pat Méndez MD    Department of Dermatology  [facility]: Phone: [phone], Fax:[phone]  [facility]: Phone: [phone], Fax: [phone]    ____________________________________________    CC: Hair Loss (Jeannine is here for a skin check. )    HPI:  Ms. Jeannine Rod is a(n) 82 year old female who presents today as a return patient for hair loss follow-up and skin check. Last seen by me on 2/22/2023, at which time patient underwent ILK for treatment of hair loss. Additionally, patient underwent shave biopsy on right shin which returned as superficial portions of irregular acanthosis and patchy interface dermatitis.        Today, she is here for skin check, has hyeprtrophic lichen palnus.     No itching, but she does still have spreading    Scalp doing well    Patient is otherwise feeling well, without additional skin concerns.    Labs Reviewed:  A(1). Right shin:  - Superficial portions of irregular acanthosis and patchy interface dermatitis - (see comment and description)   Electronically signed by Desmond Echeverria MD on 2/24/2023 at 12:52 PM   Comment  [facility]MAYO   Together with the clinical information in the electronic medical record (including review of notes/history and photographs, where available), the findings present in this specimen are favored to represent the superficial aspect of hypertrophic lichen  planus.          Physical Exam:  Vitals: LMP 07/07/1992   SKIN: Total skin excluding the undergarment areas was performed. The exam included the head/face, neck, both arms, chest, back, abdomen, buttocks, both legs, digits and/or nails.     1 scaly patch on the left thigh   Hypertrophic plaques on the right shin and left lower leg  -thinning frontal scalp stable, loss of ostia  - No other lesions of concern on areas examined.     Medications:  Current Outpatient Medications   Medication    albuterol (PROAIR HFA/PROVENTIL HFA/VENTOLIN HFA) 108 (90 Base) MCG/ACT inhaler    amLODIPine (NORVASC) 5 MG tablet    atorvastatin (LIPITOR) 20 MG tablet    calcium-vitamin D (CALTRATE) 600-400 MG-UNIT per tablet    clotrimazole (LOTRIMIN) 1 % external cream    estradiol (ESTRACE) 0.1 MG/GM vaginal cream    fluocinonide (LIDEX) 0.05 % external solution    hydroxychloroquine (PLAQUENIL) 200 MG tablet    ketoconazole (NIZORAL) 2 % external shampoo    Melatonin ER 1 MG TBCR    Multiple Vitamin (MULTI-VITAMIN) per tablet    nystatin (MYCOSTATIN) 118046 UNIT/GM external cream    tacrolimus (PROTOPIC) 0.1 % external ointment    triamterene-HCTZ (MAXZIDE-25) 37.5-25 MG tablet     Current Facility-Administered Medications   Medication    triamcinolone acetonide (KENALOG-10) injection 10 mg    triamcinolone acetonide (KENALOG-10) injection 10 mg      Past Medical History:   Patient Active Problem List   Diagnosis    Malignant neoplasm of breast (H)    Raynaud's syndrome    Status post hip replacement    Systemic sclerosis (H)    Gastroesophageal reflux disease with esophagitis    Hyperlipidemia LDL goal <100    Calculus of gallbladder without cholecystitis    Radiculoplexus neuropathy    Benign essential hypertension    Atrophic vaginitis    Chronic kidney disease, stage 3 (H)    Chronic obstructive pulmonary disease, unspecified COPD type (H)    History of colonic polyps     Past Medical History:   Diagnosis Date    Arthritis     Benign  essential hypertension 10/23/2017    Benign essential hypertension 10/23/2017    Calculus of gallbladder without cholecystitis 10/6/2016    Gastroesophageal reflux disease with esophagitis 10/6/2016    Hyperlipidemia     Malignant neoplasm (H)     breast bilat mastectomy, no rad, no chemobilat     Pulmonary embolism and infarction 3/9/2012    IMO update changed this record. 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 on close of this encounter.

## 2023-04-26 NOTE — NURSING NOTE
Dermatology Rooming Note    Mia Marcel's goals for this visit include:   Chief Complaint   Patient presents with     Hair Loss     Jeannine is here for a skin check.      Joyce Rene, Visit Facilitator

## 2023-04-27 ENCOUNTER — TELEPHONE (OUTPATIENT)
Dept: CALL CENTER | Age: 83
End: 2023-04-27

## 2023-04-27 RX ORDER — TACROLIMUS 1 MG/G
OINTMENT TOPICAL
Start: 2023-04-27

## 2023-04-27 NOTE — TELEPHONE ENCOUNTER
TACROLIMUS 0.1% OINTMENT     RF NOT NEEDED,   PA REQUEST- SENT  TO PA TEAM, OTHER ENCOUNTER  FYI TO CLINIC, if PA denied then need alternative

## 2023-04-27 NOTE — TELEPHONE ENCOUNTER
Alternative Requested:THIS RX REQUIRES A PRIOR AUTH. PLEASE START PA OR CHANGE TO SOMETHING SIMILAR.    tacrolimus (PROTOPIC) 0.1 % external ointment   120 g 2 4/26/2023  No  Sig: USe twice daily when not using fluocinonide cream on the legs and scalp  [id number] - [place], [place] - [address] [place] [address]  Prescribing Provider's NPI: [id number]  Pat Méndez        Derm Clinic: FYI, if PA denied then alternative is needed

## 2023-04-27 NOTE — NURSING NOTE
Drug Administration Record    Prior to injection, verified patient identity using patient's name and date of birth.  Due to injection administration, patient instructed to remain in clinic for 15 minutes  afterwards, and to report any adverse reaction to me immediately.    Drug Name: triamcinolone acetonide(kenalog)  Dose: 0.75mL of triamcinolone 10mg/mL, 7.5mg dose  Route administered: ID  NDC #: Kenalog-40 ([address]-4)  Amount of waste(mL):4.25  Reason for waste: Single use vial    LOT #: [id number]  : [employer]  EXPIRATION DATE: 05/2023

## 2023-05-01 NOTE — TELEPHONE ENCOUNTER
PRIOR AUTHORIZATION DENIED    Medication: tacrolimus (PROTOPIC) 0.1 % external ointment-PA denied    Denial Date: 5/1/2023    Denial Rational: Dx is not covered        Appeal Information:

## 2023-05-01 NOTE — TELEPHONE ENCOUNTER
Central Prior Authorization Team   Phone: [phone]      PA Initiation    Medication: PA requested by pharmacy:tacrolimus (PROTOPIC) 0.1 % external ointment-PA initiated  Insurance Company: [employer] - Phone [phone] Fax [phone]  Pharmacy Filling the Rx: [id number] - [place], [place] - [address]  Filling Pharmacy Phone: [phone]  Filling Pharmacy Fax:    Start Date: 5/1/2023

## 2023-05-03 ENCOUNTER — TELEPHONE (OUTPATIENT)
Dept: OTHER | Facility: CLINIC | Age: 83
End: 2023-05-03
Payer: MEDICARE

## 2023-05-03 NOTE — TELEPHONE ENCOUNTER
[facility]    Who is the name of the provider? New Consult for Dr Heath - Referred by Dr Gus Shaffer ([facility]) - mentioned it to Jeannine at her 03/10/23 OV     What is the location you see this provider at/preferred location? [place]    Person calling / Facility:  Jeannine     Phone number:  [phone]     Nurse call back needed: N     Reason for call:  Pt is having problems with circulation in both hands and feet (blue up to her ankles) - Pt does have Raynauds and Dr Shaffer is wondering what the severity is      Pharmacy location:  N/A    Outside Imaging: N/A    Can we leave a detailed message on this number?  Y    Additional Info:

## 2023-05-03 NOTE — TELEPHONE ENCOUNTER
Please arrange for new patient consult with Dr. Heath at next available.    Appt note:  Referred by Dr. Gus Shaffer for Raynauds/circulation issues.

## 2023-05-04 ENCOUNTER — OFFICE VISIT (OUTPATIENT)
Dept: OTHER | Facility: CLINIC | Age: 83
End: 2023-05-04
Attending: INTERNAL MEDICINE
Payer: MEDICARE

## 2023-05-04 ENCOUNTER — TELEPHONE (OUTPATIENT)
Dept: OTHER | Facility: CLINIC | Age: 83
End: 2023-05-04

## 2023-05-04 VITALS
BODY MASS INDEX: 21.47 KG/M2 | OXYGEN SATURATION: 99 % | SYSTOLIC BLOOD PRESSURE: 136 MMHG | HEIGHT: 67 IN | DIASTOLIC BLOOD PRESSURE: 69 MMHG | HEART RATE: 76 BPM | WEIGHT: 136.8 LBS

## 2023-05-04 DIAGNOSIS — I73.00 RAYNAUD'S DISEASE WITHOUT GANGRENE: ICD-10-CM

## 2023-05-04 DIAGNOSIS — I74.9 EMBOLISM AND THROMBOSIS OF ARTERY (H): Primary | ICD-10-CM

## 2023-05-04 PROCEDURE — G0463 HOSPITAL OUTPT CLINIC VISIT: HCPCS

## 2023-05-04 PROCEDURE — 99205 OFFICE O/P NEW HI 60 MIN: CPT | Performed by: INTERNAL MEDICINE

## 2023-05-04 NOTE — PROGRESS NOTES
"Patient is here to discuss follow up    /69 (BP Location: Left arm, Patient Position: Chair, Cuff Size: Adult Small)   Pulse 76   Ht 5' 6.5\" (1.689 m)   Wt 136 lb 12.8 oz (62.1 kg)   LMP 07/07/1992   SpO2 99%   BMI 21.75 kg/m      Questions patient would like addressed today are: N/A.    Refills are needed: No    Has homecare services and agency name:  [facility] FELDMAN"

## 2023-05-04 NOTE — PROGRESS NOTES
INITIAL VASCULAR MEDICAL ASSESSMENT  REFERRAL SOURCE: Dr. Gus Shaffer  REASON FOR CONSULT:  for Raynauds/circulation issues    HPI: Jeannine Rod is a 82 year old female with a h/o CREST maintained on Plaquenil with reasonable control who also has Raynaud's sxs which have gotten worse in the last several months. Her rheumatologist (Dr. Shaffer) referred her to us due to evolving darker splotches in her finger tips. SHe has no tissue loss. There is no pain per se.    Review Of Systems  Skin: as above  Eyes: negative  Ears/Nose/Throat: negative  Respiratory: No shortness of breath, dyspnea on exertion, cough, or hemoptysis  Cardiovascular: negative  Gastrointestinal: negative  Genitourinary: negative  Musculoskeletal: negative  Neurologic: negative  Psychiatric: negative  Hematologic/Lymphatic/Immunologic: negative  Endocrine: negative      PAST MEDICAL HISTORY:                  Past Medical History:   Diagnosis Date     Arthritis      Benign essential hypertension 10/23/2017     Benign essential hypertension 10/23/2017     Calculus of gallbladder without cholecystitis 10/6/2016     Gastroesophageal reflux disease with esophagitis 10/6/2016     Hyperlipidemia      Malignant neoplasm (H)     breast bilat mastectomy, no rad, no chemobilat      Pulmonary embolism and infarction 3/9/2012    IMO update changed this record. 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     Raynaud's syndrome      Systemic sclerosis (H) 10/6/2016       PAST SURGICAL HISTORY:                  Past Surgical History:   Procedure Laterality Date     ARTHROPLASTY HIP  3/5/2012    Procedure:ARTHROPLASTY HIP; LEFT TOTAL HIP ARTHROPLASTY (BIOMET)^; Surgeon:SABRINA CALLEJAS; Location:[facility] OR     BIOPSY  8/29/2007    Right Breast Biopsy     BIOPSY  9/12/2007    Right Breast Biopsy x2     BIOPSY  9/13/2007    Left Breast Biopsy     BREAST SURGERY  10/2/2007    Bilateral Mastectomy     BREAST SURGERY  3/2008    Breast  Reconstruction Surgery     COLONOSCOPY  2011    repeat in 5 years     ENT SURGERY      salivary gland removal       CURRENT MEDICATIONS:                  Current Outpatient Medications   Medication Sig Dispense Refill     amLODIPine (NORVASC) 5 MG tablet Take 1 tablet (5 mg) by mouth daily 90 tablet 0     atorvastatin (LIPITOR) 20 MG tablet TAKE 1 TABLET BY MOUTH EVERY DAY 90 tablet 0     calcium-vitamin D (CALTRATE) 600-400 MG-UNIT per tablet Take 1 tablet by mouth 2 times daily       estradiol (ESTRACE) 0.1 MG/GM vaginal cream PLACE 2 GRAMS VAGINALLY TWICE A WEEK 42.5 g 11     fluocinonide (LIDEX) 0.05 % external solution Use Monday, wed, Friday along the frontal hair line 60 mL 3     hydroxychloroquine (PLAQUENIL) 200 MG tablet Take 200 mg by mouth 2 times daily 2 tabs daily 5 days per week, 1 tab daily 2 days per week.       ketoconazole (NIZORAL) 2 % external shampoo USe up to 3 times weekly 120 mL 3     Melatonin ER 1 MG TBCR Take 1 mg by mouth nightly as needed       Multiple Vitamin (MULTI-VITAMIN) per tablet Take 1 tablet by mouth daily.       nystatin (MYCOSTATIN) 280216 UNIT/GM external cream Apply topically 2 times daily 30 g 1     tacrolimus (PROTOPIC) 0.1 % external ointment USe twice daily when not using fluocinonide cream on the legs and scalp 120 g 2     triamterene-HCTZ (MAXZIDE-25) 37.5-25 MG tablet TAKE 1 TABLET BY MOUTH EVERY DAY 90 tablet 0     albuterol (PROAIR HFA/PROVENTIL HFA/VENTOLIN HFA) 108 (90 Base) MCG/ACT inhaler Inhale 2 puffs into the lungs every 6 hours (Patient not taking: Reported on 5/4/2023) 18 g 0     clotrimazole (LOTRIMIN) 1 % external cream Apply nightly to buttocks (Patient not taking: Reported on 5/4/2023) 60 g 0       ALLERGIES:                  Allergies   Allergen Reactions     Metoclopramide      Other reaction(s): Other - Describe In Comment Field  Severe redness in eyes from the preservative in certain eye medications     No Clinical Screening - See Comments Other  (See Comments)     Severe redness in eyes from the preservative in certain eye medications       SOCIAL HISTORY:                  Social History     Socioeconomic History     Marital status:      Spouse name: Not on file     Number of children: Not on file     Years of education: Not on file     Highest education level: Not on file   Occupational History     Not on file   Tobacco Use     Smoking status: Former     Years: 20.00     Types: Cigarettes     Quit date: 1982     Years since quittin.3     Smokeless tobacco: Never   Vaping Use     Vaping status: Not on file   Substance and Sexual Activity     Alcohol use: Yes     Alcohol/week: 0.0 standard drinks of alcohol     Comment: red wine socially, 2-3x week     Drug use: No     Sexual activity: Yes     Partners: Male     Birth control/protection: Post-menopausal   Other Topics Concern     Parent/sibling w/ CABG, MI or angioplasty before 65F 55M? Not Asked   Social History Narrative     Not on file     Social Determinants of Health     Financial Resource Strain: Not on file   Food Insecurity: Not on file   Transportation Needs: Not on file   Physical Activity: Not on file   Stress: Not on file   Social Connections: Not on file   Intimate Partner Violence: Not on file   Housing Stability: Not on file       FAMILY HISTORY:                   Family History   Problem Relation Age of Onset     Cancer Mother         lung age 83     Unknown/Adopted Father         91 years     Eye Disorder Father         Macular Degeneration     Breast Cancer Maternal Grandmother      Cancer Paternal Grandfather      Cancer Paternal Uncle      Asthma Brother      C.A.D. Sister         elevated cholesterol         Physical exam Reveals:    O/P: WNL  HEENT: WNL  NECK: No JVD, thyromegaly, or lymphadenopathy  HEART: RRR, no murmurs, gallops, or rubs  LUNGS: CTA bilaterally without rales, wheezes, or rhonchi  GI: NABS, nondistended, nontender, soft  EXT:without cyanosis,  clubbing, or edema; digital cyanosis noted in fingers and toes with embolic appearing lesions in finger tips  NEURO: nonfocal  : no flank tenderness      Rt femoral: 3 plus palpable   Rt popliteal: 3 plus palpable  Rt DP:  3 plus palpable    Rt PT:   3 plus palpable      Lt femoral: 3 plus palpable   Lt popliteal: 3 plus palpable   Lt DP:  3 plus palpable   Lt PT:   3 plus palpable           A/P:      (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 Provided above imaging is normal, she will be offered reassurance to utilize thermal protection strategies, calcium blockade or long acting nitrates.  Plan: As above.    65 minutes total medical care on today's date.

## 2023-05-04 NOTE — TELEPHONE ENCOUNTER
Follow-up to 05/04/23      CTA Chest with contrast    CTA BUE runoff w/contrast    Echocardiogram    Follow up one week later.  In clinic.     Patient was provided with phone numbers for scheduling imaging while in clinic.  She will need to be scheduled for follow up with Dr. Heath.

## 2023-05-11 NOTE — TELEPHONE ENCOUNTER
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 We use tacrolimus to give breaks from steroid overuse for this condition. There is not FDA approved treatment for lichen planopilaris. Therefore, use of all medications will be off-label.              We strive to provide our patient with outstanding care. Therefore, I request you please contact me at the numbers below  if you have any questions regarding coverage or our treatment rational.     Sincerely,    Pat Méndez MD    Department of Dermatology  [facility]: Phone: [phone], Fax:[phone]  [facility]: Phone: [phone], Fax: [phone]

## 2023-05-16 ENCOUNTER — OFFICE VISIT (OUTPATIENT)
Dept: FAMILY MEDICINE | Facility: CLINIC | Age: 83
End: 2023-05-16
Payer: MEDICARE

## 2023-05-16 VITALS
HEART RATE: 71 BPM | WEIGHT: 134 LBS | OXYGEN SATURATION: 99 % | TEMPERATURE: 98.2 F | BODY MASS INDEX: 21.03 KG/M2 | SYSTOLIC BLOOD PRESSURE: 132 MMHG | RESPIRATION RATE: 16 BRPM | HEIGHT: 67 IN | DIASTOLIC BLOOD PRESSURE: 72 MMHG

## 2023-05-16 DIAGNOSIS — K21.00 GASTROESOPHAGEAL REFLUX DISEASE WITH ESOPHAGITIS WITHOUT HEMORRHAGE: ICD-10-CM

## 2023-05-16 DIAGNOSIS — Z86.0100 HISTORY OF COLONIC POLYPS: ICD-10-CM

## 2023-05-16 DIAGNOSIS — Z23 HIGH PRIORITY FOR 2019-NCOV VACCINE: ICD-10-CM

## 2023-05-16 DIAGNOSIS — E78.5 HYPERLIPIDEMIA LDL GOAL <100: ICD-10-CM

## 2023-05-16 DIAGNOSIS — Z01.818 PREOP GENERAL PHYSICAL EXAM: Primary | ICD-10-CM

## 2023-05-16 DIAGNOSIS — N18.31 STAGE 3A CHRONIC KIDNEY DISEASE (H): ICD-10-CM

## 2023-05-16 DIAGNOSIS — M34.9 SYSTEMIC SCLEROSIS (H): ICD-10-CM

## 2023-05-16 DIAGNOSIS — R09.82 POST-NASAL DRAINAGE: ICD-10-CM

## 2023-05-16 DIAGNOSIS — J44.9 CHRONIC OBSTRUCTIVE PULMONARY DISEASE, UNSPECIFIED COPD TYPE (H): ICD-10-CM

## 2023-05-16 DIAGNOSIS — I10 BENIGN ESSENTIAL HYPERTENSION: ICD-10-CM

## 2023-05-16 PROCEDURE — 99214 OFFICE O/P EST MOD 30 MIN: CPT | Mod: 25 | Performed by: INTERNAL MEDICINE

## 2023-05-16 PROCEDURE — 0124A COVID-19 BIVALENT 12+ (PFIZER): CPT | Performed by: INTERNAL MEDICINE

## 2023-05-16 PROCEDURE — 91312 COVID-19 BIVALENT 12+ (PFIZER): CPT | Performed by: INTERNAL MEDICINE

## 2023-05-16 RX ORDER — IPRATROPIUM BROMIDE 42 UG/1
2 SPRAY, METERED NASAL 4 TIMES DAILY PRN
Qty: 15 ML | Refills: 11 | Status: SHIPPED | OUTPATIENT
Start: 2023-05-16 | End: 2023-06-07

## 2023-05-16 ASSESSMENT — PAIN SCALES - GENERAL: PAINLEVEL: NO PAIN (0)

## 2023-05-16 NOTE — PATIENT INSTRUCTIONS
On AM of colonoscopy take all scheduled medications with exception of triamterene/ hydrochlorathiazide  with a sip of water.

## 2023-05-16 NOTE — PROGRESS NOTES
[address], [address]  [facility] [address]  Phone: [phone]  Primary Provider: Angelika Hernandez  Pre-op Performing Provider: ANGELIKA HERNANDEZ      PREOPERATIVE EVALUATION:  Today's date: 5/16/2023    Jeannine Rod is a 82 year old female who presents for a preoperative evaluation.       View : No data to display.              Surgical Information:  Surgery/Procedure: Colonoscopy  Surgery Location:  GI  Surgeon: Kiel Rasmussen MD  Surgery Date: 6-9-2023  Time of Surgery: 9:45am  Where patient plans to recover: At home with family  Fax number for surgical facility: Note does not need to be faxed, will be available electronically in Epic.    Assessment & Plan     The proposed surgical procedure is considered LOW risk.    Preop general physical exam  Suitable candidate for MAC and colonoscopy     History of colonic polyps      Chronic obstructive pulmonary disease, unspecified COPD type (H)  Stable; mild     Systemic sclerosis (H)  Followed by Dr. Shaffer  Recent referral to investigate embolic signs on finger tips  Work up will be completed prior to planned colonoscopy     Stage 3a chronic kidney disease (H)      Benign essential hypertension  Well controlled     Hyperlipidemia LDL goal <100  On statin therapy     Gastroesophageal reflux disease with esophagitis without hemorrhage  Stable     Post-nasal drainage  Trial of below to address, quality of life limiting nasal drainage   - ipratropium (ATROVENT) 0.06 % nasal spray; Spray 2 sprays into both nostrils 4 times daily as needed (nasal drainage that triggers cough and throat clearing)            - No identified additional risk factors other than previously addressed    Antiplatelet or Anticoagulation Medication Instructions:   - Patient is on no antiplatelet or anticoagulation medications.    Additional Medication Instructions:  Patient is to take all scheduled medications on the day of surgery EXCEPT for  modifications listed below:   - Diuretics: HOLD on the day of surgery.    RECOMMENDATION:  APPROVAL GIVEN to proceed with proposed procedure, without further diagnostic evaluation.      20 minutes spent by me on the date of the encounter doing chart review, history and exam, documentation and further activities per the note      Subjective     HPI related to upcoming procedure:  Requires a surveillance colonoscopy due to history of multiple polyps.  This patient reports being able to perform 4 METS of physical activity without chest pain or dyspnea.  Complains of severe post nasal drainage that is quality of life limiting.           5/12/2023     5:05 AM   Preop Questions   1. Have you ever had a heart attack or stroke? No   2. Have you ever had surgery on your heart or blood vessels, such as a stent placement, a coronary artery bypass, or surgery on an artery in your head, neck, heart, or legs? No   3. Do you have chest pain with activity? No   4. Do you have a history of  heart failure? No   5. Do you currently have a cold, bronchitis or symptoms of other infection? No   6. Do you have a cough, shortness of breath, or wheezing? YES - she attributes this to her nasal drainage as described above; not limited from walking on treadmill 30-45 minutes    7. Do you or anyone in your family have previous history of blood clots? YES - she is process of getting evaluated for embolic phenomena by echo and CTA later thisweek per vascular medicine.  She may have had a blood clot after a hip surgery 10 years ago and her mother and father may have had a history of blood clots.     8. Do you or does anyone in your family have a serious bleeding problem such as prolonged bleeding following surgeries or cuts? No   9. Have you ever had problems with anemia or been told to take iron pills? No   10. Have you had any abnormal blood loss such as black, tarry or bloody stools, or abnormal vaginal bleeding? No   11. Have you ever had a  blood transfusion? No   12. Are you willing to have a blood transfusion if it is medically needed before, during, or after your surgery? Yes   13. Have you or any of your relatives ever had problems with anesthesia? No   14. Do you have sleep apnea, excessive snoring or daytime drowsiness? No   15. Do you have any artifical heart valves or other implanted medical devices like a pacemaker, defibrillator, or continuous glucose monitor? No   16. Do you have artificial joints? YES -    17. Are you allergic to latex? No       Health Care Directive:  Patient does not have a Health Care Directive or Living Will: Patient states has Advance Directive and will bring in a copy to clinic.    Preoperative Review of :   reviewed - no record of controlled substances prescribed.          Review of Systems  Constitutional, neuro, ENT, endocrine, pulmonary, cardiac, gastrointestinal, genitourinary, musculoskeletal, integument and psychiatric systems are negative, except as otherwise noted.    Patient Active Problem List    Diagnosis Date Noted     Chronic obstructive pulmonary disease, unspecified COPD type (H) 06/02/2022     Priority: Medium     History of colonic polyps 06/02/2022     Priority: Medium     Chronic kidney disease, stage 3 (H) 12/02/2020     Priority: Medium     Atrophic vaginitis 07/22/2018     Priority: Medium     Benign essential hypertension 10/23/2017     Priority: Medium     Systemic sclerosis (H) 10/06/2016     Priority: Medium     Gastroesophageal reflux disease with esophagitis 10/06/2016     Priority: Medium     Hyperlipidemia LDL goal <100 10/06/2016     Priority: Medium     Calculus of gallbladder without cholecystitis 10/06/2016     Priority: Medium     Radiculoplexus neuropathy 10/06/2016     Priority: Medium     Status post hip replacement 03/06/2012     Priority: Medium     (Problem list name updated by automated process. Provider to review and confirm.)       Raynaud's syndrome      Priority:  Medium     Malignant neoplasm of breast (H) 03/09/2010     Priority: Medium     Do you wish to do the replacement in the background? yes          Past Medical History:   Diagnosis Date     Arthritis      Benign essential hypertension 10/23/2017     Benign essential hypertension 10/23/2017     Calculus of gallbladder without cholecystitis 10/6/2016     Gastroesophageal reflux disease with esophagitis 10/6/2016     Hyperlipidemia      Malignant neoplasm (H)     breast bilat mastectomy, no rad, no chemobilat      Pulmonary embolism and infarction 3/9/2012    IMO update changed this record. 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     Raynaud's syndrome      Systemic sclerosis (H) 10/6/2016     Past Surgical History:   Procedure Laterality Date     ARTHROPLASTY HIP  3/5/2012    Procedure:ARTHROPLASTY HIP; LEFT TOTAL HIP ARTHROPLASTY (BIOMET)^; Surgeon:SABRINA CALLEJAS; Location:[facility] OR     BIOPSY  8/29/2007    Right Breast Biopsy     BIOPSY  9/12/2007    Right Breast Biopsy x2     BIOPSY  9/13/2007    Left Breast Biopsy     BREAST SURGERY  10/2/2007    Bilateral Mastectomy     BREAST SURGERY  3/2008    Breast Reconstruction Surgery     COLONOSCOPY  2011    repeat in 5 years     ENT SURGERY      salivary gland removal     Current Outpatient Medications   Medication Sig Dispense Refill     amLODIPine (NORVASC) 5 MG tablet Take 1 tablet (5 mg) by mouth daily 90 tablet 0     atorvastatin (LIPITOR) 20 MG tablet TAKE 1 TABLET BY MOUTH EVERY DAY 90 tablet 0     calcium-vitamin D (CALTRATE) 600-400 MG-UNIT per tablet Take 1 tablet by mouth 2 times daily       clotrimazole (LOTRIMIN) 1 % external cream Apply nightly to buttocks 60 g 0     estradiol (ESTRACE) 0.1 MG/GM vaginal cream PLACE 2 GRAMS VAGINALLY TWICE A WEEK 42.5 g 11     fluocinonide (LIDEX) 0.05 % external solution Use Monday, wed, Friday along the frontal hair line 60 mL 3     hydroxychloroquine (PLAQUENIL) 200 MG tablet Take 200 mg by mouth  2 times daily 2 tabs daily 5 days per week, 1 tab daily 2 days per week.       ipratropium (ATROVENT) 0.06 % nasal spray Spray 2 sprays into both nostrils 4 times daily as needed (nasal drainage that triggers cough and throat clearing) 15 mL 11     ketoconazole (NIZORAL) 2 % external shampoo USe up to 3 times weekly 120 mL 3     Melatonin ER 1 MG TBCR Take 1 mg by mouth nightly as needed       Multiple Vitamin (MULTI-VITAMIN) per tablet Take 1 tablet by mouth daily.       nystatin (MYCOSTATIN) 920197 UNIT/GM external cream Apply topically 2 times daily 30 g 1     tacrolimus (PROTOPIC) 0.1 % external ointment USe twice daily when not using fluocinonide cream on the legs and scalp 120 g 2     triamterene-HCTZ (MAXZIDE-25) 37.5-25 MG tablet TAKE 1 TABLET BY MOUTH EVERY DAY 90 tablet 0     albuterol (PROAIR HFA/PROVENTIL HFA/VENTOLIN HFA) 108 (90 Base) MCG/ACT inhaler Inhale 2 puffs into the lungs every 6 hours (Patient not taking: Reported on 2023) 18 g 0       Allergies   Allergen Reactions     Metoclopramide      Other reaction(s): Other - Describe In Comment Field  Severe redness in eyes from the preservative in certain eye medications     No Clinical Screening - See Comments Other (See Comments)     Severe redness in eyes from the preservative in certain eye medications        Social History     Tobacco Use     Smoking status: Former     Years: 20.00     Types: Cigarettes     Quit date: 1982     Years since quittin.3     Smokeless tobacco: Never   Vaping Use     Vaping status: Not on file   Substance Use Topics     Alcohol use: Yes     Alcohol/week: 0.0 standard drinks of alcohol     Comment: red wine socially, 2-3x week     Family History   Problem Relation Age of Onset     Cancer Mother         lung age 83     Unknown/Adopted Father         91 years     Eye Disorder Father         Macular Degeneration     Breast Cancer Maternal Grandmother      Cancer Paternal Grandfather      Cancer Paternal Uncle   "    Asthma Brother      C.A.D. Sister         elevated cholesterol     History   Drug Use No         Objective     /72 (BP Location: Right arm, Patient Position: Sitting, Cuff Size: Adult Regular)   Pulse 71   Temp 98.2  F (36.8  C) (Oral)   Resp 16   Ht 1.689 m (5' 6.5\")   Wt 60.8 kg (134 lb)   LMP 07/07/1992   SpO2 99%   BMI 21.30 kg/m      Physical Exam    GENERAL APPEARANCE: healthy, alert and no distress     EYES: EOMI, PERRL     HENT: ear canals and TM's normal and nose and mouth without ulcers or lesions     NECK: no adenopathy, no asymmetry, masses, or scars and thyroid normal to palpation     RESP: lungs clear to auscultation - no rales, rhonchi or wheezes     CV: regular rates and rhythm, normal S1 S2, no S3 or S4 and unchanged systolic murmur noted, click or rub     ABDOMEN:  soft, nontender, no HSM or masses and bowel sounds normal     MS: She has some darker areas of skin changes on finger tips noted on recent vascular medicine exam as well     SKIN: no suspicious lesions or rashes     NEURO: Normal strength and tone, sensory exam grossly normal, mentation intact and speech normal     PSYCH: mentation appears normal. and affect normal/bright     LYMPHATICS: No cervical adenopathy    Recent Labs   Lab Test 06/02/22  1003   HGB 12.0         POTASSIUM 4.5   CR 1.13*        Diagnostics:  No labs were ordered during this visit.   No EKG required for low risk surgery (cataract, skin procedure, breast biopsy, etc).    Revised Cardiac Risk Index (RCRI):  The patient has the following serious cardiovascular risks for perioperative complications:   - No serious cardiac risks = 0 points     RCRI Interpretation: 0 points: Class I (very low risk - 0.4% complication rate)           Signed Electronically by: Bebeto Freitas MD  Copy of this evaluation report is provided to requesting physician.      "

## 2023-05-16 NOTE — TELEPHONE ENCOUNTER
Medication Appeal Initiation    We have initiated an appeal for the requested medication:  Medication: tacrolimus (PROTOPIC) 0.1 % external ointment-PA denied, INITIATE PA APPEAL     Appeal Start Date:  5/16/2023  Insurance Company: [employer] - Phone [phone] Fax [phone]  Comments:       Initiate Urgent PA Appeal via fax [phone] with Letter of Medical Necessity (dated 5/11/2023)  with Request. Awaiting on response on Appeal.

## 2023-05-17 ENCOUNTER — TRANSFERRED RECORDS (OUTPATIENT)
Dept: HEALTH INFORMATION MANAGEMENT | Facility: CLINIC | Age: 83
End: 2023-05-17
Payer: MEDICARE

## 2023-05-18 NOTE — TELEPHONE ENCOUNTER
Left voicemail with instructions for patient to call back to schedule their appointment(s)    May 18, 2023 , 9:49 AM

## 2023-05-19 ENCOUNTER — ANCILLARY PROCEDURE (OUTPATIENT)
Dept: CARDIOLOGY | Facility: CLINIC | Age: 83
End: 2023-05-19
Attending: INTERNAL MEDICINE
Payer: MEDICARE

## 2023-05-19 ENCOUNTER — ANCILLARY PROCEDURE (OUTPATIENT)
Dept: CT IMAGING | Facility: CLINIC | Age: 83
End: 2023-05-19
Attending: INTERNAL MEDICINE
Payer: MEDICARE

## 2023-05-19 DIAGNOSIS — I74.9 EMBOLISM AND THROMBOSIS OF ARTERY (H): ICD-10-CM

## 2023-05-19 LAB
CREAT BLD-MCNC: 1.2 MG/DL (ref 0.5–1)
GFR SERPL CREATININE-BSD FRML MDRD: 46 ML/MIN/1.73M2
LVEF ECHO: NORMAL

## 2023-05-19 PROCEDURE — 82565 ASSAY OF CREATININE: CPT | Performed by: PATHOLOGY

## 2023-05-19 PROCEDURE — 93306 TTE W/DOPPLER COMPLETE: CPT | Performed by: INTERNAL MEDICINE

## 2023-05-19 RX ORDER — IOPAMIDOL 755 MG/ML
90 INJECTION, SOLUTION INTRAVASCULAR ONCE
Status: COMPLETED | OUTPATIENT
Start: 2023-05-19 | End: 2023-05-19

## 2023-05-19 RX ADMIN — IOPAMIDOL 90 ML: 755 INJECTION, SOLUTION INTRAVASCULAR at 14:21

## 2023-05-22 NOTE — TELEPHONE ENCOUNTER
MEDICATION APPEAL DENIED    Medication: tacrolimus (PROTOPIC) 0.1 % external ointment-PA denied, PA APPEAL DENIED       Denial Date: 5/19/2023    Denial Rational:         Second Level Appeal Information:     Second level appeals will be managed by the clinic staff and provider. Please contact the [employer] Prior Authorization Team if additional information about the denial is needed.

## 2023-05-24 NOTE — TELEPHONE ENCOUNTER
Patient scheduled for echo and CTA runoff on 05/19/23(completed), CTA chest on 05/26/23 and in clinic follow up with Dr Heath on 06/07/23

## 2023-05-26 ENCOUNTER — ANCILLARY PROCEDURE (OUTPATIENT)
Dept: CT IMAGING | Facility: CLINIC | Age: 83
End: 2023-05-26
Attending: INTERNAL MEDICINE
Payer: MEDICARE

## 2023-05-26 DIAGNOSIS — I74.9 EMBOLISM AND THROMBOSIS OF ARTERY (H): ICD-10-CM

## 2023-05-26 LAB — RADIOLOGIST FLAGS: NORMAL

## 2023-05-26 PROCEDURE — 71275 CT ANGIOGRAPHY CHEST: CPT | Mod: MG | Performed by: RADIOLOGY

## 2023-05-26 PROCEDURE — G1010 CDSM STANSON: HCPCS | Mod: GC | Performed by: RADIOLOGY

## 2023-05-26 RX ORDER — IOPAMIDOL 755 MG/ML
90 INJECTION, SOLUTION INTRAVASCULAR ONCE
Status: COMPLETED | OUTPATIENT
Start: 2023-05-26 | End: 2023-05-26

## 2023-05-26 RX ADMIN — IOPAMIDOL 90 ML: 755 INJECTION, SOLUTION INTRAVASCULAR at 08:25

## 2023-06-04 ENCOUNTER — OFFICE VISIT (OUTPATIENT)
Dept: URGENT CARE | Facility: URGENT CARE | Age: 83
End: 2023-06-04
Payer: MEDICARE

## 2023-06-04 VITALS
SYSTOLIC BLOOD PRESSURE: 136 MMHG | BODY MASS INDEX: 21.3 KG/M2 | TEMPERATURE: 98.2 F | HEART RATE: 75 BPM | DIASTOLIC BLOOD PRESSURE: 70 MMHG | WEIGHT: 134 LBS | RESPIRATION RATE: 14 BRPM | OXYGEN SATURATION: 100 %

## 2023-06-04 DIAGNOSIS — J32.0 CHRONIC MAXILLARY SINUSITIS: Primary | ICD-10-CM

## 2023-06-04 PROCEDURE — 99213 OFFICE O/P EST LOW 20 MIN: CPT | Performed by: PHYSICIAN ASSISTANT

## 2023-06-04 NOTE — PROGRESS NOTES
SUBJECTIVE:  Jeannine Rod is a 82 year old female here with concerns about sinus infection.  She states onset of symptoms were 4 day(s) ago.  She has had maxillary pressure. Course of illness is waxing and waning. Severity mild  Current and Associated symptoms: nasal congestion and rhinorrhea  Predisposing factors include sinus blockage. Recent treatment has included: Decongestants and flonase for chronic symptoms    Past Medical History:   Diagnosis Date     Arthritis      Benign essential hypertension 10/23/2017     Benign essential hypertension 10/23/2017     Calculus of gallbladder without cholecystitis 10/6/2016     Gastroesophageal reflux disease with esophagitis 10/6/2016     Hyperlipidemia      Malignant neoplasm (H)     breast bilat mastectomy, no rad, no chemobilat      Pulmonary embolism and infarction 3/9/2012    IMO update changed this record. 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    OBJECTIVE:  /70   Pulse 75   Temp 98.2  F (36.8  C) (Oral)   Resp 14   Wt 60.8 kg (134 lb)   LMP 1992   SpO2 100%   BMI 21.30 kg/m    Exam:GENERAL APPEARANCE: healthy, alert and no distress  HENT: ear canals and TM's normal.  Nose and mouth without ulcers, erythema or lesions  NECK: supple, nontender, no lymphadenopathy  RESP: lungs clear to auscultation - no rales, rhonchi or wheezes  CV: regular rates and rhythm, normal S1 S2, no murmur noted  NEURO: Normal strength and tone, sensory exam  grossly normal,  normal speech and mentation  SKIN: no suspicious lesions or rashes    ASSESSMENT:  (J32.0) Chronic maxillary sinusitis  (primary encounter diagnosis)  Plan: Adult ENT  Referral      Patient Instructions   Call your primary this week before your procedure to discuss next steps.  Return if worsening as discussed  1.  Plenty of fluids, rest, warm compresses on face  2.  Mucinex twice daily for at least 4 days  3.  Blanka Pot 1x in the morning 1x at night (SALINE MIST SPRAY IS AN ACCEPTABLE, THOUGH NOT AS EFFECTIVE REPLACEMENT)  4.  Benadryl (diphenhydramine) at bedtime   5.  Either Claritin (Loratadine), Allegra (Fexofenadine), or Zyrtec (Cetirizine) in the day  6.  Flonase (Fluticasone) 2x each nostril twice a day for two weeks, then once each nostril once a day       Please let us know if symptoms persist, or worsen.  Fever and focal pain may be a sign of a bacterial infection which may need antibiotic treatment

## 2023-06-04 NOTE — PATIENT INSTRUCTIONS
Call your primary this week before your procedure to discuss next steps.  Return if worsening as discussed  1.  Plenty of fluids, rest, warm compresses on face  2.  Mucinex twice daily for at least 4 days  3.  Blanka Pot 1x in the morning 1x at night (SALINE MIST SPRAY IS AN ACCEPTABLE, THOUGH NOT AS EFFECTIVE REPLACEMENT)  4.  Benadryl (diphenhydramine) at bedtime   5.  Either Claritin (Loratadine), Allegra (Fexofenadine), or Zyrtec (Cetirizine) in the day  6.  Flonase (Fluticasone) 2x each nostril twice a day for two weeks, then once each nostril once a day       Please let us know if symptoms persist, or worsen.  Fever and focal pain may be a sign of a bacterial infection which may need antibiotic treatment

## 2023-06-05 ENCOUNTER — TELEPHONE (OUTPATIENT)
Dept: OTOLARYNGOLOGY | Facility: CLINIC | Age: 83
End: 2023-06-05
Payer: MEDICARE

## 2023-06-05 NOTE — TELEPHONE ENCOUNTER
This encounter is being sent to inform the clinic that this patient has a referral from [facility] for the diagnoses of chronic sinusitis and has requested that this patient be seen within 1-2 weeks and/or with unknown.  Based on the availability of our provider(s), we are unable to accommodate this request.      Were all sites offered this patient?  Yes      Does scheduling algorithm request to schedule next available?  Patient has been scheduled for the first available opening with Dr García on October 19.  We have informed the patient that the clinic will review their referral and reach out if a sooner appointment is medically necessary.

## 2023-06-07 ENCOUNTER — OFFICE VISIT (OUTPATIENT)
Dept: OTHER | Facility: CLINIC | Age: 83
End: 2023-06-07
Attending: INTERNAL MEDICINE
Payer: MEDICARE

## 2023-06-07 ENCOUNTER — MYC MEDICAL ADVICE (OUTPATIENT)
Dept: FAMILY MEDICINE | Facility: CLINIC | Age: 83
End: 2023-06-07

## 2023-06-07 VITALS
HEART RATE: 79 BPM | OXYGEN SATURATION: 98 % | DIASTOLIC BLOOD PRESSURE: 72 MMHG | HEIGHT: 66 IN | SYSTOLIC BLOOD PRESSURE: 121 MMHG | BODY MASS INDEX: 22.34 KG/M2 | WEIGHT: 139 LBS

## 2023-06-07 DIAGNOSIS — E04.1 THYROID NODULE: Primary | ICD-10-CM

## 2023-06-07 DIAGNOSIS — I74.9 EMBOLISM AND THROMBOSIS OF ARTERY (H): ICD-10-CM

## 2023-06-07 DIAGNOSIS — R59.1 LYMPHADENOPATHY: ICD-10-CM

## 2023-06-07 DIAGNOSIS — I73.00 RAYNAUD'S DISEASE WITHOUT GANGRENE: ICD-10-CM

## 2023-06-07 PROCEDURE — G0463 HOSPITAL OUTPT CLINIC VISIT: HCPCS

## 2023-06-07 PROCEDURE — 99214 OFFICE O/P EST MOD 30 MIN: CPT | Performed by: INTERNAL MEDICINE

## 2023-06-07 NOTE — PROGRESS NOTES
"Patient is here to discuss follow up    /72 (BP Location: Right arm, Patient Position: Chair, Cuff Size: Adult Regular)   Pulse 79   Ht 5' 6\" (1.676 m)   Wt 139 lb (63 kg)   LMP 07/07/1992   SpO2 98%   BMI 22.44 kg/m      Questions patient would like addressed today are: N/A.    Refills are needed: No    Has homecare services and agency name:  [facility]    "

## 2023-06-07 NOTE — TELEPHONE ENCOUNTER
See below message from pt regarding findings on imaging     Recommended pt schedule an appointment to follow up with you/review results    Brit MULLINS, Triage RN  [facility]

## 2023-06-07 NOTE — TELEPHONE ENCOUNTER
Reason for call:  Same Day Appointment     Requested Provider: Dr. Freitas    Reason for visit: Patient would like to discuss results of an MRI done on Thyroid - They found a cyst and an enlarged lympnode     Have you been treated for this in the past? No    Additional comments: Would like an appt as soon as possible      Phone number to reach patient:  Cell number on file:    Telephone Information:   Mobile [phone]       Best Time:  Anytime except Friday - Having a colonscopy on Friday    Can we leave a detailed message on this number?  YES    Travel screening: Not Applicable

## 2023-06-07 NOTE — PROGRESS NOTES
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 Her rheumatologist (Dr. Shaffer) referred her to us due to evolving darker splotches in her finger tips. She has no tissue loss. There is no pain per se.    When seen on 5/4/23, I stated Raynaud's disease was the most likely etiology of her sxs. Nonetheless, to be thorough and complete I stated we should rule out an overt embolic source by checking a TTE, as well as a CTA chest with upper extremity runoff.  Those studies revealed no CTA evidence of central embolic source, including intracardiac, aortic, or visualized major vessels of the aortic arch. The arms revealed grossly patent normal calibers of the major bilateral upper  extremity arteries with normal distal runoff. Evaluation of the distal arm and hand was limited due to limits of the resolution of the scan.  TTE w/o bubble study revealed no cardioembolic focus.    Incidentally discovered was a 1.1 cm left superior thyroid nodule. She notes difficulty swallowing for the last eighteen months.       Review Of Systems  Skin: as above  Eyes: negative  Ears/Nose/Throat: negative  Respiratory: No shortness of breath, dyspnea on exertion, cough, or hemoptysis  Cardiovascular: negative  Gastrointestinal: negative  Genitourinary: negative  Musculoskeletal: negative  Neurologic: negative  Psychiatric: negative  Hematologic/Lymphatic/Immunologic: negative  Endocrine: negative        PAST MEDICAL HISTORY:                  Past Medical History        Past Medical History:   Diagnosis Date     Arthritis       Benign essential hypertension 10/23/2017     Benign essential hypertension 10/23/2017     Calculus of gallbladder without cholecystitis 10/6/2016     Gastroesophageal reflux disease with esophagitis 10/6/2016     Hyperlipidemia       Malignant neoplasm (H)       breast bilat  mastectomy, no rad, no chemobilat      Pulmonary embolism and infarction 3/9/2012     IMO update changed this record. 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     Raynaud's syndrome       Systemic sclerosis (H) 10/6/2016            PAST SURGICAL HISTORY:                  Past Surgical History         Past Surgical History:   Procedure Laterality Date     ARTHROPLASTY HIP   3/5/2012     Procedure:ARTHROPLASTY HIP; LEFT TOTAL HIP ARTHROPLASTY (BIOMET)^; Surgeon:SABRINA CALLEJAS; Location:[facility] OR     BIOPSY   8/29/2007     Right Breast Biopsy     BIOPSY   9/12/2007     Right Breast Biopsy x2     BIOPSY   9/13/2007     Left Breast Biopsy     BREAST SURGERY   10/2/2007     Bilateral Mastectomy     BREAST SURGERY   3/2008     Breast Reconstruction Surgery     COLONOSCOPY   2011     repeat in 5 years     ENT SURGERY         salivary gland removal            CURRENT MEDICATIONS:                  Current Outpatient Prescriptions          Current Outpatient Medications   Medication Sig Dispense Refill     amLODIPine (NORVASC) 5 MG tablet Take 1 tablet (5 mg) by mouth daily 90 tablet 0     atorvastatin (LIPITOR) 20 MG tablet TAKE 1 TABLET BY MOUTH EVERY DAY 90 tablet 0     calcium-vitamin D (CALTRATE) 600-400 MG-UNIT per tablet Take 1 tablet by mouth 2 times daily         estradiol (ESTRACE) 0.1 MG/GM vaginal cream PLACE 2 GRAMS VAGINALLY TWICE A WEEK 42.5 g 11     fluocinonide (LIDEX) 0.05 % external solution Use Monday, wed, Friday along the frontal hair line 60 mL 3     hydroxychloroquine (PLAQUENIL) 200 MG tablet Take 200 mg by mouth 2 times daily 2 tabs daily 5 days per week, 1 tab daily 2 days per week.         ketoconazole (NIZORAL) 2 % external shampoo USe up to 3 times weekly 120 mL 3     Melatonin ER 1 MG TBCR Take 1 mg by mouth nightly as needed         Multiple Vitamin (MULTI-VITAMIN) per tablet Take 1 tablet by mouth daily.         nystatin (MYCOSTATIN) 168256 UNIT/GM external cream Apply  topically 2 times daily 30 g 1     tacrolimus (PROTOPIC) 0.1 % external ointment USe twice daily when not using fluocinonide cream on the legs and scalp 120 g 2     triamterene-HCTZ (MAXZIDE-25) 37.5-25 MG tablet TAKE 1 TABLET BY MOUTH EVERY DAY 90 tablet 0     albuterol (PROAIR HFA/PROVENTIL HFA/VENTOLIN HFA) 108 (90 Base) MCG/ACT inhaler Inhale 2 puffs into the lungs every 6 hours (Patient not taking: Reported on 2023) 18 g 0     clotrimazole (LOTRIMIN) 1 % external cream Apply nightly to buttocks (Patient not taking: Reported on 2023) 60 g 0            ALLERGIES:                        Allergies   Allergen Reactions     Metoclopramide         Other reaction(s): Other - Describe In Comment Field  Severe redness in eyes from the preservative in certain eye medications     No Clinical Screening - See Comments Other (See Comments)       Severe redness in eyes from the preservative in certain eye medications         SOCIAL HISTORY:                  Social History   Social History            Socioeconomic History     Marital status:        Spouse name: Not on file     Number of children: Not on file     Years of education: Not on file     Highest education level: Not on file   Occupational History     Not on file   Tobacco Use     Smoking status: Former       Years: 20.00       Types: Cigarettes       Quit date: 1982       Years since quittin.3     Smokeless tobacco: Never   Vaping Use     Vaping status: Not on file   Substance and Sexual Activity     Alcohol use: Yes       Alcohol/week: 0.0 standard drinks of alcohol       Comment: red wine socially, 2-3x week     Drug use: No     Sexual activity: Yes       Partners: Male       Birth control/protection: Post-menopausal   Other Topics Concern     Parent/sibling w/ CABG, MI or angioplasty before 65F 55M? Not Asked   Social History Narrative     Not on file      Social Determinants of Health      Financial Resource Strain: Not on file   Food  Insecurity: Not on file   Transportation Needs: Not on file   Physical Activity: Not on file   Stress: Not on file   Social Connections: Not on file   Intimate Partner Violence: Not on file   Housing Stability: Not on file            FAMILY HISTORY:                   Family History         Family History   Problem Relation Age of Onset     Cancer Mother           lung age 83     Unknown/Adopted Father           91 years     Eye Disorder Father           Macular Degeneration     Breast Cancer Maternal Grandmother       Cancer Paternal Grandfather       Cancer Paternal Uncle       Asthma Brother       C.A.D. Sister           elevated cholesterol               Physical exam Reveals:     O/P: WNL  HEENT: WNL  NECK: No JVD, thyromegaly, or lymphadenopathy  HEART: RRR, no murmurs, gallops, or rubs  LUNGS: CTA bilaterally without rales, wheezes, or rhonchi  GI: NABS, nondistended, nontender, soft  EXT:without cyanosis, clubbing, or edema; digital cyanosis noted in fingers and toes with embolic appearing lesions in finger tips  NEURO: nonfocal  : no flank tenderness        Rt femoral:      3 plus palpable   Rt popliteal:     3 plus palpable  Rt DP:              3 plus palpable    Rt PT:              3 plus palpable        Lt femoral:       3 plus palpable   Lt popliteal:      3 plus palpable   Lt DP:              3 plus palpable   Lt PT:               3 plus palpable          Exam: Computed tomographic angiography of the chest without and with  contrast including 3D reformations dated 5/26/2023 8:39 AM     Clinical information: POSSIBLE DIGITAL EMBOLI, EVAL FOR ATHEROEMBOLIC  SOURCE; Embolism and thrombosis of artery (H)     Technique: Helical scans through the chest obtained before the  administration of intravenous contrast media and following the  injection of contrast media in the arterial phase. Source images  reviewed as well as 3D and multi-planar reconstructions.     Contrast: Isovue 370 90cc     DLP: 1098  mGy*cm     Comparison: CT of bilateral upper extremities 5/19/2023. CT chest  1/29/2013.     Findings:  No abnormal intracardiac contrast filling defect such as vegetation  which could provide embolic source among any other abnormal filling  defects. Normal contrast filling defects within the ascending aorta,  aortic arch, right brachiocephalic or bilateral subclavian/visualized  axillary arteries that could explain concern for potential distal  digital emboli. No evidence of patent foraminal ovale. Normal  branching of the great arch vessels with mild atherosclerotic  ossification at the arch vessel origins without significant stenosis.  Additional mild scattered aortic atherosclerotic calcification.     Additional Chest/mediastinum:  Bilateral breast implants without visualized complication. There is a  1.1 cm left superior thyroid nodule not seen in 2013. Heart size is  within normal limits. Moderate scattered coronary artery  atherosclerotic calcification, including the LAD. The left coronary  artery arises approximately 10 mm medial/posterior of the left cusp,  without malignant course (series 5 image 369). No significant valvular  calcification or vegetation. There is a 1.0 cm carinal lymph node  (series 4 image 48), with mildly prominent nonenlarged prevascular  lymph nodes, together presumed reactive in nature. No appreciable  axillary or hilar lymphadenopathy. The diameter of the main pulmonary  artery is not enlarged. See thoracic aortic diameter measurements  below. Small hiatal hernia.     Lungs:  No pneumothorax, pleural effusion, or focal consolidation. Mild  dependent subpleural reticular atelectasis and lingular atelectasis  extending along the left major fissure. Apically predominant mild  centrilobular emphysematous change. There is a 4 x 12 mm right  posterior lower lobe subpleural solid pulmonary nodule (series 8 image  111). There is a 4-5 mm left lower lobe posterior subpleural solid  pulmonary  nodule (series 8 image 115). There are a few additional  scattered sub-6 mm solid pulmonary nodules. These pulmonary nodules  are stable since 2013 without new pulmonary nodule identified.     Thoracic aortic maximal diameters:  Sinuses of Valsalva: 3.3 cm  Sinotubular junction: 2.7 cm  Mid ascending aorta: 3.6 cm  Aortic arch: 2.9 cm  Proximal ascending aorta: 2.6 cm  Mid ascending aorta: 2.5 cm  Distal aorta at diaphragmatic hiatus: 2.3 cm     Abdominal vasculature:  Mild atherosclerotic calcification at the celiac origin without  significant stenosis, patent. Patent SMA, single bilateral renal  arteries, and minimally visualized patent-appearing MICHELLE origin.     Upper abdomen:  Visualized upper abdomen is unremarkable. No appreciable  lymphadenopathy.     Bones and soft tissues:  No acute osseous abnormality with scattered degenerative changes about  the shoulder joints, spine, and diffusely throughout; grossly patent  spinal canal and neural foramen.                                                                      Impression:  1. No CTA evidence of central embolic source, including intracardiac,  aortic, or visualized major vessels of the aortic arch.    2. The thoracic aorta is not aneurysmal, measurements above.  3. There is a 1.1 cm left superior thyroid nodule; thyroid ultrasound  recommended.  4. There is a 1.0 cm carinal lymph node (series 4 image 48), mildly  prominent nonenlarged prevascular lymph nodes, presumed reactive.  5. Stable pulmonary nodules since 2013, statistically benign.     [Consider Follow Up: Thyroid nodule]     This report will be copied to the [facility] to ensure a  provider acknowledges the finding. [facility] Center is available Monday  through Friday 8am-3:30 pm.     I have personally reviewed the examination and initial interpretation  and I agree with the findings.     HAN DODSON MD     EXAMINATION: CT UPPER EXTREMITY BILATERAL RUNOFF WITH  CONTRAST  (5/19/2023)     INDICATION: Embolism and thrombosis of artery; evaluate for possible  atheroembolic source     COMPARISON: None     TECHNIQUE: Axial CTA of the bilateral upper extremities with IV  contrast (90 cc IV Isovue 370) with sagittal and coronal  reconstructions.     FINDINGS:  BILATERAL UPPER EXTREMITIES:  Patent grossly normal calibers of the bilateral visualized aortic  arch, right brachiocephalic, right subclavian, left subclavian,  bilateral axillary, brachial, radial, ulnar, and smaller caliber  runoff of the posterior interosseous arteries bilaterally without  evidence of focal or significant stenosis or occlusion. Evaluation of  the distal upper extremity arteries is limited by contrast bolus  timing and resolution of the exam.     Congruent glenohumeral, elbow, and wrist joints without visualized  acute osseous abnormality. Degenerative changes of the glenohumeral  joints and first CMC and triscaphe joints in the hands. No osseous or  soft tissue lesion/mass.     VISUALIZED HEAD/NECK:  Additionally, the carotid arteries are grossly patent bilaterally with  calcification of the carotid siphons, though patent. Grossly patent  [place] of [place] on this nonoptimal brain study. No incidental or  definite acute intracranial findings. Grossly patent vertebral  arteries.     Completely opacified left maxillary sinus with moderate mucosal  debris/thickening within the right maxillary sinus. Mastoid air cells  are relatively clear. Mild periapical lucency about tooth 16 (left  mandibular third molar). No visualized neck mass/lesion or suspicious  lymphadenopathy.     No acute spinal osseous abnormality. Degenerative changes of the  visualized spine with grossly patent spinal canal.     MEDIASTINUM/VISUALIZED LUNG WHITE:  Partially visualized atherosclerotic calcification of the aortic arch  and great arch origins. No pneumothorax, focal consolidation, or  suspicious pulmonary nodule/mass in the  visualized lung fields. Mild  centrilobular and paraseptal emphysematous changes. .     CHEST SOFT TISSUES:  0.9 cm left thyroid nodule requires no further dedicated imaging  evaluation. No suspicious lymphadenopathy. Partially visualized breast  implants                                                                      IMPRESSION:  1. Grossly patent normal calibers of the major bilateral upper  extremity arteries with normal distal runoff. Evaluation of the distal  arm and hand is limited due to limits of the resolution of the scan.  No evidence of significant stenosis or occlusion; no evidence of  embolism or thrombosis.  2. No acute osseous abnormality. Scattered degenerative changes as  detailed above.  3. Complete opacification of the left maxillary sinus with moderate  mucosal debris/thickening of the right maxillary sinus.  4. Mild periapical lucency about the left maxillary third molar.     I have personally reviewed the examination and initial interpretation  and I agree with the findings.     MALIKA MURPHY MD     [facility]  Diagnostic and Treatment-3rd Floor  [address] [place] [id number]     Name: MOOSE LEMUS  MRN: [id number]  : 1940  Study Date: 2023 02:56 PM  Age: 82 yrs  Gender: Female  Patient Location: [facility]  Reason For Study: Embolism and thrombosis of artery (H)  Ordering Physician: MALIKA SIERRA  Referring Physician: MALIKA SIERRA  Performed By: Rebecca Diamond     BSA: 1.7 m2  Height: 66 in  Weight: 134 lb  HR: 68  BP: 124/59 mmHg  ______________________________________________________________________________  Procedure  Echocardiogram with two-dimensional, color and spectral Doppler performed.  ______________________________________________________________________________  Interpretation Summary  Global and regional left ventricular function is normal with an EF of 55-60%.  Global  right ventricular function is normal.  No significant valvular abnormalities present.     This study was compared with the study from 2017 .  No significant changes noted.  ______________________________________________________________________________  Left Ventricle  Left ventricular size is normal. Global and regional left ventricular function  is normal with an EF of 55-60%. Left ventricular wall thickness is normal.  Left ventricular diastolic function is normal.     Right Ventricle  The right ventricle is normal size. Global right ventricular function is  normal.     Atria  Both atria appear normal.     Mitral Valve  Mild mitral annular calcification is present.     Aortic Valve  Mild aortic valve calcification is present. On Doppler interrogation, there is  no significant stenosis or regurgitation.     Tricuspid Valve  The tricuspid valve is normal. Mild tricuspid insufficiency is present.     Pulmonic Valve  The pulmonic valve is normal.     Vessels  The aorta root is normal. The inferior vena cava was normal in size with  preserved respiratory variability. The thoracic aorta is normal.     Pericardium  No pericardial effusion is present.     Miscellaneous  No significant valvular abnormalities present.     Compared to Previous Study  This study was compared with the study from 2017 . No significant changes  noted.  ______________________________________________________________________________  MMode/2D Measurements & Calculations  IVSd: 0.66 cm  LVIDd: 4.0 cm  LVIDs: 2.6 cm  LVPWd: 0.90 cm  FS: 35.0 %  LV mass(C)d: 92.7 grams  LV mass(C)dI: 54.9 grams/m2  Ao root diam: 3.2 cm  asc Aorta Diam: 3.2 cm  LVOT diam: 2.0 cm  LVOT area: 3.1 cm2  LA Volume (BP): 81.6 ml     LA Volume Index (BP): 48.3 ml/m2  RV Base: 4.3 cm  RWT: 0.45  TAPSE: 2.4 cm     Doppler Measurements & Calculations  MV E max woody: 111.0 cm/sec  MV A max woody: 123.0 cm/sec  MV E/A: 0.90  MV max P.5 mmHg  MV mean PG: 3.0 mmHg  MV V2 VTI: 44.5  cm  MV dec time: 0.24 sec  PA acc time: 0.18 sec  TR max lei: 257.0 cm/sec  TR max P.4 mmHg  E/E' av.9  Lateral E/e': 11.1  Medial E/e': 12.8  RV S Eli: 13.6 cm/sec     ______________________________________________________________________________  Report approved by: Karen CELIS 2023 03:58 PM     A/P:             (I73.00) Raynaud's disease without gangrene  Comment: This is the most likely etiology of her sxs. Nonetheless, to be thorough and complete I stated we should rule out an overt embolic source by checking a TTE, as well as a CTA chest with upper extremity runoff. Those studies revealed no CTA evidence of a central embolic source, including intracardiac, aortic, or visualized major vessels of the aortic arch. The arms revealed grossly patent normal calibers of the major bilateral upper extremity arteries with normal distal runoff. Evaluation of the distal arm and hand was limited due to limits of the resolution of the scan.  TTE w/o bubble study revealed no cardioembolic focus. I would not recommend proceeding further with a CHUNG bubble study or a catheter directed angiogram to more closely image distal arm and digital arteries as I feel the pretest probability of ofnding ds would be lower than the expected small risk of periprocedural complications form either study.  Plan: As above imaging is normal, she is offered reassurance to utilize thermal protection strategies, calcium blockade or long acting nitrates. She will simply continue calcium blockade and use thermal protection strategies.    (E04.1) Thyroid nodule  (primary encounter diagnosis)  Comment: She has difficulty with swallowing.  Plan: I advised she discuss thyroid U/S and possible FNA with her PCP. Even if benign, she could discuss removal with a thyroid surgeon due to recent difficulty swallowing.        (R59.1) Carinal lymphadenopathy  Comment: Asx.  Plan: I advised she discuss monitoring with her PCP.          35 minutes  total medical care on today's date.

## 2023-06-09 ENCOUNTER — ANESTHESIA (OUTPATIENT)
Dept: GASTROENTEROLOGY | Facility: CLINIC | Age: 83
End: 2023-06-09
Payer: MEDICARE

## 2023-06-09 ENCOUNTER — HOSPITAL ENCOUNTER (OUTPATIENT)
Facility: CLINIC | Age: 83
Discharge: HOME | End: 2023-06-09
Attending: INTERNAL MEDICINE | Admitting: INTERNAL MEDICINE
Payer: MEDICARE

## 2023-06-09 ENCOUNTER — ANESTHESIA EVENT (OUTPATIENT)
Dept: GASTROENTEROLOGY | Facility: CLINIC | Age: 83
End: 2023-06-09
Payer: MEDICARE

## 2023-06-09 VITALS
HEART RATE: 59 BPM | DIASTOLIC BLOOD PRESSURE: 66 MMHG | WEIGHT: 139 LBS | OXYGEN SATURATION: 100 % | BODY MASS INDEX: 22.44 KG/M2 | SYSTOLIC BLOOD PRESSURE: 132 MMHG | RESPIRATION RATE: 16 BRPM

## 2023-06-09 LAB — COLONOSCOPY: NORMAL

## 2023-06-09 PROCEDURE — 999N000010 HC STATISTIC ANES STAT CODE-CRNA PER MINUTE: Performed by: INTERNAL MEDICINE

## 2023-06-09 PROCEDURE — 250N000011 HC RX IP 250 OP 636: Performed by: SURGERY

## 2023-06-09 PROCEDURE — 45385 COLONOSCOPY W/LESION REMOVAL: CPT | Mod: PT | Performed by: INTERNAL MEDICINE

## 2023-06-09 PROCEDURE — 258N000003 HC RX IP 258 OP 636: Performed by: SURGERY

## 2023-06-09 PROCEDURE — 45380 COLONOSCOPY AND BIOPSY: CPT | Mod: PT,XU | Performed by: INTERNAL MEDICINE

## 2023-06-09 PROCEDURE — 370N000017 HC ANESTHESIA TECHNICAL FEE, PER MIN: Performed by: INTERNAL MEDICINE

## 2023-06-09 PROCEDURE — 250N000009 HC RX 250: Performed by: SURGERY

## 2023-06-09 PROCEDURE — 88305 TISSUE EXAM BY PATHOLOGIST: CPT | Mod: TC | Performed by: INTERNAL MEDICINE

## 2023-06-09 RX ORDER — ONDANSETRON 2 MG/ML
4 INJECTION INTRAMUSCULAR; INTRAVENOUS
Status: CANCELLED | OUTPATIENT
Start: 2023-06-09

## 2023-06-09 RX ORDER — FLUMAZENIL 0.1 MG/ML
0.2 INJECTION, SOLUTION INTRAVENOUS
Status: CANCELLED | OUTPATIENT
Start: 2023-06-09 | End: 2023-06-09

## 2023-06-09 RX ORDER — NALOXONE HYDROCHLORIDE 0.4 MG/ML
0.2 INJECTION, SOLUTION INTRAMUSCULAR; INTRAVENOUS; SUBCUTANEOUS
Status: CANCELLED | OUTPATIENT
Start: 2023-06-09

## 2023-06-09 RX ORDER — SODIUM CHLORIDE, SODIUM LACTATE, POTASSIUM CHLORIDE, CALCIUM CHLORIDE 600; 310; 30; 20 MG/100ML; MG/100ML; MG/100ML; MG/100ML
INJECTION, SOLUTION INTRAVENOUS CONTINUOUS PRN
Status: DISCONTINUED | OUTPATIENT
Start: 2023-06-09 | End: 2023-06-09

## 2023-06-09 RX ORDER — ONDANSETRON 2 MG/ML
4 INJECTION INTRAMUSCULAR; INTRAVENOUS EVERY 6 HOURS PRN
Status: CANCELLED | OUTPATIENT
Start: 2023-06-09

## 2023-06-09 RX ORDER — PROCHLORPERAZINE MALEATE 5 MG
5 TABLET ORAL EVERY 6 HOURS PRN
Status: CANCELLED | OUTPATIENT
Start: 2023-06-09

## 2023-06-09 RX ORDER — PROPOFOL 10 MG/ML
INJECTION, EMULSION INTRAVENOUS PRN
Status: DISCONTINUED | OUTPATIENT
Start: 2023-06-09 | End: 2023-06-09

## 2023-06-09 RX ORDER — PROPOFOL 10 MG/ML
INJECTION, EMULSION INTRAVENOUS CONTINUOUS PRN
Status: DISCONTINUED | OUTPATIENT
Start: 2023-06-09 | End: 2023-06-09

## 2023-06-09 RX ORDER — LIDOCAINE 40 MG/G
CREAM TOPICAL
Status: CANCELLED | OUTPATIENT
Start: 2023-06-09

## 2023-06-09 RX ORDER — NALOXONE HYDROCHLORIDE 0.4 MG/ML
0.4 INJECTION, SOLUTION INTRAMUSCULAR; INTRAVENOUS; SUBCUTANEOUS
Status: CANCELLED | OUTPATIENT
Start: 2023-06-09

## 2023-06-09 RX ORDER — ONDANSETRON 2 MG/ML
INJECTION INTRAMUSCULAR; INTRAVENOUS PRN
Status: DISCONTINUED | OUTPATIENT
Start: 2023-06-09 | End: 2023-06-09

## 2023-06-09 RX ORDER — ONDANSETRON 4 MG/1
4 TABLET, ORALLY DISINTEGRATING ORAL EVERY 6 HOURS PRN
Status: CANCELLED | OUTPATIENT
Start: 2023-06-09

## 2023-06-09 RX ORDER — LIDOCAINE HYDROCHLORIDE 20 MG/ML
INJECTION, SOLUTION INFILTRATION; PERINEURAL PRN
Status: DISCONTINUED | OUTPATIENT
Start: 2023-06-09 | End: 2023-06-09

## 2023-06-09 RX ADMIN — PROPOFOL 25 MG: 10 INJECTION, EMULSION INTRAVENOUS at 10:00

## 2023-06-09 RX ADMIN — LIDOCAINE HYDROCHLORIDE 40 MG: 20 INJECTION, SOLUTION INFILTRATION; PERINEURAL at 10:00

## 2023-06-09 RX ADMIN — SODIUM CHLORIDE, POTASSIUM CHLORIDE, SODIUM LACTATE AND CALCIUM CHLORIDE: 600; 310; 30; 20 INJECTION, SOLUTION INTRAVENOUS at 09:58

## 2023-06-09 RX ADMIN — PROPOFOL 150 MCG/KG/MIN: 10 INJECTION, EMULSION INTRAVENOUS at 10:00

## 2023-06-09 RX ADMIN — ONDANSETRON 4 MG: 2 INJECTION INTRAMUSCULAR; INTRAVENOUS at 10:10

## 2023-06-09 ASSESSMENT — ACTIVITIES OF DAILY LIVING (ADL)
ADLS_ACUITY_SCORE: 35
ADLS_ACUITY_SCORE: 35

## 2023-06-09 ASSESSMENT — COPD QUESTIONNAIRES: COPD: 1

## 2023-06-09 NOTE — ANESTHESIA CARE TRANSFER NOTE
Patient: Jeannine Rod    Procedure: Procedure(s):  Colonoscopy  COLONOSCOPY, WITH POLYPECTOMY AND BIOPSY       Diagnosis: Tubular adenoma [D36.9]  Left lower quadrant pain [R10.32]  Diagnosis Additional Information: No value filed.    Anesthesia Type:   MAC     Note:    Oropharynx: oropharynx clear of all foreign objects and spontaneously breathing  Level of Consciousness: awake  Oxygen Supplementation: face mask  Level of Supplemental Oxygen (L/min / FiO2): 6  Independent Airway: airway patency satisfactory and stable  Dentition: dentition unchanged  Vital Signs Stable: post-procedure vital signs reviewed and stable  Report to RN Given: handoff report given  Patient transferred to: Phase II    Handoff Report: Identifed the Patient, Identified the Reponsible Provider, Reviewed the pertinent medical history, Discussed the surgical course, Reviewed Intra-OP anesthesia mangement and issues during anesthesia, Set expectations for post-procedure period and Allowed opportunity for questions and acknowledgement of understanding      Vitals:  Vitals Value Taken Time   /67    Temp 36.0C    Pulse 54    Resp 26 06/09/23 1033   SpO2 100 % 06/09/23 1033   Vitals shown include unvalidated device data.    Electronically Signed By: FLORENCIO Riddle CRNA  June 9, 2023  10:35 AM

## 2023-06-09 NOTE — ANESTHESIA PREPROCEDURE EVALUATION
Anesthesia Pre-Procedure Evaluation    Patient: Jeannine Rod   MRN: [id number] : 1940        Procedure : Procedure(s):  Colonoscopy          Past Medical History:   Diagnosis Date     Arthritis      Benign essential hypertension 10/23/2017     Benign essential hypertension 10/23/2017     Calculus of gallbladder without cholecystitis 10/6/2016     Gastroesophageal reflux disease with esophagitis 10/6/2016     Hyperlipidemia      Malignant neoplasm (H)     breast bilat mastectomy, no rad, no chemobilat      Pulmonary embolism and infarction 3/9/2012    IMO update changed this record. 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     Raynaud's syndrome      Systemic sclerosis (H) 10/6/2016      Past Surgical History:   Procedure Laterality Date     ARTHROPLASTY HIP  3/5/2012    Procedure:ARTHROPLASTY HIP; LEFT TOTAL HIP ARTHROPLASTY (BIOMET)^; Surgeon:SABRINA CALLEJAS; Location:[facility]     BIOPSY  2007    Right Breast Biopsy     BIOPSY  2007    Right Breast Biopsy x2     BIOPSY  2007    Left Breast Biopsy     BREAST SURGERY  10/2/2007    Bilateral Mastectomy     BREAST SURGERY  3/2008    Breast Reconstruction Surgery     COLONOSCOPY      repeat in 5 years     ENT SURGERY      salivary gland removal      Allergies   Allergen Reactions     Metoclopramide      Other reaction(s): Other - Describe In Comment Field  Severe redness in eyes from the preservative in certain eye medications     No Clinical Screening - See Comments Other (See Comments)     Severe redness in eyes from the preservative in certain eye medications      Social History     Tobacco Use     Smoking status: Former     Years: 20.00     Types: Cigarettes     Quit date: 1982     Years since quittin.4     Smokeless tobacco: Never   Vaping Use     Vaping status: Not on file   Substance Use Topics     Alcohol use: Yes     Alcohol/week: 0.0 standard drinks of alcohol     Comment: red wine socially, 2-3x  week      Wt Readings from Last 1 Encounters:   06/07/23 63 kg (139 lb)        Anesthesia Evaluation            ROS/MED HX  ENT/Pulmonary:     (+) COPD,     Neurologic:       Cardiovascular:     (+) Dyslipidemia hypertension-----    METS/Exercise Tolerance:     Hematologic:       Musculoskeletal: Comment: raynauds syndrome  Systemic sclerosis  (+) arthritis,     GI/Hepatic:     (+) GERD,     Renal/Genitourinary:     (+) renal disease,     Endo:       Psychiatric/Substance Use:       Infectious Disease:       Malignancy:   (+) Malignancy, History of Breast.Breast CA Remission status post Surgery.        Other:            Physical Exam    Airway        Mallampati: I   TM distance: > 3 FB   Neck ROM: full   Mouth opening: > 3 cm    Respiratory Devices and Support         Dental       (+) Minor Abnormalities - some fillings, tiny chips      Cardiovascular   cardiovascular exam normal          Pulmonary   pulmonary exam normal                OUTSIDE LABS:  CBC:   Lab Results   Component Value Date    WBC 5.8 06/02/2022    WBC 6.9 12/02/2020    HGB 12.0 06/02/2022    HGB 11.9 12/02/2020    HCT 38.4 06/02/2022    HCT 37.1 12/02/2020     06/02/2022     12/02/2020     BMP:   Lab Results   Component Value Date     06/02/2022     12/02/2020    POTASSIUM 4.5 06/02/2022    POTASSIUM 4.0 12/02/2020    CHLORIDE 107 06/02/2022    CHLORIDE 107 12/02/2020    CO2 29 06/02/2022    CO2 26 12/02/2020    BUN 30 06/02/2022    BUN 30 12/02/2020    CR 1.2 (H) 05/19/2023    CR 1.13 (H) 06/02/2022    GLC 95 06/02/2022    GLC 87 12/02/2020     COAGS:   Lab Results   Component Value Date    INR 1.22 (H) 03/09/2012     POC: No results found for: BGM, HCG, HCGS  HEPATIC:   Lab Results   Component Value Date    ALBUMIN 3.8 12/02/2020    PROTTOTAL 7.1 12/02/2020    ALT 30 12/02/2020    AST 16 12/02/2020    ALKPHOS 71 12/02/2020    BILITOTAL 0.4 12/02/2020     OTHER:   Lab Results   Component Value Date    SAMEER 8.8 06/24/2022     TSH 3.69 06/02/2022    T4 1.31 02/27/2013       Anesthesia Plan    ASA Status:  2      Anesthesia Type: MAC.     - Reason for MAC: straight local not clinically adequate, immobility needed              Consents    Anesthesia Plan(s) and associated risks, benefits, and realistic alternatives discussed. Questions answered and patient/representative(s) expressed understanding.    - Discussed:     - Discussed with:  Patient         Postoperative Care       PONV prophylaxis: Ondansetron (or other 5HT-3)     Comments:                Deven Florian MD

## 2023-06-09 NOTE — ANESTHESIA POSTPROCEDURE EVALUATION
Patient: Jeannine Rod    Procedure: Procedure(s):  Colonoscopy  COLONOSCOPY, WITH POLYPECTOMY AND BIOPSY       Anesthesia Type:  MAC    Note:  Disposition: Outpatient   Postop Pain Control: Uneventful            Sign Out: Well controlled pain   PONV: No   Neuro/Psych: Uneventful            Sign Out: Acceptable/Baseline neuro status   Airway/Respiratory: Uneventful            Sign Out: Acceptable/Baseline resp. status   CV/Hemodynamics: Uneventful            Sign Out: Acceptable CV status   Other NRE: NONE   DID A NON-ROUTINE EVENT OCCUR? No           Last vitals:  Vitals Value Taken Time   /66 06/09/23 1050   Temp     Pulse 55 06/09/23 1059   Resp 27 06/09/23 1059   SpO2 100 % 06/09/23 1058   Vitals shown include unvalidated device data.    Electronically Signed By: Deven Florian MD  June 9, 2023  11:01 AM

## 2023-06-12 ENCOUNTER — VIRTUAL VISIT (OUTPATIENT)
Dept: FAMILY MEDICINE | Facility: CLINIC | Age: 83
End: 2023-06-12
Payer: MEDICARE

## 2023-06-12 DIAGNOSIS — M34.9 SYSTEMIC SCLEROSIS (H): ICD-10-CM

## 2023-06-12 DIAGNOSIS — R22.1 NECK MASS: ICD-10-CM

## 2023-06-12 DIAGNOSIS — E04.1 THYROID NODULE: Primary | ICD-10-CM

## 2023-06-12 DIAGNOSIS — R09.82 POST-NASAL DRAINAGE: ICD-10-CM

## 2023-06-12 DIAGNOSIS — N18.30 STAGE 3 CHRONIC KIDNEY DISEASE, UNSPECIFIED WHETHER STAGE 3A OR 3B CKD (H): ICD-10-CM

## 2023-06-12 PROCEDURE — 99213 OFFICE O/P EST LOW 20 MIN: CPT | Mod: VID | Performed by: INTERNAL MEDICINE

## 2023-06-12 NOTE — PROGRESS NOTES
Jeannine is a 82 year old who is being evaluated via a billable video visit.      How would you like to obtain your AVS? MyChart  If the video visit is dropped, the invitation should be resent by: Text to cell phone: [phone]  Will anyone else be joining your video visit? No        Assessment & Plan        Thyroid nodule  Systemic sclerosis (H)  Stage 3 chronic kidney disease, unspecified whether stage 3a or 3b CKD (H)  Post-nasal drainage      Check thyroid ultrasound   FNA pending results  Discussed with patient    Continue zyrtec, atroven and mucinex for globus sensation probably from post nasal drainage, has appointment to see ENT for this as well, based on hx, I double the thyroid nodule is the cause for these symptoms, but ultrasound with further characterize, discussed with patient     Renal function has been followed closely by rheumatology, cr was up a bid on point of care check prior to scan, I plan to recheck in July at the time of our preventive visit    21 minutes spent by me on the date of the encounter doing chart review, history and exam, documentation and further activities per the note           Bebeto Freitas MD  [facility]    Subjective   Jeannine is a 82 year old, presenting for the following health issues:  Thyroid Problem         View : No data to display.              History of Present Illness       Reason for visit:  Cyst on thyriod, enlargement on lymph node  Symptom onset:  More than a month  Symptoms include:  Difficulty in swallowing I attributed to sinus discharge alone  Symptom intensity:  Moderate  Symptom progression:  Staying the same  Had these symptoms before:  Yes  Has tried/received treatment for these symptoms:  Yes  Previous treatment was successful:  Yes  Prior treatment description:  Mix of nose drops, zyrtex and mucinex  What makes it worse:  Increase in amount of sinus discharge  What makes it better:  Decrease in amount of sinus discharge    She eats 4  or more servings of fruits and vegetables daily.She consumes 0 sweetened beverage(s) daily.She exercises with enough effort to increase her heart rate 20 to 29 minutes per day.  She exercises with enough effort to increase her heart rate 6 days per week. She is missing 1 dose(s) of medications per week.  She is not taking prescribed medications regularly due to remembering to take.         It is actually not a problem with swallowing in her words  It is more of a sensation that there is a lot of 'junk' or phlegm in the back of the throat  She thinks that zyrtec, mucinex and nose drops helps  She plans to see an ENT for this in the fall  No weight loss or odynophagia or true dysphagia         Review of Systems         Objective           Vitals:  No vitals were obtained today due to virtual visit.    Physical Exam   GENERAL: Healthy, alert and no distress  EYES: Eyes grossly normal to inspection.  No discharge or erythema, or obvious scleral/conjunctival abnormalities.  RESP: No audible wheeze, cough, or visible cyanosis.  No visible retractions or increased work of breathing.    SKIN: Visible skin clear. No significant rash, abnormal pigmentation or lesions.  NEURO: Cranial nerves grossly intact.  Mentation and speech appropriate for age.  PSYCH: Mentation appears normal, affect normal/bright, judgement and insight intact, normal speech and appearance well-groomed.                Video-Visit Details    Type of service:  Video Visit   Video Start Time: 0930  Video End Time:9:48 AM    Originating Location (pt. Location): Home  Distant Location (provider location):  On-site  Platform used for Video Visit: Merlene

## 2023-06-13 LAB
PATH REPORT.COMMENTS IMP SPEC: NORMAL
PATH REPORT.COMMENTS IMP SPEC: NORMAL
PATH REPORT.FINAL DX SPEC: NORMAL
PATH REPORT.GROSS SPEC: NORMAL
PATH REPORT.MICROSCOPIC SPEC OTHER STN: NORMAL
PATH REPORT.RELEVANT HX SPEC: NORMAL
PHOTO IMAGE: NORMAL

## 2023-06-13 PROCEDURE — 88305 TISSUE EXAM BY PATHOLOGIST: CPT | Mod: 26 | Performed by: PATHOLOGY

## 2023-06-16 ENCOUNTER — TRANSFERRED RECORDS (OUTPATIENT)
Dept: HEALTH INFORMATION MANAGEMENT | Facility: CLINIC | Age: 83
End: 2023-06-16
Payer: MEDICARE

## 2023-06-19 ENCOUNTER — TELEPHONE (OUTPATIENT)
Dept: OTOLARYNGOLOGY | Facility: CLINIC | Age: 83
End: 2023-06-19
Payer: MEDICARE

## 2023-06-19 ENCOUNTER — MYC MEDICAL ADVICE (OUTPATIENT)
Dept: OTOLARYNGOLOGY | Facility: CLINIC | Age: 83
End: 2023-06-19

## 2023-06-19 NOTE — TELEPHONE ENCOUNTER
Provided ENT Clinic number for rescheduling needs. Sent patient a cancelled appointment letter and a MyChart Message.

## 2023-06-21 ENCOUNTER — ANCILLARY PROCEDURE (OUTPATIENT)
Dept: ULTRASOUND IMAGING | Facility: CLINIC | Age: 83
End: 2023-06-21
Attending: INTERNAL MEDICINE
Payer: MEDICARE

## 2023-06-21 DIAGNOSIS — E04.1 THYROID NODULE: ICD-10-CM

## 2023-06-21 PROCEDURE — 76536 US EXAM OF HEAD AND NECK: CPT | Performed by: RADIOLOGY

## 2023-06-21 NOTE — RESULT ENCOUNTER NOTE
The following letter pertains to your most recent diagnostic tests:    The thyroid nodules are small and have ultrasound characteristic that are not concerning for dangerous problems and do not require additional follow up.    That being said there is a soft tissue shadow in the neck that was NOT observed on the CT scan that the radiologist identified incidentally.    I would recommend we obtain a CT scan of the neck which is a better test to evaluate this problem then the ultrasound.      Please call [facility] at [phone] to schedule your CT of your neck.    I suspect the CT will clarify this situation.      My suspicion for the ultrasound finding to be anything worrisome is low, but we should be thorough and obtain the CT scan to be quite sure.        Sincerely,    Dr. Freitas
Attending Only

## 2023-06-23 ENCOUNTER — HOSPITAL ENCOUNTER (OUTPATIENT)
Dept: CT IMAGING | Facility: CLINIC | Age: 83
Discharge: HOME | End: 2023-06-23
Attending: INTERNAL MEDICINE | Admitting: INTERNAL MEDICINE
Payer: MEDICARE

## 2023-06-23 DIAGNOSIS — R22.1 NECK MASS: ICD-10-CM

## 2023-06-23 DIAGNOSIS — M34.9 SYSTEMIC SCLEROSIS (H): Primary | ICD-10-CM

## 2023-06-23 DIAGNOSIS — J32.9 CHRONIC SINUSITIS, UNSPECIFIED LOCATION: ICD-10-CM

## 2023-06-23 LAB
CREAT BLD-MCNC: 1.4 MG/DL (ref 0.5–1)
GFR SERPL CREATININE-BSD FRML MDRD: 38 ML/MIN/1.73M2

## 2023-06-23 PROCEDURE — 250N000011 HC RX IP 250 OP 636: Performed by: INTERNAL MEDICINE

## 2023-06-23 PROCEDURE — 82565 ASSAY OF CREATININE: CPT

## 2023-06-23 PROCEDURE — G1010 CDSM STANSON: HCPCS

## 2023-06-23 PROCEDURE — 250N000009 HC RX 250: Performed by: INTERNAL MEDICINE

## 2023-06-23 RX ORDER — IOPAMIDOL 755 MG/ML
100 INJECTION, SOLUTION INTRAVASCULAR ONCE
Status: COMPLETED | OUTPATIENT
Start: 2023-06-23 | End: 2023-06-23

## 2023-06-23 RX ADMIN — SODIUM CHLORIDE 60 ML: 9 INJECTION, SOLUTION INTRAVENOUS at 12:42

## 2023-06-23 RX ADMIN — IOPAMIDOL 75 ML: 755 INJECTION, SOLUTION INTRAVENOUS at 12:42

## 2023-06-23 NOTE — RESULT ENCOUNTER NOTE
The following letter pertains to your most recent diagnostic tests:    There are no acutely dangerous findings on this neck CT scan.    There is evidence of sinusitis.    My review of your chart indicates that you have been trying to schedule an appointment with an ENT doctor regarding your sinuses.    I would recommend Josef Giraldo MD Ear, Nose and Throat specialist [phone].  He practices in our building.    Given these incidental CT findings, I would recommend talking to him about whether something can be done to help drain your sinuses bettter.    There are also abnormalities noted in the esophagus that may be related to food not moving down the esophagus normally.    Esophageal problems like this are common in the setting of scleroderma.    I think you have been seeing [facility] regarding this.    If there are new swallowing problems, I would recommend returning to [facility] to discuss whether another endoscopy might be helpful.    Finally, there are nor findings on this CT to explain the shadow noted on your ultrasound.       I sent [facility] and Dr. Giraldo referrals, however, since they are not [facility] doctors, you would need to call to schedule appointments.        Sincerely,    Dr. Freitas

## 2023-07-01 DIAGNOSIS — I10 BENIGN ESSENTIAL HYPERTENSION: ICD-10-CM

## 2023-07-03 RX ORDER — TRIAMTERENE/HYDROCHLOROTHIAZID 37.5-25 MG
TABLET ORAL
Qty: 90 TABLET | Refills: 0 | Status: SHIPPED | OUTPATIENT
Start: 2023-07-03 | End: 2023-10-02

## 2023-07-10 DIAGNOSIS — R09.82 POST-NASAL DRAINAGE: ICD-10-CM

## 2023-07-10 RX ORDER — IPRATROPIUM BROMIDE 42 UG/1
SPRAY, METERED NASAL
Qty: 15 ML | Refills: 0 | Status: SHIPPED | OUTPATIENT
Start: 2023-07-10 | End: 2023-08-03

## 2023-07-17 ENCOUNTER — TRANSFERRED RECORDS (OUTPATIENT)
Dept: HEALTH INFORMATION MANAGEMENT | Facility: CLINIC | Age: 83
End: 2023-07-17

## 2023-07-17 ENCOUNTER — LAB REQUISITION (OUTPATIENT)
Dept: LAB | Facility: CLINIC | Age: 83
End: 2023-07-17
Payer: MEDICARE

## 2023-07-17 PROCEDURE — 87077 CULTURE AEROBIC IDENTIFY: CPT | Mod: ORL | Performed by: OTOLARYNGOLOGY

## 2023-07-19 LAB
BACTERIA SPEC CULT: ABNORMAL
BACTERIA SPEC CULT: ABNORMAL

## 2023-07-19 NOTE — PROGRESS NOTES
[facility] Dermatology Note  Encounter Date: Jul 26, 2023   Office visit    Dermatology Problem List:  1. Hair loss c/w FFA and AGA, started 2020. Also has CREST  - Current tx: Rogaine daily, ketoconazole shampoo and Krysten deep conditioner 3x weekly, Lidex  3x weekly, protopic on eyebrows daily, calcium, multivitamin, latisse to eyebrows  - Prior tx: fluocinolone oil  - ILK 5/18/2021, 6/30/2021, 9/29/2021, 11/24/21, 11/30/2022, 2/22/2023  - [employer]: 6/30/2021, 9/29/2021, 2/23/22, 5/25/22, 11/30/2022  2. BLK, right posterior thigh, s/p shave bx 8/24/2022  3. Superficial portions of irregular acanthosis and patchy interface dermatitis, s/p shave bx 2/22/2023  - Betamethasone ointment      Family history: sister with skin cancer      ____________________________________________     Assessment & Plan:     # Hair loss, most consistent with FF/LPPA and AGA. 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 Patient scalp is stable.  - Continue Lidex solution every other day  - Continue minoxidil 5% solution daily/protopic on the brows  -using bimatroprost on the brows with is okay   - Injections see below  - Plaquenil from rheum     # Plaquenil discoloration   - review with rheumatology, counseled her       # Hypertrophic lichen planus- biopsy proven- better   - Flucinonide alternating with Protopic every 2 weeks, injections today       # Angular cheilitis  - Can use Protopic as tolerated  - Instructed to use Vaseline on the corners of the mouth at night and multiple times daily     Procedures Performed:   - Intra-lesional triamcinolone procedure note. After verbal consent and review of risk of pain and skin thinning/atrophy, positioning and cleansing with isopropyl alcohol, 7.5 total mL of triamcinolone 2.5 mg/mL was injected into 40 lesion(s) on the scalp and legs. The patient tolerated the procedure well and left the dermatology clinic in good condition.    - Cryotherapy procedure note,  "location(s): back. After verbal consent and discussion of risks and benefits including, but not limited to, dyspigmentation/scar, blister, and pain, 1 lesion(s) was(were) treated with 1-2 mm freeze border for 1-2 cycles with liquid nitrogen. Post cryotherapy instructions were provided.    Follow-up: 3 month(s) in-person, or earlier for new or changing lesions    Staff and Scribe:     I, Bj Ramone, am serving as a scribe to document services personally performed by Pat Méndez MD based on data collection and the provider's statements to me.    Provider Disclosure:   The documentation recorded by the scribe accurately reflects the services I personally performed and the decisions made by me.    Pat Méndez MD    Department of Dermatology  [facility]: Phone: [phone], Fax:[phone]  [facility]: Phone: [phone], Fax: [phone]   ____________________________________________    CC: Derm Problem (Jeannine is here for a skin check and hairloss with ILK. She has \"existing spots\" of concern on skin and a spot on her left side. )    HPI:  Ms. Jeannine Rod is a(n) 82 year old female who presents today as a return patient for a skin check and hair loss (ILK). Last seen April 26, 2023 by myself. Today reports \"itchy spot\" on back.     Reporting spots around the mouth.    Scalp is stable    Is not treating legs with cream but legs are itchy    Wants back spot frozen    Patient is otherwise feeling well, without additional skin concerns.    Labs Reviewed:  N/A    Physical Exam:  Vitals: LMP 07/07/1992   SKIN: Focused examination of back and legs was performed.  - Scaling and thinning of the frontal hair line, loss of follicular ostea  - Fissures on the corners of the mouth  - Waxy, stuck-on brown irritated plaque on the back   - 1 cm brown plaques on an erythematous base, 2 nummular " plaques on the anterior right shin  - 3 5 mm red papule on the right shin  - 1 erythematous, excoriated papule on the left upper thigh    - No other lesions of concern on areas examined.     Medications:  Current Outpatient Medications   Medication    amLODIPine (NORVASC) 5 MG tablet    atorvastatin (LIPITOR) 20 MG tablet    calcium-vitamin D (CALTRATE) 600-400 MG-UNIT per tablet    clotrimazole (LOTRIMIN) 1 % external cream    estradiol (ESTRACE) 0.1 MG/GM vaginal cream    fluocinonide (LIDEX) 0.05 % external solution    hydroxychloroquine (PLAQUENIL) 200 MG tablet    ipratropium (ATROVENT) 0.06 % nasal spray    ketoconazole (NIZORAL) 2 % external shampoo    Melatonin ER 1 MG TBCR    Multiple Vitamin (MULTI-VITAMIN) per tablet    nystatin (MYCOSTATIN) 875861 UNIT/GM external cream    tacrolimus (PROTOPIC) 0.1 % external ointment    triamterene-HCTZ (MAXZIDE-25) 37.5-25 MG tablet     Current Facility-Administered Medications   Medication    triamcinolone acetonide (KENALOG-10) injection 10 mg    triamcinolone acetonide (KENALOG-10) injection 10 mg    triamcinolone acetonide (KENALOG-10) injection 10 mg      Past Medical History:   Patient Active Problem List   Diagnosis    Malignant neoplasm of breast (H)    Raynaud's syndrome    Status post hip replacement    Systemic sclerosis (H)    Gastroesophageal reflux disease with esophagitis    Hyperlipidemia LDL goal <100    Calculus of gallbladder without cholecystitis    Radiculoplexus neuropathy    Benign essential hypertension    Atrophic vaginitis    Chronic kidney disease, stage 3 (H)    Chronic obstructive pulmonary disease, unspecified COPD type (H)    History of colonic polyps     Past Medical History:   Diagnosis Date    Arthritis     Benign essential hypertension 10/23/2017    Benign essential hypertension 10/23/2017    Calculus of gallbladder without cholecystitis 10/6/2016    Gastroesophageal reflux disease with esophagitis 10/6/2016    Hyperlipidemia      Malignant neoplasm (H)     breast bilat mastectomy, no rad, no chemobilat     Pulmonary embolism and infarction 3/9/2012    IMO update changed this record. 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 on close of this encounter.

## 2023-07-19 NOTE — PATIENT INSTRUCTIONS
PLAN  Angular cheilitis (around the mouth)  - Can use Protopic(tacrolimus) as tolerated  - Instructed to use Vaseline on the corners of the mouth at night and throughout the day    For scalp  -use fluocinonide solution nightly for 1 week, take 1 week off, then repeat    For legs  -use fluocinonide ointment twice daily for 2 weeks, then tacrolimus for 2 weeks, then repeat the cycle until follow up        Intralesional Kenalog (ILK) Injections    Intralesional steroid injection involves a corticosteroid, such as triamcinolone acetonide (brand name Kenalog), which is injected directly into a lesion on or immediately below the skin. Intralesional kenalog may be used to treat many skin conditions:    Alopecia areata  Discoid lupus erythematosus  Keloids/hypertrophic scars  Granuloma annulare and other granulomatous disorders  Hypertrophic lichen planus  Lichen simplex chronicus (neurodermatitis)  Localised psoriasis  Necrobiosis lipoidica  Acne cysts (nodulocystic acne)  Small infantile hemangiomas    Possible side-effects of intralesional Kenalog (ILK) injections include bleeding, pain, skin thinning,infection, contact dermatitis, nerve damage, scar formation and need for a repeat procedure.    Some people may experience delayed side-effects including:  Lipoatrophy, appearing as skin indentations or dimples around the injection sites a few weeks after treatment; these may be permanent.  White marks (leukoderma) or brown marks (postinflammatory pigmentation) at the site of injection or spreading from the site of injection - these may resolve or persist long term.  Telangiectasia, or small dilated blood vessels at the site of injection.   Increased hair growth at the site of injection - this resolves eventually.  Steroid acne: steroids increase growth hormone, leading to increased sebum (oil) production by the sebaceous glands. Steroid acne generally improves once the steroid has been stopped.      Who should I call with  questions?  [facility]: [phone]   [facility]: [phone]  For urgent needs outside of business hours call the [facility] at [phone] and ask for the dermatology resident on call     Cryotherapy    What is it?  Use of a very cold liquid, such as liquid nitrogen, to freeze and destroy abnormal skin cells that need to be removed    What should I expect?  Tenderness and redness  A small blister that might grow and fill with dark purple blood. There may be crusting.  More than one treatment may be needed if the lesions do not go away.    How do I care for the treated area?  Gently wash the area with your hands when bathing.  Use a thin layer of Vaseline to help with healing. You may use a Band-Aid.   The area should heal within 7-10 days and may leave behind a pink or lighter color.   Do not use an antibiotic or Neosporin ointment.   You may take acetaminophen (Tylenol) for pain.     Call your doctor if you have:  Severe pain  Signs of infection (warmth, redness, cloudy yellow drainage, and or a bad smell)  Questions or concerns    Who should I call with questions?      [facility]: [phone]      [facility]: [phone]      For urgent needs outside of business hours call the [facility] at [phone] and ask for the dermatology resident on call

## 2023-07-26 ENCOUNTER — OFFICE VISIT (OUTPATIENT)
Dept: DERMATOLOGY | Facility: CLINIC | Age: 83
End: 2023-07-26
Payer: MEDICARE

## 2023-07-26 DIAGNOSIS — L66.12 FRONTAL FIBROSING ALOPECIA: Primary | ICD-10-CM

## 2023-07-26 DIAGNOSIS — L82.0 INFLAMED SEBORRHEIC KERATOSIS: ICD-10-CM

## 2023-07-26 PROCEDURE — 99213 OFFICE O/P EST LOW 20 MIN: CPT | Mod: 25 | Performed by: DERMATOLOGY

## 2023-07-26 PROCEDURE — 17110 DESTRUCTION B9 LES UP TO 14: CPT | Performed by: DERMATOLOGY

## 2023-07-26 PROCEDURE — 11901 INJECT SKIN LESIONS >7: CPT | Mod: XS | Performed by: DERMATOLOGY

## 2023-07-26 ASSESSMENT — PAIN SCALES - GENERAL: PAINLEVEL: NO PAIN (0)

## 2023-07-26 NOTE — NURSING NOTE
"Dermatology Rooming Note    Jeannine Rod's goals for this visit include:   Chief Complaint   Patient presents with    Derm Problem     Jeannine is here for a skin check and hairloss with ILK. She has \"existing spots\" of concern on skin and a spot on her left side.      Nancy Rene, visit facilitator     "

## 2023-07-26 NOTE — LETTER
7/26/2023       RE: Jeannine Rod  [address]  [place] [address]     Dear Colleague,    Thank you for referring your patient, Jeannine Rod, to the [facility] DERMATOLOGY CLINIC [place] at [facility]. Please see a copy of my visit note below.    [facility] Dermatology Note  Encounter Date: Jul 26, 2023   Office visit    Dermatology Problem List:  1. Hair loss c/w FFA and AGA, started 2020. Also has CREST  - Current tx: Rogaine daily, ketoconazole shampoo and Krysten deep conditioner 3x weekly, Lidex  3x weekly, protopic on eyebrows daily, calcium, multivitamin, latisse to eyebrows  - Prior tx: fluocinolone oil  - ILK 5/18/2021, 6/30/2021, 9/29/2021, 11/24/21, 11/30/2022, 2/22/2023  - [employer]: 6/30/2021, 9/29/2021, 2/23/22, 5/25/22, 11/30/2022  2. BLK, right posterior thigh, s/p shave bx 8/24/2022  3. Superficial portions of irregular acanthosis and patchy interface dermatitis, s/p shave bx 2/22/2023  - Betamethasone ointment      Family history: sister with skin cancer      ____________________________________________     Assessment & Plan:     # Hair loss, most consistent with FF/LPPA and AGA. 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 Patient scalp is stable.  - Continue Lidex solution every other day  - Continue minoxidil 5% solution daily/protopic on the brows  -using bimatroprost on the brows with is okay   - Injections see below  - Plaquenil from rheum     # Plaquenil discoloration   - review with rheumatology, counseled her       # Hypertrophic lichen planus- biopsy proven- better   - Flucinonide alternating with Protopic every 2 weeks, injections today       # Angular cheilitis  - Can use Protopic as tolerated  - Instructed to use Vaseline on the corners of the mouth at night and multiple times daily     Procedures Performed:   - Intra-lesional triamcinolone procedure note. After verbal consent  "and review of risk of pain and skin thinning/atrophy, positioning and cleansing with isopropyl alcohol, 7.5 total mL of triamcinolone 2.5 mg/mL was injected into 40 lesion(s) on the scalp and legs. The patient tolerated the procedure well and left the dermatology clinic in good condition.    - Cryotherapy procedure note, location(s): back. After verbal consent and discussion of risks and benefits including, but not limited to, dyspigmentation/scar, blister, and pain, 1 lesion(s) was(were) treated with 1-2 mm freeze border for 1-2 cycles with liquid nitrogen. Post cryotherapy instructions were provided.    Follow-up: 3 month(s) in-person, or earlier for new or changing lesions    Staff and Scribe:     I, Bj Pack, am serving as a scribe to document services personally performed by Pat Méndez MD based on data collection and the provider's statements to me.    Provider Disclosure:   The documentation recorded by the scribe accurately reflects the services I personally performed and the decisions made by me.    Pat Méndez MD    Department of Dermatology  [facility]: Phone: [phone], Fax:[phone]  [facility]: Phone: [phone], Fax: [phone]   ____________________________________________    CC: Derm Problem (Jeannine is here for a skin check and hairloss with ILK. She has \"existing spots\" of concern on skin and a spot on her left side. )    HPI:  Ms. Jeannine Rod is a(n) 82 year old female who presents today as a return patient for a skin check and hair loss (ILK). Last seen April 26, 2023 by myself. Today reports \"itchy spot\" on back.     Reporting spots around the mouth.    Scalp is stable    Is not treating legs with cream but legs are itchy    Wants back spot frozen    Patient is otherwise feeling well, without additional skin concerns.    Labs " Reviewed:  N/A    Physical Exam:  Vitals: LMP 07/07/1992   SKIN: Focused examination of back and legs was performed.  - Scaling and thinning of the frontal hair line, loss of follicular ostea  - Fissures on the corners of the mouth  - Waxy, stuck-on brown irritated plaque on the back   - 1 cm brown plaques on an erythematous base, 2 nummular plaques on the anterior right shin  - 3 5 mm red papule on the right shin  - 1 erythematous, excoriated papule on the left upper thigh    - No other lesions of concern on areas examined.     Medications:  Current Outpatient Medications   Medication    amLODIPine (NORVASC) 5 MG tablet    atorvastatin (LIPITOR) 20 MG tablet    calcium-vitamin D (CALTRATE) 600-400 MG-UNIT per tablet    clotrimazole (LOTRIMIN) 1 % external cream    estradiol (ESTRACE) 0.1 MG/GM vaginal cream    fluocinonide (LIDEX) 0.05 % external solution    hydroxychloroquine (PLAQUENIL) 200 MG tablet    ipratropium (ATROVENT) 0.06 % nasal spray    ketoconazole (NIZORAL) 2 % external shampoo    Melatonin ER 1 MG TBCR    Multiple Vitamin (MULTI-VITAMIN) per tablet    nystatin (MYCOSTATIN) 751106 UNIT/GM external cream    tacrolimus (PROTOPIC) 0.1 % external ointment    triamterene-HCTZ (MAXZIDE-25) 37.5-25 MG tablet     Current Facility-Administered Medications   Medication    triamcinolone acetonide (KENALOG-10) injection 10 mg    triamcinolone acetonide (KENALOG-10) injection 10 mg    triamcinolone acetonide (KENALOG-10) injection 10 mg      Past Medical History:   Patient Active Problem List   Diagnosis    Malignant neoplasm of breast (H)    Raynaud's syndrome    Status post hip replacement    Systemic sclerosis (H)    Gastroesophageal reflux disease with esophagitis    Hyperlipidemia LDL goal <100    Calculus of gallbladder without cholecystitis    Radiculoplexus neuropathy    Benign essential hypertension    Atrophic vaginitis    Chronic kidney disease, stage 3 (H)    Chronic obstructive pulmonary disease,  unspecified COPD type (H)    History of colonic polyps     Past Medical History:   Diagnosis Date    Arthritis     Benign essential hypertension 10/23/2017    Benign essential hypertension 10/23/2017    Calculus of gallbladder without cholecystitis 10/6/2016    Gastroesophageal reflux disease with esophagitis 10/6/2016    Hyperlipidemia     Malignant neoplasm (H)     breast bilat mastectomy, no rad, no chemobilat     Pulmonary embolism and infarction 3/9/2012    IMO update changed this record. 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 on close of this encounter.

## 2023-07-27 NOTE — PROGRESS NOTES
Drug Administration Record    Prior to injection, verified patient identity using patient's name and date of birth.  Due to injection administration, patient instructed to remain in clinic for 15 minutes  afterwards, and to report any adverse reaction to me immediately.    Drug Name: triamcinolone acetonide(kenalog)  Dose: 3mL of triamcinolone 2.5mg/mL, 7.5mg dose  Route administered: ID  NDC #: Kenalog-10 (8282-0558-04)  Amount of waste(mL):  Reason for waste: Single use vial    LOT #: [id number]  SITE: scalp, leg  : [employer]  EXPIRATION DATE: 10/2025

## 2023-07-30 ASSESSMENT — ENCOUNTER SYMPTOMS
SORE THROAT: 0
DIZZINESS: 0
DIARRHEA: 0
DYSURIA: 0
NAUSEA: 1
HEARTBURN: 1
SHORTNESS OF BREATH: 1
NERVOUS/ANXIOUS: 0
CONSTIPATION: 0
PARESTHESIAS: 0
COUGH: 1
ABDOMINAL PAIN: 0
BREAST MASS: 0
PALPITATIONS: 0
MYALGIAS: 0
CHILLS: 0
JOINT SWELLING: 0
HEADACHES: 0
WEAKNESS: 0
EYE PAIN: 0
FEVER: 0
ARTHRALGIAS: 0
HEMATURIA: 0
FREQUENCY: 0
HEMATOCHEZIA: 0

## 2023-07-30 ASSESSMENT — ACTIVITIES OF DAILY LIVING (ADL): CURRENT_FUNCTION: NO ASSISTANCE NEEDED

## 2023-07-31 ENCOUNTER — OFFICE VISIT (OUTPATIENT)
Dept: FAMILY MEDICINE | Facility: CLINIC | Age: 83
End: 2023-07-31
Payer: MEDICARE

## 2023-07-31 VITALS
OXYGEN SATURATION: 92 % | TEMPERATURE: 97.8 F | BODY MASS INDEX: 21.34 KG/M2 | HEART RATE: 71 BPM | HEIGHT: 66 IN | SYSTOLIC BLOOD PRESSURE: 131 MMHG | WEIGHT: 132.8 LBS | DIASTOLIC BLOOD PRESSURE: 76 MMHG

## 2023-07-31 DIAGNOSIS — E78.5 HYPERLIPIDEMIA LDL GOAL <100: ICD-10-CM

## 2023-07-31 DIAGNOSIS — I73.00 RAYNAUD'S DISEASE WITHOUT GANGRENE: ICD-10-CM

## 2023-07-31 DIAGNOSIS — I10 BENIGN ESSENTIAL HYPERTENSION: ICD-10-CM

## 2023-07-31 DIAGNOSIS — K21.00 GASTROESOPHAGEAL REFLUX DISEASE WITH ESOPHAGITIS WITHOUT HEMORRHAGE: ICD-10-CM

## 2023-07-31 DIAGNOSIS — N18.30 STAGE 3 CHRONIC KIDNEY DISEASE, UNSPECIFIED WHETHER STAGE 3A OR 3B CKD (H): ICD-10-CM

## 2023-07-31 DIAGNOSIS — Z17.0 MALIGNANT NEOPLASM OF BREAST IN FEMALE, ESTROGEN RECEPTOR POSITIVE, UNSPECIFIED LATERALITY, UNSPECIFIED SITE OF BREAST (H): ICD-10-CM

## 2023-07-31 DIAGNOSIS — Z00.00 ROUTINE GENERAL MEDICAL EXAMINATION AT A HEALTH CARE FACILITY: Primary | ICD-10-CM

## 2023-07-31 DIAGNOSIS — C50.919 MALIGNANT NEOPLASM OF BREAST IN FEMALE, ESTROGEN RECEPTOR POSITIVE, UNSPECIFIED LATERALITY, UNSPECIFIED SITE OF BREAST (H): ICD-10-CM

## 2023-07-31 DIAGNOSIS — M34.9 SYSTEMIC SCLEROSIS (H): ICD-10-CM

## 2023-07-31 DIAGNOSIS — J44.9 CHRONIC OBSTRUCTIVE PULMONARY DISEASE, UNSPECIFIED COPD TYPE (H): ICD-10-CM

## 2023-07-31 LAB
ANION GAP SERPL CALCULATED.3IONS-SCNC: 13 MMOL/L (ref 7–15)
BUN SERPL-MCNC: 33 MG/DL (ref 8–23)
CALCIUM SERPL-MCNC: 9.8 MG/DL (ref 8.8–10.2)
CHLORIDE SERPL-SCNC: 101 MMOL/L (ref 98–107)
CHOLEST SERPL-MCNC: 182 MG/DL
CREAT SERPL-MCNC: 1.08 MG/DL (ref 0.51–0.95)
DEPRECATED HCO3 PLAS-SCNC: 24 MMOL/L (ref 22–29)
ERYTHROCYTE [DISTWIDTH] IN BLOOD BY AUTOMATED COUNT: 13.6 % (ref 10–15)
GFR SERPL CREATININE-BSD FRML MDRD: 52 ML/MIN/1.73M2
GLUCOSE SERPL-MCNC: 85 MG/DL (ref 70–99)
HCT VFR BLD AUTO: 39 % (ref 35–47)
HDLC SERPL-MCNC: 83 MG/DL
HGB BLD-MCNC: 12.2 G/DL (ref 11.7–15.7)
LDLC SERPL CALC-MCNC: 91 MG/DL
MCH RBC QN AUTO: 28.4 PG (ref 26.5–33)
MCHC RBC AUTO-ENTMCNC: 31.3 G/DL (ref 31.5–36.5)
MCV RBC AUTO: 91 FL (ref 78–100)
NONHDLC SERPL-MCNC: 99 MG/DL
PLATELET # BLD AUTO: 190 10E3/UL (ref 150–450)
POTASSIUM SERPL-SCNC: 4.6 MMOL/L (ref 3.4–5.3)
RBC # BLD AUTO: 4.29 10E6/UL (ref 3.8–5.2)
SODIUM SERPL-SCNC: 138 MMOL/L (ref 136–145)
TRIGL SERPL-MCNC: 41 MG/DL
WBC # BLD AUTO: 8.5 10E3/UL (ref 4–11)

## 2023-07-31 PROCEDURE — 85027 COMPLETE CBC AUTOMATED: CPT | Performed by: INTERNAL MEDICINE

## 2023-07-31 PROCEDURE — G0439 PPPS, SUBSEQ VISIT: HCPCS | Performed by: INTERNAL MEDICINE

## 2023-07-31 PROCEDURE — 80048 BASIC METABOLIC PNL TOTAL CA: CPT | Performed by: INTERNAL MEDICINE

## 2023-07-31 PROCEDURE — 36415 COLL VENOUS BLD VENIPUNCTURE: CPT | Performed by: INTERNAL MEDICINE

## 2023-07-31 PROCEDURE — 80061 LIPID PANEL: CPT | Performed by: INTERNAL MEDICINE

## 2023-07-31 ASSESSMENT — PAIN SCALES - GENERAL: PAINLEVEL: NO PAIN (0)

## 2023-07-31 ASSESSMENT — ACTIVITIES OF DAILY LIVING (ADL): CURRENT_FUNCTION: NO ASSISTANCE NEEDED

## 2023-07-31 NOTE — PROGRESS NOTES
"SUBJECTIVE:   Jeannine is a 82 year old who presents for Preventive Visit.      Are you in the first 12 months of your Medicare coverage?  No    Healthy Habits:     In general, how would you rate your overall health?  Good    Frequency of exercise:  4-5 days/week    Duration of exercise:  30-45 minutes    Do you usually eat at least 4 servings of fruit and vegetables a day, include whole grains    & fiber and avoid regularly eating high fat or \"junk\" foods?  Yes    Taking medications regularly:  Yes    Medication side effects:  None    Ability to successfully perform activities of daily living:  No assistance needed    Home Safety:  Throw rugs in the hallway    Hearing Impairment:  Difficulty following a conversation in a noisy restaurant or crowded room, feel that people are mumbling or not speaking clearly, difficult to understand a speaker at a public meeting or Yazidi service, need to ask people to speak up or repeat themselves, find that men's voices are easier to understand than woman's and difficulty understanding soft or whispered speech    In the past 6 months, have you been bothered by leaking of urine?  No    In general, how would you rate your overall mental or emotional health?  Good        Have you ever done Advance Care Planning? (For example, a Health Directive, POLST, or a discussion with a medical provider or your loved ones about your wishes): No, advance care planning information given to patient to review.  Patient plans to discuss their wishes with loved ones or provider.      Fall risk  Fallen 2 or more times in the past year?: No  Any fall with injury in the past year?: No  click delete button to remove this line now  Cognitive Screening   1) Repeat 3 items (Leader, Season, Table)    2) Clock draw: NORMAL  3) 3 item recall: Recalls 2 objects   Results: NORMAL clock, 1-2 items recalled: COGNITIVE IMPAIRMENT LESS LIKELY    Mini-CogTM Copyright S Melvi. Licensed by the author for use in [facility]; reprinted with permission (soob@[facility]). All rights reserved.      Do you have sleep apnea, excessive snoring or daytime drowsiness? : no    Reviewed and updated as needed this visit by clinical staff   Tobacco  Allergies  Meds      Soc Hx        Reviewed and updated as needed this visit by Provider                 Social History     Tobacco Use    Smoking status: Former     Years: 20.00     Types: Cigarettes     Quit date: 1982     Years since quittin.6    Smokeless tobacco: Never   Substance Use Topics    Alcohol use: Yes     Alcohol/week: 0.0 standard drinks of alcohol     Comment: red wine socially, 2-3x week             2023     1:12 PM   Alcohol Use   Prescreen: >3 drinks/day or >7 drinks/week? No          No data to display              Do you have a current opioid prescription? No  Do you use any other controlled substances or medications that are not prescribed by a provider? None          Current providers sharing in care for this patient include:   Patient Care Team:  Bebeto Freitas MD as PCP - General (Internal Medicine)  Bebeto Freitas MD as Assigned PCP  Danelle Aguayo PA-C as Physician Assistant (Physician Assistant)  Pat Méndez MD as MD (Dermatology)  Pat Méndez MD as Assigned Surgical Provider  Dennis Heath MD as Assigned Heart and Vascular Provider  Savage García MD as MD (Otolaryngology)  Piter Morfin PA-C as Physician Assistant (Family Medicine)  Eliel Liu MD as MD (Otolaryngology)    The following health maintenance items are reviewed in Epic and correct as of today:  Health Maintenance   Topic Date Due    COPD ACTION PLAN  Never done    MEDICARE ANNUAL WELLNESS VISIT  2021    BMP  2023    LIPID  2023    INFLUENZA VACCINE (1) 2023    TSH W/FREE T4 REFLEX  10/11/2023    ANNUAL REVIEW OF HM ORDERS  2024    FALL RISK ASSESSMENT  2024    DTAP/TDAP/TD IMMUNIZATION (4 - Td or  Tdap) 02/17/2025    ADVANCE CARE PLANNING  07/31/2028    DEXA  11/02/2030    SPIROMETRY  Completed    PHQ-2 (once per calendar year)  Completed    Pneumococcal Vaccine: 65+ Years  Completed    URINALYSIS  Completed    ZOSTER IMMUNIZATION  Completed    COVID-19 Vaccine  Completed    IPV IMMUNIZATION  Aged Out    MENINGITIS IMMUNIZATION  Aged Out    MICROALBUMIN  Discontinued    MAMMO SCREENING  Discontinued    HEMOGLOBIN  Discontinued    COLORECTAL CANCER SCREENING  Discontinued     Patient Active Problem List   Diagnosis    Malignant neoplasm of breast (H)    Raynaud's syndrome    Status post hip replacement    Systemic sclerosis (H)    Gastroesophageal reflux disease with esophagitis    Hyperlipidemia LDL goal <100    Calculus of gallbladder without cholecystitis    Radiculoplexus neuropathy    Benign essential hypertension    Atrophic vaginitis    Chronic kidney disease, stage 3 (H)    Chronic obstructive pulmonary disease, unspecified COPD type (H)    History of colonic polyps    Routine general medical examination at a health care facility     Past Surgical History:   Procedure Laterality Date    ARTHROPLASTY HIP  3/5/2012    Procedure:ARTHROPLASTY HIP; LEFT TOTAL HIP ARTHROPLASTY (BIOMET)^; Surgeon:SABRINA CALLEJAS; Location: OR    BIOPSY  8/29/2007    Right Breast Biopsy    BIOPSY  9/12/2007    Right Breast Biopsy x2    BIOPSY  9/13/2007    Left Breast Biopsy    BREAST SURGERY  10/2/2007    Bilateral Mastectomy    BREAST SURGERY  3/2008    Breast Reconstruction Surgery    COLONOSCOPY  2011    repeat in 5 years    COLONOSCOPY N/A 6/9/2023    Procedure: Colonoscopy;  Surgeon: Kiel Rasmussen MD;  Location:  GI    COLONOSCOPY N/A 6/9/2023    Procedure: COLONOSCOPY, WITH POLYPECTOMY AND BIOPSY;  Surgeon: Kiel Rasmussen MD;  Location:  GI    ENT SURGERY      salivary gland removal       Social History     Tobacco Use    Smoking status: Former     Years: 20.00     Types: Cigarettes     Quit date:  1982     Years since quittin.6    Smokeless tobacco: Never   Substance Use Topics    Alcohol use: Yes     Alcohol/week: 0.0 standard drinks of alcohol     Comment: red wine socially, 2-3x week     Family History   Problem Relation Age of Onset    Cancer Mother         lung age 83    Unknown/Adopted Father         91 years    Eye Disorder Father         Macular Degeneration    Breast Cancer Maternal Grandmother     Cancer Paternal Grandfather     Cancer Paternal Uncle     Asthma Brother     C.A.D. Sister         elevated cholesterol         Current Outpatient Medications   Medication Sig Dispense Refill    amLODIPine (NORVASC) 5 MG tablet Take 1 tablet (5 mg) by mouth daily 90 tablet 0    amoxicillin-clavulanate (AUGMENTIN) 875-125 MG tablet 1 TABLET ORALLY EVERY 12 HRS 21 DAY(S)      atorvastatin (LIPITOR) 20 MG tablet TAKE 1 TABLET BY MOUTH EVERY DAY 90 tablet 2    calcium-vitamin D (CALTRATE) 600-400 MG-UNIT per tablet Take 1 tablet by mouth 2 times daily      clotrimazole (LOTRIMIN) 1 % external cream Apply nightly to buttocks 60 g 0    estradiol (ESTRACE) 0.1 MG/GM vaginal cream PLACE 2 GRAMS VAGINALLY TWICE A WEEK 42.5 g 11    fluocinonide (LIDEX) 0.05 % external solution Use Monday, wed, Friday along the frontal hair line 60 mL 3    hydroxychloroquine (PLAQUENIL) 200 MG tablet Take 200 mg by mouth 2 times daily 2 tabs daily 5 days per week, 1 tab daily 2 days per week.      ipratropium (ATROVENT) 0.06 % nasal spray 2 SPRAYS INTO BOTH NOSTRILS 4XDAY AS NEEDED(NASAL DRAINAGE THAT TRIGGERS COUGH/THROAT CLEARING) 15 mL 0    ketoconazole (NIZORAL) 2 % external shampoo USe up to 3 times weekly 120 mL 3    Melatonin ER 1 MG TBCR Take 1 mg by mouth nightly as needed      Multiple Vitamin (MULTI-VITAMIN) per tablet Take 1 tablet by mouth daily.      nystatin (MYCOSTATIN) 706914 UNIT/GM external cream Apply topically 2 times daily 30 g 1    tacrolimus (PROTOPIC) 0.1 % external ointment USe twice daily when not  "using fluocinonide cream on the legs and scalp 120 g 2    triamterene-HCTZ (MAXZIDE-25) 37.5-25 MG tablet TAKE 1 TABLET BY MOUTH EVERY DAY 90 tablet 0     Allergies   Allergen Reactions    Metoclopramide      Other reaction(s): Other - Describe In Comment Field  Severe redness in eyes from the preservative in certain eye medications    No Clinical Screening - See Comments Other (See Comments)     Severe redness in eyes from the preservative in certain eye medications     A 10 organ systems ROS is negative other than any pertinent positives or negatives previously stated.       OBJECTIVE:   /76 (BP Location: Right arm, Patient Position: Sitting, Cuff Size: Adult Regular)   Pulse 71   Temp 97.8  F (36.6  C) (Tympanic)   Ht 1.676 m (5' 6\")   Wt 60.2 kg (132 lb 12.8 oz)   LMP 07/07/1992   SpO2 92%   BMI 21.43 kg/m   Estimated body mass index is 21.43 kg/m  as calculated from the following:    Height as of this encounter: 1.676 m (5' 6\").    Weight as of this encounter: 60.2 kg (132 lb 12.8 oz).  Physical Exam  GENERAL APPEARANCE: healthy, alert and no distress  EYES: Eyes grossly normal to inspection, PERRL and conjunctivae and sclerae normal  HENT: ear canals and TM's normal, nose and mouth without ulcers or lesions, oropharynx clear and oral mucous membranes moist  NECK: no adenopathy, no asymmetry, masses, or scars and thyroid normal to palpation  RESP: lungs clear to auscultation - no rales, rhonchi or wheezes  CV: regular rate and rhythm, normal S1 S2, no S3 or S4, no murmur, click or rub, no peripheral edema and peripheral pulses strong  ABDOMEN: soft, nontender, no hepatosplenomegaly, no masses and bowel sounds normal  MS: no musculoskeletal defects are noted and gait is age appropriate without ataxia  SKIN: no suspicious lesions or rashes  NEURO: Normal strength and tone, sensory exam grossly normal, mentation intact and speech normal  PSYCH: mentation appears normal and affect " normal/bright        ASSESSMENT / PLAN:       ICD-10-CM    1. Routine general medical examination at a health care facility  Z00.00       2. Chronic obstructive pulmonary disease, unspecified COPD type (H)  J44.9       3. Stage 3 chronic kidney disease, unspecified whether stage 3a or 3b CKD (H)  N18.30 BASIC METABOLIC PANEL     CBC with platelets     BASIC METABOLIC PANEL     CBC with platelets      4. Malignant neoplasm of breast in female, estrogen receptor positive, unspecified laterality, unspecified site of breast (H)  C50.919     Z17.0       5. Systemic sclerosis (H)  M34.9       6. Raynaud's disease without gangrene  I73.00       7. Gastroesophageal reflux disease with esophagitis without hemorrhage  K21.00       8. Benign essential hypertension  I10       9. Hyperlipidemia LDL goal <100  E78.5 Lipid panel reflex to direct LDL Non-fasting     Lipid panel reflex to direct LDL Non-fasting      Stable COPD  Recheck renal function, POC result higher than baseline prior to scans  Post mastectomies   Continue follow up with rheumatology re CREST  Blood pressure OK, check labs  On statin therapy recheck lipids     Patient has been advised of split billing requirements and indicates understanding: Yes      COUNSELING:  Reviewed preventive health counseling, as reflected in patient instructions  Special attention given to:       Regular exercise       Healthy diet/nutrition       Immunizations  Vaccines are up to date           She reports that she quit smoking about 41 years ago. Her smoking use included cigarettes. She has never used smokeless tobacco.      Appropriate preventive services were discussed with this patient, including applicable screening as appropriate for cardiovascular disease, diabetes, osteopenia/osteoporosis, and glaucoma.  As appropriate for age/gender, discussed screening for colorectal cancer, prostate cancer, breast cancer, and cervical cancer. Checklist reviewing preventive services  available has been given to the patient.    Reviewed patients plan of care and provided an AVS. The Basic Care Plan (routine screening as documented in Health Maintenance) for Jeannine meets the Care Plan requirement. This Care Plan has been established and reviewed with the Patient.          Bebeto Freitas MD  [facility]    Identified Health Risks:  I have reviewed Opioid Use Disorder and Substance Use Disorder risk factors and made any needed referrals.

## 2023-08-01 NOTE — RESULT ENCOUNTER NOTE
The following letter pertains to your most recent diagnostic tests:    Good news!  -Tests of kidney function are stable for you (Creatinine, GFR) and improved significantly from what was discovered by the point of care testing prior to your scan.      Your cholesterol panel looks healthy.     -Your complete blood counts including your hemoglobin returned normal for you.         Sincerely,    Dr. Freitas

## 2023-08-02 DIAGNOSIS — N95.2 ATROPHIC VAGINITIS: ICD-10-CM

## 2023-08-03 DIAGNOSIS — R09.82 POST-NASAL DRAINAGE: ICD-10-CM

## 2023-08-03 RX ORDER — IPRATROPIUM BROMIDE 42 UG/1
SPRAY, METERED NASAL
Qty: 15 ML | Refills: 2 | Status: SHIPPED | OUTPATIENT
Start: 2023-08-03 | End: 2023-11-09

## 2023-08-03 RX ORDER — ESTRADIOL 0.1 MG/G
CREAM VAGINAL
Qty: 42.5 G | Refills: 10 | Status: SHIPPED | OUTPATIENT
Start: 2023-08-03

## 2023-08-07 ENCOUNTER — TELEPHONE (OUTPATIENT)
Dept: DERMATOLOGY | Facility: CLINIC | Age: 83
End: 2023-08-07
Payer: MEDICARE

## 2023-08-07 DIAGNOSIS — L66.12 FRONTAL FIBROSING ALOPECIA: Primary | ICD-10-CM

## 2023-08-07 NOTE — TELEPHONE ENCOUNTER
[facility] Call Center    Phone Message    May a detailed message be left on voicemail: yes     Reason for Call: Medication Question or concern regarding medication   Prescription Clarification  Name of Medication: fluocinonide (LIDEX) 0.05 % external solution and the ointment version of this as well.     Prescribing Provider: Dr. Pat Méndez   Pharmacy: [id number] - [place], [place] - [address]   What on the order needs clarification? Pt needs a script for the solution and the ointment version          Action Taken: Message routed to:  [facility] Derm    Travel Screening: Not Applicable

## 2023-08-08 ENCOUNTER — ANCILLARY PROCEDURE (OUTPATIENT)
Dept: CT IMAGING | Facility: CLINIC | Age: 83
End: 2023-08-08
Attending: OTOLARYNGOLOGY
Payer: MEDICARE

## 2023-08-08 DIAGNOSIS — J32.0 CHRONIC MAXILLARY SINUSITIS: ICD-10-CM

## 2023-08-08 PROCEDURE — 70486 CT MAXILLOFACIAL W/O DYE: CPT | Performed by: RADIOLOGY

## 2023-08-10 ENCOUNTER — MYC MEDICAL ADVICE (OUTPATIENT)
Dept: DERMATOLOGY | Facility: CLINIC | Age: 83
End: 2023-08-10
Payer: MEDICARE

## 2023-08-10 DIAGNOSIS — L66.12 FRONTAL FIBROSING ALOPECIA: ICD-10-CM

## 2023-08-10 DIAGNOSIS — L43.0 HYPERTROPHIC LICHEN PLANUS: Primary | ICD-10-CM

## 2023-08-10 NOTE — TELEPHONE ENCOUNTER
# Hypertrophic lichen planus- biopsy proven- better   - Flucinonide alternating with Protopic every 2 weeks, injections today

## 2023-08-11 RX ORDER — FLUOCINONIDE TOPICAL SOLUTION USP, 0.05% 0.5 MG/ML
SOLUTION TOPICAL
Qty: 60 ML | Status: CANCELLED | OUTPATIENT
Start: 2023-08-11

## 2023-08-11 NOTE — TELEPHONE ENCOUNTER
fluocinonide (LIDEX) 0.05 % external solution 60 mL 3 5/25/2022     Last Office Visit : 7-  Future Office visit:  11-    Routing refill request to provider for review/approval because:  Last order is over a year old.  Sig does NOT match plan

## 2023-08-14 ENCOUNTER — TRANSFERRED RECORDS (OUTPATIENT)
Dept: HEALTH INFORMATION MANAGEMENT | Facility: CLINIC | Age: 83
End: 2023-08-14
Payer: MEDICARE

## 2023-08-15 RX ORDER — FLUOCINONIDE 0.5 MG/G
OINTMENT TOPICAL
Qty: 120 G | Refills: 1 | Status: SHIPPED | OUTPATIENT
Start: 2023-08-15 | End: 2023-08-23

## 2023-08-15 NOTE — TELEPHONE ENCOUNTER
Yes see below        # Hair loss, most consistent with FF/LPPA and AGA. 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 Patient scalp is stable.  - Continue Lidex solution every other day      # Hypertrophic lichen planus on legs- biopsy proven- better   - Flucinonide alternating with Protopic every 2 weeks, injections today- lower legs

## 2023-08-16 ENCOUNTER — TELEPHONE (OUTPATIENT)
Dept: DERMATOLOGY | Facility: CLINIC | Age: 83
End: 2023-08-16

## 2023-08-16 RX ORDER — FLUOCINOLONE ACETONIDE 0.1 MG/ML
SOLUTION TOPICAL EVERY OTHER DAY
Qty: 60 ML | Refills: 3 | Status: SHIPPED | OUTPATIENT
Start: 2023-08-16

## 2023-08-16 NOTE — TELEPHONE ENCOUNTER
Prior Authorization Retail Medication Request    Medication/Dose: fluocinonide (LIDEX) 0.05 % external ointment  ICD code (if different than what is on RX):    Hypertrophic lichen planus [L43.0]        Previously Tried and Failed:  See chart  Rationale:      Insurance Name:  Medicare  Insurance ID:  [id number]

## 2023-08-16 NOTE — TELEPHONE ENCOUNTER
Spoke with the pharmacy and this is not what they needed. The fluocinonide (LIDEX) 0.05 % external ointment needs a PA and they did not receive a prescription for fluocinonide (LIDEX) 0.05 % solution.    PA has been started    Note sent to Dr. Méndez to prescribe fluocinonide (LIDEX) solution    Tati DELGADILLO CMA

## 2023-08-21 NOTE — TELEPHONE ENCOUNTER
Central Prior Authorization Team   Phone: [phone]    PA Initiation    Medication: fluocinonide (LIDEX) 0.05 % external ointment  Insurance Company: [employer] - Phone [phone] Fax [phone]  Pharmacy Filling the Rx: [id number] - [place], [place] - [address]  Filling Pharmacy Phone: [phone]  Filling Pharmacy Fax:    Start Date: 8/21/2023

## 2023-08-23 RX ORDER — FLUOCINONIDE 0.5 MG/G
CREAM TOPICAL
Qty: 60 G | Refills: 3 | Status: SHIPPED | OUTPATIENT
Start: 2023-08-23

## 2023-08-23 NOTE — TELEPHONE ENCOUNTER
PRIOR AUTHORIZATION DENIED    Medication: fluocinonide (LIDEX) 0.05 % external ointment    Denial Date: 8/23/2023    Denial Rational: Needs to try/fail one more drug listed below.          Appeal Information: This medication was denied. If physician would like to appeal because patient has contraindication or allergy to covered medication please write letter of medical necessity and route back to PA team to initiate.  If no further action is needed please close encounter thank you.

## 2023-09-21 ENCOUNTER — NURSE TRIAGE (OUTPATIENT)
Dept: FAMILY MEDICINE | Facility: CLINIC | Age: 83
End: 2023-09-21
Payer: MEDICARE

## 2023-09-21 ENCOUNTER — VIRTUAL VISIT (OUTPATIENT)
Dept: PEDIATRICS | Facility: CLINIC | Age: 83
End: 2023-09-21
Payer: MEDICARE

## 2023-09-21 DIAGNOSIS — U07.1 CLINICAL DIAGNOSIS OF COVID-19: Primary | ICD-10-CM

## 2023-09-21 PROCEDURE — 99213 OFFICE O/P EST LOW 20 MIN: CPT | Mod: 95 | Performed by: NURSE PRACTITIONER

## 2023-09-21 NOTE — TELEPHONE ENCOUNTER
Reason for Disposition   COVID-19 diagnosed by positive lab test (e.g., PCR, rapid self-test kit) and mild symptoms (e.g., cough, fever, others) and no complications or SOB    Additional Information   Negative: SEVERE difficulty breathing (e.g., struggling for each breath, speaks in single words)   Negative: Difficult to awaken or acting confused (e.g., disoriented, slurred speech)   Negative: Bluish (or gray) lips or face now   Negative: Shock suspected (e.g., cold/pale/clammy skin, too weak to stand, low BP, rapid pulse)   Negative: Sounds like a life-threatening emergency to the triager   Negative: Diagnosed or suspected COVID-19 and symptoms lasting 3 or more weeks   Negative: COVID-19 exposure and no symptoms   Negative: COVID-19 vaccine reaction suspected (e.g., fever, headache, muscle aches) occurring 1 to 3 days after getting vaccine   Negative: COVID-19 vaccine, questions about   Negative: Lives with someone known to have influenza (flu test positive) and flu-like symptoms (e.g., cough, runny nose, sore throat, SOB; with or without fever)   Negative: Possible COVID-19 symptoms and triager concerned about severity of symptoms or other causes   Negative: COVID-19 and breastfeeding, questions about   Negative: SEVERE or constant chest pain or pressure  (Exception: Mild central chest pain, present only when coughing.)   Negative: MODERATE difficulty breathing (e.g., speaks in phrases, SOB even at rest, pulse 100-120)   Negative: Headache and stiff neck (can't touch chin to chest)   Negative: Oxygen level (e.g., pulse oximetry) 90% or lower   Negative: Chest pain or pressure  (Exception: MILD central chest pain, present only when coughing.)   Negative: Drinking very little and dehydration suspected (e.g., no urine > 12 hours, very dry mouth, very lightheaded)   Negative: Patient sounds very sick or weak to the triager   Negative: MILD difficulty breathing (e.g., minimal/no SOB at rest, SOB with walking,  "pulse <100)   Negative: Fever > 103 F (39.4 C)   Negative: Fever > 101 F (38.3 C) and over 60 years of age   Negative: Fever > 100.0 F (37.8 C) and bedridden (e.g., CVA, chronic illness, recovering from surgery)   Negative: HIGH RISK patient (e.g., weak immune system, age > 64 years, obesity with BMI of 30 or higher, pregnant, chronic lung disease or other chronic medical condition) and COVID symptoms (e.g., cough, fever)  (Exceptions: Already seen by doctor or NP/PA and no new or worsening symptoms.)   Negative: HIGH RISK patient and influenza is widespread in the community and ONE OR MORE respiratory symptoms: cough, sore throat, runny or stuffy nose   Negative: HIGH RISK patient and influenza exposure within the last 7 days and ONE OR MORE respiratory symptoms: cough, sore throat, runny or stuffy nose   Negative: Oxygen level (e.g., pulse oximetry) 91 to 94%   Negative: COVID-19 infection suspected by caller or triager and mild symptoms (cough, fever, or others) and negative COVID-19 rapid test   Negative: Fever present > 3 days (72 hours)   Negative: Fever returns after gone for over 24 hours and symptoms worse or not improved   Negative: Continuous (nonstop) coughing interferes with work or school and no improvement using cough treatment per Care Advice   Negative: Cough present > 3 weeks   Negative: COVID-19 diagnosed by positive lab test (e.g., PCR, rapid self-test kit) and NO symptoms (e.g., cough, fever, others)    Answer Assessment - Initial Assessment Questions  1. COVID-19 DIAGNOSIS: \"How do you know that you have COVID?\" (e.g., positive lab test or self-test, diagnosed by doctor or NP/PA, symptoms after exposure).      Today  2. COVID-19  EXPOSURE: \"Was there any known exposure to COVID before the symptoms began?\" CDC Definition of close contact: within 6 feet (2 meters) for a total of 15 minutes or more over a 24-hour period.       Not to my knowledge  3. ONSET: \"When did the COVID-19 symptoms start?\" " "      Day before yesterday  4. WORST SYMPTOM: \"What is your worst symptom?\" (e.g., cough, fever, shortness of breath, muscle aches)      Nasal drainage  5. COUGH: \"Do you have a cough?\" If Yes, ask: \"How bad is the cough?\"        No  6. FEVER: \"Do you have a fever?\" If Yes, ask: \"What is your temperature, how was it measured, and when did it start?\"      97.6  7. RESPIRATORY STATUS: \"Describe your breathing?\" (e.g., normal; shortness of breath, wheezing, unable to speak)       No  8. BETTER-SAME-WORSE: \"Are you getting better, staying the same or getting worse compared to yesterday?\"  If getting worse, ask, \"In what way?\"      worse  9. OTHER SYMPTOMS: \"Do you have any other symptoms?\"  (e.g., chills, fatigue, headache, loss of smell or taste, muscle pain, sore throat)       Chills, fatigue, body aches, cough, congestion, nasal drainage  10. HIGH RISK DISEASE: \"Do you have any chronic medical problems?\" (e.g., asthma, heart or lung disease, weak immune system, obesity, etc.)        Rehnards and another immune system thing  11. VACCINE: \"Have you had the COVID-19 vaccine?\" If Yes, ask: \"Which one, how many shots, when did you get it?\"        Yes   12. PREGNANCY: \"Is there any chance you are pregnant?\" \"When was your last menstrual period?\"        No  13. O2 SATURATION MONITOR:  \"Do you use an oxygen saturation monitor (pulse oximeter) at home?\" If Yes, ask \"What is your reading (oxygen level) today?\" \"What is your usual oxygen saturation reading?\" (e.g., 95%)        No    Protocols used: Coronavirus (COVID-19) Diagnosed or [id number]    "

## 2023-09-21 NOTE — PROGRESS NOTES
Jeannine is a 82 year old who is being evaluated via a billable telephone visit.      What phone number would you like to be contacted at? [phone]    Distant Location (provider location):  On-site    Assessment & Plan     Clinical diagnosis of COVID-19  Instructed to HOLD statin for now until 3 days post paxlovid. We reviewed previus BMP and history of CKD, will do lower dose.   - nirmatrelvir and ritonavir (PAXLOVID) 150 mg/100 mg therapy pack; Take 2 tablets by mouth 2 times daily for 5 days (Take one tablet of Nirmatelvir and 1 tablet of Ritonavir twice daily for 5 days)                 Daysi Antunez, FLORENCIO CNP  [facility]    Subjective   Jeannine is a 82 year old, presenting for the following health issues:  Covid Concern        9/21/2023     5:03 PM   Additional Questions   Roomed by Stephany Vaz CMA       HPI       COVID-19 Symptom Review  How many days ago did these symptoms start? Tuesday evening    Are any of the following symptoms significant for you?  New or worsening difficulty breathing? No  Worsening cough? No  Fever or chills? No  Headache: No  Sore throat: No  Chest pain: No  Diarrhea: No  Body aches? YES    What treatments has patient tried? ibuprofen   Does patient live in a nursing home, group home, or shelter? No  Does patient have a way to get food/medications during quarantined? Yes, I have a friend or family member who can help me.            COVID-19 Symptom Review  How many days ago did these symptoms start? 2 days    Are any of the following symptoms significant for you?  New or worsening difficulty breathing? No  Worsening cough? No  Fever or chills? No  Headache: No  Sore throat: No  Chest pain: No  Diarrhea: No  Body aches? YES    What treatments has patient tried? none   Does patient live in a nursing home, group home, or shelter? No  Does patient have a way to get food/medications during quarantined? Yes, I have a friend or family member who can help  me.              Review of Systems   Constitutional, HEENT, cardiovascular, pulmonary, gi and gu systems are negative, except as otherwise noted.      Objective           Vitals:  No vitals were obtained today due to virtual visit.    Physical Exam   healthy, alert, and no distress  PSYCH: Alert and oriented times 3; coherent speech, normal   rate and volume, able to articulate logical thoughts, able   to abstract reason, no tangential thoughts, no hallucinations   or delusions  Her affect is normal  RESP: No cough, no audible wheezing, able to talk in full sentences  Remainder of exam unable to be completed due to telephone visits            Phone call duration: 9 minutes

## 2023-09-21 NOTE — PATIENT INSTRUCTIONS
HOLD the lipitor until 3 days after finished with paxlovid    For informational purposes only. Not to replace the advice of your health care provider. Copyright   2022 [facility]. All rights reserved. Clinically reviewed by Kiya Nickerson, PharmD, BCACP. [employer] [id number] - REV 06/23.  COVID-19 Outpatient Treatments  Your care team can help you find the best treatments for COVID-19. Talk to a health care provider or refer to the FDA medicine fact sheets below.  Paxlovid (nirmatrelvir and ritonavir): https://www.paxlovid.[facility]/resources  Molnupiravir (Lagevrio): https://www.fda.gov/media/194243/download  Important: We can only prescribe Paxlovid or Molnupiravir when it can be started within 5 days of first having symptoms.  Paxlovid (nimatrelvir and ritonavir)  How it works  Two medicines (nirmatrelvir and ritonavir) are taken together. They stop the virus from growing. Less amount of virus is easier for your body to fight.  Benefits  Lowers risk of a hospital stay or death from COVID-19.  How to take  Medicine comes in a daily container with both medicine tablets. Take by mouth twice daily (once in the morning, once at night) for 5 days.  The number of tablets to take varies by patient.  Don't chew or break capsules. Swallow whole.  When to take  Take it as soon as possible and within 5 days of your first symptoms.  Who can take it  Patients must be 12 years or older weigh at least 88 pounds. Paxlovid is the preferred treatment for pregnant patients.  Possible side effects  Can cause altered sense of taste, diarrhea (loose, watery stools), high blood pressure, muscle aches.  Medicine conflicts  Some medicines may conflict with Paxlovid and may cause serious side effects.  Tell your care team about all the medicines you take, including prescription and over-the-counter medicines, vitamins, and herbal supplements.  Your care team will review your medicines to make sure that you can safely take  Paxlovid.  Cautions  Paxlovid is not advised for patients with severe kidney or liver disease. If you have kidney or liver problems, the dose may need to be changed.  If you're pregnant or breastfeeding, talk to your care team about your options.  If you take hormonal birth control (such as the Pill), then you or your partner should also use a non-hormonal form of birth control (such as a condom). Keep doing this for 1 menstrual cycle after your last dose of Paxlovid.  Molnupiravir (lagevrio)  How it works  Stops the virus from growing. Less amount of virus is easier for your body to fight.  Benefits  Lowers risk of a hospital stay or death from COVID-19.  How to take  Take 4 capsules by mouth every 12 hours (4 in the morning and 4 at night) for 5 days. Don't chew or break capsules. Swallow whole.  When to take  Take as soon as possible and within 5 days of your first symptoms.  Who can take it  Patients must be 18 years or older.   Possible side effects  Diarrhea (loose, watery stools), nausea (feeling sick to your stomach), dizziness, headaches.  Medicine conflicts  Right now, there are no known conflicts with other drugs. But tell your care team about all medicines you take.  Cautions  This medicine is not advised for patients who are pregnant.  If you are someone who could become pregnant, use trusted birth control until 4 days after your last dose of molnupiravir.  If your partner could become pregnant, you should use trusted birth control until 3 months after your last dose of molnupiravir.

## 2023-09-21 NOTE — TELEPHONE ENCOUNTER
RN COVID TREATMENT VISIT  09/21/23      The patient has been triaged and does not require a higher level of care.    Jeannine Rod  82 year old  Current weight? 132lb    Has the patient been seen by a primary care provider at an [facility] or [facility] within the past two years? Yes.   Have you been in close proximity to/do you have a known exposure to a person with a confirmed case of influenza? No.     General treatment eligibility:  Date of positive COVID test (PCR or at home)?  9/21/2023    Are you or have you been hospitalized for this COVID-19 infection? No.   Have you received monoclonal antibodies or antiviral treatment for COVID-19 since this positive test? No.   Do you have any of the following conditions that place you at risk of being very sick from COVID-19?   - Age 50 years or older  Yes, patient has at least one high risk condition as noted above.     Current COVID symptoms:   - muscle or body aches  - congestion or runny nose  Yes. Patient has at least one symptom as selected.     How many days since symptoms started? 5 days or less. Established patient, 12 years or older weighing at least 88.2 lbs, who has symptoms that started in the past 5 days, has not been hospitalized nor received treatment already, and is at risk for being very sick from COVID-19.     Treatment eligibility by RN:  Are you currently pregnant or nursing? No  Do you have a clinically significant hypersensitivity to nirmatrelvir or ritonavir, or toxic epidermal necrolysis (TEN) or Gonzáles-Milind Syndrome? No  Do you have a history of hepatitis, any hepatic impairment on the Problem List (such as Child-Luo Class C, cirrhosis, fatty liver disease, alcoholic liver disease), or was the last liver lab (hepatic panel, ALT, AST, ALK Phos, bilirubin) elevated in the past 6 months? No  Do you have any history of severe renal impairment (eGFR < 30mL/min)? No    Is patient eligible to continue? Yes, patient  meets all eligibility requirements for the RN COVID treatment (as denoted by all no responses above).     Current Outpatient Medications   Medication Sig Dispense Refill    amLODIPine (NORVASC) 5 MG tablet Take 1 tablet (5 mg) by mouth daily 90 tablet 0    amoxicillin-clavulanate (AUGMENTIN) 875-125 MG tablet 1 TABLET ORALLY EVERY 12 HRS 21 DAY(S)      atorvastatin (LIPITOR) 20 MG tablet TAKE 1 TABLET BY MOUTH EVERY DAY 90 tablet 2    calcium-vitamin D (CALTRATE) 600-400 MG-UNIT per tablet Take 1 tablet by mouth 2 times daily      clotrimazole (LOTRIMIN) 1 % external cream Apply nightly to buttocks 60 g 0    estradiol (ESTRACE) 0.1 MG/GM vaginal cream PLACE 2 GRAMS VAGINALLY TWICE A WEEK 42.5 g 10    fluocinolone (SYNALAR) 0.01 % solution Apply topically every other day 60 mL 3    fluocinonide (LIDEX) 0.05 % external cream Apply twice daily for 2 weeks to lesions on legs, take 2 weeks off, then repeat if needed 60 g 3    fluocinonide (LIDEX) 0.05 % external solution Use Monday, wed, Friday along the frontal hair line 60 mL 3    hydroxychloroquine (PLAQUENIL) 200 MG tablet Take 200 mg by mouth 2 times daily 2 tabs daily 5 days per week, 1 tab daily 2 days per week.      ipratropium (ATROVENT) 0.06 % nasal spray 2 SPRAYS INTO BOTH NOSTRILS 4XDAY AS NEEDED(NASAL DRAINAGE THAT TRIGGERS COUGH/THROAT CLEARING) 15 mL 2    ketoconazole (NIZORAL) 2 % external shampoo USe up to 3 times weekly 120 mL 3    Melatonin ER 1 MG TBCR Take 1 mg by mouth nightly as needed      Multiple Vitamin (MULTI-VITAMIN) per tablet Take 1 tablet by mouth daily.      nystatin (MYCOSTATIN) 837404 UNIT/GM external cream Apply topically 2 times daily 30 g 1    tacrolimus (PROTOPIC) 0.1 % external ointment USe twice daily when not using fluocinonide cream on the legs and scalp 120 g 2    triamterene-HCTZ (MAXZIDE-25) 37.5-25 MG tablet TAKE 1 TABLET BY MOUTH EVERY DAY 90 tablet 0       Medications from List 1 of the standing order (on medications that  exclude the use of Paxlovid) that patient is taking: NONE. Is patient taking Aleja's Wort? No  Is patient taking [place]'s Wort or any meds from List 1? No.   Medications from List 2 of the standing order (on meds that provider needs to adjust) that patient is taking: amlodipine (Norvasc), explained a provider visit is necessary to discuss medication adjustments while taking Paxlovid.  tacrolimus (Prograf, Envarsus, Astagraf), explained a provider visit is necessary to discuss medication adjustments while taking Paxlovid and recommended patient see their specialty provider prescribing this med to determine if Paxlovid is the best option for COVID treatment. Is patient on any of the meds from List 2? Yes. Patient will be scheduled or transferred to a  at the end of this call.   Miguel Angel Freeman RN

## 2023-10-01 DIAGNOSIS — I10 BENIGN ESSENTIAL HYPERTENSION: ICD-10-CM

## 2023-10-02 DIAGNOSIS — L66.12 FRONTAL FIBROSING ALOPECIA: ICD-10-CM

## 2023-10-02 RX ORDER — TRIAMTERENE/HYDROCHLOROTHIAZID 37.5-25 MG
TABLET ORAL
Qty: 90 TABLET | Refills: 0 | Status: SHIPPED | OUTPATIENT
Start: 2023-10-02 | End: 2023-11-16

## 2023-10-02 NOTE — TELEPHONE ENCOUNTER
ketoconazole (NIZORAL) 2 % external shampoo 120 mL 3 5/25/2022       Last Office Visit : 7-  Future Office visit:  11-    Routing refill request to provider for review/approval because:  Over a year since last ordered and no addressed in last clinic note.      Kathleen M Doege RN

## 2023-10-02 NOTE — TELEPHONE ENCOUNTER
PT request refill of Ketconazole shampoo from [facility] pharmacy on [address] in [place], [place]    iNck Bradshaw on 10/2/2023 at 10:02 AM

## 2023-10-03 RX ORDER — KETOCONAZOLE 20 MG/ML
SHAMPOO TOPICAL
Qty: 120 ML | Refills: 3 | Status: SHIPPED | OUTPATIENT
Start: 2023-10-03 | End: 2024-02-02

## 2023-10-18 ENCOUNTER — OFFICE VISIT (OUTPATIENT)
Dept: PODIATRY | Facility: CLINIC | Age: 83
End: 2023-10-18
Payer: MEDICARE

## 2023-10-18 VITALS — WEIGHT: 135 LBS | BODY MASS INDEX: 21.79 KG/M2 | DIASTOLIC BLOOD PRESSURE: 68 MMHG | SYSTOLIC BLOOD PRESSURE: 126 MMHG

## 2023-10-18 DIAGNOSIS — M20.42 HAMMER TOES OF BOTH FEET: Primary | ICD-10-CM

## 2023-10-18 DIAGNOSIS — M79.674 TOE PAIN, BILATERAL: ICD-10-CM

## 2023-10-18 DIAGNOSIS — M79.675 TOE PAIN, BILATERAL: ICD-10-CM

## 2023-10-18 DIAGNOSIS — M21.42 FLAT FEET: ICD-10-CM

## 2023-10-18 DIAGNOSIS — L84 CALLUS OF FOOT: ICD-10-CM

## 2023-10-18 DIAGNOSIS — M21.41 FLAT FEET: ICD-10-CM

## 2023-10-18 DIAGNOSIS — M20.41 HAMMER TOES OF BOTH FEET: Primary | ICD-10-CM

## 2023-10-18 PROCEDURE — 99203 OFFICE O/P NEW LOW 30 MIN: CPT | Performed by: PODIATRIST

## 2023-10-18 NOTE — PATIENT INSTRUCTIONS
Thank you for choosing [facility]!    DR. KIM'S CLINIC LOCATIONS:     [facility] TRIAGE LINE: [phone]   [address] APPOINTMENTS: [phone]   [place] [address] RADIOLOGY: [phone]   (Every other Tues - Wed - Fri PM) SET UP SURGERY: [phone]    PHYSICAL THERAPY: [phone]   [facility] BILLING QUESTIONS: [phone] [id number] FAX: [phone]   [place] [address]    (Thurs & Fri AM)         Here is a list of routine foot care resources, which includes toenail trimming and callus/corn management.     This is not a referral. It is your responsibility to contact the organization and your insurance to confirm cost and coverage.        HAMMERTOES  Hammertoe is a contracture (bending) of one or both joints of the second, third, fourth, or fifth (little) toes. This abnormal bending can put pressure on the toe when wearing shoes, causing problems to develop.  Hammertoes usually start out as mild deformities and get progressively worse over time. In the earlier stages, hammertoes are flexible and the symptoms can often be managed with noninvasive measures. But if left untreated, hammertoes can become more rigid and will not respond to non-surgical treatment.  Because of the progressive nature of hammertoes, they should receive early attention. Hammertoes never get better without some kind of intervention.  CAUSES  The most common cause of hammertoe is a muscle/tendon imbalance. This imbalance, which leads to a bending of the toe, results from mechanical (structural) changes in the foot that occur over time in some people.  Hammertoes may be aggravated by shoes that don t fit properly. A hammertoe may result if a toe is too long and is forced into a cramped position when a tight shoe is worn.  Occasionally, hammertoe is the result of an earlier trauma to the toe. In some people, hammertoes are inherited.  SYMPTOMS  Pain or  irritation of the affected toe when wearing shoes.   Corns and calluses (a buildup of skin) on the toe, between two toes, or on the ball of the foot. Corns are caused by constant friction against the shoe. They may be soft or hard, depending upon their location.   Inflammation, redness, or a burning sensation   Contracture of the toe   In more severe cases of hammertoe, open sores may form.   DIAGNOSIS  Although hammertoes are readily apparent, to arrive at a diagnosis the foot and ankle surgeon will obtain a thorough history of your symptoms and examine your foot. During the physical examination, the doctor may attempt to reproduce your symptoms by manipulating your foot and will study the contractures of the toes. In addition, the foot and ankle surgeon may take x-rays to determine the degree of the deformities and assess any changes that may have occurred.   Hammertoes are progressive - they don t go away by themselves and usually they will get worse over time. However, not all cases are alike - some hammertoes progress more rapidly than others. Once your foot and ankle surgeon has evaluated your hammertoes, a treatment plan can be developed that is suited to your needs.  NON-SURGICAL TREATMENT  There is a variety of treatment options for hammertoe. The treatment your foot and ankle surgeon selects will depend upon the severity of your hammertoe and other factors.  A number of non-surgical measures can be undertaken:  Padding corns and calluses. Your foot and ankle surgeon can provide or prescribe pads designed to shield corns from irritation. If you want to try over-the-counter pads, avoid the medicated types. Medicated pads are generally not recommended because they may contain a small amount of acid that can be harmful. Consult your surgeon about this option.   Changes in shoewear. Avoid shoes with pointed toes, shoes that are too short, or shoes with high heels - conditions that can force your toe against the  front of the shoe. Instead, choose comfortable shoes with a deep, roomy toe box and heels no higher than two inches.   Orthotic devices. A custom orthotic device placed in your shoe may help control the muscle/tendon imbalance.   Injection therapy. Corticosteroid injections are sometimes used to ease pain and inflammation caused by hammertoe.   Medications. Oral nonsteroidal anti-inflammatory drugs (NSAIDs), such as ibuprofen, may be recommended to reduce pain and inflammation.   Splinting/strapping. Splints or small straps may be applied by the surgeon to realign the bent toe.   Exercises:   1. The Toe Tap  Stand flat on the ground in your bare feet. Raise all of your toes up off the ground as high as you can. Then starting with the little toes, slowly press them down to the ground. After the big toes are on the ground, start over by raising all of them up off the ground again. This motion is similar to tapping your fingers on a desk. Repeat this ten times.     2. Interlocking your Fingers and Toes  Cross your right foot over your knee and place the fingers of your left hand between your toes. Squeeze your toes together, pinching your fingers between them. Spread the toes apart and squeeze them together again. Repeat this ten times then do the other foot. Like most exercises, this will get easier the more you do it. If you are having a lot of difficulty with this exercise, start with just your index finger between your first and second toe, then later add your middle finger between your second and third toes, and so on until you can fit all your fingers between your toes. Do this ten times on each foot. Eventually you will be able to spread your toes apart without using your fingers.    3. Gripping the Floor   the floor by pressing the pads of your toes (not the tips) into the floor without curling your toes. Relax and repeat this ten times. If your shoes have the proper amount of depth, you should be able to  do this with shoes on.    HAMMERTOE TOE SURGERY   Hammertoe surgery is complex. The surgical procedure is an attempt to help the toe lay in a better position. Nearly every structure in the toe will be cut including the tendons, ligaments, skin and bone. Hammertoes are a complex deformity and final toe position is difficult to predict. The only sure way to position a toe is to fuse all three digital joints. That will not happen as some degree of toe motion is needed for walking. The toe may not be completely reduced as the surrounding skin and other structures may not allow the toe to return to a normal position. The tendons on adjacent toes may need to be cut at the time of the original or subsequent surgeries, as interconnections exist between the toes. The toe may drift after surgery. Stiffness may develop leading to new areas of pressure.   Future shoe choices will be critical in allowing the surgery to provide comfort. The toes will still hurt if shoes rub. The original pain may also persist as other foot problems may be contributing to the current pain. The toe may or may not be pinned in place. External pins would require complete avoidance of water on the foot for six weeks. The pin would be removed about six weeks after the surgery. Strict attention to protection is critical. The pin could get bumped or loosen resulting in early removal. Removal might be necessary before the bone heals which would negatively affect the final surgical outcome and toe allignment.

## 2023-10-18 NOTE — LETTER
10/18/2023         RE: Jeannine Rod  [address]  [place] [address]        Dear Colleague,    Thank you for referring your patient, Jeannine Rod, to the [facility]. Please see a copy of my visit note below.    ASSESSMENT:  Encounter Diagnoses   Name Primary?     Hammer toes of both feet Yes     Toe pain, bilateral      Flat feet      Callus of foot      MEDICAL DECISION MAKING:  Her pain is most consistent with pressure related pain at the tips of her bilateral second and third toe.  Increased pressure is made obvious by the callusing.  No ulceration.  I explained how the digital contractures contribute to her pain and how hammertoes relate to the flatfoot structure, flexor stabilization.    Recommendations:  Bilateral crest pads.  These were provided and demonstrated.  Consider stiffer soled shoes to reduce stress on the forefoot during the propulsive phase of gait.  Avoid tight footwear    Should conservative care is not adequately relieve her pain, the minimally invasive flexor tenotomy procedure might be a reasonable next step.  However, perhaps best done in summer months and there would be increased risk with her Raynaud's syndrome.    Disclaimer: This note consists of symbols derived from keyboarding, dictation and/or voice recognition software. As a result, there may be errors in the script that have gone undetected. Please consider this when interpreting information found in this chart.    Ricky Hoffman DPM, FACFAS, [facility] Department of Podiatry/Foot & Ankle Surgery      ____________________________________________________________________    HPI:       Jeannine presents today reporting pain in her toes when walking.  She specifies the distal second and third toe bilaterally.  She is hoping to reduce his pain by the time she travels to [place] in January.  Pain has existed for months  Shooting pain rated a 4 out of 10  The frequency intensity of  pain depends on her shoes.  More pain with weightbearing.  Known Raynaud's syndrome -she takes Plaquenil  Exercise activities include treadmill walking, water aerobics  *  Past Medical History:   Diagnosis Date     Arthritis      Benign essential hypertension 10/23/2017     Benign essential hypertension 10/23/2017     Calculus of gallbladder without cholecystitis 10/6/2016     Gastroesophageal reflux disease with esophagitis 10/6/2016     Hyperlipidemia      Malignant neoplasm (H)     breast bilat mastectomy, no rad, no chemobilat      Pulmonary embolism and infarction 3/9/2012    IMO update changed this record. 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     Raynaud's syndrome      Systemic sclerosis (H) 10/6/2016   *  *  Past Surgical History:   Procedure Laterality Date     ARTHROPLASTY HIP  3/5/2012    Procedure:ARTHROPLASTY HIP; LEFT TOTAL HIP ARTHROPLASTY (BIOMET)^; Surgeon:SABRINA CALLEJAS; Location: OR     BIOPSY  8/29/2007    Right Breast Biopsy     BIOPSY  9/12/2007    Right Breast Biopsy x2     BIOPSY  9/13/2007    Left Breast Biopsy     BREAST SURGERY  10/2/2007    Bilateral Mastectomy     BREAST SURGERY  3/2008    Breast Reconstruction Surgery     COLONOSCOPY  2011    repeat in 5 years     COLONOSCOPY N/A 6/9/2023    Procedure: Colonoscopy;  Surgeon: Kiel Rasmussen MD;  Location:  GI     COLONOSCOPY N/A 6/9/2023    Procedure: COLONOSCOPY, WITH POLYPECTOMY AND BIOPSY;  Surgeon: Kiel Rasmussen MD;  Location:  GI     ENT SURGERY      salivary gland removal   *  *  Current Outpatient Medications   Medication Sig Dispense Refill     amLODIPine (NORVASC) 5 MG tablet Take 1 tablet (5 mg) by mouth daily 90 tablet 0     atorvastatin (LIPITOR) 20 MG tablet TAKE 1 TABLET BY MOUTH EVERY DAY 90 tablet 2     calcium-vitamin D (CALTRATE) 600-400 MG-UNIT per tablet Take 1 tablet by mouth 2 times daily       clotrimazole (LOTRIMIN) 1 % external cream Apply nightly to buttocks 60  g 0     estradiol (ESTRACE) 0.1 MG/GM vaginal cream PLACE 2 GRAMS VAGINALLY TWICE A WEEK 42.5 g 10     fluocinolone (SYNALAR) 0.01 % solution Apply topically every other day 60 mL 3     fluocinonide (LIDEX) 0.05 % external cream Apply twice daily for 2 weeks to lesions on legs, take 2 weeks off, then repeat if needed 60 g 3     fluocinonide (LIDEX) 0.05 % external solution Use Monday, wed, Friday along the frontal hair line 60 mL 3     hydroxychloroquine (PLAQUENIL) 200 MG tablet Take 200 mg by mouth 2 times daily 2 tabs daily 5 days per week, 1 tab daily 2 days per week.       ipratropium (ATROVENT) 0.06 % nasal spray 2 SPRAYS INTO BOTH NOSTRILS 4XDAY AS NEEDED(NASAL DRAINAGE THAT TRIGGERS COUGH/THROAT CLEARING) 15 mL 2     ketoconazole (NIZORAL) 2 % external shampoo USe up to 3 times weekly 120 mL 3     Melatonin ER 1 MG TBCR Take 1 mg by mouth nightly as needed       Multiple Vitamin (MULTI-VITAMIN) per tablet Take 1 tablet by mouth daily.       nystatin (MYCOSTATIN) 180869 UNIT/GM external cream Apply topically 2 times daily 30 g 1     tacrolimus (PROTOPIC) 0.1 % external ointment USe twice daily when not using fluocinonide cream on the legs and scalp 120 g 2     triamterene-HCTZ (MAXZIDE-25) 37.5-25 MG tablet TAKE 1 TABLET BY MOUTH EVERY DAY 90 tablet 0         EXAM:    Vitals: /68   Wt 61.2 kg (135 lb)   LMP 07/07/1992   BMI 21.79 kg/m    BMI: Body mass index is 21.79 kg/m .    Constitutional:  Jeannine Rod is in no apparent distress, appears well-nourished.  Cooperative with history and physical exam.    Vascular:  Pedal pulses are palpable for both the DP and PT arteries.  Digits of the bilateral foot are cool to the touch and a dark violaceous color    Neuro: Light touch sensation is intact to the L4, L5, S1 distributions  No evidence of weakness, spasticity, or contracture in the lower extremities.     Derm: Normal texture and turgor.  No erythema, ecchymosis, or cyanosis.  No open lesions.   Painful hyperkeratotic lesions, distal second third toe bilaterally.  No evidence of intraepidermal bleeding.    Musculoskeletal:    Lower extremity muscle strength is normal.  Flatfoot structure, left greater than right.  Contractures of the lesser digits, largely reducible in the sagittal plane.        Again, thank you for allowing me to participate in the care of your patient.        Sincerely,        Ricky Hoffman DPM

## 2023-11-09 DIAGNOSIS — R09.82 POST-NASAL DRAINAGE: ICD-10-CM

## 2023-11-09 RX ORDER — IPRATROPIUM BROMIDE 42 UG/1
SPRAY, METERED NASAL
Qty: 45 ML | Refills: 0 | Status: SHIPPED | OUTPATIENT
Start: 2023-11-09

## 2023-11-15 ENCOUNTER — MYC MEDICAL ADVICE (OUTPATIENT)
Dept: DERMATOLOGY | Facility: CLINIC | Age: 83
End: 2023-11-15

## 2023-11-15 ENCOUNTER — OFFICE VISIT (OUTPATIENT)
Dept: DERMATOLOGY | Facility: CLINIC | Age: 83
End: 2023-11-15
Payer: MEDICARE

## 2023-11-15 DIAGNOSIS — D49.2 NEOPLASM OF UNSPECIFIED BEHAVIOR OF BONE, SOFT TISSUE, AND SKIN: Primary | ICD-10-CM

## 2023-11-15 PROCEDURE — 11901 INJECT SKIN LESIONS >7: CPT | Performed by: DERMATOLOGY

## 2023-11-15 PROCEDURE — 99214 OFFICE O/P EST MOD 30 MIN: CPT | Mod: 25 | Performed by: DERMATOLOGY

## 2023-11-15 PROCEDURE — 11102 TANGNTL BX SKIN SINGLE LES: CPT | Performed by: DERMATOLOGY

## 2023-11-15 PROCEDURE — 88305 TISSUE EXAM BY PATHOLOGIST: CPT | Mod: 26 | Performed by: DERMATOLOGY

## 2023-11-15 PROCEDURE — 88305 TISSUE EXAM BY PATHOLOGIST: CPT | Mod: TC | Performed by: DERMATOLOGY

## 2023-11-15 ASSESSMENT — PAIN SCALES - GENERAL: PAINLEVEL: NO PAIN (0)

## 2023-11-15 NOTE — PATIENT INSTRUCTIONS
Wound Care After a Biopsy    What is a skin biopsy?  A skin biopsy allows the doctor to examine a very small piece of tissue under the microscope to determine the diagnosis and the best treatment for the skin condition. A local anesthetic (numbing medicine) is injected with a very small needle into the skin area to be tested. A small piece of skin is taken from the area. Sometimes a suture (stitch) is used.     What are the risks of a skin biopsy?  I will experience scar, bleeding, swelling, pain, crusting and redness. I may experience incomplete removal or recurrence. Risks of this procedure are excessive bleeding, bruising, infection, nerve damage, numbness, thick (hypertrophic or keloidal) scar and non-diagnostic biopsy.    How should I care for my wound for the first 24 hours?  Keep the wound dry and covered for 24 hours  If it bleeds, hold direct pressure on the area for 15 minutes. If bleeding does not stop, call us or go to the emergency room  Avoid strenuous exercise the first 1-2 days or as your doctor instructs you    How should I care for the wound after 24 hours?  After 24 hours, remove the bandage  You may bathe or shower as normal  If you had a scalp biopsy, you can shampoo as usual and can use shower water to clean the biopsy site daily  Clean the wound once a day with gentle soap and water  Do not scrub, be gentle  Apply white petroleum/Vaseline after cleaning the wound with a cotton swab or a clean finger, and keep the site covered with a Bandaid /bandage. Bandages are not necessary with a scalp biopsy  If you are unable to cover the site with a Bandaid /bandage, re-apply ointment 2-3 times a day to keep the site moist. Moisture will help with healing  Avoid strenuous activity for first 1-2 days  Avoid lakes, rivers, pools, and oceans until the stitches are removed or the site is healed    How do I clean my wound?  Wash hands thoroughly with soap or use hand  before all wound care  Clean  the wound with gentle soap and water  Apply white petroleum/Vaseline  to wound after it is clean  Replace the Bandaid /bandage to keep the wound covered for the first few days or as instructed by your doctor  If you had a scalp biopsy, warm shower water to the area on a daily basis should suffice    What should I use to clean my wound?   Cotton-tipped applicators (Qtips )  White petroleum jelly (Vaseline ). Use a clean new container and use Q-tips to apply.  Bandaids  as needed  Gentle soap     How should I care for my wound long term?  Do not get your wound dirty  Keep up with wound care for one week or until the area is healed.     A small scab will form and fall off by itself when the area is completely healed. The area will be red and will become pink in color as it heals. Sun protection is very important for how your scar will turn out. Sunscreen with an SPF 30 or greater is recommended once the area is healed.  You should have some soreness but it should be mild and slowly go away over several days. Talk to your doctor about using tylenol for pain,    When should I call my doctor?  If you have increased:   Pain or swelling  Pus or drainage (clear or slightly yellow drainage is ok)  Temperature over 100F  Spreading redness or warmth around wound    When will I hear about my results?  The biopsy results can take 2 weeks to come back.  Your results will automatically release to Todaytickets before your provider has even reviewed them.  The clinic will call you with the results, send you a Todaytickets message, or have you schedule a follow-up clinic or phone time to discuss the results.  Contact our clinics if you do not hear from us in 2 weeks.    Who should I call with questions?  [facility]: [phone]  [facility]: [phone]  For urgent needs outside of business hours call the [facility] at [phone] and ask for the dermatology resident on  call

## 2023-11-15 NOTE — PROGRESS NOTES
Drug Administration Record    Prior to injection, verified patient identity using patient's name and date of birth.  Due to injection administration, patient instructed to remain in clinic for 15 minutes  afterwards, and to report any adverse reaction to me immediately.    Drug Name: triamcinolone acetonide(kenalog)  Dose: 3mL of triamcinolone 2.5mg/mL, 7.5mg dose  Route administered: ID  NDC #: Kenalog-10 (7548-5462-95)  Amount of waste(mL):42.5  Reason for waste: Single use vial    LOT #: [id number]  SITE: scalp  : [employer]  EXPIRATION DATE: 10/25

## 2023-11-15 NOTE — NURSING NOTE
Dermatology Rooming Note    Jeannine Rod's goals for this visit include:   Chief Complaint   Patient presents with    Hair Loss     4 month follow up on hair loss, she reports it being stable but temple areas are struggling.      Nancy Rene, visit facilitator

## 2023-11-15 NOTE — LETTER
11/15/2023       RE: Jeannine Rod  [address]  [place] [address]     Dear Colleague,    Thank you for referring your patient, Jeannine Rod, to the [facility] DERMATOLOGY CLINIC [place] at [facility]. Please see a copy of my visit note below.    [facility] Dermatology Note  Encounter Date: Nov 15, 2023  Office Visit     Dermatology Problem List:  1. Hair loss c/w FFA and AGA, started 2020. Also has CREST  - Current tx: Rogaine daily, ketoconazole shampoo and Krysten deep conditioner 3x weekly, Lidex  3x weekly, protopic on eyebrows daily, calcium, multivitamin, latisse to eyebrows  - Prior tx: fluocinolone oil  - ILK 5/18/2021, 6/30/2021, 9/29/2021, 11/24/21, 11/30/2022, 2/22/2023  - HairMetrix: 6/30/2021, 9/29/2021, 2/23/22, 5/25/22, 11/30/2022  2. BLK, right posterior thigh, s/p shave bx 8/24/2022  3. Superficial portions of irregular acanthosis and patchy interface dermatitis, s/p shave bx 2/22/2023  - Betamethasone ointment      Family history: sister with skin cancer      ____________________________________________     Assessment & Plan:     # Hair loss, most consistent with FFA/LPP and AGA. 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 Patient scalp is stable but with some thinning on the temples per patient.  - Continue Lidex solution every other day  - Continue minoxidil 5% solution daily/protopic on the brows  - using bimatroprost on the brows  - Continue Plaquenil per rheum     # Plaquenil discoloration   - review with rheumatology, revviewed       # Hypertrophic lichen planus- biopsy proven- better   - Flucinonide alternating with Protopic every 2 weeks, injections today   -ILK      # Angular cheilitis  - Can use Protopic as tolerated  - Instructed to use Vaseline on the corners of the mouth at night and multiple times daily       # Neoplasm of uncertain behavior, mid-back   Ddx: SK or other  -  Shave biopsy today (see procedures performed below)     Procedures Performed:     - Shave biopsy: Location(s) left mid-back.  After discussion of benefits and risks including but not limited to bleeding/bruising, pain/swelling, infection, scar, incomplete removal, nerve damage/numbness, recurrence, and non-diagnostic biopsy,  verbal consent and photographs were obtained. Time-out was performed. The area was cleaned with isopropyl alcohol. 0.5mL of 1% lidocaine with epinephrine was injected to obtain adequate anesthesia of each lesion. Shave biopsy was performed. Hemostasis was achieved with aluminium chloride. Vaseline and a sterile dressing were applied. The patient tolerated the procedure and no complications were noted. The patient was provided with verbal and written post care instructions.     - Kenalog intralesional injection procedure note: After verbal consent and discussion of risks including but not limited to atrophy, pain, and bruising, time out was performed, the patient underwent positioning and the area was prepped with isopropyl alcohol, 1 total cc of Kenalog 2.5 mg/cc was injected into 25 sites on the frontal scalp. An additional 2 cc of kenalog was injected into the right lower leg. The patient tolerated the procedure well and left the Dermatology clinic in good condition.          Follow-up: 3 months for kenalog 4cc kenalog 5mg/cc    Staff and Scribe:     Scribe Disclosure:   I, Michael Partida, am serving as a scribe to document services personally performed by this physician, Dr. Pat Méndez, based on data collection and the provider's statements to me.     Scribe Disclosure:   I, Bebe Carpenter ([email or web address]), am serving as a scribe to document services personally performed by this physician, Dr. Pat Méndez, based on data collection and the provider's statements to me.     Provider Disclosure:   The documentation recorded by the scribe accurately reflects the services I personally performed and the  decisions made by me.    Pat Méndez MD    Department of Dermatology  [facility]: Phone: [phone], Fax:[phone]  [facility]: Phone: [phone], Fax: [phone]   ____________________________________________    CC: Hair Loss (4 month follow up on hair loss, she reports it being stable but temple areas are struggling. )    HPI:  Ms. Jeannine Rod is a(n) 82 year old female who presents today as a return patient for hair loss.    Last seen 7/26/2023 for FFA/LPP. At that time, pt instructed to continue plaquenil, continue Lidex solution 3 times weekly, and continue minoxidil solution daily.     Today, pt feels that her hair is still thinning on the bilateral temples.     Patient is otherwise feeling well, without additional skin concerns.    Labs Reviewed:  N/A    Physical Exam:  Vitals: LMP 07/07/1992   SKIN: Focused examination of posterior back, scalp, legs was performed.  - 3 scaly papules on right lateral calf   - 1 stuck on plaque in mid-back   -hair loss with scarring on the frontal scalp  - No other lesions of concern on areas examined.     Medications:  Current Outpatient Medications   Medication    amLODIPine (NORVASC) 5 MG tablet    atorvastatin (LIPITOR) 20 MG tablet    calcium-vitamin D (CALTRATE) 600-400 MG-UNIT per tablet    clotrimazole (LOTRIMIN) 1 % external cream    estradiol (ESTRACE) 0.1 MG/GM vaginal cream    fluocinolone (SYNALAR) 0.01 % solution    fluocinonide (LIDEX) 0.05 % external cream    fluocinonide (LIDEX) 0.05 % external solution    hydroxychloroquine (PLAQUENIL) 200 MG tablet    ipratropium (ATROVENT) 0.06 % nasal spray    ketoconazole (NIZORAL) 2 % external shampoo    Melatonin ER 1 MG TBCR    Multiple Vitamin (MULTI-VITAMIN) per tablet    nystatin (MYCOSTATIN) 202123 UNIT/GM external cream    tacrolimus (PROTOPIC) 0.1 % external ointment     triamterene-HCTZ (MAXZIDE-25) 37.5-25 MG tablet     Current Facility-Administered Medications   Medication    triamcinolone acetonide (KENALOG-10) injection 10 mg    triamcinolone acetonide (KENALOG-10) injection 10 mg    triamcinolone acetonide (KENALOG-10) injection 7.5 mg      Past Medical History:   Patient Active Problem List   Diagnosis    Malignant neoplasm of breast (H)    Raynaud's syndrome    Status post hip replacement    Systemic sclerosis (H)    Gastroesophageal reflux disease with esophagitis    Hyperlipidemia LDL goal <100    Calculus of gallbladder without cholecystitis    Radiculoplexus neuropathy    Benign essential hypertension    Atrophic vaginitis    Chronic kidney disease, stage 3 (H)    Chronic obstructive pulmonary disease, unspecified COPD type (H)    History of colonic polyps    Routine general medical examination at a health care facility     Past Medical History:   Diagnosis Date    Arthritis     Benign essential hypertension 10/23/2017    Benign essential hypertension 10/23/2017    Calculus of gallbladder without cholecystitis 10/6/2016    Gastroesophageal reflux disease with esophagitis 10/6/2016    Hyperlipidemia     Malignant neoplasm (H)     breast bilat mastectomy, no rad, no chemobilat     Pulmonary embolism and infarction 3/9/2012    IMO update changed this record. 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 on close of this encounter.

## 2023-11-16 DIAGNOSIS — R09.82 POST-NASAL DRAINAGE: ICD-10-CM

## 2023-11-16 DIAGNOSIS — I10 BENIGN ESSENTIAL HYPERTENSION: ICD-10-CM

## 2023-11-16 RX ORDER — TRIAMTERENE/HYDROCHLOROTHIAZID 37.5-25 MG
TABLET ORAL
Qty: 90 TABLET | Refills: 0 | Status: SHIPPED | OUTPATIENT
Start: 2023-11-16

## 2023-11-16 RX ORDER — IPRATROPIUM BROMIDE 42 UG/1
SPRAY, METERED NASAL
OUTPATIENT
Start: 2023-11-16

## 2023-11-21 ENCOUNTER — TELEPHONE (OUTPATIENT)
Dept: DERMATOLOGY | Facility: CLINIC | Age: 83
End: 2023-11-21
Payer: MEDICARE

## 2023-11-21 NOTE — TELEPHONE ENCOUNTER
Pt read T-VIPS message and will call the clinic with any questions or concerns.      Melva Diamond RN on 11/21/2023 at 10:49 AM        Final Diagnosis  A. Mid-back:  - Seborrheic keratosis, inflamed - (see description)  Electronically signed by Alec Harvey MD on 11/17/2023 at  2:37 PM        Dr Dev Jaramillo wanted us to inform you that the biopsy we did on your back came back as a normal mole and no further intervention is needed. Please reach out to the clinic with any questions or concerns.     Thanks,  Garry RN  Written by Melva Diamond RN on 11/20/2023  3:32 PM CST  Seen by patient Jeannine Martineznz on 11/20/2023  7:44 PM

## 2023-11-29 ENCOUNTER — TRANSFERRED RECORDS (OUTPATIENT)
Dept: HEALTH INFORMATION MANAGEMENT | Facility: CLINIC | Age: 83
End: 2023-11-29
Payer: MEDICARE

## 2023-12-04 ENCOUNTER — TRANSFERRED RECORDS (OUTPATIENT)
Dept: HEALTH INFORMATION MANAGEMENT | Facility: CLINIC | Age: 83
End: 2023-12-04
Payer: MEDICARE

## 2024-01-30 DIAGNOSIS — L66.12 FRONTAL FIBROSING ALOPECIA: ICD-10-CM

## 2024-02-02 RX ORDER — KETOCONAZOLE 20 MG/ML
SHAMPOO TOPICAL
Qty: 120 ML | Refills: 3 | Status: SHIPPED | OUTPATIENT
Start: 2024-02-02

## 2024-02-02 NOTE — TELEPHONE ENCOUNTER
KETOCONAZOLE 2% SHAMPOO   Last Written Prescription Date:  10/3/2023  Last Fill Quantity: 120,   # refills: 3  Last Office Visit : 11/15/2023  Future Office visit:  3/6/2024  120 mL, 3 Refills sent to sara Calvillo RN  Central Triage Red Flags/Med Refills    Antifungal Agents [id number]/30/2024 12:27 AM   Protocol Details Recent (12 mo) or future (30 days) visit within the authorizing provider's specialty    Not Fluconazole or Terconazole    Medication is active on med list       To be filled at: [facility]/pharmacy #[id number] - [place], [place] - [address]

## 2024-03-04 NOTE — PROGRESS NOTES
[facility] Dermatology Note  Encounter Date: Mar 6, 2024  Office Visit     Dermatology Problem List:  1. Hair loss c/w FFA and AGA, started 2020. Also has CREST  - Current tx: Plaquenil, Rogaine daily, ketoconazole shampoo and Krysten deep conditioner 3x weekly, Lidex  3x weekly, protopic on eyebrows daily, calcium, multivitamin, latisse to eyebrows  - Prior tx: fluocinolone oil  - ILK 5/18/2021, 6/30/2021, 9/29/2021, 11/24/21, 11/30/2022, 2/22/2023  - HairMetrix: 6/30/2021, 9/29/2021, 2/23/22, 5/25/22, 11/30/2022  2. BLK, right posterior thigh, s/p shave bx 8/24/2022  3. Superficial portions of irregular acanthosis and patchy interface dermatitis, s/p shave bx 2/22/2023  - Betamethasone ointment   4. Hypertrophic lichen planus  - fluocinonide alternating with protopic/tacrolimus     Family history: sister with skin cancer     ____________________________________________    Assessment & Plan:      # Hair loss, most consistent with FFA/LPP and AGA. 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 Patient scalp is stable today. No scalp injections necessary.  - Continue Lidex solution every other day  - Continue minoxidil 5% solution daily/protopic on the brows  - using bimatroprost on the brows  - Continue Plaquenil per rheumatologist.   - Future: finasteride or topical tacrolimus     # Plaquenil discoloration. Stable today.   - Will continue to monitor, pt has reviewed with rheumatology and understands continuing drug will continue this process      # Hypertrophic lichen planus- biopsy proven- better   - Increase fluocinonide to every day for 2 weeks.  - Protopic when not using fluocinonide. Refilled today.   -ILK injections today     # Angular cheilitis with milia at the angles of mouth  - Can use Protopic as tolerated  - Plan for extraction. Will submit to insurance given are with chronic irritation on angles    # Vascular 1 mm macule on chin, c/w angioma  - plan for  PDL          Procedures Performed:     - Intra-lesional triamcinolone procedure note. After verbal consent and review of risk of pain and skin thinning/atrophy, positioning and cleansing with isopropyl alcohol, 1 total mL of triamcinolone 10 mg/mL was injected into 4 lesion(s) on the chin. The patient tolerated the procedure well and left the dermatology clinic in good condition.    Follow-up: 4 week(s) in-person milia extraction/angular chelitis and 12 weeks PDL, or earlier for new or changing lesions    Staff and Scribe:     Scribe Disclosure:   I, Jojo Jimenez, am serving as a scribe to document services personally performed by Pat Méndez MD based on data collection and the provider's statements to me.     Provider Disclosure:   The documentation recorded by the scribe accurately reflects the services I personally performed and the decisions made by me.    Pat Méndez MD    Department of Dermatology  [facility]: Phone: [phone], Fax:[phone]  [facility]: Phone: [phone], Fax: [phone]   ____________________________________________    CC: Hair Loss (3 month HL follow-up - FFA and AGA. Patient reports that there hair is thinning near left temple. Patient reports no changes in HL regime. )    HPI:  Ms. Jeannine Rod is a(n) 83 year old female who presents today as a return patient for hair loss.    Last seen by me 11/15/23 for NUB of mid back, FFA/LPP /AGA with history of CREST, lichen planus and angular chelitis. Biopsy of NUB performed. Path returned as ISK. ILK injections at visit and plans unchanged.    Today, patient reports she thinks her hair is ok. She has blackish spot on her chin she would like checked today as well as some bothersome areas at corners of her mouth.      Patient is otherwise feeling well, without additional skin concerns.    Labs Reviewed:  Last  Derm path 11/15/23  Final Diagnosis   A. Mid-back:  - Seborrheic keratosis, inflamed       Physical Exam:  Vitals: LMP 07/07/1992   SKIN: Focused examination of scalp and face was performed.  - Part width at 1.25   - Bandwidth at the frontal hair line with vein prominence  - Mild erythema at the widows's peak  - 1 mm vascular blue macule on chin  - There are white 1-2 mm papules on the angles of the mouth .   - No other lesions of concern on areas examined.     Medications:  Current Outpatient Medications   Medication    amLODIPine (NORVASC) 5 MG tablet    atorvastatin (LIPITOR) 20 MG tablet    atovaquone-proguanil (MALARONE) 250-100 MG tablet    calcium-vitamin D (CALTRATE) 600-400 MG-UNIT per tablet    clotrimazole (LOTRIMIN) 1 % external cream    estradiol (ESTRACE) 0.1 MG/GM vaginal cream    fluocinolone (SYNALAR) 0.01 % solution    fluocinonide (LIDEX) 0.05 % external cream    fluocinonide (LIDEX) 0.05 % external solution    hydroxychloroquine (PLAQUENIL) 200 MG tablet    ipratropium (ATROVENT) 0.06 % nasal spray    ketoconazole (NIZORAL) 2 % external shampoo    Melatonin ER 1 MG TBCR    Multiple Vitamin (MULTI-VITAMIN) per tablet    nystatin (MYCOSTATIN) 246716 UNIT/GM external cream    tacrolimus (PROTOPIC) 0.1 % external ointment    triamterene-HCTZ (MAXZIDE-25) 37.5-25 MG tablet     Current Facility-Administered Medications   Medication    triamcinolone acetonide (KENALOG-10) injection 10 mg    triamcinolone acetonide (KENALOG-10) injection 10 mg      Past Medical History:   Patient Active Problem List   Diagnosis    Malignant neoplasm of breast (H)    Raynaud's syndrome    Status post hip replacement    Systemic sclerosis (H)    Gastroesophageal reflux disease with esophagitis    Hyperlipidemia LDL goal <100    Calculus of gallbladder without cholecystitis    Radiculoplexus neuropathy    Benign essential hypertension    Atrophic vaginitis    Chronic kidney disease, stage 3 (H)    Chronic obstructive  pulmonary disease, unspecified COPD type (H)    History of colonic polyps    Routine general medical examination at a health care facility     Past Medical History:   Diagnosis Date    Arthritis     Benign essential hypertension 10/23/2017    Benign essential hypertension 10/23/2017    Calculus of gallbladder without cholecystitis 10/6/2016    Gastroesophageal reflux disease with esophagitis 10/6/2016    Hyperlipidemia     Malignant neoplasm (H)     breast bilat mastectomy, no rad, no chemobilat     Pulmonary embolism and infarction 3/9/2012    IMO update changed this record. 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 on close of this encounter.

## 2024-03-06 ENCOUNTER — OFFICE VISIT (OUTPATIENT)
Dept: DERMATOLOGY | Facility: CLINIC | Age: 84
End: 2024-03-06
Payer: MEDICARE

## 2024-03-06 DIAGNOSIS — L43.0 HYPERTROPHIC LICHEN PLANUS: Primary | ICD-10-CM

## 2024-03-06 DIAGNOSIS — L66.10 LICHEN PLANOPILARIS: ICD-10-CM

## 2024-03-06 DIAGNOSIS — L66.12 FRONTAL FIBROSING ALOPECIA: ICD-10-CM

## 2024-03-06 PROCEDURE — 99214 OFFICE O/P EST MOD 30 MIN: CPT | Mod: 25 | Performed by: DERMATOLOGY

## 2024-03-06 PROCEDURE — 11901 INJECT SKIN LESIONS >7: CPT | Performed by: DERMATOLOGY

## 2024-03-06 RX ORDER — ATOVAQUONE AND PROGUANIL HYDROCHLORIDE 250; 100 MG/1; MG/1
1 TABLET, FILM COATED ORAL DAILY
COMMUNITY
Start: 2024-01-03

## 2024-03-06 ASSESSMENT — PAIN SCALES - GENERAL: PAINLEVEL: NO PAIN (0)

## 2024-03-06 NOTE — NURSING NOTE
Dermatology Rooming Note    Jeannine Rod's goals for this visit include:   Chief Complaint   Patient presents with    Hair Loss     3 month HL follow-up - FFA and AGA. Patient reports that there hair is thinning near left temple. Patient reports no changes in HL regime.         - Does patient need refills? Tacrolimus (Protopic) 0.1% External Ointment      Angelina Fuchs

## 2024-03-06 NOTE — LETTER
3/6/2024       RE: Jeannine Rod  [address]  [place] [address]     Dear Colleague,    Thank you for referring your patient, Jeannine Rod, to the [facility] DERMATOLOGY CLINIC [place] at [facility]. Please see a copy of my visit note below.      [facility] Dermatology Note  Encounter Date: Mar 6, 2024  Office Visit     Dermatology Problem List:  1. Hair loss c/w FFA and AGA, started 2020. Also has CREST  - Current tx: Plaquenil, Rogaine daily, ketoconazole shampoo and Krysten deep conditioner 3x weekly, Lidex  3x weekly, protopic on eyebrows daily, calcium, multivitamin, latisse to eyebrows  - Prior tx: fluocinolone oil  - ILK 5/18/2021, 6/30/2021, 9/29/2021, 11/24/21, 11/30/2022, 2/22/2023  - HairMetrix: 6/30/2021, 9/29/2021, 2/23/22, 5/25/22, 11/30/2022  2. BLK, right posterior thigh, s/p shave bx 8/24/2022  3. Superficial portions of irregular acanthosis and patchy interface dermatitis, s/p shave bx 2/22/2023  - Betamethasone ointment   4. Hypertrophic lichen planus  - fluocinonide alternating with protopic/tacrolimus     Family history: sister with skin cancer     ____________________________________________    Assessment & Plan:      # Hair loss, most consistent with FFA/LPP and AGA. 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 Patient scalp is stable today. No scalp injections necessary.  - Continue Lidex solution every other day  - Continue minoxidil 5% solution daily/protopic on the brows  - using bimatroprost on the brows  - Continue Plaquenil per rheumatologist.   - Future: finasteride or topical tacrolimus     # Plaquenil discoloration. Stable today.   - Will continue to monitor, pt has reviewed with rheumatology and understands continuing drug will continue this process      # Hypertrophic lichen planus- biopsy proven- better   - Increase fluocinonide to every day for 2 weeks.  - Protopic when not  using fluocinonide. Refilled today.   -ILK injections today     # Angular cheilitis with milia at the angles of mouth  - Can use Protopic as tolerated  - Plan for extraction. Will submit to insurance given are with chronic irritation on angles    # Vascular 1 mm macule on chin, c/w angioma  - plan for PDL          Procedures Performed:     - Intra-lesional triamcinolone procedure note. After verbal consent and review of risk of pain and skin thinning/atrophy, positioning and cleansing with isopropyl alcohol, 1 total mL of triamcinolone 10 mg/mL was injected into 4 lesion(s) on the chin. The patient tolerated the procedure well and left the dermatology clinic in good condition.    Follow-up: 4 week(s) in-person milia extraction/angular chelitis and 12 weeks PDL, or earlier for new or changing lesions    Staff and Scribe:     Scribe Disclosure:   I, Jojo Jimenez, am serving as a scribe to document services personally performed by Pat Méndez MD based on data collection and the provider's statements to me.     Provider Disclosure:   The documentation recorded by the scribe accurately reflects the services I personally performed and the decisions made by me.    Pat Méndez MD    Department of Dermatology  [facility]: Phone: [phone], Fax:[phone]  [facility]: Phone: [phone], Fax: [phone]   ____________________________________________    CC: Hair Loss (3 month HL follow-up - FFA and AGA. Patient reports that there hair is thinning near left temple. Patient reports no changes in HL regime. )    HPI:  Ms. Jeannine Rod is a(n) 83 year old female who presents today as a return patient for hair loss.    Last seen by me 11/15/23 for NUB of mid back, FFA/LPP /AGA with history of CREST, lichen planus and angular chelitis. Biopsy of NUB performed. Path returned as ISK. ILK injections  at visit and plans unchanged.    Today, patient reports she thinks her hair is ok. She has blackish spot on her chin she would like checked today as well as some bothersome areas at corners of her mouth.      Patient is otherwise feeling well, without additional skin concerns.    Labs Reviewed:  Last Derm path 11/15/23  Final Diagnosis   A. Mid-back:  - Seborrheic keratosis, inflamed       Physical Exam:  Vitals: LMP 07/07/1992   SKIN: Focused examination of scalp and face was performed.  - Part width at 1.25   - Bandwidth at the frontal hair line with vein prominence  - Mild erythema at the widows's peak  - 1 mm vascular blue macule on chin  - There are white 1-2 mm papules on the angles of the mouth .   - No other lesions of concern on areas examined.     Medications:  Current Outpatient Medications   Medication    amLODIPine (NORVASC) 5 MG tablet    atorvastatin (LIPITOR) 20 MG tablet    atovaquone-proguanil (MALARONE) 250-100 MG tablet    calcium-vitamin D (CALTRATE) 600-400 MG-UNIT per tablet    clotrimazole (LOTRIMIN) 1 % external cream    estradiol (ESTRACE) 0.1 MG/GM vaginal cream    fluocinolone (SYNALAR) 0.01 % solution    fluocinonide (LIDEX) 0.05 % external cream    fluocinonide (LIDEX) 0.05 % external solution    hydroxychloroquine (PLAQUENIL) 200 MG tablet    ipratropium (ATROVENT) 0.06 % nasal spray    ketoconazole (NIZORAL) 2 % external shampoo    Melatonin ER 1 MG TBCR    Multiple Vitamin (MULTI-VITAMIN) per tablet    nystatin (MYCOSTATIN) 510038 UNIT/GM external cream    tacrolimus (PROTOPIC) 0.1 % external ointment    triamterene-HCTZ (MAXZIDE-25) 37.5-25 MG tablet     Current Facility-Administered Medications   Medication    triamcinolone acetonide (KENALOG-10) injection 10 mg    triamcinolone acetonide (KENALOG-10) injection 10 mg      Past Medical History:   Patient Active Problem List   Diagnosis    Malignant neoplasm of breast (H)    Raynaud's syndrome    Status post hip replacement     Systemic sclerosis (H)    Gastroesophageal reflux disease with esophagitis    Hyperlipidemia LDL goal <100    Calculus of gallbladder without cholecystitis    Radiculoplexus neuropathy    Benign essential hypertension    Atrophic vaginitis    Chronic kidney disease, stage 3 (H)    Chronic obstructive pulmonary disease, unspecified COPD type (H)    History of colonic polyps    Routine general medical examination at a health care facility     Past Medical History:   Diagnosis Date    Arthritis     Benign essential hypertension 10/23/2017    Benign essential hypertension 10/23/2017    Calculus of gallbladder without cholecystitis 10/6/2016    Gastroesophageal reflux disease with esophagitis 10/6/2016    Hyperlipidemia     Malignant neoplasm (H)     breast bilat mastectomy, no rad, no chemobilat     Pulmonary embolism and infarction 3/9/2012    IMO update changed this record. 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 on close of this encounter.

## 2024-03-13 RX ORDER — TACROLIMUS 1 MG/G
OINTMENT TOPICAL
Qty: 120 G | Refills: 2 | Status: SHIPPED | OUTPATIENT
Start: 2024-03-13

## 2024-03-23 ENCOUNTER — HEALTH MAINTENANCE LETTER (OUTPATIENT)
Age: 84
End: 2024-03-23

## 2024-03-23 DIAGNOSIS — H02.729 HYPOTRICHOSIS OF EYELID: Primary | ICD-10-CM

## 2024-03-27 NOTE — TELEPHONE ENCOUNTER
BIMATOPROST 0.03% EYELASH SOLN    Last Written Prescription Date:  ?  Last Fill Quantity: /,   # refills: ?  Last Office Visit : 3/6/2024  Future Office visit:  6/5/2024    Routing refill request to provider for review/approval because:  Not on updated med list    Tania Calvillo RN  Central Triage Red Flags/Med Refills

## 2024-04-02 RX ORDER — BIMATOPROST 3 UG/ML
SOLUTION TOPICAL
Qty: 5 ML | Refills: 3 | Status: SHIPPED | OUTPATIENT
Start: 2024-04-02

## 2024-04-27 ENCOUNTER — TRANSFERRED RECORDS (OUTPATIENT)
Dept: HEALTH INFORMATION MANAGEMENT | Facility: CLINIC | Age: 84
End: 2024-04-27
Payer: MEDICARE

## 2024-05-01 ENCOUNTER — TRANSFERRED RECORDS (OUTPATIENT)
Dept: HEALTH INFORMATION MANAGEMENT | Facility: CLINIC | Age: 84
End: 2024-05-01
Payer: MEDICARE

## 2024-05-03 ENCOUNTER — TRANSFERRED RECORDS (OUTPATIENT)
Dept: HEALTH INFORMATION MANAGEMENT | Facility: CLINIC | Age: 84
End: 2024-05-03
Payer: MEDICARE

## 2024-05-08 ENCOUNTER — TRANSFERRED RECORDS (OUTPATIENT)
Dept: HEALTH INFORMATION MANAGEMENT | Facility: CLINIC | Age: 84
End: 2024-05-08
Payer: MEDICARE

## 2024-05-30 ENCOUNTER — TRANSFERRED RECORDS (OUTPATIENT)
Dept: HEALTH INFORMATION MANAGEMENT | Facility: CLINIC | Age: 84
End: 2024-05-30

## 2024-05-31 ENCOUNTER — MEDICAL CORRESPONDENCE (OUTPATIENT)
Dept: HEALTH INFORMATION MANAGEMENT | Facility: CLINIC | Age: 84
End: 2024-05-31
Payer: MEDICARE

## 2024-05-31 ENCOUNTER — TRANSFERRED RECORDS (OUTPATIENT)
Dept: HEALTH INFORMATION MANAGEMENT | Facility: CLINIC | Age: 84
End: 2024-05-31
Payer: MEDICARE

## 2024-06-03 NOTE — PROGRESS NOTES
Laser- VBeam(Pulsed Dye Laser) Procedure Note: Cosmetic      Dermatology Problem List:  1. Hair loss c/w FFA and AGA, started . Also has CREST  - Current tx: Plaquenil, Rogaine daily, ketoconazole shampoo and Krysten deep conditioner 3x weekly, Lidex  3x weekly, protopic on eyebrows daily, calcium, multivitamin, latisse to eyebrows  - Prior tx: fluocinolone oil  - ILK 2021, 2021, 2021, 21, 2022, 2023  - HairMetrix: 2021, 2021, 22, 22, 2022  2. BLK, right posterior thigh, s/p shave bx 2022  3. Superficial portions of irregular acanthosis and patchy interface dermatitis, s/p shave bx 2023  - Betamethasone ointment   4. Hypertrophic lichen planus  - fluocinonide alternating with protopic/tacrolimus     Family history: sister with skin cancer       Procedure Date: 2024    Attending Staff Surgeon: Dr. Pat Méndez    Assistant: Angelina Fuchs observed only. Rebekah Guerrero CMA     Operating Room Data:     Surgery/Procedure Date:    SAME     Pre-operative Diagnosis:   Cherry angioma  Location: chin    Number of lesions: 1    Operation/Procedure    Vbeam pulsed dye laser treatment#: 1       Post-operative Diagnosis:  SAME    Laser Settings: chin  1st pulse  Energy:11 J/cm2  Spot size:3 mm  Pulse width:  1.5 mS (0.45 thru 40 mS)  Dynamic cooling spray settin mS  Dynamic cooling device delay:  20 mS    1st pulse  Energy:11.5 J/cm2  Spot size: 3 mm  Pulse width:  1.5 mS (0.45 thru 40 mS)  Dynamic cooling spray settin mS  Dynamic cooling device delay:  20 mS      Fotofinder photos: No    Anesthesia:  None    Description of Operation/Procedure:   The nature and purpose of the procedure, associated risks, possible consequences, complications and alternative methods of treatment were explained in detail, this includes but is not limited to hyperpigmentation, hypopigmentation, scarring, bruising, hair loss pain/discomfort, eye injury,  hair loss,  and blister. We reviewed that the outcome could be any of the following: no improvement, slight improvement or change in skin color & texture, the skin might be permanently lighter or darker, and though uncommon, superficial scarring may occur.  Multiple treatments may be recommended.     A photo and operative consent were obtained. Time-out was performed.The patient was positioned to optimally expose the area treated. Protective eyewear was worn by the patient and goggles on all personnel in the treatment room. The patient confirmed the site to be treated. The laser energy output was verified by meter reading.      The clinically evident lesion(s) was/were treated with Roseline Vbeam pulsed dye laser (595 nm) beam as above. A total of 2 pulses were used. The patient tolerated the procedure well and no complications were noted. Post operative instructions were provided. The total laser operation and preparation time was >15 minutes.  The patient was counseled to call immediately for any issues and read the after visit summary for emergency contact information. The patient was counseled that [employer] messaging response may be delayed by several days, therefore, phone is preferred for emergency issues.     The patient will follow-up in 4-12 weeks.    The patient will pay the cosmetic fee today.           Staff Involved:  Scribe/Staff  Scribe Disclosure:   IJojo, am serving as a scribe to document services personally performed by Pat Méndez MD based on data collection and the provider's statements to me.     Scribe Disclosure:   IBebe ([email or web address]), am serving as a scribe to document services personally performed by this physician, Dr. Pat Méndez, based on data collection and the provider's statements to me.     Provider Disclosure:   The documentation recorded by the scribe accurately reflects the services I personally performed and the decisions made by me.    Pat Méndez MD  Assistant  Professor  Department of Dermatology  [facility] [place] [facility] [place]: Phone: [phone], Fax:[phone]  [facility]: Phone: [phone], Fax: [phone]

## 2024-06-03 NOTE — PROGRESS NOTES
[facility] Dermatology Note  Encounter Date: Jun 5, 2024  Office Visit     Dermatology Problem List:  1. 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 BLK, right posterior thigh, s/p shave bx 8/24/2022  3. Superficial portions of irregular acanthosis and patchy interface dermatitis, s/p shave bx 2/22/2023  - Betamethasone ointment   4. Hypertrophic lichen planus  - fluocinonide alternating with protopic/tacrolimus     Family history: sister with skin cancer     ____________________________________________    Assessment & Plan:    # Hair loss, most consistent with FFA/LPP and AGA. Patient has history of CREST and is currently on plaquenil by rheumatology HairMetrix shows improvement today. No ILK needed today.   - Continue Lidex solution daily   - Continue minoxidil 5% solution to vertex of scalp   - Bimatroprost on the brows  - Continue Plaquenil per rheumatologist. Last eye exam was 2 weeks ago. She goes every 6 months   - HairMetrix today. See procedures section.   - Future: finasteride or topical tacrolimus     # Plaquenil discoloration.   - Recommended starting SkinDiscoloration Defense to discoloration on the shins  - Will continue to monitor, pt has reviewed with rheumatology and understands continuing drug will continue this process      # Hypertrophic lichen planus- biopsy proven- improved  -protopic on shins  -injections       # Angular cheilitis with milia at the angles of mouth-hx of irritation  - Can use Protopic as tolerated  - Extraction today.      # Vascular 1 mm macule on chin, c/w angioma  -  PDL today. See separate encounter    Procedures Performed:   Milia extraction and PDL. See separate cosmetic encounter    - Intra-lesional triamcinolone procedure note. After verbal consent and review of risk of pain and skin thinning/atrophy, positioning and cleansing with isopropyl alcohol, 0.6 total mL of triamcinolone 10 mg/mL was injected into 4 lesion(s) on the shins. The patient tolerated the procedure well and left the dermatology clinic in good condition.      HairMetrix: 11/30/22 to 06/05/24  - Frontal anterior scalp: 180 to 214  - Mid scalp: 225 to 243  - Vertex scalp: 250 to 282  - Occipital scalp: 188 to 199  - Right temple: 112 to 125  - Left temple: 135 to 131   Milia extraction  After verbal and written consent and review of risk of permanent hyperpigmentation an scar, the area was prepped with alcohol approximately 2 milia at angles of mouth  lesions were prepped on the bilateral cheeks.  A 11 blade was used to incise each lesion and a comedone extractor was used to express the lesions. Vaseine was applied. Wound care was reviewed.   The patient tolerated the procedure well and left the Dermatology clinic in good condition.   Follow-up: 6 month follow up    Staff and Scribe:     Scribe Disclosure:   IJojo, am serving as a scribe to document services personally performed by Pat Méndez MD based on data collection and the provider's statements to me.     Scribe Disclosure:   IBebe ([email or web address]), am serving as a scribe to document services personally performed by this physician, Dr. Pat Méndez, based on data collection and the provider's statements to me.       Provider Disclosure:   The documentation recorded by the scribe accurately reflects the services I personally performed and the decisions made by me.    Pat Méndez MD    Department of Dermatology  [facility]: Phone: [phone],  Fax:[phone]  [facility]: Phone: [phone], Fax: [phone]   ____________________________________________    CC: No chief complaint on file.    HPI:  Ms. Jeannine Rod is a(n) 83 year old female who presents today as a return patient for hair loss.    Last seen in clinic with me in March 2024. Today, patient reports hair is getting thinner. Pt endorses infrequent tingling of the scalp.     Wants milia treated     Patient is otherwise feeling well, without additional skin concerns.    Labs Reviewed:  N/A    Physical Exam:  Vitals: LMP 07/07/1992   SKIN: Focused examination of scalp, face, and legs was performed.  - Negative hair pull test   - Mild perifollicular erythema and scale in the frontal regions   - Trace loose scale on scalp    - There are two white 1-2 mm papules on the angles of the mouth   - Lichenified papules on the L calf, R lateral calf x 3  - Hyperpigmentation and gray discoloration on the shins.   Milia at hte angles of mouth X2   - No other lesions of concern on areas examined.     Medications:  Current Outpatient Medications   Medication Sig Dispense Refill    amLODIPine (NORVASC) 5 MG tablet Take 1 tablet (5 mg) by mouth daily 90 tablet 0    atorvastatin (LIPITOR) 20 MG tablet TAKE 1 TABLET BY MOUTH EVERY DAY 90 tablet 2    atovaquone-proguanil (MALARONE) 250-100 MG tablet Take 1 tablet by mouth daily      bimatoprost (LATISSE) 0.03 % external opthalmic solution APPLY 1 DROP TOPICALLY AT BEDTIME TO EYEBROWS 5 mL 3    calcium-vitamin D (CALTRATE) 600-400 MG-UNIT per tablet Take 1 tablet by mouth 2 times daily      clotrimazole (LOTRIMIN) 1 % external cream Apply nightly to buttocks 60 g 0    estradiol (ESTRACE) 0.1 MG/GM vaginal cream PLACE 2 GRAMS VAGINALLY TWICE A WEEK 42.5 g 10    fluocinolone (SYNALAR) 0.01 % solution Apply topically every other day 60 mL 3    fluocinonide (LIDEX) 0.05 % external cream Apply twice daily for 2 weeks  to lesions on legs, take 2 weeks off, then repeat if needed 60 g 3    fluocinonide (LIDEX) 0.05 % external solution Use Monday, wed, Friday along the frontal hair line 60 mL 3    hydroxychloroquine (PLAQUENIL) 200 MG tablet Take 200 mg by mouth 2 times daily 2 tabs daily 5 days per week, 1 tab daily 2 days per week.      ipratropium (ATROVENT) 0.06 % nasal spray 2 SPRAYS INTO BOTH NOSTRILS 4XDAY AS NEEDED(NASAL DRAINAGE THAT TRIGGERS COUGH/THROAT CLEARING) 45 mL 0    ketoconazole (NIZORAL) 2 % external shampoo USE UP TO 3 TIMES WEEKLY 120 mL 3    Melatonin ER 1 MG TBCR Take 1 mg by mouth nightly as needed      Multiple Vitamin (MULTI-VITAMIN) per tablet Take 1 tablet by mouth daily.      nystatin (MYCOSTATIN) 220112 UNIT/GM external cream Apply topically 2 times daily 30 g 1    tacrolimus (PROTOPIC) 0.1 % external ointment USe twice daily when not using fluocinonide cream on the legs and scalp 120 g 2    triamterene-HCTZ (MAXZIDE-25) 37.5-25 MG tablet TAKE 1 TABLET BY MOUTH EVERY DAY 90 tablet 0     Current Facility-Administered Medications   Medication Dose Route Frequency Provider Last Rate Last Admin    triamcinolone acetonide (KENALOG-10) injection 10 mg  10 mg Intra-Lesional Once Pat Méndez MD        triamcinolone acetonide (KENALOG-10) injection 10 mg  10 mg Intra-Lesional Once Pat Méndez MD        triamcinolone acetonide (KENALOG-10) injection 10 mg  10 mg Intra-Lesional Once Pat Méndez MD        triamcinolone acetonide (KENALOG-10) injection 6 mg  6 mg Intra-Lesional Once           Past Medical History:   Patient Active Problem List   Diagnosis    Malignant neoplasm of breast (H)    Raynaud's syndrome    Status post hip replacement    Systemic sclerosis (H)    Gastroesophageal reflux disease with esophagitis    Hyperlipidemia LDL goal <100    Calculus of gallbladder without cholecystitis    Radiculoplexus neuropathy    Benign essential hypertension    Atrophic vaginitis    Chronic kidney disease,  stage 3 (H)    Chronic obstructive pulmonary disease, unspecified COPD type (H)    History of colonic polyps    Routine general medical examination at a health care facility     Past Medical History:   Diagnosis Date    Arthritis     Benign essential hypertension 10/23/2017    Benign essential hypertension 10/23/2017    Calculus of gallbladder without cholecystitis 10/6/2016    Gastroesophageal reflux disease with esophagitis 10/6/2016    Hyperlipidemia     Malignant neoplasm (H)     breast bilat mastectomy, no rad, no chemobilat     Pulmonary embolism and infarction 3/9/2012    IMO update changed this record. 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 on close of this encounter.

## 2024-06-05 ENCOUNTER — OFFICE VISIT (OUTPATIENT)
Dept: DERMATOLOGY | Facility: CLINIC | Age: 84
End: 2024-06-05
Payer: MEDICARE

## 2024-06-05 DIAGNOSIS — D18.01 CHERRY ANGIOMA: Primary | ICD-10-CM

## 2024-06-05 DIAGNOSIS — L72.0 MILIA: ICD-10-CM

## 2024-06-05 DIAGNOSIS — L43.0 HYPERTROPHIC LICHEN PLANUS: ICD-10-CM

## 2024-06-05 DIAGNOSIS — L66.12 FRONTAL FIBROSING ALOPECIA: Primary | ICD-10-CM

## 2024-06-05 PROCEDURE — 99213 OFFICE O/P EST LOW 20 MIN: CPT | Mod: 25 | Performed by: DERMATOLOGY

## 2024-06-05 PROCEDURE — 10040 EXTRACTION: CPT | Performed by: DERMATOLOGY

## 2024-06-05 PROCEDURE — 96999 UNLISTED SPEC DERM SVC/PX: CPT | Performed by: DERMATOLOGY

## 2024-06-05 PROCEDURE — 11901 INJECT SKIN LESIONS >7: CPT | Performed by: DERMATOLOGY

## 2024-06-05 NOTE — LETTER
2024       RE: Jeannine Rod  [address]  [place] [address]     Dear Colleague,    Thank you for referring your patient, Jeannine Rod, to the [facility] [place] [facility]. Please see a copy of my visit note below.        Laser- VBeam(Pulsed Dye Laser) Procedure Note: Cosmetic      Dermatology Problem List:  1. Hair loss c/w FFA and AGA, started . Also has CREST  - Current tx: Plaquenil, Rogaine daily, ketoconazole shampoo and Krysten deep conditioner 3x weekly, Lidex  3x weekly, protopic on eyebrows daily, calcium, multivitamin, latisse to eyebrows  - Prior tx: fluocinolone oil  - ILK 2021, 2021, 2021, 21, 2022, 2023  - HairMetrix: 2021, 2021, 22, 22, 2022  2. BLK, right posterior thigh, s/p shave bx 2022  3. Superficial portions of irregular acanthosis and patchy interface dermatitis, s/p shave bx 2023  - Betamethasone ointment   4. Hypertrophic lichen planus  - fluocinonide alternating with protopic/tacrolimus     Family history: sister with skin cancer       Procedure Date: 2024    Attending Staff Surgeon: Dr. Pat Méndez    Assistant: Angelina Fuchs observed only. Rebekah Guerrero [facility]     Operating Room Data:     Surgery/Procedure Date:    SAME     Pre-operative Diagnosis:   Cherry angioma  Location: chin    Number of lesions: 1    Operation/Procedure    Vbeam pulsed dye laser treatment#: 1       Post-operative Diagnosis:  SAME    Laser Settings: chin  1st pulse  Energy:11 J/cm2  Spot size:3 mm  Pulse width:  1.5 mS (0.45 thru 40 mS)  Dynamic cooling spray settin mS  Dynamic cooling device delay:  20 mS    1st pulse  Energy:11.5 J/cm2  Spot size: 3 mm  Pulse width:  1.5 mS (0.45 thru 40 mS)  Dynamic cooling spray settin mS  Dynamic cooling device delay:  20 mS      Fotofinder photos: No    Anesthesia:  None    Description  of Operation/Procedure:   The nature and purpose of the procedure, associated risks, possible consequences, complications and alternative methods of treatment were explained in detail, this includes but is not limited to hyperpigmentation, hypopigmentation, scarring, bruising, hair loss pain/discomfort, eye injury, hair loss,  and blister. We reviewed that the outcome could be any of the following: no improvement, slight improvement or change in skin color & texture, the skin might be permanently lighter or darker, and though uncommon, superficial scarring may occur.  Multiple treatments may be recommended.     A photo and operative consent were obtained. Time-out was performed.The patient was positioned to optimally expose the area treated. Protective eyewear was worn by the patient and goggles on all personnel in the treatment room. The patient confirmed the site to be treated. The laser energy output was verified by meter reading.      The clinically evident lesion(s) was/were treated with Roseline Vbeam pulsed dye laser (595 nm) beam as above. A total of 2 pulses were used. The patient tolerated the procedure well and no complications were noted. Post operative instructions were provided. The total laser operation and preparation time was >15 minutes.  The patient was counseled to call immediately for any issues and read the after visit summary for emergency contact information. The patient was counseled that [employer] messaging response may be delayed by several days, therefore, phone is preferred for emergency issues.     The patient will follow-up in 4-12 weeks.    The patient will pay the cosmetic fee today.           Staff Involved:  Scribe/Staff  Scribe Disclosure:   Jojo TAMAYO, am serving as a scribe to document services personally performed by Pat Méndez MD based on data collection and the provider's statements to me.     Scribe Disclosure:   Bebe TAMAYO ([email or web address]), am serving as a scribe to document  services personally performed by this physician, Dr. Pat Méndez, based on data collection and the provider's statements to me.     Provider Disclosure:   The documentation recorded by the scribe accurately reflects the services I personally performed and the decisions made by me.    Pat Méndez MD    Department of Dermatology  [facility]: Phone: [phone], Fax:[phone]  [facility]: Phone: [phone], Fax: [phone]

## 2024-06-05 NOTE — PATIENT INSTRUCTIONS
Intralesional Kenalog (ILK) Injections    Intralesional steroid injection involves a corticosteroid, such as triamcinolone acetonide (brand name Kenalog), which is injected directly into a lesion on or immediately below the skin. Intralesional kenalog may be used to treat many skin conditions:    Alopecia areata  Discoid lupus erythematosus  Keloids/hypertrophic scars  Granuloma annulare and other granulomatous disorders  Hypertrophic lichen planus  Lichen simplex chronicus (neurodermatitis)  Localised psoriasis  Necrobiosis lipoidica  Acne cysts (nodulocystic acne)  Small infantile hemangiomas    Possible side-effects of intralesional Kenalog (ILK) injections include bleeding, pain, skin thinning,infection, contact dermatitis, nerve damage, scar formation and need for a repeat procedure.    Some people may experience delayed side-effects including:  Lipoatrophy, appearing as skin indentations or dimples around the injection sites a few weeks after treatment; these may be permanent.  White marks (leukoderma) or brown marks (postinflammatory pigmentation) at the site of injection or spreading from the site of injection - these may resolve or persist long term.  Telangiectasia, or small dilated blood vessels at the site of injection.   Increased hair growth at the site of injection - this resolves eventually.  Steroid acne: steroids increase growth hormone, leading to increased sebum (oil) production by the sebaceous glands. Steroid acne generally improves once the steroid has been stopped.      Who should I call with questions?  [facility]: [phone]   [facility]: [phone]  For urgent needs outside of business hours call the [facility] at [phone] and ask for the dermatology resident on call

## 2024-06-05 NOTE — NURSING NOTE
Dermatology Rooming Note    Mia Hinz's goals for this visit include:   Chief Complaint   Patient presents with    Laser Treatment     PDL - 1 area         - Does patient need refills? No    Angelina Fuchs    Dermatology Laser Intake Checklist:  History of psoriasis: No  History of recent tan, indoor or outdoor tanning/vacation or other sun exposure: No  History of vitiligo: No  Family history of vitiligo: No  Recent other cosmetic procedure(microderm abrasion/peel/hair removal/facial etc): No  History of HSV: No  Did the patient start valtrex: No  For genital laser hair removal patient only: Is there a history of genital warts or condyloma: No  Tattoo in the area to be treated: No  Is patient using hydroquinone: No  Retinoids and other acne medications stopped for 2 weeks: No  Has the patient had accutane in the last 6-12 months: No  Pregnant or breastfeeding: N/A  History of skin cancer in area planned for treatment: No  History of treatment with gold: No  Changes in medical history: No  Photos obtained: Yes  Does the patient smoke: No  Is the patient on ibuprofen/aspirin/plavix/coumadin/other blood thinner: Yes  If patient is taking narcotic or diazepam(valium)-does patient have : No  LMP 07/07/1992

## 2024-06-05 NOTE — LETTER
6/5/2024       RE: Jeannine Rod  [address]  [place] [address]     Dear Colleague,    Thank you for referring your patient, Jeannine Rod, to the [facility] [place] at [facility]. Please see a copy of my visit note below.    [facility] Dermatology Note  Encounter Date: Jun 5, 2024  Office Visit     Dermatology Problem List:  1. 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 BLK, right posterior thigh, s/p shave bx 8/24/2022  3. Superficial portions of irregular acanthosis and patchy interface dermatitis, s/p shave bx 2/22/2023  - Betamethasone ointment   4. Hypertrophic lichen planus  - fluocinonide alternating with protopic/tacrolimus     Family history: sister with skin cancer     ____________________________________________    Assessment & Plan:    # Hair loss, most consistent with FFA/LPP and AGA. Patient has history of CREST and is currently on plaquenil by rheumatology [facility] shows improvement today. No ILK needed today.   - Continue Lidex solution daily   - Continue minoxidil 5% solution to vertex of scalp   - Bimatroprost on the brows  - Continue Plaquenil per rheumatologist. Last eye exam was 2 weeks ago. She goes every 6 months   - HairMetrix today. See procedures section.   - Future: finasteride or topical tacrolimus     # Plaquenil discoloration.   - Recommended starting SkinDiscoloration Defense to discoloration on the shins  - Will continue to monitor, pt has reviewed with rheumatology and understands  continuing drug will continue this process      # Hypertrophic lichen planus- biopsy proven- improved  -protopic on shins  -injections       # Angular cheilitis with milia at the angles of mouth-hx of irritation  - Can use Protopic as tolerated  - Extraction today.      # Vascular 1 mm macule on chin, c/w angioma  - PDL today. See separate encounter    Procedures Performed:   Milia extraction and PDL. See separate cosmetic encounter    - Intra-lesional triamcinolone procedure note. After verbal consent and review of risk of pain and skin thinning/atrophy, positioning and cleansing with isopropyl alcohol, 0.6 total mL of triamcinolone 10 mg/mL was injected into 4 lesion(s) on the shins. The patient tolerated the procedure well and left the dermatology clinic in good condition.      HairMetrix: 11/30/22 to 06/05/24  - Frontal anterior scalp: 180 to 214  - Mid scalp: 225 to 243  - Vertex scalp: 250 to 282  - Occipital scalp: 188 to 199  - Right temple: 112 to 125  - Left temple: 135 to 131   Milia extraction  After verbal and written consent and review of risk of permanent hyperpigmentation an scar, the area was prepped with alcohol approximately 2 milia at angles of mouth  lesions were prepped on the bilateral cheeks.  A 11 blade was used to incise each lesion and a comedone extractor was used to express the lesions. Vaseine was applied. Wound care was reviewed.   The patient tolerated the procedure well and left the Dermatology clinic in good condition.   Follow-up: 6 month follow up    Staff and Scribe:     Scribe Disclosure:   IJojo, am serving as a scribe to document services personally performed by Pat Méndez MD based on data collection and the provider's statements to me.     Scribe Disclosure:   IBebe ([email or web address]), am serving as a scribe to document services personally performed by this physician, Dr. Pat Méndez, based on data collection and the provider's statements to me.       Provider  Disclosure:   The documentation recorded by the scribe accurately reflects the services I personally performed and the decisions made by me.    Pat Méndez MD    Department of Dermatology  [facility]: Phone: [phone], Fax:[phone]  [facility]: Phone: [phone], Fax: [phone]   ____________________________________________    CC: No chief complaint on file.    HPI:  Ms. Jeannine Rod is a(n) 83 year old female who presents today as a return patient for hair loss.    Last seen in clinic with me in March 2024. Today, patient reports hair is getting thinner. Pt endorses infrequent tingling of the scalp.     Wants milia treated     Patient is otherwise feeling well, without additional skin concerns.    Labs Reviewed:  N/A    Physical Exam:  Vitals: LMP 07/07/1992   SKIN: Focused examination of scalp, face, and legs was performed.  - Negative hair pull test   - Mild perifollicular erythema and scale in the frontal regions   - Trace loose scale on scalp    - There are two white 1-2 mm papules on the angles of the mouth   - Lichenified papules on the L calf, R lateral calf x 3  - Hyperpigmentation and gray discoloration on the shins.   Milia at hte angles of mouth X2   - No other lesions of concern on areas examined.     Medications:  Current Outpatient Medications   Medication Sig Dispense Refill    amLODIPine (NORVASC) 5 MG tablet Take 1 tablet (5 mg) by mouth daily 90 tablet 0    atorvastatin (LIPITOR) 20 MG tablet TAKE 1 TABLET BY MOUTH EVERY DAY 90 tablet 2    atovaquone-proguanil (MALARONE) 250-100 MG tablet Take 1 tablet by mouth daily      bimatoprost (LATISSE) 0.03 % external opthalmic solution APPLY 1 DROP TOPICALLY AT BEDTIME TO EYEBROWS 5 mL 3    calcium-vitamin D (CALTRATE) 600-400 MG-UNIT per tablet Take 1 tablet by mouth 2 times daily      clotrimazole (LOTRIMIN) 1 %  external cream Apply nightly to buttocks 60 g 0    estradiol (ESTRACE) 0.1 MG/GM vaginal cream PLACE 2 GRAMS VAGINALLY TWICE A WEEK 42.5 g 10    fluocinolone (SYNALAR) 0.01 % solution Apply topically every other day 60 mL 3    fluocinonide (LIDEX) 0.05 % external cream Apply twice daily for 2 weeks to lesions on legs, take 2 weeks off, then repeat if needed 60 g 3    fluocinonide (LIDEX) 0.05 % external solution Use Monday, wed, Friday along the frontal hair line 60 mL 3    hydroxychloroquine (PLAQUENIL) 200 MG tablet Take 200 mg by mouth 2 times daily 2 tabs daily 5 days per week, 1 tab daily 2 days per week.      ipratropium (ATROVENT) 0.06 % nasal spray 2 SPRAYS INTO BOTH NOSTRILS 4XDAY AS NEEDED(NASAL DRAINAGE THAT TRIGGERS COUGH/THROAT CLEARING) 45 mL 0    ketoconazole (NIZORAL) 2 % external shampoo USE UP TO 3 TIMES WEEKLY 120 mL 3    Melatonin ER 1 MG TBCR Take 1 mg by mouth nightly as needed      Multiple Vitamin (MULTI-VITAMIN) per tablet Take 1 tablet by mouth daily.      nystatin (MYCOSTATIN) 991490 UNIT/GM external cream Apply topically 2 times daily 30 g 1    tacrolimus (PROTOPIC) 0.1 % external ointment USe twice daily when not using fluocinonide cream on the legs and scalp 120 g 2    triamterene-HCTZ (MAXZIDE-25) 37.5-25 MG tablet TAKE 1 TABLET BY MOUTH EVERY DAY 90 tablet 0     Current Facility-Administered Medications   Medication Dose Route Frequency Provider Last Rate Last Admin    triamcinolone acetonide (KENALOG-10) injection 10 mg  10 mg Intra-Lesional Once Pat Méndez MD        triamcinolone acetonide (KENALOG-10) injection 10 mg  10 mg Intra-Lesional Once Pat Méndez MD        triamcinolone acetonide (KENALOG-10) injection 10 mg  10 mg Intra-Lesional Once Pat Méndez MD        triamcinolone acetonide (KENALOG-10) injection 6 mg  6 mg Intra-Lesional Once           Past Medical History:   Patient Active Problem List   Diagnosis    Malignant neoplasm of breast (H)    Raynaud's syndrome     Status post hip replacement    Systemic sclerosis (H)    Gastroesophageal reflux disease with esophagitis    Hyperlipidemia LDL goal <100    Calculus of gallbladder without cholecystitis    Radiculoplexus neuropathy    Benign essential hypertension    Atrophic vaginitis    Chronic kidney disease, stage 3 (H)    Chronic obstructive pulmonary disease, unspecified COPD type (H)    History of colonic polyps    Routine general medical examination at a health care facility     Past Medical History:   Diagnosis Date    Arthritis     Benign essential hypertension 10/23/2017    Benign essential hypertension 10/23/2017    Calculus of gallbladder without cholecystitis 10/6/2016    Gastroesophageal reflux disease with esophagitis 10/6/2016    Hyperlipidemia     Malignant neoplasm (H)     breast bilat mastectomy, no rad, no chemobilat     Pulmonary embolism and infarction 3/9/2012    IMO update changed this record. 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    Raynaud's syndrome     Systemic sclerosis (H) 10/6/2016        CC No referring provider

## 2024-06-11 ENCOUNTER — MEDICAL CORRESPONDENCE (OUTPATIENT)
Dept: HEALTH INFORMATION MANAGEMENT | Facility: CLINIC | Age: 84
End: 2024-06-11
Payer: MEDICARE

## 2024-06-13 ENCOUNTER — TRANSCRIBE ORDERS (OUTPATIENT)
Dept: OTHER | Age: 84
End: 2024-06-13

## 2024-06-13 DIAGNOSIS — K46.9 HERNIA OF ABDOMINAL CAVITY: Primary | ICD-10-CM

## 2024-06-13 NOTE — TELEPHONE ENCOUNTER
REFERRAL INFORMATION:  Referring Provider: Dr. Gillette  Referring Clinic: [facility] - Primary Care  Reason for Visit/Diagnosis: Hernia of abdominal cavity       FUTURE VISIT INFORMATION:  Appointment Date: 6/27/2024  Appointment Time: 3 PM     NOTES RECORD STATUS  DETAILS   OFFICE NOTE from Referring Provider Care Everywhere [facility]:  ([facility]) 6/26/24, 1/3/24 - [facility] OV with Dr. Gillette   OFFICE NOTE from Other Specialists [facility]:  6/7/23 - [facility] OV with Dr. Heath   HOSPITAL DISCHARGE SUMMARY/ ED VISITS  N/A    OPERATIVE REPORT N/A    PERTINENT LABS Care Everywhere    IMAGING (CT, MRI, US, XR)  [facility]:  ([facility]) 6/25/24 - MRI Lumbar Spine  5/26/23 - CTA Chest  10/13/14 - CT Abd/Pelvis

## 2024-06-25 ENCOUNTER — ANCILLARY PROCEDURE (OUTPATIENT)
Dept: MRI IMAGING | Facility: CLINIC | Age: 84
End: 2024-06-25
Attending: NURSE PRACTITIONER
Payer: MEDICARE

## 2024-06-25 DIAGNOSIS — M54.50 LOW BACK PAIN: ICD-10-CM

## 2024-06-25 DIAGNOSIS — M25.552 HIP PAIN, LEFT: ICD-10-CM

## 2024-06-25 PROCEDURE — 72148 MRI LUMBAR SPINE W/O DYE: CPT | Mod: GC | Performed by: RADIOLOGY

## 2024-06-26 ENCOUNTER — PRE VISIT (OUTPATIENT)
Dept: SURGERY | Facility: CLINIC | Age: 84
End: 2024-06-26
Payer: MEDICARE

## 2024-06-26 NOTE — TELEPHONE ENCOUNTER
Patient Telephone Reminder Call    Date of call:  06/26/24  Phone numbers:  Home number on file [phone] (home)    Reached patient/confirmed appointment:  Yes  Appointment with:   Dr. Jose Ross  Reason for visit:  Abdominal wall hernia  Remind patient that this visit is a consultation only, NO procedure:  Yes  Can this visit be changed to a video visit:  No

## 2024-06-27 ENCOUNTER — OFFICE VISIT (OUTPATIENT)
Dept: SURGERY | Facility: CLINIC | Age: 84
End: 2024-06-27
Attending: INTERNAL MEDICINE
Payer: MEDICARE

## 2024-06-27 ENCOUNTER — TRANSFERRED RECORDS (OUTPATIENT)
Dept: HEALTH INFORMATION MANAGEMENT | Facility: CLINIC | Age: 84
End: 2024-06-27

## 2024-06-27 ENCOUNTER — PRE VISIT (OUTPATIENT)
Dept: SURGERY | Facility: CLINIC | Age: 84
End: 2024-06-27

## 2024-06-27 VITALS
WEIGHT: 140.3 LBS | DIASTOLIC BLOOD PRESSURE: 73 MMHG | HEART RATE: 75 BPM | OXYGEN SATURATION: 100 % | SYSTOLIC BLOOD PRESSURE: 136 MMHG | HEIGHT: 66 IN | BODY MASS INDEX: 22.55 KG/M2

## 2024-06-27 DIAGNOSIS — K40.90 LEFT INGUINAL HERNIA: Primary | ICD-10-CM

## 2024-06-27 PROCEDURE — 99203 OFFICE O/P NEW LOW 30 MIN: CPT | Performed by: SURGERY

## 2024-06-27 RX ORDER — NIFEDIPINE 30 MG/1
1 TABLET, EXTENDED RELEASE ORAL DAILY
COMMUNITY
Start: 2024-01-03

## 2024-06-27 ASSESSMENT — PAIN SCALES - GENERAL: PAINLEVEL: MODERATE PAIN (4)

## 2024-06-27 NOTE — PATIENT INSTRUCTIONS
You met with Dr. Jose Ross.      Today's visit instructions:    Return to the Surgery Clinic on an as needed basis.  Dr. Ross is recommending watchful waiting verses hernia repair at this time.  Please contact our office with any new or worsening symptoms.      If you have questions please contact Paulina RN or Cleopatra RN during regular clinic hours, Monday through Friday 7:30 AM - 4:00 PM, or you can contact us via "[employer] at anytime.       If you have urgent needs after-hours, weekends, or holidays please call the hospital at [phone] and ask to speak with our on-call General Surgery Team.    Appointment [phone]  Nurse Advice (Paulina or Cleopatra): [phone]   Surgery Scheduler (Ramona): [phone]  Fax: [phone]

## 2024-06-27 NOTE — LETTER
6/27/2024       RE: Jeannine Rod  [address]  [place] [address]     Dear Colleague,    Thank you for referring your patient, Jeannine Rod, to the [facility]. Please see a copy of my visit note below.    Jeannine Rod is a 83 year old female with a 1 year history of a left inguinal mass with the following symptoms of lump.    Onset did not occur with lifting.  Obstructive symptoms:  no  Urinary difficulties:  no  Chronic cough: no  Constipation:  no  Current level of activity:  limited secondary to back issues.    Past medical and surgical history, medications, allergies, family history, and social history were reviewed with the patient.    ROS: 10 point review of systems negative except noted in HPI  PHYSICAL EXAM  General appearance- alert, and in no distress.  Lungs- Respiratory effort unlabored.  Gait- uses cane  Abdomen - soft non distended, non tender .  Left inguinal hernia easily reducible.  Impression:  Inguinal hernia, left  Recommendation:  Watchful waiting vs Left Inguinal Hernioplasty open mesh repair.    A full discussion regarding the alternatives, risks, goals, and potential complications for this surgery was completed today.  The patient understood I would use non recalled mesh and  that the potential problems included but are not limited to:  Infection, bleeding, hematoma, seroma, recurrence, injury to nerve/muscle and chronic pain.    The patient verbally expressed understanding, was given the opportunity for questions, and at this point would like to follow watchful waiting protocol.      The total time spent with this patient was 30 minutes.  The total time was spent on the date of encounter doing chart review, history and physical, documentation, patient education, and any further activity as noted above.       Again, thank you for allowing me to participate in the care of  your patient.      Sincerely,    Jose Ross MD

## 2024-06-27 NOTE — PROGRESS NOTES
Jeannine Rod is a 83 year old female with a 1 year history of a left inguinal mass with the following symptoms of lump.    Onset did not occur with lifting.  Obstructive symptoms:  no  Urinary difficulties:  no  Chronic cough: no  Constipation:  no  Current level of activity:  limited secondary to back issues.    Past medical and surgical history, medications, allergies, family history, and social history were reviewed with the patient.    ROS: 10 point review of systems negative except noted in HPI  PHYSICAL EXAM  General appearance- alert, and in no distress.  Lungs- Respiratory effort unlabored.  Gait- uses cane  Abdomen - soft non distended, non tender .  Left inguinal hernia easily reducible.  Impression:  Inguinal hernia, left  Recommendation:  Watchful waiting vs Left Inguinal Hernioplasty open mesh repair.    A full discussion regarding the alternatives, risks, goals, and potential complications for this surgery was completed today.  The patient understood I would use non recalled mesh and  that the potential problems included but are not limited to:  Infection, bleeding, hematoma, seroma, recurrence, injury to nerve/muscle and chronic pain.    The patient verbally expressed understanding, was given the opportunity for questions, and at this point would like to follow watchful waiting protocol.      The total time spent with this patient was 30 minutes.  The total time was spent on the date of encounter doing chart review, history and physical, documentation, patient education, and any further activity as noted above.

## 2024-06-27 NOTE — NURSING NOTE
"Chief Complaint   Patient presents with    Consult     Hernia of abdominal wall       Vitals:    06/27/24 1450   BP: 136/73   BP Location: Left arm   Patient Position: Sitting   Cuff Size: Adult Regular   Pulse: 75   SpO2: 100%   Weight: 140 lb 4.8 oz   Height: 5' 6\"       Body mass index is 22.65 kg/m .    Shanice Barron EMT  "

## 2024-07-22 ENCOUNTER — MEDICAL CORRESPONDENCE (OUTPATIENT)
Dept: HEALTH INFORMATION MANAGEMENT | Facility: CLINIC | Age: 84
End: 2024-07-22
Payer: MEDICARE

## 2024-07-23 ENCOUNTER — OFFICE VISIT (OUTPATIENT)
Dept: ORTHOPEDICS | Facility: CLINIC | Age: 84
End: 2024-07-23
Payer: MEDICARE

## 2024-07-23 ENCOUNTER — ANCILLARY PROCEDURE (OUTPATIENT)
Dept: GENERAL RADIOLOGY | Facility: CLINIC | Age: 84
End: 2024-07-23
Attending: STUDENT IN AN ORGANIZED HEALTH CARE EDUCATION/TRAINING PROGRAM
Payer: MEDICARE

## 2024-07-23 ENCOUNTER — PATIENT OUTREACH (OUTPATIENT)
Dept: CARE COORDINATION | Facility: CLINIC | Age: 84
End: 2024-07-23

## 2024-07-23 ENCOUNTER — TRANSCRIBE ORDERS (OUTPATIENT)
Dept: OTHER | Age: 84
End: 2024-07-23

## 2024-07-23 DIAGNOSIS — M25.551 BILATERAL HIP PAIN: Primary | ICD-10-CM

## 2024-07-23 DIAGNOSIS — M25.552 PAIN OF LEFT HIP: ICD-10-CM

## 2024-07-23 DIAGNOSIS — G89.29 CHRONIC LEFT HIP PAIN: Primary | ICD-10-CM

## 2024-07-23 DIAGNOSIS — M25.552 CHRONIC LEFT HIP PAIN: Primary | ICD-10-CM

## 2024-07-23 DIAGNOSIS — M25.552 PAIN OF LEFT HIP: Primary | ICD-10-CM

## 2024-07-23 DIAGNOSIS — M25.552 BILATERAL HIP PAIN: Primary | ICD-10-CM

## 2024-07-23 DIAGNOSIS — M67.952 TENDINOPATHY OF LEFT GLUTEAL REGION: ICD-10-CM

## 2024-07-23 PROCEDURE — 99203 OFFICE O/P NEW LOW 30 MIN: CPT | Performed by: STUDENT IN AN ORGANIZED HEALTH CARE EDUCATION/TRAINING PROGRAM

## 2024-07-23 PROCEDURE — 73502 X-RAY EXAM HIP UNI 2-3 VIEWS: CPT | Mod: LT | Performed by: RADIOLOGY

## 2024-07-23 NOTE — PROGRESS NOTES
Sports Medicine Clinic           ASSESSMENT and PLAN:     Jeannine was seen today for pain.    Diagnoses and all orders for this visit:    Chronic left hip pain  Tendinopathy of left gluteal region  Several months of left hip pain in the setting of KEESHA ~10 years ago. Has been seen and evaluated by [facility] and had an ultrasound guided iliospoas tendon as well as MRI of her lumbar spine which didn't show any significant stenosis. She was treated with oral steroids and didn't have any significant improvement in her symptoms. She has been doing PT without improvement either. Her symptoms localize to her greater troch region as well as her glute tendons with significant weakness and her SI joint which was also felt to be a good target for CSI by [facility]. We will start with an MRI of her left hip to evaluate the extent of her glute tendinopathy.  -     MR Hip Left w/o Contrast; Future  -     follow up after MRI      Return sooner if develops new or worsening symptoms.    Options for treatment and/or follow-up care were reviewed with the patient was actively involved in the decision making process. Patient verbalized understanding and was in agreement with the plan.    Rossana Villarreal MD, [facility]  Primary Care Sports Medicine             SUBJECTIVE       Jeannine Rod is a 83 year old female presenting to clinic today with a chief complaint of left hip, referred by self. She saw [facility] initially for this issue, a left hip iliopsoas was ordered for her on 5/2/24 when she was seen at [facility]. Later, she had a back work up by [facility].   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 She is worried this may be happening again.     Onset: 3 month(s) ago. Patient describes injury: she was doing strength training with a , she was holding a 8 lb dumbbell and went into a forward lunge.  Location of Pain: left groin burning and achy  pain in the buttock   Rating of Pain at worst: 5/10  Rating of Pain Currently: 3/10  Worsened by: night time, walking, clamshells laying on the left, tandem balance stance  Better with: Tylenol  Treatments tried: physical therapy   Associated symptoms: no distal numbness or tingling; denies swelling or warmth  Orthopedic history: YES - Date: left hip KEESHA   Relevant surgical history: YES - Date: 03/05/2012 left KEESHA Dr. Paniagua   Social history: social history: retired    She is able to go to the gym and use some of the machines. She can do abduction and adduction machine 50lbs, back extension 50lbs, hamstring curls 30-35lbs, leg extensions 25-30lbs. Clamshells are painful as well as tandem balance.     Has in inguinal hernia that is now more persistent.     Does not feel like her symptoms are from her back or her hip joint.     PMH, Medications and Allergies were reviewed and updated as needed.    ROS:  As noted above otherwise negative.    Patient Active Problem List   Diagnosis    Malignant neoplasm of breast (H)    Raynaud's syndrome    Status post hip replacement    Systemic sclerosis (H)    Gastroesophageal reflux disease with esophagitis    Hyperlipidemia LDL goal <100    Calculus of gallbladder without cholecystitis    Radiculoplexus neuropathy    Benign essential hypertension    Atrophic vaginitis    Chronic kidney disease, stage 3 (H)    Chronic obstructive pulmonary disease, unspecified COPD type (H)    History of colonic polyps    Routine general medical examination at a health care facility       Current Outpatient Medications   Medication Sig Dispense Refill    amLODIPine (NORVASC) 5 MG tablet Take 1 tablet (5 mg) by mouth daily 90 tablet 0    atorvastatin (LIPITOR) 20 MG tablet TAKE 1 TABLET BY MOUTH EVERY DAY 90 tablet 2    atovaquone-proguanil (MALARONE) 250-100 MG tablet Take 1 tablet by mouth daily      bimatoprost (LATISSE) 0.03 % external opthalmic solution APPLY 1 DROP TOPICALLY AT BEDTIME TO  EYEBROWS 5 mL 3    calcium-vitamin D (CALTRATE) 600-400 MG-UNIT per tablet Take 1 tablet by mouth 2 times daily      clotrimazole (LOTRIMIN) 1 % external cream Apply nightly to buttocks 60 g 0    estradiol (ESTRACE) 0.1 MG/GM vaginal cream PLACE 2 GRAMS VAGINALLY TWICE A WEEK 42.5 g 10    fluocinolone (SYNALAR) 0.01 % solution Apply topically every other day 60 mL 3    fluocinonide (LIDEX) 0.05 % external cream Apply twice daily for 2 weeks to lesions on legs, take 2 weeks off, then repeat if needed 60 g 3    fluocinonide (LIDEX) 0.05 % external solution Use Monday, wed, Friday along the frontal hair line 60 mL 3    hydroxychloroquine (PLAQUENIL) 200 MG tablet Take 200 mg by mouth 2 times daily 2 tabs daily 5 days per week, 1 tab daily 2 days per week.      ipratropium (ATROVENT) 0.06 % nasal spray 2 SPRAYS INTO BOTH NOSTRILS 4XDAY AS NEEDED(NASAL DRAINAGE THAT TRIGGERS COUGH/THROAT CLEARING) 45 mL 0    ketoconazole (NIZORAL) 2 % external shampoo USE UP TO 3 TIMES WEEKLY 120 mL 3    Melatonin ER 1 MG TBCR Take 1 mg by mouth nightly as needed      Multiple Vitamin (MULTI-VITAMIN) per tablet Take 1 tablet by mouth daily.      NIFEdipine ER OSMOTIC (PROCARDIA XL) 30 MG 24 hr tablet Take 1 tablet by mouth daily      nystatin (MYCOSTATIN) 426182 UNIT/GM external cream Apply topically 2 times daily 30 g 1    tacrolimus (PROTOPIC) 0.1 % external ointment USe twice daily when not using fluocinonide cream on the legs and scalp 120 g 2    triamterene-HCTZ (MAXZIDE-25) 37.5-25 MG tablet TAKE 1 TABLET BY MOUTH EVERY DAY 90 tablet 0            OBJECTIVE:       Vitals: There were no vitals filed for this visit.  BMI: There is no height or weight on file to calculate BMI.    Gen:  Well nourished and in no acute distress  HEENT: Extraocular movement intact  Neck: Supple  Pulm:  Breathing Comfortably. No increased respiratory effort.  Psych: Euthymic. Appropriately answers questions    MSK:   LEFT HIP  Inspection:    No swelling,  bruising, discoloration, or obvious deformity or asymmetry  Palpation:    Left inguinal hernia. Tender about the greater trochanteric region, gluteal muscles, and SI joint. Otherwise all other landmarks are nontender.  Active Range of Motion:     Flexion full, extension full / IR limited slightly by pain / ER  limited slightly by pain  Strength:    Flexion 5-/5 / extension 5/5 / adduction 5-/5 / abduction 4/5  Special Tests:    Positive: resisted gluteus medius provocation    Negative: Logroll, JUMA, anterior impingement (FADIR), SI provocative testing     Imaging was personally reviewed and interpreted by me.   3 views pelvis radiograph(s) 7/23/2024 4:10 PM     History: AP/FROG LAT; Pain of left hip     Comparison: Radiographs dated 3/5/2012     Findings:     3 view(s) of the pelvis were obtained.      No acute osseous abnormality.     Mild to moderate degenerative change of the right hip.     Stable appearing postsurgical changes of total hip arthroplasty of the  left hip.     Sacrum and innominate bones are partially obscured by overlying bowel  gas/fecal content.     Pelvic phlebolith.                                                                      Impression:  1. Stable postsurgical changes of total hip arthroplasty on the left.  2. Mild to moderate degenerative change of the right hip.    MR Lumbar spine without contrast      Provided History: Hip pain, left; Low back pain.     Comparison: 10/13/2014 CT of abdomen/pelvis.      Technique: Sagittal T1-weighted, sagittal STIR, 3D volumetric axial  and sagittal reconstructed T2-weighted images of the lumbar spine were  obtained without intravenous contrast.      Findings: There are 5 lumbar-type vertebrae, counting down from C2 The  tip of the conus medullaris is at the superior endplate of L2.  Normal  lumbar vertebral alignment.  There is moderate to severe disc height  loss at L4-5 and L5-S1 with a very minimal degree of reactive endplate  changes at L4  and L5. disc height narrowing .  No pathological marrow  on the T1-weighted sequences.. Possible subacute Schmorl's node-like  deformity versus endplate degenerative change at L4 inferior endplate.     On a level by level basis:     T12-L1: Disc extrusion migrating superiorly. Mild facet arthropathy.  No significant spinal canal or neuroforamina stenosis.     L1-2: No spinal canal or neuroforaminal stenosis.     L2-3: No spinal canal or neuroforaminal stenosis.     L3-4: Small posterior disc bulge with bilateral facet atrophy and mild  ligamentum flavum hypertrophy resulting in mild right and mild to  moderate left neural foraminal stenosis. There is mild spinal canal  stenosis.     L4-5: Asymmetric posterior disc bulge with bilateral facet hypertrophy  resulting in a mild left neural foraminal stenosis, mild right neural  foraminal stenosis but no discernible spinal canal stenosis. No  descending nerve root impingement.     L5-S1: Asymmetric osteophyte spurring with mild facet hypertrophy  which results in mild/moderate right neural foraminal stenosis, mild  to moderate left neuroforaminal stenosis but no spinal canal stenosis.     Paraspinous tissues are within normal limits.                                                                      Impression: Multilevel lumbar degenerative changes within the lumbar  spine, most pronounced at L4-5 and L5-S1, and also disc extrusion at  T12-L1, however there is no high-grade spinal canal or neural  foraminal stenosis.

## 2024-07-23 NOTE — LETTER
7/23/2024      RE: Jeannine Rod  [address]  [place] [address]     Dear Colleague,    Thank you for referring your patient, Jeannine Rod, to the [facility] [place]. Please see a copy of my visit note below.    Sports Medicine Clinic           ASSESSMENT and PLAN:     Jeannine was seen today for pain.    Diagnoses and all orders for this visit:    Chronic left hip pain  Tendinopathy of left gluteal region  Several months of left hip pain in the setting of KEESHA ~10 years ago. Has been seen and evaluated by [facility] and had an ultrasound guided iliospoas tendon as well as MRI of her lumbar spine which didn't show any significant stenosis. She was treated with oral steroids and didn't have any significant improvement in her symptoms. She has been doing PT without improvement either. Her symptoms localize to her greater troch region as well as her glute tendons with significant weakness and her SI joint which was also felt to be a good target for CSI by [facility]. We will start with an MRI of her left hip to evaluate the extent of her glute tendinopathy.  -     MR Hip Left w/o Contrast; Future  -     follow up after MRI      Return sooner if develops new or worsening symptoms.    Options for treatment and/or follow-up care were reviewed with the patient was actively involved in the decision making process. Patient verbalized understanding and was in agreement with the plan.    Rossana Villarreal MD, [facility]  Primary Care Sports Medicine             SUBJECTIVE       Jeannine Rod is a 83 year old female presenting to clinic today with a chief complaint of left hip, referred by self. She saw [facility] initially for this issue, a left hip iliopsoas was ordered for her on 5/2/24 when she was seen at [facility]. Later, she had a back work up by [facility].   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 She is worried this may be happening again.     Onset: 3 month(s) ago. Patient describes injury: she was doing strength training with a , she was holding a 8 lb dumbbell and went into a forward lunge.  Location of Pain: left groin burning and achy pain in the buttock   Rating of Pain at worst: 5/10  Rating of Pain Currently: 3/10  Worsened by: night time, walking, clamshells laying on the left, tandem balance stance  Better with: Tylenol  Treatments tried: physical therapy   Associated symptoms: no distal numbness or tingling; denies swelling or warmth  Orthopedic history: YES - Date: left hip KEESHA   Relevant surgical history: YES - Date: 03/05/2012 left KEESHA Dr. Paniagua   Social history: social history: retired    She is able to go to the gym and use some of the machines. She can do abduction and adduction machine 50lbs, back extension 50lbs, hamstring curls 30-35lbs, leg extensions 25-30lbs. Clamshells are painful as well as tandem balance.     Has in inguinal hernia that is now more persistent.     Does not feel like her symptoms are from her back or her hip joint.     PMH, Medications and Allergies were reviewed and updated as needed.    ROS:  As noted above otherwise negative.    Patient Active Problem List   Diagnosis     Malignant neoplasm of breast (H)     Raynaud's syndrome     Status post hip replacement     Systemic sclerosis (H)     Gastroesophageal reflux disease with esophagitis     Hyperlipidemia LDL goal <100     Calculus of gallbladder without cholecystitis     Radiculoplexus neuropathy     Benign essential hypertension     Atrophic vaginitis     Chronic kidney disease, stage 3 (H)     Chronic obstructive pulmonary disease, unspecified COPD type (H)     History of colonic polyps     Routine general medical examination at a health care facility       Current Outpatient Medications   Medication Sig Dispense Refill     amLODIPine (NORVASC) 5 MG tablet Take 1 tablet (5 mg) by mouth daily 90  tablet 0     atorvastatin (LIPITOR) 20 MG tablet TAKE 1 TABLET BY MOUTH EVERY DAY 90 tablet 2     atovaquone-proguanil (MALARONE) 250-100 MG tablet Take 1 tablet by mouth daily       bimatoprost (LATISSE) 0.03 % external opthalmic solution APPLY 1 DROP TOPICALLY AT BEDTIME TO EYEBROWS 5 mL 3     calcium-vitamin D (CALTRATE) 600-400 MG-UNIT per tablet Take 1 tablet by mouth 2 times daily       clotrimazole (LOTRIMIN) 1 % external cream Apply nightly to buttocks 60 g 0     estradiol (ESTRACE) 0.1 MG/GM vaginal cream PLACE 2 GRAMS VAGINALLY TWICE A WEEK 42.5 g 10     fluocinolone (SYNALAR) 0.01 % solution Apply topically every other day 60 mL 3     fluocinonide (LIDEX) 0.05 % external cream Apply twice daily for 2 weeks to lesions on legs, take 2 weeks off, then repeat if needed 60 g 3     fluocinonide (LIDEX) 0.05 % external solution Use Monday, wed, Friday along the frontal hair line 60 mL 3     hydroxychloroquine (PLAQUENIL) 200 MG tablet Take 200 mg by mouth 2 times daily 2 tabs daily 5 days per week, 1 tab daily 2 days per week.       ipratropium (ATROVENT) 0.06 % nasal spray 2 SPRAYS INTO BOTH NOSTRILS 4XDAY AS NEEDED(NASAL DRAINAGE THAT TRIGGERS COUGH/THROAT CLEARING) 45 mL 0     ketoconazole (NIZORAL) 2 % external shampoo USE UP TO 3 TIMES WEEKLY 120 mL 3     Melatonin ER 1 MG TBCR Take 1 mg by mouth nightly as needed       Multiple Vitamin (MULTI-VITAMIN) per tablet Take 1 tablet by mouth daily.       NIFEdipine ER OSMOTIC (PROCARDIA XL) 30 MG 24 hr tablet Take 1 tablet by mouth daily       nystatin (MYCOSTATIN) 952582 UNIT/GM external cream Apply topically 2 times daily 30 g 1     tacrolimus (PROTOPIC) 0.1 % external ointment USe twice daily when not using fluocinonide cream on the legs and scalp 120 g 2     triamterene-HCTZ (MAXZIDE-25) 37.5-25 MG tablet TAKE 1 TABLET BY MOUTH EVERY DAY 90 tablet 0            OBJECTIVE:       Vitals: There were no vitals filed for this visit.  BMI: There is no height or  weight on file to calculate BMI.    Gen:  Well nourished and in no acute distress  HEENT: Extraocular movement intact  Neck: Supple  Pulm:  Breathing Comfortably. No increased respiratory effort.  Psych: Euthymic. Appropriately answers questions    MSK:   LEFT HIP  Inspection:    No swelling, bruising, discoloration, or obvious deformity or asymmetry  Palpation:    Left inguinal hernia. Tender about the greater trochanteric region, gluteal muscles, and SI joint. Otherwise all other landmarks are nontender.  Active Range of Motion:     Flexion full, extension full / IR limited slightly by pain / ER  limited slightly by pain  Strength:    Flexion 5-/5 / extension 5/5 / adduction 5-/5 / abduction 4/5  Special Tests:    Positive: resisted gluteus medius provocation    Negative: Logroll, JUMA, anterior impingement (FADIR), SI provocative testing     Imaging was personally reviewed and interpreted by me.   3 views pelvis radiograph(s) 7/23/2024 4:10 PM     History: AP/FROG LAT; Pain of left hip     Comparison: Radiographs dated 3/5/2012     Findings:     3 view(s) of the pelvis were obtained.      No acute osseous abnormality.     Mild to moderate degenerative change of the right hip.     Stable appearing postsurgical changes of total hip arthroplasty of the  left hip.     Sacrum and innominate bones are partially obscured by overlying bowel  gas/fecal content.     Pelvic phlebolith.                                                                      Impression:  1. Stable postsurgical changes of total hip arthroplasty on the left.  2. Mild to moderate degenerative change of the right hip.    MR Lumbar spine without contrast      Provided History: Hip pain, left; Low back pain.     Comparison: 10/13/2014 CT of abdomen/pelvis.      Technique: Sagittal T1-weighted, sagittal STIR, 3D volumetric axial  and sagittal reconstructed T2-weighted images of the lumbar spine were  obtained without intravenous contrast.       Findings: There are 5 lumbar-type vertebrae, counting down from C2 The  tip of the conus medullaris is at the superior endplate of L2.  Normal  lumbar vertebral alignment.  There is moderate to severe disc height  loss at L4-5 and L5-S1 with a very minimal degree of reactive endplate  changes at L4 and L5. disc height narrowing .  No pathological marrow  on the T1-weighted sequences.. Possible subacute Schmorl's node-like  deformity versus endplate degenerative change at L4 inferior endplate.     On a level by level basis:     T12-L1: Disc extrusion migrating superiorly. Mild facet arthropathy.  No significant spinal canal or neuroforamina stenosis.     L1-2: No spinal canal or neuroforaminal stenosis.     L2-3: No spinal canal or neuroforaminal stenosis.     L3-4: Small posterior disc bulge with bilateral facet atrophy and mild  ligamentum flavum hypertrophy resulting in mild right and mild to  moderate left neural foraminal stenosis. There is mild spinal canal  stenosis.     L4-5: Asymmetric posterior disc bulge with bilateral facet hypertrophy  resulting in a mild left neural foraminal stenosis, mild right neural  foraminal stenosis but no discernible spinal canal stenosis. No  descending nerve root impingement.     L5-S1: Asymmetric osteophyte spurring with mild facet hypertrophy  which results in mild/moderate right neural foraminal stenosis, mild  to moderate left neuroforaminal stenosis but no spinal canal stenosis.     Paraspinous tissues are within normal limits.                                                                      Impression: Multilevel lumbar degenerative changes within the lumbar  spine, most pronounced at L4-5 and L5-S1, and also disc extrusion at  T12-L1, however there is no high-grade spinal canal or neural  foraminal stenosis.       Again, thank you for allowing me to participate in the care of your patient.      Sincerely,    Rossana Villarreal MD

## 2024-08-12 ASSESSMENT — ACTIVITIES OF DAILY LIVING (ADL)
ADL_TOTAL_ITEM_SCORE: INCOMPLETE
JUMPING: EXTREME DIFFICULTY
STEPPING UP AND DOWN CURBS: NO DIFFICULTY AT ALL
HOS_ADL_HIGHEST_POTENTIAL_SCORE: 60
GOING DOWN 1 FLIGHT OF STAIRS: MODERATE DIFFICULTY
HOW_WOULD_YOU_RATE_YOUR_CURRENT_LEVEL_OF_FUNCTION?: ABNORMAL
LIGHT_TO_MODERATE_WORK: SLIGHT DIFFICULTY
SITTING_FOR_15_MINUTES: NO DIFFICULTY AT ALL
LOW_IMPACT_ACTIVITIES_LIKE_FAST_WALKING: EXTREME DIFFICULTY
WALKING_DOWN_STEEP_HILLS: MODERATE DIFFICULTY
HEAVY_WORK: MODERATE DIFFICULTY
GOING_DOWN_1_FLIGHT_OF_STAIRS: MODERATE DIFFICULTY
DEEP_SQUATTING: SLIGHT DIFFICULTY
PLEASE_INDICATE_YOR_PRIMARY_REASON_FOR_REFERRAL_TO_THERAPY:: HIP
ROLLING OVER IN BED: SLIGHT DIFFICULTY
RUNNING_ONE_MILE: EXTREME DIFFICULTY
GETTING_INTO_AND_OUT_OF_A_BATHTUB: MODERATE DIFFICULTY
PUTTING_ON_SOCKS_AND_SHOES: NO DIFFICULTY AT ALL
WALKING_UP_STEEP_HILLS: MODERATE DIFFICULTY
WALKING_15_MINUTES_OR_GREATER: SLIGHT DIFFICULTY
SITTING FOR 15 MINUTES: NO DIFFICULTY AT ALL
SPORTS_COUNT: 9
WALKING_APPROXIMATELY_10_MINUTES: SLIGHT DIFFICULTY
DEEP SQUATTING: SLIGHT DIFFICULTY
WALKING_UP_STEEP_HILLS: MODERATE DIFFICULTY
GOING UP 1 FLIGHT OF STAIRS: MODERATE DIFFICULTY
LIGHT_TO_MODERATE_WORK: SLIGHT DIFFICULTY
WALKING_FOR_APPROXIMATELY_10_MINUTES: SLIGHT DIFFICULTY
STEPPING_UP_AND_DOWN_CURBS: NO DIFFICULTY AT ALL
ROLLING_OVER_IN_BED: SLIGHT DIFFICULTY
SPORTS_HIGHEST_POTENTIAL_SCORE: 36
GOING_UP_1_FLIGHT_OF_STAIRS: MODERATE DIFFICULTY
SPORTS_TOTAL_ITEM_SCORE: 0
HEAVY_WORK: MODERATE DIFFICULTY
TWISTING/PIVOTING ON INVOLVED LEG: EXTREME DIFFICULTY
WALKING_DOWN_STEEP_HILLS: MODERATE DIFFICULTY
ADL_HIGHEST_POTENTIAL_SCORE: 64
STANDING_FOR_15_MINUTES: MODERATE DIFFICULTY
GETTING_INTO_AND_OUT_OF_AN_AVERAGE_CAR: SLIGHT DIFFICULTY
ADL_SCORE(%): INCOMPLETE
STARTING_AND_STOPPING_QUICKLY: MODERATE DIFFICULTY
PUTTING ON SOCKS AND SHOES: NO DIFFICULTY AT ALL
GETTING_INTO_AND_OUT_OF_A_BATHTUB: MODERATE DIFFICULTY
HOS_ADL_ITEM_SCORE_TOTAL: 37
TWISTING/PIVOTING_ON_INVOLVED_LEG: EXTREME DIFFICULTY
HOS_ADL_SCORE(%): 61.67
STANDING FOR 15 MINUTES: MODERATE DIFFICULTY
SPORTS_SCORE(%): 0
WALKING_15_MINUTES_OR_GREATER: SLIGHT DIFFICULTY
ADL_COUNT: 16
GETTING INTO AND OUT OF AN AVERAGE CAR: SLIGHT DIFFICULTY
ABILITY_TO_PERFORM_ACTIVITY_WITH_YOUR_NORMAL_TECHNIQUE: MODERATE DIFFICULTY

## 2024-08-13 ENCOUNTER — THERAPY VISIT (OUTPATIENT)
Dept: PHYSICAL THERAPY | Facility: CLINIC | Age: 84
End: 2024-08-13
Payer: MEDICARE

## 2024-08-13 DIAGNOSIS — M25.552 HIP PAIN, LEFT: Primary | ICD-10-CM

## 2024-08-13 DIAGNOSIS — M67.952 TENDINOPATHY OF LEFT GLUTEAL REGION: ICD-10-CM

## 2024-08-13 DIAGNOSIS — R29.898 WEAKNESS OF LEFT HIP: ICD-10-CM

## 2024-08-13 DIAGNOSIS — G89.29 CHRONIC LEFT HIP PAIN: ICD-10-CM

## 2024-08-13 DIAGNOSIS — M25.552 CHRONIC LEFT HIP PAIN: ICD-10-CM

## 2024-08-13 PROCEDURE — 97110 THERAPEUTIC EXERCISES: CPT | Mod: GP

## 2024-08-13 PROCEDURE — 97161 PT EVAL LOW COMPLEX 20 MIN: CPT | Mod: GP

## 2024-08-13 NOTE — PROGRESS NOTES
PHYSICAL THERAPY EVALUATION  Type of Visit: Evaluation              Subjective   Pt is a 84 yo female presenting to PT with L hip pain. Pt states she was doing a forward lunge with 8lbs in 4/2024 when she felt an instant, flaming like pain in her L hip. Has a history of L KEESHA from 10 yrs ago that had been doing well up until that point. She now has posterior hip pain that results in a burning sensation; occasional groin/anterior hip pain and no numbness/tingling into her LLE. The pain is present when standing after prolonged sitting, using stairs, and with walking; states she can walk for 30-60 min during errands if needed but it is painful and results in limping. She continues to attempt 2 mi walks per day. She started PT at [facility] but was not making progress so she stopped. She wishes to return to the gym 3x/wk for her strengthening routine. She also has a history of inguinal hernia on her L side and wonders if this is contributing. Having L hip MRI on 8/15.         Presenting condition or subjective complaint: Continual pain in hip  Date of onset: 04/01/24    Relevant medical history: Arthritis; Cancer; Hearing problems; High blood pressure; Implanted device; Numbness or tingling in perianal area; Smoking; Vision problems   Dates & types of surgery: Hip, 2012; mastectomy  and reconstruction; 2007-07    Prior diagnostic imaging/testing results: MRI; Other Maybe an x-ray; shot of ortisone inhp ar to reduce pain  center front tendon hip to anklr; 12 cortisone pills for hip; 2 sessons [facility]   Prior therapy history for the same diagnosis, illness or injury: Yes      Prior Level of Function  Transfers: Independent  Ambulation: Independent  ADL: Independent    Living Environment  Social support: Alone   Type of home: House; 2-story; Basement   Stairs to enter the home: Yes 5 Is there a railing: Yes     Ramp: No   Stairs inside the home: Yes 18 Is there a railing: Yes     Help at home: None  Equipment owned:  Straight Cane; Raised toilet seat     Employment: No    Hobbies/Interests: Gardening; gym; reading,  kniting, quilting, [place]nn walking    Patient goals for therapy: Walk and climb stairs pain free.  NOt have my balance be rown off by unexpected jolts of pain    Pain assessment: Pain present     Objective   HIP EVALUATION  GAIT:   Gait Deviations: Antalgic  BALANCE/PROPRIOCEPTION:  < 5 sec B during SLS.  ROM:  Lumbar ROM WNL, rotation causes slight L hip discomfort. Hip ROM WNL L except for empty end feel/severe pain with ER on L. Slight end range limitations in flexion/IR/ER on R hip but no pain.  PELVIC/SI SCREEN:  - anterior compression, thigh thrust, palpation to SIJ  STRENGTH:  3+/5 hip abd B Pain with trying to externally rotate hip during clamshell.  LE FLEXIBILITY: WNL  PALPATION:  No tenderness to palpation    Assessment & Plan   CLINICAL IMPRESSIONS  Medical Diagnosis: B hip pain    Treatment Diagnosis: L hip pain, weakness , especially into hip ER   Impression/Assessment: Patient is a 83 year old female with L hip pain complaints.  The following significant findings have been identified: Pain, Decreased strength, Impaired balance, Impaired gait, Impaired muscle performance, and Decreased activity tolerance. These impairments interfere with their ability to perform self care tasks, work tasks, recreational activities, household chores, driving , household mobility, and community mobility as compared to previous level of function.     Clinical Decision Making (Complexity):  Clinical Presentation: Stable/Uncomplicated  Clinical Presentation Rationale: based on medical and personal factors listed in PT evaluation  Clinical Decision Making (Complexity): Low complexity    PLAN OF CARE  Treatment Interventions:  Modalities: Cryotherapy, E-stim  Interventions: Gait Training, Manual Therapy, Neuromuscular Re-education, Therapeutic Activity, Therapeutic Exercise    Long Term Goals     PT Goal 1  Goal Identifier:  Ambulation  Goal Description: Pt will ambulate for 2 mi independently without L hip pain and with normalized gait mechanics  Rationale: to maximize safety and independence with performance of ADLs and functional tasks;to maximize safety and independence within the home;to maximize safety and independence within the community;to maximize safety and independence with transportation  Goal Progress: Pt currently has antalgic gait and L hip pain when ambulating, this is one of her main forms of physical activity and required for running errands  Target Date: 10/08/24      Frequency of Treatment: 1x/wk  Duration of Treatment: 8 wks    Recommended Referrals to Other Professionals:  none  Education Assessment:   Learner/Method: Patient;Demonstration;Pictures/Video    Risks and benefits of evaluation/treatment have been explained.   Patient/Family/caregiver agrees with Plan of Care.     Evaluation Time:     PT Eval, Low Complexity Minutes (51722): 25     Signing Clinician: BART GUTIERREZ [facility]                                                                                   OUTPATIENT PHYSICAL THERAPY      PLAN OF TREATMENT FOR OUTPATIENT REHABILITATION   Patient's Last Name, First Name, Jeannine Montoya YOB: 1940   Provider's Name   [facility]   Medical Record No.  [id number]     Onset Date: 04/01/24  Start of Care Date: 08/13/24     Medical Diagnosis:  B hip pain      PT Treatment Diagnosis:  L hip pain, weakness , especially into hip ER Plan of Treatment  Frequency/Duration: 1x/wk/ 8 wks    Certification date from 08/13/24 to 10/08/24         See note for plan of treatment details and functional goals     ALIVIA MONSON PT                         I CERTIFY THE NEED FOR THESE SERVICES FURNISHED UNDER        THIS PLAN OF TREATMENT AND WHILE UNDER MY CARE     (Physician attestation of this document indicates review and  certification of the therapy plan).              Referring Provider:  Rossana Villarreal    Initial Assessment  See Epic Evaluation- Start of Care Date: 08/13/24

## 2024-08-15 ENCOUNTER — ANCILLARY PROCEDURE (OUTPATIENT)
Dept: MRI IMAGING | Facility: CLINIC | Age: 84
End: 2024-08-15
Attending: STUDENT IN AN ORGANIZED HEALTH CARE EDUCATION/TRAINING PROGRAM
Payer: MEDICARE

## 2024-08-15 DIAGNOSIS — M25.552 CHRONIC LEFT HIP PAIN: ICD-10-CM

## 2024-08-15 DIAGNOSIS — M67.952 TENDINOPATHY OF LEFT GLUTEAL REGION: ICD-10-CM

## 2024-08-15 DIAGNOSIS — G89.29 CHRONIC LEFT HIP PAIN: ICD-10-CM

## 2024-08-15 PROCEDURE — G1010 CDSM STANSON: HCPCS | Performed by: RADIOLOGY

## 2024-08-15 PROCEDURE — 73721 MRI JNT OF LWR EXTRE W/O DYE: CPT | Mod: LT | Performed by: RADIOLOGY

## 2024-08-20 ENCOUNTER — OFFICE VISIT (OUTPATIENT)
Dept: ORTHOPEDICS | Facility: CLINIC | Age: 84
End: 2024-08-20
Payer: MEDICARE

## 2024-08-20 DIAGNOSIS — M25.552 CHRONIC LEFT HIP PAIN: ICD-10-CM

## 2024-08-20 DIAGNOSIS — M67.952 TENDINOPATHY OF LEFT GLUTEAL REGION: Primary | ICD-10-CM

## 2024-08-20 DIAGNOSIS — G89.29 CHRONIC LEFT HIP PAIN: ICD-10-CM

## 2024-08-20 DIAGNOSIS — M25.559 GREATER TROCHANTERIC PAIN SYNDROME: ICD-10-CM

## 2024-08-20 PROCEDURE — 99213 OFFICE O/P EST LOW 20 MIN: CPT | Performed by: STUDENT IN AN ORGANIZED HEALTH CARE EDUCATION/TRAINING PROGRAM

## 2024-08-20 NOTE — LETTER
8/20/2024      RE: Jeannine Rod  [address]  [place] [address]     Dear Colleague,    Thank you for referring your patient, Jeannine Rod, to the [facility] [place]. Please see a copy of my visit note below.      Sports Medicine Clinic           ASSESSMENT and PLAN:     Jeannine was seen today for follow up.    Diagnoses and all orders for this visit:    Tendinopathy of left gluteal region  Chronic left hip pain  Greater trochanteric pain syndrome  Chronic lateral left hip pain with history of total hip arthroplasty with MRI showing low-grade tearing of the gluteus minimus and medius tendons with superimposed tendinopathy as well as small bursal effusion over the greater trochanter.  This is consistent with her clinical exam with significant glute weakness.  She does feel like she has gotten stronger and her pain is improved with physical therapy.  Encouraged her to continue this.  We did discuss possibility of corticosteroid injection into the greater trochanter, but that this can weaken the glue tendons further.  She would like to continue with physical therapy and will follow-up as needed.    Return sooner if develops new or worsening symptoms.    Options for treatment and/or follow-up care were reviewed with the patient was actively involved in the decision making process. Patient verbalized understanding and was in agreement with the plan.    Rossana Villarreal MD, [facility]  Primary Care Sports Medicine             SUBJECTIVE       Jeannine Rod is a 83 year old female presenting to clinic today for follow up of left hip pain.    Patient was last seen on 7/23/24, at which point she was having lateral hip pain with significant glute weakness not improving with PT. We discussed treatment options and she underwent an MRI of her left hip.    Since their last visit her hip is getting better slowly but still having pain and weakness.She can now do 2-3 steps with the left  leg but after that it becomes weak.      She has changed from doing clamshells to side leg lifts and then some more banded exercises. She has PT in two days again.     PMH, Medications and Allergies were reviewed and updated as needed.    ROS:  As noted above otherwise negative.    Patient Active Problem List   Diagnosis     Malignant neoplasm of breast (H)     Raynaud's syndrome     Status post hip replacement     Systemic sclerosis (H)     Gastroesophageal reflux disease with esophagitis     Hyperlipidemia LDL goal <100     Calculus of gallbladder without cholecystitis     Radiculoplexus neuropathy     Benign essential hypertension     Atrophic vaginitis     Chronic kidney disease, stage 3 (H)     Chronic obstructive pulmonary disease, unspecified COPD type (H)     History of colonic polyps     Routine general medical examination at a health care facility     Hip pain, left     Weakness of left hip       Current Outpatient Medications   Medication Sig Dispense Refill     amLODIPine (NORVASC) 5 MG tablet Take 1 tablet (5 mg) by mouth daily 90 tablet 0     atorvastatin (LIPITOR) 20 MG tablet TAKE 1 TABLET BY MOUTH EVERY DAY 90 tablet 2     atovaquone-proguanil (MALARONE) 250-100 MG tablet Take 1 tablet by mouth daily       bimatoprost (LATISSE) 0.03 % external opthalmic solution APPLY 1 DROP TOPICALLY AT BEDTIME TO EYEBROWS 5 mL 3     calcium-vitamin D (CALTRATE) 600-400 MG-UNIT per tablet Take 1 tablet by mouth 2 times daily       clotrimazole (LOTRIMIN) 1 % external cream Apply nightly to buttocks 60 g 0     estradiol (ESTRACE) 0.1 MG/GM vaginal cream PLACE 2 GRAMS VAGINALLY TWICE A WEEK 42.5 g 10     fluocinolone (SYNALAR) 0.01 % solution Apply topically every other day 60 mL 3     fluocinonide (LIDEX) 0.05 % external cream Apply twice daily for 2 weeks to lesions on legs, take 2 weeks off, then repeat if needed 60 g 3     fluocinonide (LIDEX) 0.05 % external solution Use Monday, wed, Friday along the frontal  hair line 60 mL 3     hydroxychloroquine (PLAQUENIL) 200 MG tablet Take 200 mg by mouth 2 times daily 2 tabs daily 5 days per week, 1 tab daily 2 days per week.       ipratropium (ATROVENT) 0.06 % nasal spray 2 SPRAYS INTO BOTH NOSTRILS 4XDAY AS NEEDED(NASAL DRAINAGE THAT TRIGGERS COUGH/THROAT CLEARING) 45 mL 0     ketoconazole (NIZORAL) 2 % external shampoo USE UP TO 3 TIMES WEEKLY 120 mL 3     Melatonin ER 1 MG TBCR Take 1 mg by mouth nightly as needed       Multiple Vitamin (MULTI-VITAMIN) per tablet Take 1 tablet by mouth daily.       NIFEdipine ER OSMOTIC (PROCARDIA XL) 30 MG 24 hr tablet Take 1 tablet by mouth daily       nystatin (MYCOSTATIN) 887814 UNIT/GM external cream Apply topically 2 times daily 30 g 1     tacrolimus (PROTOPIC) 0.1 % external ointment USe twice daily when not using fluocinonide cream on the legs and scalp 120 g 2     triamterene-HCTZ (MAXZIDE-25) 37.5-25 MG tablet TAKE 1 TABLET BY MOUTH EVERY DAY 90 tablet 0            OBJECTIVE:       Vitals: There were no vitals filed for this visit.  BMI: There is no height or weight on file to calculate BMI.    Gen:  Well nourished and in no acute distress  HEENT: Extraocular movement intact  Neck: Supple  Pulm:  Breathing Comfortably. No increased respiratory effort.  Psych: Euthymic. Appropriately answers questions    MSK:   MSK:   LEFT HIP  Inspection:    No swelling, bruising, discoloration, or obvious deformity or asymmetry  Palpation:    Left inguinal hernia. Tender about the greater trochanteric region, gluteal muscles. Otherwise all other landmarks are nontender.  Active Range of Motion:     Flexion full, extension full / IR limited slightly by pain / ER  limited slightly by pain  Strength:    Flexion 5-/5 / extension 5/5 / adduction 5-/5 / abduction 4/5      Imaging was personally reviewed and interpreted by me.   MR  left   hip without contrast 8/15/2024 9:25 AM     Techniques: Multiplanar multisequence imaging of the left hip  was  obtained without  administration of intra-articular or intravenous  contrast using routing protocol.     History: Chronic left glute pain, evaluate for glute tear, history of  KEESHA; Chronic left hip pain; Chronic left hip pain; Tendinopathy of  left gluteal region      Comparison: Radiograph dated 7/23/2024     Findings:     The images are degraded by metal susceptibility artifact.     Osseous structures  Osseous structures: No fracture, stress reaction, avascular necrosis,  or focal osseous lesion is seen.     Joint or bursal effusion  There are postsurgical changes of left total hip arthroplasty. The  acetabular as well as the femoral component are well seated.     Joint effusion: A physiologic amount of joint fluid. No evidence of  joint effusion. No definite evidence of abnormal masses.     Bursal effusion: There is a small bursal sided effusion. There is mild  edema over the greater trochanter.     Muscles and tendons  Muscles and tendons: Edema involving the gluteus minimus muscle  bellies (serious 3, images 16, 17) with low-grade undersurface tearing  of the respective tendons.     Proximal hamstrings, rectus femoris, sartorius, and iliopsoas tendons  intact. The visualized adductor muscles are unremarkable.      Nerves:  The visualized course of the sciatic nerve is unremarkable.     Other Findings:  None.                                                                      Impression:  1. Stable postsurgical changes of left total hip arthroplasty.  2. Low-grade undersurface tearing of the gluteus minimus and medius  tendons superimposed on tendinopathy, associated gluteus minimus and  medius muscle edema. Small bursal effusion over the greater  trochanter.      Again, thank you for allowing me to participate in the care of your patient.      Sincerely,    Rossana Villarreal MD

## 2024-08-20 NOTE — PROGRESS NOTES
Sports Medicine Clinic           ASSESSMENT and PLAN:     Jeannine was seen today for follow up.    Diagnoses and all orders for this visit:    Tendinopathy of left gluteal region  Chronic left hip pain  Greater trochanteric pain syndrome  Chronic lateral left hip pain with history of total hip arthroplasty with MRI showing low-grade tearing of the gluteus minimus and medius tendons with superimposed tendinopathy as well as small bursal effusion over the greater trochanter.  This is consistent with her clinical exam with significant glute weakness.  She does feel like she has gotten stronger and her pain is improved with physical therapy.  Encouraged her to continue this.  We did discuss possibility of corticosteroid injection into the greater trochanter, but that this can weaken the glue tendons further.  She would like to continue with physical therapy and will follow-up as needed.    Return sooner if develops new or worsening symptoms.    Options for treatment and/or follow-up care were reviewed with the patient was actively involved in the decision making process. Patient verbalized understanding and was in agreement with the plan.    Rossana Villarreal MD, [facility]  Primary Care Sports Medicine             SUBJECTIVE       Jeannine Rod is a 83 year old female presenting to clinic today for follow up of left hip pain.    Patient was last seen on 7/23/24, at which point she was having lateral hip pain with significant glute weakness not improving with PT. We discussed treatment options and she underwent an MRI of her left hip.    Since their last visit her hip is getting better slowly but still having pain and weakness.She can now do 2-3 steps with the left leg but after that it becomes weak.      She has changed from doing clamshells to side leg lifts and then some more banded exercises. She has PT in two days again.     PMH, Medications and Allergies were reviewed and updated as needed.    ROS:  As noted above  otherwise negative.    Patient Active Problem List   Diagnosis    Malignant neoplasm of breast (H)    Raynaud's syndrome    Status post hip replacement    Systemic sclerosis (H)    Gastroesophageal reflux disease with esophagitis    Hyperlipidemia LDL goal <100    Calculus of gallbladder without cholecystitis    Radiculoplexus neuropathy    Benign essential hypertension    Atrophic vaginitis    Chronic kidney disease, stage 3 (H)    Chronic obstructive pulmonary disease, unspecified COPD type (H)    History of colonic polyps    Routine general medical examination at a health care facility    Hip pain, left    Weakness of left hip       Current Outpatient Medications   Medication Sig Dispense Refill    amLODIPine (NORVASC) 5 MG tablet Take 1 tablet (5 mg) by mouth daily 90 tablet 0    atorvastatin (LIPITOR) 20 MG tablet TAKE 1 TABLET BY MOUTH EVERY DAY 90 tablet 2    atovaquone-proguanil (MALARONE) 250-100 MG tablet Take 1 tablet by mouth daily      bimatoprost (LATISSE) 0.03 % external opthalmic solution APPLY 1 DROP TOPICALLY AT BEDTIME TO EYEBROWS 5 mL 3    calcium-vitamin D (CALTRATE) 600-400 MG-UNIT per tablet Take 1 tablet by mouth 2 times daily      clotrimazole (LOTRIMIN) 1 % external cream Apply nightly to buttocks 60 g 0    estradiol (ESTRACE) 0.1 MG/GM vaginal cream PLACE 2 GRAMS VAGINALLY TWICE A WEEK 42.5 g 10    fluocinolone (SYNALAR) 0.01 % solution Apply topically every other day 60 mL 3    fluocinonide (LIDEX) 0.05 % external cream Apply twice daily for 2 weeks to lesions on legs, take 2 weeks off, then repeat if needed 60 g 3    fluocinonide (LIDEX) 0.05 % external solution Use Monday, wed, Friday along the frontal hair line 60 mL 3    hydroxychloroquine (PLAQUENIL) 200 MG tablet Take 200 mg by mouth 2 times daily 2 tabs daily 5 days per week, 1 tab daily 2 days per week.      ipratropium (ATROVENT) 0.06 % nasal spray 2 SPRAYS INTO BOTH NOSTRILS 4XDAY AS NEEDED(NASAL DRAINAGE THAT TRIGGERS  COUGH/THROAT CLEARING) 45 mL 0    ketoconazole (NIZORAL) 2 % external shampoo USE UP TO 3 TIMES WEEKLY 120 mL 3    Melatonin ER 1 MG TBCR Take 1 mg by mouth nightly as needed      Multiple Vitamin (MULTI-VITAMIN) per tablet Take 1 tablet by mouth daily.      NIFEdipine ER OSMOTIC (PROCARDIA XL) 30 MG 24 hr tablet Take 1 tablet by mouth daily      nystatin (MYCOSTATIN) 695612 UNIT/GM external cream Apply topically 2 times daily 30 g 1    tacrolimus (PROTOPIC) 0.1 % external ointment USe twice daily when not using fluocinonide cream on the legs and scalp 120 g 2    triamterene-HCTZ (MAXZIDE-25) 37.5-25 MG tablet TAKE 1 TABLET BY MOUTH EVERY DAY 90 tablet 0            OBJECTIVE:       Vitals: There were no vitals filed for this visit.  BMI: There is no height or weight on file to calculate BMI.    Gen:  Well nourished and in no acute distress  HEENT: Extraocular movement intact  Neck: Supple  Pulm:  Breathing Comfortably. No increased respiratory effort.  Psych: Euthymic. Appropriately answers questions    MSK:   MSK:   LEFT HIP  Inspection:    No swelling, bruising, discoloration, or obvious deformity or asymmetry  Palpation:    Left inguinal hernia. Tender about the greater trochanteric region, gluteal muscles. Otherwise all other landmarks are nontender.  Active Range of Motion:     Flexion full, extension full / IR limited slightly by pain / ER  limited slightly by pain  Strength:    Flexion 5-/5 / extension 5/5 / adduction 5-/5 / abduction 4/5      Imaging was personally reviewed and interpreted by me.   MR  left   hip without contrast 8/15/2024 9:25 AM     Techniques: Multiplanar multisequence imaging of the left hip was  obtained without  administration of intra-articular or intravenous  contrast using routing protocol.     History: Chronic left glute pain, evaluate for glute tear, history of  KEESHA; Chronic left hip pain; Chronic left hip pain; Tendinopathy of  left gluteal region      Comparison: Radiograph  dated 7/23/2024     Findings:     The images are degraded by metal susceptibility artifact.     Osseous structures  Osseous structures: No fracture, stress reaction, avascular necrosis,  or focal osseous lesion is seen.     Joint or bursal effusion  There are postsurgical changes of left total hip arthroplasty. The  acetabular as well as the femoral component are well seated.     Joint effusion: A physiologic amount of joint fluid. No evidence of  joint effusion. No definite evidence of abnormal masses.     Bursal effusion: There is a small bursal sided effusion. There is mild  edema over the greater trochanter.     Muscles and tendons  Muscles and tendons: Edema involving the gluteus minimus muscle  bellies (serious 3, images 16, 17) with low-grade undersurface tearing  of the respective tendons.     Proximal hamstrings, rectus femoris, sartorius, and iliopsoas tendons  intact. The visualized adductor muscles are unremarkable.      Nerves:  The visualized course of the sciatic nerve is unremarkable.     Other Findings:  None.                                                                      Impression:  1. Stable postsurgical changes of left total hip arthroplasty.  2. Low-grade undersurface tearing of the gluteus minimus and medius  tendons superimposed on tendinopathy, associated gluteus minimus and  medius muscle edema. Small bursal effusion over the greater  trochanter.

## 2024-08-22 ENCOUNTER — THERAPY VISIT (OUTPATIENT)
Dept: PHYSICAL THERAPY | Facility: CLINIC | Age: 84
End: 2024-08-22
Payer: MEDICARE

## 2024-08-22 DIAGNOSIS — R29.898 WEAKNESS OF LEFT HIP: ICD-10-CM

## 2024-08-22 DIAGNOSIS — M25.552 HIP PAIN, LEFT: Primary | ICD-10-CM

## 2024-08-22 PROCEDURE — 97112 NEUROMUSCULAR REEDUCATION: CPT | Mod: GP

## 2024-08-22 PROCEDURE — 97110 THERAPEUTIC EXERCISES: CPT | Mod: GP

## 2024-08-28 ENCOUNTER — THERAPY VISIT (OUTPATIENT)
Dept: PHYSICAL THERAPY | Facility: CLINIC | Age: 84
End: 2024-08-28
Payer: MEDICARE

## 2024-08-28 DIAGNOSIS — M25.552 HIP PAIN, LEFT: Primary | ICD-10-CM

## 2024-08-28 DIAGNOSIS — R29.898 WEAKNESS OF LEFT HIP: ICD-10-CM

## 2024-08-28 PROCEDURE — 97112 NEUROMUSCULAR REEDUCATION: CPT | Mod: GP

## 2024-08-28 PROCEDURE — 97110 THERAPEUTIC EXERCISES: CPT | Mod: GP

## 2024-09-11 ENCOUNTER — THERAPY VISIT (OUTPATIENT)
Dept: PHYSICAL THERAPY | Facility: CLINIC | Age: 84
End: 2024-09-11
Payer: MEDICARE

## 2024-09-11 DIAGNOSIS — M25.552 HIP PAIN, LEFT: Primary | ICD-10-CM

## 2024-09-11 DIAGNOSIS — R29.898 WEAKNESS OF LEFT HIP: ICD-10-CM

## 2024-09-11 PROCEDURE — 97110 THERAPEUTIC EXERCISES: CPT | Mod: GP

## 2024-09-11 PROCEDURE — 97140 MANUAL THERAPY 1/> REGIONS: CPT | Mod: GP

## 2024-09-11 PROCEDURE — 97112 NEUROMUSCULAR REEDUCATION: CPT | Mod: GP

## 2024-09-17 ENCOUNTER — LAB REQUISITION (OUTPATIENT)
Dept: LAB | Facility: CLINIC | Age: 84
End: 2024-09-17
Payer: MEDICARE

## 2024-09-17 DIAGNOSIS — I10 ESSENTIAL (PRIMARY) HYPERTENSION: ICD-10-CM

## 2024-09-17 DIAGNOSIS — Z13.220 ENCOUNTER FOR SCREENING FOR LIPOID DISORDERS: ICD-10-CM

## 2024-09-17 LAB
ANION GAP SERPL CALCULATED.3IONS-SCNC: 14 MMOL/L (ref 7–15)
BUN SERPL-MCNC: 32.5 MG/DL (ref 8–23)
CALCIUM SERPL-MCNC: 9 MG/DL (ref 8.8–10.4)
CHLORIDE SERPL-SCNC: 104 MMOL/L (ref 98–107)
CHOLEST SERPL-MCNC: 192 MG/DL
CREAT SERPL-MCNC: 1.03 MG/DL (ref 0.51–0.95)
EGFRCR SERPLBLD CKD-EPI 2021: 54 ML/MIN/1.73M2
FASTING STATUS PATIENT QL REPORTED: NORMAL
GLUCOSE SERPL-MCNC: 92 MG/DL (ref 70–99)
HBA1C MFR BLD: 6.1 %
HCO3 SERPL-SCNC: 24 MMOL/L (ref 22–29)
HDLC SERPL-MCNC: 91 MG/DL
LDLC SERPL CALC-MCNC: 88 MG/DL
NONHDLC SERPL-MCNC: 101 MG/DL
POTASSIUM SERPL-SCNC: 4.6 MMOL/L (ref 3.4–5.3)
SODIUM SERPL-SCNC: 142 MMOL/L (ref 135–145)
TRIGL SERPL-MCNC: 66 MG/DL

## 2024-09-17 PROCEDURE — 80061 LIPID PANEL: CPT | Mod: ORL | Performed by: INTERNAL MEDICINE

## 2024-09-17 PROCEDURE — 80048 BASIC METABOLIC PNL TOTAL CA: CPT | Mod: ORL | Performed by: INTERNAL MEDICINE

## 2024-09-17 PROCEDURE — 83036 HEMOGLOBIN GLYCOSYLATED A1C: CPT | Mod: ORL | Performed by: INTERNAL MEDICINE

## 2024-09-20 ENCOUNTER — LAB REQUISITION (OUTPATIENT)
Dept: LAB | Facility: CLINIC | Age: 84
End: 2024-09-20
Payer: MEDICARE

## 2024-09-20 DIAGNOSIS — I10 ESSENTIAL (PRIMARY) HYPERTENSION: ICD-10-CM

## 2024-09-20 LAB — FERRITIN SERPL-MCNC: 106 NG/ML (ref 11–328)

## 2024-09-20 PROCEDURE — 82728 ASSAY OF FERRITIN: CPT | Mod: ORL | Performed by: INTERNAL MEDICINE

## 2024-10-01 ENCOUNTER — THERAPY VISIT (OUTPATIENT)
Dept: PHYSICAL THERAPY | Facility: CLINIC | Age: 84
End: 2024-10-01
Payer: MEDICARE

## 2024-10-01 DIAGNOSIS — R29.898 WEAKNESS OF LEFT HIP: ICD-10-CM

## 2024-10-01 DIAGNOSIS — M25.552 HIP PAIN, LEFT: Primary | ICD-10-CM

## 2024-10-01 PROCEDURE — 97140 MANUAL THERAPY 1/> REGIONS: CPT | Mod: GP

## 2024-10-01 PROCEDURE — 97112 NEUROMUSCULAR REEDUCATION: CPT | Mod: GP

## 2024-10-09 ENCOUNTER — PATIENT OUTREACH (OUTPATIENT)
Dept: CARE COORDINATION | Facility: CLINIC | Age: 84
End: 2024-10-09
Payer: MEDICARE

## 2024-10-17 ENCOUNTER — THERAPY VISIT (OUTPATIENT)
Dept: PHYSICAL THERAPY | Facility: CLINIC | Age: 84
End: 2024-10-17
Payer: MEDICARE

## 2024-10-17 DIAGNOSIS — M25.552 HIP PAIN, LEFT: Primary | ICD-10-CM

## 2024-10-17 DIAGNOSIS — R29.898 WEAKNESS OF LEFT HIP: ICD-10-CM

## 2024-10-17 PROCEDURE — 97110 THERAPEUTIC EXERCISES: CPT | Mod: GP

## 2024-10-17 PROCEDURE — 97112 NEUROMUSCULAR REEDUCATION: CPT | Mod: GP

## 2024-11-07 ENCOUNTER — THERAPY VISIT (OUTPATIENT)
Dept: PHYSICAL THERAPY | Facility: CLINIC | Age: 84
End: 2024-11-07
Payer: MEDICARE

## 2024-11-07 DIAGNOSIS — M25.552 HIP PAIN, LEFT: Primary | ICD-10-CM

## 2024-11-07 DIAGNOSIS — R29.898 WEAKNESS OF LEFT HIP: ICD-10-CM

## 2024-11-07 PROCEDURE — 97110 THERAPEUTIC EXERCISES: CPT | Mod: GP

## 2024-11-07 PROCEDURE — 97530 THERAPEUTIC ACTIVITIES: CPT | Mod: GP

## 2024-11-07 PROCEDURE — 97112 NEUROMUSCULAR REEDUCATION: CPT | Mod: GP

## 2024-11-20 ENCOUNTER — ANCILLARY PROCEDURE (OUTPATIENT)
Dept: BONE DENSITY | Facility: CLINIC | Age: 84
End: 2024-11-20
Attending: INTERNAL MEDICINE
Payer: MEDICARE

## 2024-11-20 DIAGNOSIS — Z78.0 MENOPAUSE: ICD-10-CM

## 2024-11-20 PROCEDURE — 77080 DXA BONE DENSITY AXIAL: CPT | Performed by: INTERNAL MEDICINE

## 2024-11-21 ENCOUNTER — THERAPY VISIT (OUTPATIENT)
Dept: PHYSICAL THERAPY | Facility: CLINIC | Age: 84
End: 2024-11-21
Payer: MEDICARE

## 2024-11-21 DIAGNOSIS — M25.552 HIP PAIN, LEFT: Primary | ICD-10-CM

## 2024-11-21 DIAGNOSIS — R29.898 WEAKNESS OF LEFT HIP: ICD-10-CM

## 2024-11-21 NOTE — PROGRESS NOTES
11/21/24 0500   Appointment Info   Signing clinician's name / credentials Elizabeth Lara, PT, DPT   Total/Authorized Visits 8 (per PT)   Visits Used 8   Medical Diagnosis B hip pain   PT Tx Diagnosis L hip pain, weakness , especially into hip ER   Progress Note/Certification   Start of Care Date 08/13/24   Onset of illness/injury or Date of Surgery 04/01/24   Therapy Frequency 1x/wk   Predicted Duration 8 wks   Certification date from 11/21/24   Certification date to 02/18/25   Progress Note Due Date 02/18/25   PT Goal 1   Goal Identifier Ambulation   Goal Description Pt will ambulate for 2 mi independently without L hip pain and with normalized gait mechanics   Rationale to maximize safety and independence with performance of ADLs and functional tasks;to maximize safety and independence within the home;to maximize safety and independence within the community;to maximize safety and independence with transportation   Goal Progress Pt walked 1.5 miles last week, progressing   Target Date 10/08/24   Subjective Report   Subjective Report Pt reports 85% improvement in symptoms with balance still being a deficit.   Objective Measures   Objective Measures Objective Measure 1   Objective Measure 1   Objective Measure IE   Treatment Interventions (PT)   Interventions Therapeutic Procedure/Exercise;Neuromuscular Re-education;Manual Therapy;Therapeutic Activity   Therapeutic Procedure/Exercise   Therapeutic Procedures: strength, endurance, ROM, flexibility minutes (75601) 10   Therapeutic Procedures Ther Proc 2;Ther Proc 3;Ther Proc 4;Ther Proc 5   PTRx Ther Proc 1  Hip Flexor Stretch Preston Test Position   PTRx Ther Proc 1 - Details Cues to keep pelvis posteriorly tilted hip slightly internally rotated. 2x30 sec    PTRx Ther Proc 2 Hip Abduction Straight Leg Raise   PTRx Ther Proc 2 - Details rev   PTRx Ther Proc 3 Roll Outs Hook lying    PTRx Ther Proc 3 - Details rev   PTRx Ther Proc 4 Bridging   PTRx Ther Proc 4 -  Details rev   PTRx Ther Proc 5 Hip Flexion With Tubing   PTRx Ther Proc 5 - Details rev   PTRx Ther Proc 6 Hip Abduction With Theraband   PTRx Ther Proc 6 - Details rev   PTRx Ther Proc 7 Hip AROM Standing Extension   PTRx Ther Proc 7 - Details rev   Skilled Intervention manual/verbal cueing   Patient Response/Progress Noted significant weakness to L glute med/min - unable to lift L LE into sidelying abduction through ROM when placed into glute med/min bias; unable to complete hip abduction ROM in standing with YTB resistance; regressed to tan band to allow her to work through ROM.   PTRx Ther Proc 8 Walking With Arm Swing   PTRx Ther Proc 8 - Details rev continue walking    Therapeutic Activity   Ther Act 1 Heel lift correction R LE   Ther Act 1 - Details Trial of heel lift for leg length discrepancy. Trialed 3 lifts, 2 then 1. Pt responded best to 1 lift, approx 4 mm. Pt instructed to try lifts at home to correct, or seek out orthopedic shoes.   Skilled Intervention correction of leg length discrepancy   Patient Response/Progress not today   Neuromuscular Re-education   Neuromuscular re-ed of mvmt, balance, coord, kinesthetic sense, posture, proprioception minutes (37587) 10   PTRx Neuro Re-ed 1 Supine Abdominal Exercise #8 (Toe Taps)   PTRx Neuro Re-ed 1 - Details rev   PTRx Neuro Re-ed 2 Tandem Stance   PTRx Neuro Re-ed 2 - Details 2x30 sec B improved balance with L LE backwards after psoas release    PTRx Neuro Re-ed 3 Marching in Place   PTRx Neuro Re-ed 3 - Details 1x10 B    PTRx Neuro Re-ed 4 Sit to Stand   PTRx Neuro Re-ed 4 - Details rev   PTRx Neuro Re-ed 5 Walking Backwards   PTRx Neuro Re-ed 5 - Details not today, removed from HEP   Skilled Intervention Edu on TrA bracing to increase stability   Patient Response/Progress tolerated well   Manual Therapy   Manual Therapy: Mobilization, MFR, MLD, friction massage minutes (72760) 10   Manual Therapy Manual Therapy 2   Manual Therapy 1 STM to psoas on L with  movement   Manual Therapy 1 - Details STM -> PROM-> AAROM-> AROM   Patient Response/Progress tolerated well, improved balance with release in tandem   Education   Learner/Method Patient;Demonstration;Pictures/Video   Plan   Home program PTRX printed   Plan for next session forward walk, progression of agility ladder for duel tasking, rev goals, assess psoas again, balance that is dynamic maybe d/c?   Total Session Time   Timed Code Treatment Minutes 30   Total Treatment Time (sum of timed and untimed services) 30       [facility]                                                                                   OUTPATIENT PHYSICAL THERAPY    PLAN OF TREATMENT FOR OUTPATIENT REHABILITATION   Patient's Last Name, First Name, Jeannine Montoya YOB: 1940   Provider's Name   [facility]   Medical Record No.  [id number]     Onset Date: 04/01/24  Start of Care Date: 08/13/24     Medical Diagnosis:  B hip pain      PT Treatment Diagnosis:  L hip pain, weakness , especially into hip ER Plan of Treatment  Frequency/Duration: 1x/wk/ 8 wks    Certification date from 11/21/24 to 02/18/25         See note for plan of treatment details and functional goals     Elizabeth Lara, PT                         I CERTIFY THE NEED FOR THESE SERVICES FURNISHED UNDER        THIS PLAN OF TREATMENT AND WHILE UNDER MY CARE     (Physician attestation of this document indicates review and certification of the therapy plan).              Referring Provider:  Rossana Villarreal    Initial Assessment  See Epic Evaluation- Start of Care Date: 08/13/24            PLAN  Continue therapy per current plan of care. Continue PT to progress walking endurance and strength in hip mm.    Beginning/End Dates of Progress Note Reporting Period:    to 11/21/2024    Referring Provider:  Rossana Villarreal

## 2024-12-03 ENCOUNTER — TRANSFERRED RECORDS (OUTPATIENT)
Dept: HEALTH INFORMATION MANAGEMENT | Facility: CLINIC | Age: 84
End: 2024-12-03

## 2024-12-16 NOTE — PROGRESS NOTES
[facility] Dermatology Note  Encounter Date: Dec 18, 2024  Office Visit     Dermatology Problem List:  1. 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 BLK, right posterior thigh, s/p shave bx 8/24/2022  3. Superficial portions of irregular acanthosis and patchy interface dermatitis, s/p shave bx 2/22/2023  - Betamethasone ointment   4. Hypertrophic lichen planus  - fluocinonide alternating with protopic/tacrolimus, ILK  5. Angular cheilitis  - Protopic     Family history: sister with skin cancer     ____________________________________________    Assessment & Plan:    # Hair loss, most consistent with FFA/LPP and AGA. On plaquenil,   - Continue Synalar solution.   - Continue minoxidil 5% solution daily  - Bimatroprost on the brows, has at home  - Continue Plaquenil per rheumatologist (for CREST). Last eye exam was early 12/2024. She goes every 6 months. No dizziness or SOB.   -  Start dutasteride 0.5 mg daily. Risks reviewed including but not limited to depression, dizziness    - Future: finasteride or topical tacrolimus     # Plaquenil discoloration. stable  - Recommended starting SkinDiscoloration Defense to discoloration on the shins  - Will continue to monitor, pt has reviewed with rheumatology and understands continuing drug will continue this process      # Hypertrophic lichen planus- biopsy proven- New lesions today. Reviewed increased risk of SCC.   - Continue protopic on shins.   - ILK today. See procedure section. Refilled.           Procedures Performed:   - Intra-lesional triamcinolone procedure  note. After verbal consent and review of risk of pain and skin thinning/atrophy, positioning and cleansing with isopropyl alcohol, 1 total mL of triamcinolone 10 mg/mL was injected into 5 lesion(s) on the shins. The patient tolerated the procedure well and left the dermatology clinic in good condition.    - Intra-lesional triamcinolone procedure note. After verbal consent and review of risk of pain and skin thinning/atrophy, positioning and cleansing with isopropyl alcohol, 1 total mL of triamcinolone 2.5 mg/mL was injected into 10 lesion(s) on the frontal scalp. The patient tolerated the procedure well and left the dermatology clinic in good condition.      Follow-up: 6 months, earlier as needed    Staff and Scribe:     Scribe Disclosure:   I, Jojo Jimenez, am serving as a scribe to document services personally performed by Pat Méndez MD based on data collection and the provider's statements to me.     Scribe Disclosure:   I, Pat Cardona, MS, am serving as a scribe to document services personally performed by Pat Méndez MD based on data collection and the provider's statements to me.      Provider Disclosure:   The documentation recorded by the scribe accurately reflects the services I personally performed and the decisions made by me.    Pat Méndez MD    Department of Dermatology  [facility]: Phone: [phone], Fax:[phone]  [facility]: Phone: [phone], Fax: [phone] ____________________________________________    CC: Derm Problem (Jeannine is following up today for hair loss. Pt reports bald spot on back of head, is unsure how long it has been present./Milia that were extracted have not come back. Lichen planus has become active again per pt, lots of itchiness.)    HPI:  Ms. Jeannine Rod is a(n) 84 year old female who presents today as a return patient for hair  "loss.    Today, patient reports that she hasn't noticed more hair shedding but the \"tingling\" and slight warming sensation is back on the vertex area. It is intermittent but it happens enough that she notices it. Denies any dizziness or SOB with plaquenil. Denies problems with urination.     She is still using the ketoconazole 3x/week, Lidex solution 2-3x/week, minoxidil 5% solution 3x/week, and bimatoprost to eyebrows. She would like an alternative recommendation for the Krysten deep conditioner.       Patient is otherwise feeling well, without additional skin concerns.    Labs Reviewed:  N/A    Physical Exam:  Vitals: LMP 07/07/1992   SKIN: Focused examination of scalp, lower legs was performed.  - Discoloration on the lower legs  - There are hypertrophic scaly lesions on the lower legs.  - Papules noted along the hairline     - No other lesions of concern on areas examined.     Medications:  Current Outpatient Medications   Medication Sig Dispense Refill    amLODIPine (NORVASC) 5 MG tablet Take 1 tablet (5 mg) by mouth daily 90 tablet 0    atorvastatin (LIPITOR) 20 MG tablet TAKE 1 TABLET BY MOUTH EVERY DAY 90 tablet 2    atovaquone-proguanil (MALARONE) 250-100 MG tablet Take 1 tablet by mouth daily      bimatoprost (LATISSE) 0.03 % external opthalmic solution APPLY 1 DROP TOPICALLY AT BEDTIME TO EYEBROWS 5 mL 3    calcium-vitamin D (CALTRATE) 600-400 MG-UNIT per tablet Take 1 tablet by mouth 2 times daily      clotrimazole (LOTRIMIN) 1 % external cream Apply nightly to buttocks 60 g 0    estradiol (ESTRACE) 0.1 MG/GM vaginal cream PLACE 2 GRAMS VAGINALLY TWICE A WEEK 42.5 g 10    fluocinolone (SYNALAR) 0.01 % solution Apply topically every other day 60 mL 3    fluocinonide (LIDEX) 0.05 % external cream Apply twice daily for 2 weeks to lesions on legs, take 2 weeks off, then repeat if needed 60 g 3    fluocinonide (LIDEX) 0.05 % external solution Use Monday, wed, Friday along the frontal hair line 60 mL 3    " hydroxychloroquine (PLAQUENIL) 200 MG tablet Take 200 mg by mouth 2 times daily 2 tabs daily 5 days per week, 1 tab daily 2 days per week.      ipratropium (ATROVENT) 0.06 % nasal spray 2 SPRAYS INTO BOTH NOSTRILS 4XDAY AS NEEDED(NASAL DRAINAGE THAT TRIGGERS COUGH/THROAT CLEARING) 45 mL 0    ketoconazole (NIZORAL) 2 % external shampoo USE UP TO 3 TIMES WEEKLY 120 mL 3    Melatonin ER 1 MG TBCR Take 1 mg by mouth nightly as needed      Multiple Vitamin (MULTI-VITAMIN) per tablet Take 1 tablet by mouth daily.      NIFEdipine ER OSMOTIC (PROCARDIA XL) 30 MG 24 hr tablet Take 1 tablet by mouth daily      nystatin (MYCOSTATIN) 298517 UNIT/GM external cream Apply topically 2 times daily 30 g 1    triamterene-HCTZ (MAXZIDE-25) 37.5-25 MG tablet TAKE 1 TABLET BY MOUTH EVERY DAY 90 tablet 0    tacrolimus (PROTOPIC) 0.1 % external ointment USe twice daily when not using fluocinonide cream on the legs and scalp 120 g 2     Current Facility-Administered Medications   Medication Dose Route Frequency Provider Last Rate Last Admin    triamcinolone acetonide (KENALOG-10) injection 10 mg  10 mg Intra-Lesional Once Pat Méndez MD        triamcinolone acetonide (KENALOG-10) injection 10 mg  10 mg Intra-Lesional Once Pat Méndez MD        triamcinolone acetonide (KENALOG-10) injection 10 mg  10 mg Intra-Lesional Once Pat Méndez MD        triamcinolone acetonide (KENALOG-10) injection 6 mg  6 mg Intra-Lesional Once           Past Medical History:   Patient Active Problem List   Diagnosis    Malignant neoplasm of breast (H)    Raynaud's syndrome    Status post hip replacement    Systemic sclerosis (H)    Gastroesophageal reflux disease with esophagitis    Hyperlipidemia LDL goal <100    Calculus of gallbladder without cholecystitis    Radiculoplexus neuropathy    Benign essential hypertension    Atrophic vaginitis    Chronic kidney disease, stage 3 (H)    Chronic obstructive pulmonary disease, unspecified COPD type (H)    History  of colonic polyps    Routine general medical examination at a health care facility    Hip pain, left    Weakness of left hip     Past Medical History:   Diagnosis Date    Arthritis     Benign essential hypertension 10/23/2017    Benign essential hypertension 10/23/2017    Calculus of gallbladder without cholecystitis 10/6/2016    Gastroesophageal reflux disease with esophagitis 10/6/2016    Hyperlipidemia     Malignant neoplasm (H)     breast bilat mastectomy, no rad, no chemobilat     Pulmonary embolism and infarction 3/9/2012    IMO update changed this record. 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    Raynaud's syndrome     Systemic sclerosis (H) 10/6/2016        CC Referred Self, MD  No address on file on close of this encounter.

## 2024-12-18 ENCOUNTER — OFFICE VISIT (OUTPATIENT)
Dept: DERMATOLOGY | Facility: CLINIC | Age: 84
End: 2024-12-18
Payer: MEDICARE

## 2024-12-18 DIAGNOSIS — H02.729 HYPOTRICHOSIS OF EYELID: ICD-10-CM

## 2024-12-18 DIAGNOSIS — L66.10 LICHEN PLANOPILARIS: ICD-10-CM

## 2024-12-18 DIAGNOSIS — L43.9 LICHEN PLANUS: Primary | ICD-10-CM

## 2024-12-18 DIAGNOSIS — L66.12 FRONTAL FIBROSING ALOPECIA: ICD-10-CM

## 2024-12-18 RX ORDER — FLUOCINOLONE ACETONIDE 0.1 MG/ML
SOLUTION TOPICAL EVERY OTHER DAY
Qty: 60 ML | Refills: 3 | Status: SHIPPED | OUTPATIENT
Start: 2024-12-18

## 2024-12-18 RX ORDER — DUTASTERIDE 0.5 MG/1
0.5 CAPSULE, LIQUID FILLED ORAL DAILY
Qty: 90 CAPSULE | Refills: 1 | Status: SHIPPED | OUTPATIENT
Start: 2024-12-18

## 2024-12-18 RX ORDER — KETOCONAZOLE 20 MG/ML
SHAMPOO, SUSPENSION TOPICAL
Qty: 120 ML | Refills: 3 | Status: SHIPPED | OUTPATIENT
Start: 2024-12-18

## 2024-12-18 RX ORDER — TACROLIMUS 1 MG/G
OINTMENT TOPICAL
Qty: 120 G | Refills: 2 | Status: SHIPPED | OUTPATIENT
Start: 2024-12-18

## 2024-12-18 ASSESSMENT — PAIN SCALES - GENERAL: PAINLEVEL_OUTOF10: NO PAIN (0)

## 2024-12-18 NOTE — NURSING NOTE
Dermatology Rooming Note    Mia Marcel's goals for this visit include:   Chief Complaint   Patient presents with    Derm Problem     Jeannine is following up today for hair loss. Pt reports bald spot on back of head, is unsure how long it has been present.  Milia that were extracted have not come back. Lichen planus has become active again per pt, lots of itchiness.     Tania Cm, EMT

## 2024-12-18 NOTE — LETTER
12/18/2024       RE: Jeannine Rod  [address]  [place] [address]     Dear Colleague,    Thank you for referring your patient, Jeannine Rod, to the [facility] [place] at [facility]. Please see a copy of my visit note below.      [facility] Dermatology Note  Encounter Date: Dec 18, 2024  Office Visit     Dermatology Problem List:  1. 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 BLK, right posterior thigh, s/p shave bx 8/24/2022  3. Superficial portions of irregular acanthosis and patchy interface dermatitis, s/p shave bx 2/22/2023  - Betamethasone ointment   4. Hypertrophic lichen planus  - fluocinonide alternating with protopic/tacrolimus, ILK  5. Angular cheilitis  - Protopic     Family history: sister with skin cancer     ____________________________________________    Assessment & Plan:    # Hair loss, most consistent with FFA/LPP and AGA. On plaquenil,   - Continue Synalar solution.   - Continue minoxidil 5% solution daily  - Bimatroprost on the brows, has at home  - Continue Plaquenil per rheumatologist (for CREST). Last eye exam was early 12/2024. She goes every 6 months. No dizziness or SOB.   -  Start dutasteride 0.5 mg daily. Risks reviewed including but not limited to depression, dizziness    - Future: finasteride or topical tacrolimus     # Plaquenil discoloration. stable  - Recommended starting SkinDiscoloration Defense to discoloration on the shins  - Will continue to monitor, pt has reviewed with  rheumatology and understands continuing drug will continue this process      # Hypertrophic lichen planus- biopsy proven- New lesions today. Reviewed increased risk of SCC.   - Continue protopic on shins.   - ILK today. See procedure section. Refilled.           Procedures Performed:   - Intra-lesional triamcinolone procedure note. After verbal consent and review of risk of pain and skin thinning/atrophy, positioning and cleansing with isopropyl alcohol, 1 total mL of triamcinolone 10 mg/mL was injected into 5 lesion(s) on the shins. The patient tolerated the procedure well and left the dermatology clinic in good condition.    - Intra-lesional triamcinolone procedure note. After verbal consent and review of risk of pain and skin thinning/atrophy, positioning and cleansing with isopropyl alcohol, 1 total mL of triamcinolone 2.5 mg/mL was injected into 10 lesion(s) on the frontal scalp. The patient tolerated the procedure well and left the dermatology clinic in good condition.      Follow-up: 6 months, earlier as needed    Staff and Scribe:     Scribe Disclosure:   I, Jojo Jimenez, am serving as a scribe to document services personally performed by Pat Méndez MD based on data collection and the provider's statements to me.     Scribe Disclosure:   I, Pat Cardona MS, am serving as a scribe to document services personally performed by Pat Méndez MD based on data collection and the provider's statements to me.      Provider Disclosure:   The documentation recorded by the scribe accurately reflects the services I personally performed and the decisions made by me.    Pat Méndez MD    Department of Dermatology  [facility]: Phone: [phone], Fax:[phone]  [facility]: Phone: [phone], Fax: [phone] ____________________________________________    CC: Derm Problem (Jeannine is  "following up today for hair loss. Pt reports bald spot on back of head, is unsure how long it has been present./Milia that were extracted have not come back. Lichen planus has become active again per pt, lots of itchiness.)    HPI:  Ms. Jeannine Rod is a(n) 84 year old female who presents today as a return patient for hair loss.    Today, patient reports that she hasn't noticed more hair shedding but the \"tingling\" and slight warming sensation is back on the vertex area. It is intermittent but it happens enough that she notices it. Denies any dizziness or SOB with plaquenil. Denies problems with urination.     She is still using the ketoconazole 3x/week, Lidex solution 2-3x/week, minoxidil 5% solution 3x/week, and bimatoprost to eyebrows. She would like an alternative recommendation for the Krysten deep conditioner.       Patient is otherwise feeling well, without additional skin concerns.    Labs Reviewed:  N/A    Physical Exam:  Vitals: LMP 07/07/1992   SKIN: Focused examination of scalp, lower legs was performed.  - Discoloration on the lower legs  - There are hypertrophic scaly lesions on the lower legs.  - Papules noted along the hairline     - No other lesions of concern on areas examined.     Medications:  Current Outpatient Medications   Medication Sig Dispense Refill     amLODIPine (NORVASC) 5 MG tablet Take 1 tablet (5 mg) by mouth daily 90 tablet 0     atorvastatin (LIPITOR) 20 MG tablet TAKE 1 TABLET BY MOUTH EVERY DAY 90 tablet 2     atovaquone-proguanil (MALARONE) 250-100 MG tablet Take 1 tablet by mouth daily       bimatoprost (LATISSE) 0.03 % external opthalmic solution APPLY 1 DROP TOPICALLY AT BEDTIME TO EYEBROWS 5 mL 3     calcium-vitamin D (CALTRATE) 600-400 MG-UNIT per tablet Take 1 tablet by mouth 2 times daily       clotrimazole (LOTRIMIN) 1 % external cream Apply nightly to buttocks 60 g 0     estradiol (ESTRACE) 0.1 MG/GM vaginal cream PLACE 2 GRAMS VAGINALLY TWICE A WEEK 42.5 g 10     " fluocinolone (SYNALAR) 0.01 % solution Apply topically every other day 60 mL 3     fluocinonide (LIDEX) 0.05 % external cream Apply twice daily for 2 weeks to lesions on legs, take 2 weeks off, then repeat if needed 60 g 3     fluocinonide (LIDEX) 0.05 % external solution Use Monday, wed, Friday along the frontal hair line 60 mL 3     hydroxychloroquine (PLAQUENIL) 200 MG tablet Take 200 mg by mouth 2 times daily 2 tabs daily 5 days per week, 1 tab daily 2 days per week.       ipratropium (ATROVENT) 0.06 % nasal spray 2 SPRAYS INTO BOTH NOSTRILS 4XDAY AS NEEDED(NASAL DRAINAGE THAT TRIGGERS COUGH/THROAT CLEARING) 45 mL 0     ketoconazole (NIZORAL) 2 % external shampoo USE UP TO 3 TIMES WEEKLY 120 mL 3     Melatonin ER 1 MG TBCR Take 1 mg by mouth nightly as needed       Multiple Vitamin (MULTI-VITAMIN) per tablet Take 1 tablet by mouth daily.       NIFEdipine ER OSMOTIC (PROCARDIA XL) 30 MG 24 hr tablet Take 1 tablet by mouth daily       nystatin (MYCOSTATIN) 392205 UNIT/GM external cream Apply topically 2 times daily 30 g 1     triamterene-HCTZ (MAXZIDE-25) 37.5-25 MG tablet TAKE 1 TABLET BY MOUTH EVERY DAY 90 tablet 0     tacrolimus (PROTOPIC) 0.1 % external ointment USe twice daily when not using fluocinonide cream on the legs and scalp 120 g 2     Current Facility-Administered Medications   Medication Dose Route Frequency Provider Last Rate Last Admin     triamcinolone acetonide (KENALOG-10) injection 10 mg  10 mg Intra-Lesional Once Pat Méndez MD         triamcinolone acetonide (KENALOG-10) injection 10 mg  10 mg Intra-Lesional Once Pat Méndez MD         triamcinolone acetonide (KENALOG-10) injection 10 mg  10 mg Intra-Lesional Once Pat Méndez MD         triamcinolone acetonide (KENALOG-10) injection 6 mg  6 mg Intra-Lesional Once           Past Medical History:   Patient Active Problem List   Diagnosis     Malignant neoplasm of breast (H)     Raynaud's syndrome     Status post hip replacement      Systemic sclerosis (H)     Gastroesophageal reflux disease with esophagitis     Hyperlipidemia LDL goal <100     Calculus of gallbladder without cholecystitis     Radiculoplexus neuropathy     Benign essential hypertension     Atrophic vaginitis     Chronic kidney disease, stage 3 (H)     Chronic obstructive pulmonary disease, unspecified COPD type (H)     History of colonic polyps     Routine general medical examination at a health care facility     Hip pain, left     Weakness of left hip     Past Medical History:   Diagnosis Date     Arthritis      Benign essential hypertension 10/23/2017     Benign essential hypertension 10/23/2017     Calculus of gallbladder without cholecystitis 10/6/2016     Gastroesophageal reflux disease with esophagitis 10/6/2016     Hyperlipidemia      Malignant neoplasm (H)     breast bilat mastectomy, no rad, no chemobilat      Pulmonary embolism and infarction 3/9/2012    IMO update changed this record. 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     Raynaud's syndrome      Systemic sclerosis (H) 10/6/2016        CC Rob Snyder MD  No address on file on close of this encounter.      Again, thank you for allowing me to participate in the care of your patient.      Sincerely,    Pat Méndez MD

## 2024-12-18 NOTE — PATIENT INSTRUCTIONS
Intralesional Kenalog (ILK) Injections    Intralesional steroid injection involves a corticosteroid, such as triamcinolone acetonide (brand name Kenalog), which is injected directly into a lesion on or immediately below the skin. Intralesional kenalog may be used to treat many skin conditions:    Alopecia areata  Discoid lupus erythematosus  Keloids/hypertrophic scars  Granuloma annulare and other granulomatous disorders  Hypertrophic lichen planus  Lichen simplex chronicus (neurodermatitis)  Localised psoriasis  Necrobiosis lipoidica  Acne cysts (nodulocystic acne)  Small infantile hemangiomas    Possible side-effects of intralesional Kenalog (ILK) injections include bleeding, pain, skin thinning,infection, contact dermatitis, nerve damage, scar formation and need for a repeat procedure.    Some people may experience delayed side-effects including:  Lipoatrophy, appearing as skin indentations or dimples around the injection sites a few weeks after treatment; these may be permanent.  White marks (leukoderma) or brown marks (postinflammatory pigmentation) at the site of injection or spreading from the site of injection - these may resolve or persist long term.  Telangiectasia, or small dilated blood vessels at the site of injection.   Increased hair growth at the site of injection - this resolves eventually.  Steroid acne: steroids increase growth hormone, leading to increased sebum (oil) production by the sebaceous glands. Steroid acne generally improves once the steroid has been stopped.      Who should I call with questions?  [facility]: [phone]   [facility]: [phone]  For urgent needs outside of business hours call the [facility] at [phone] and ask for the dermatology resident on call     Dutasteride is not FDA approved for hair loss.     Dutasteride and tamsulosin: Patient drug information  Outline  Brand Names: US  Brand Names:  "Malorie      What is this drug used for?  What do I need to tell my doctor BEFORE I take this drug?  What are some things I need to know or do while I take this drug?  What are some side effects that I need to call my doctor about right away?  What are some other side effects of this drug?  How is this drug best taken?  What do I do if I miss a dose?  How do I store and/or throw out this drug?  General drug facts  Last Reviewed Date  Consumer Information Use and Disclaimer  RELATED TOPICS  Dutasteride and tamsulosin: Drug information  Access [employer] for additional drug information, tools, and databases.  Copyright 2966-4222 [employer] All rights reserved.  Contributor Disclosures    (For additional information see \"Dutasteride and tamsulosin: Drug information\")    You must carefully read the \"Consumer Information Use and Disclaimer\" below in order to understand and correctly use this information.    Brand Names: US Nieves  Brand Names: Malorie Nieves      What is this drug used for?   It is used to treat the signs of an enlarged prostate.   It may be given to you for other reasons. Talk with the doctor.  What do I need to tell my doctor BEFORE I take this drug?  For all patients taking this drug:   If you are allergic to this drug; any part of this drug; or any other drugs, foods, or substances. Tell your doctor about the allergy and what signs you had.   If you take any drugs (prescription or OTC, natural products, vitamins) that must not be taken with this drug, like certain drugs that are used for HIV, infections, or depression. There are many drugs that must not be taken with this drug. Your doctor or pharmacist can tell you if you are taking a drug that must not be taken with this drug.   If you are female. This drug is not approved for use in females. This drug may cause harm to an unborn baby if it is taken during pregnancy. Talk with your doctor if you are pregnant, plan on getting pregnant, or are " breast-feeding.  Children:   If the patient is a child. This drug is not approved for use in children.  This is not a list of all drugs or health problems that interact with this drug.  Tell your doctor and pharmacist about all of your drugs (prescription or OTC, natural products, vitamins) and health problems. You must check to make sure that it is safe for you to take this drug with all of your drugs and health problems. Do not start, stop, or change the dose of any drug without checking with your doctor.  What are some things I need to know or do while I take this drug?   Tell all of your health care providers that you take this drug. This includes your doctors, nurses, pharmacists, and dentists.   Avoid driving and doing other tasks or actions that call for you to be alert until you see how this drug affects you.   To lower the chance of feeling dizzy or passing out, rise slowly if you have been sitting or lying down. Be careful going up and down stairs.   Do not donate blood while using this drug and for 6 months after stopping.   In one trial of dutasteride for prostate cancer, a serious form of prostate cancer was seen more often in people taking dutasteride than in those who were not. Talk with your doctor.   Pregnant females or females who may be pregnant must not touch the capsules. If one of these females touches a leaking capsule, the area must be washed right away with soap and water.   If you are having cataract surgery or other eye procedure, talk with your doctor.   Have blood work checked as you have been told by the doctor. Talk with the doctor.   Check your blood pressure as you have been told.   Have a rectal exam (to check prostate gland) and blood work (PSA test). Talk with your doctor.   If you have a sulfa (sulfonamide) allergy, talk with your doctor.   This drug may affect certain lab tests. Tell all of your health care providers and lab workers that you take this drug.   Talk with your  doctor before you drink alcohol.   This drug may affect sperm. This may affect being able to father a child. Talk with the doctor.    What are some side effects that I need to call my doctor about right away?  WARNING/CAUTION: Even though it may be rare, some people may have very bad and sometimes deadly side effects when taking a drug. Tell your doctor or get medical help right away if you have any of the following signs or symptoms that may be related to a very bad side effect:   Signs of an allergic reaction, like rash; hives; itching; red, swollen, blistered, or peeling skin with or without fever; wheezing; tightness in the chest or throat; trouble breathing, swallowing, or talking; unusual hoarseness; or swelling of the mouth, face, lips, tongue, or throat.   Dizziness or passing out.   A lump in the breast, breast pain or soreness, or nipple discharge.   Enlarged breasts.   Depression.   Call your doctor right away if you have a painful erection (hard penis) or an erection that lasts for longer than 4 hours. This may happen even when you are not having sex. If this is not treated right away, it may lead to lasting sex problems and you may not be able to have sex.  What are some other side effects of this drug?  All drugs may cause side effects. However, many people have no side effects or only have minor side effects. Call your doctor or get medical help if any of these side effects or any other side effects bother you or do not go away:   Runny nose.   This drug may cause lowered interest in sex, ejaculation problems, or trouble getting or keeping an erection. This could go on after you stop this drug. Talk with your doctor if these effects go on or bother you.  These are not all of the side effects that may occur. If you have questions about side effects, call your doctor. Call your doctor for medical advice about side effects.  You may report side effects to your national health agency.      How is this  drug best taken?  Use this drug as ordered by your doctor. Read all information given to you. Follow all instructions closely.   Keep taking this drug as you have been told by your doctor or other health care provider, even if you feel well.   Take 30 minutes after the same meal every day.   Swallow whole. Do not chew, open, or crush.   Do not take or touch the capsule if it is deformed, changes color, or is leaking.  What do I do if I miss a dose?   Take a missed dose as soon as you think about it, after a meal.   If it is close to the time for your next dose, skip the missed dose and go back to your normal time.   Do not take 2 doses at the same time or extra doses.   If you miss taking this drug for a few days in a row, call your doctor before you start taking it again.  How do I store and/or throw out this drug?   Store at room temperature in a dry place. Do not store in a bathroom.   Protect from heat.   Keep all drugs in a safe place. Keep all drugs out of the reach of children and pets.   Throw away unused or  drugs. Do not flush down a toilet or pour down a drain unless you are told to do so. Check with your pharmacist if you have questions about the best way to throw out drugs. There may be drug take-back programs in your area.  General drug facts   If your symptoms or health problems do not get better or if they become worse, call your doctor.   Do not share your drugs with others and do not take anyone else's drugs.   Some drugs may have another patient information leaflet. If you have any questions about this drug, please talk with your doctor, nurse, pharmacist, or other health care provider.   If you think there has been an overdose, call your poison control center or get medical care right away. Be ready to tell or show what was taken, how much, and when it happened.  Last Reviewed Date  2021  Consumer Information Use and Disclaimer  This generalized information is a limited summary of  diagnosis, treatment, and/or medication information. It is not meant to be comprehensive and should be used as a tool to help the user understand and/or assess potential diagnostic and treatment options. It does NOT include all information about conditions, treatments, medications, side effects, or risks that may apply to a specific patient. It is not intended to be medical advice or a substitute for the medical advice, diagnosis, or treatment of a health care provider based on the health care provider's examination and assessment of a patient's specific and unique circumstances. Patients must speak with a health care provider for complete information about their health, medical questions, and treatment options, including any risks or benefits regarding use of medications. This information does not endorse any treatments or medications as safe, effective, or approved for treating a specific patient. UpToDate, Inc. and its affiliates disclaim any warranty or liability relating to this information or the use thereof. The use of this information is governed by the Terms of Use, available at https://www.woltersWalldressuwer.com/en/know/clinical-effectiveness-terms.    2023 UpToDate, Inc. and its affiliates and/or licensors. All rights reserved.  Use of Rosalind is subject to the Terms of Use.

## 2024-12-19 ENCOUNTER — TELEPHONE (OUTPATIENT)
Dept: DERMATOLOGY | Facility: CLINIC | Age: 84
End: 2024-12-19
Payer: MEDICARE

## 2024-12-19 NOTE — TELEPHONE ENCOUNTER
Retail Pharmacy Prior Authorization Team   Phone: [phone]    PRIOR AUTHORIZATION DENIED    Medication: TACROLIMUS 0.1 % EX OINT  Insurance Company: [employer] [place] - Phone [phone] Fax [phone]  Denial Date: 12/19/2024  Denial Reason(s): MUST HAVE ATOPIC DERMATITIS      Appeal Information: IF THE PROVIDER WOULD LIKE TO APPEAL THIS DECISION PLEASE PROVIDE THE PA TEAM WITH A LETTER OF MEDICAL NECESSITY      Patient Notified: NO

## 2024-12-19 NOTE — TELEPHONE ENCOUNTER
Provider last day 12/18. Medication will not be appealed. Sending goodrx info via [employer].

## 2025-01-02 ENCOUNTER — THERAPY VISIT (OUTPATIENT)
Dept: PHYSICAL THERAPY | Facility: CLINIC | Age: 85
End: 2025-01-02
Payer: MEDICARE

## 2025-01-02 DIAGNOSIS — M25.552 HIP PAIN, LEFT: Primary | ICD-10-CM

## 2025-01-02 DIAGNOSIS — R29.898 WEAKNESS OF LEFT HIP: ICD-10-CM

## 2025-01-10 NOTE — PATIENT INSTRUCTIONS
ACTIVITY LEVEL: If you have received sedation or an anesthetic, you may feel sleepy for several hours. Rest  until you are more awake. Gradually resume your normal activities.    DIET: You may resume your home diet. If nausea is present, increase your diet gradually with fluids and bland  foods.    Medications: Pain medication should be taken only if needed and as directed. If antibiotics are prescribed, the  medication should be taken until completed. You will be given an updated list of you medications.    Last dose of Hydrocodone  9:27 am    Dressing: remove dressing in 72 hours may shower no tub bath, swimming pool, or submerging incision in water.  No driving, alcoholic beverages or signing legal documents for next 24  hours or while taking pain medication    CALL THE DOCTOR:  Fever over 101°F  Severe pain that doesnt go away with medication.  Upset stomach and vomiting that is persistent.  Problems urinating-unable to urinate or heavy bleeding (with or without clots)      Fall Prevention  Millions of people fall every year and injure themselves. You may have had anesthesia or sedation which may increase your risk of falling. You may have health issues that put you at an increased risk of falling.     Here are ways to reduce your risk of falling.    Make your home safe by keeping walkways clear of objects you may trip over.  Use non-slip pads under rugs. Do not use area rugs or small throw rugs.  Use non-slip mats in bathtubs and showers.  Install handrails and lights on staircases.  Do not walk in poorly lit areas.  Do not stand on chairs or wobbly ladders.  Use caution when reaching overhead or looking upward. This position can cause a loss of balance.  Be sure your shoes fit properly, have non-slip bottoms and are in good condition.   Wear shoes both inside and out. Avoid going barefoot or wearing slippers.  Be cautious when going up and down stairs, curbs, and when walking on uneven sidewalks.  If your  Pulsed Dye Laser (PDL)    I will experience redness, swelling, pain, and heat sensation. I may experience bruising, itching, or acne. Risks are blistering, oozing, permanent scarring, hair loss, temporary or permanent skin lightening or darkening, infection, and eye injury. I understand my outcome could be no improvement, slight improvement. Multiple treatments may be required.    After treatment, Do Not:  Rub, scratch, or put weight on the site for 2 weeks  Wear tight fitting clothing or jewelry over the site  [place]iles. Keep the site out of sunlight. Use sunscreen of 30 SPF or greater when in the sun. Use sunscreen 30 minutes before going out and reapply if sweating. Tanning decreases the success of the treatment    How do I care for the treated site?  Use ice packs for 10-20 minutes after the procedure for swelling   If the site is on your face, use ice again 1 hour after treatment for ten minutes and repeat again before bed. Do not burn the skin with the ice.   If a scab or crust forms, gently cleanse the site with water. Then put on Vaseline  ointment 3 times a day and contact the clinic   If a blister forms, contact the clinic by phone  If you have concerns about swelling, call the clinic  Avoid sun exposure and do not get tan as this can darken the treated area, use sunscreen  Do not use makeup on any open wound  Do not come to your next treatment with a tan    What should I expect?  Mild swelling  Blue-purple color that may take 2 to 3 weeks to go away  Redness may also last a week or longer  Results may take up to 3 or 4 months after treatment  More procedures may be needed    Who should I call with questions?  [facility]: [phone]  [facility]: [phone]  For urgent needs outside of business hours call the [facility] at [phone] and ask for the dermatology resident on call  [employer] messaging response may be delayed, please call for  balance is poor, consider using a cane or walker.  If your fall was related to alcohol use, stop or limit alcohol intake.   If your fall was related to use of sleeping medicines, talk to your doctor about this. You may need to reduce your dosage at bedtime if you awaken during the night to go to the bathroom.    To reduce the need for nighttime bathroom trips:  Avoid drinking fluids for several hours before going to bed  Empty your bladder before going to bed  Men can keep a urinal at the bedside  Stay as active as you can. Balance, flexibility, strength, and endurance all come from exercise. They all play a role in preventing falls. Ask your healthcare provider which types of activity are right for you.  Get your vision checked on a regular basis.  If you have pets, know where they are before you stand up or walk so you don't trip over them.  Use night lights.       urgent issues

## 2025-02-17 ENCOUNTER — TELEPHONE (OUTPATIENT)
Dept: SURGERY | Facility: CLINIC | Age: 85
End: 2025-02-17
Payer: MEDICARE

## 2025-02-17 NOTE — TELEPHONE ENCOUNTER
This writer received a voicemail from this patient that she would like to schedule surgery with Dr. Ross. Patient saw Dr. Ross on 6/27/2024. Writer routing to clinical staff to put in case request if appropriate.    Ramona Vaz on 2/17/2025 at 8:59 AM

## 2025-02-18 DIAGNOSIS — K40.90 LEFT INGUINAL HERNIA: Primary | ICD-10-CM

## 2025-02-18 RX ORDER — CEFAZOLIN SODIUM 2 G/50ML
2 SOLUTION INTRAVENOUS
OUTPATIENT
Start: 2025-02-18

## 2025-02-18 RX ORDER — CEFAZOLIN SODIUM 2 G/50ML
2 SOLUTION INTRAVENOUS SEE ADMIN INSTRUCTIONS
OUTPATIENT
Start: 2025-02-18

## 2025-02-18 NOTE — TELEPHONE ENCOUNTER
Patient Called to schedule surgery with Dr. Ross    Spoke with: neo Paez    Date of Surgery: 3/14/2025    Approximate surgery arrival time given:  No    Location of surgery: [facility]     Pre-Op H&P: PAC on 3/3/2025 at 8:00am via video    Pre-op Imaging: Not Applicable     Post-Op Appt Date:  Protocol     Post-op Imaging:  n/a     Discussed with patient PAC RN will provide arrival time and instructions for surgery at the time of the appointment: [[place] locations only]: Yes    Packet sent out: Yes 02/18/25  via [employer]      Additional Comments:  n/a    All patients questions were answered and was instructed to review surgical packet and call back with any questions or concerns.       Ramona Vaz on 2/18/2025 at 12:46 PM

## 2025-02-18 NOTE — TELEPHONE ENCOUNTER
Left voicemail for patient regarding scheduling surgery with Dr. Ross.    Provided contact number to discuss.    P: [phone]    __    Ramona Vaz, on 2/18/2025 at 11:35 AM

## 2025-02-18 NOTE — TELEPHONE ENCOUNTER
[employer] message sent to the patient with surgical logistics and post op teaching information for review.  The patient was asked to contact this office with any questions or if they would like to review this information over the telephone.    Reminder:  Surgery Requirements  Your surgery will be at [facility]- 5th [facility].  You will need to arrive 1.5  hours early.  You will need someone to drive you home (over 18 years old) and stay with you for 24 hours after the procedure.  You will need a preop physical with [facility] ([facility]) within 30 days of surgery- closer is always better.  Stop any blood thinners, vitamins, minerals, or herbal supplements 5 days before surgery.  If you are taking a prescribed blood thinner or weight loss medication please let us know for specific instructions.  Fasting- a nurse from [facility] will call you 1-2 days before surgery to confirm your procedure and tell you when to stop eating and drinking. If you had you preoperative physical with the Anesthesia Team/PAC, you do not get this call as it is discussed at the time of your visit.  Wash with Hibiclens (can be purchased at any pharmacy) or or you can stop at any [facility] and they will give you a bottle to use  the night before surgery and morning of surgery. See instructions in the Surgery Packet.  If you would like a procedure estimate please call [facility] at [phone] during the hours of 8 AM - 3 PM (option #2 for on-line request and option #3 for a representative).   Billing Customer Service:  [phone]  After Your Open Inguinal Hernia Repair         Incision care   You may take a shower the day after surgery. Carefully wash your incision with soap and water. Do not submerge yourself in water (bath, whirlpool, hot tub, pool, lake) for 14 days after surgery.   Remove the bandage 1-2 days after surgery but leave the medical tape (Steri-Strips) or glue in  place. These will loosen and fall off on their own 1-2 weeks after surgery.    Always wash your hands before touching your incisions or removing bandages.   It is not unusual to form a collection of fluid or blood under your incision that may feel firm or squishy- it can take several weeks to months for your body to reabsorb it.  At times, it may even drain.  If that should happen keep the area clean with soap, water,  and cover with a clean gauze dressing. You can change this daily or as needed.  It is not unusual to develop swelling and/or bruising of the scrotum and penis and it can take several weeks or a month to improve.      Other medicines   Wait to start aspirin or blood thinners until the day after surgery. You can take any other regular medicines at your normal time the day after surgery.   Your pain medicine may cause constipation (hard, dry stools). To help with this, take the stool softener your doctor gave you or an over-the-counter stool softener or laxative. You can stop taking this when you are no longer taking pain medicine and your bowel movements are back to normal.      For pain or discomfort   Take the narcotic pain medicine your doctor gave you as needed and as instructed on the bottle. If you prefer to use over-the-counter medication, use acetaminophen (Tylenol) or ibuprofen (Advil, Motrin) as instructed on the box. Do not take Tylenol if it is in your narcotic pain medication.    Use an ice pack on your groin for 20 minutes at a time as needed for the first 24 hours. Be sure to protect your skin by putting a cloth between the ice pack and your skin.   After 24 hours you can switch to heat for 20 minutes as needed. Be sure to protect your skin by putting a cloth between the heat pack and your skin.    It is normal to feel a lump in your groin after surgery. This lump may take up to 8 weeks to go away.      Activities   No driving until you feel it s safe to do so. Don t drive while taking  narcotic pain medicine.   Don t lift anything heavier than 20 pounds for 3 weeks after surgery.      Special equipment   Male Patients:  We recommend that you wear scrotal support for the first 3 days after surgery; however, you can wear it longer if you wish.  We suggest using an athletic supporter (Jock Strap), snug briefs, or Spandex biker shorts.     Diet   You can eat your regular meals after surgery.     Dental Care  Please avoid dental care for two weeks prior to hernia repair and for 6 weeks after surgery due to the mesh that may be placed.     When to call the doctor   Call your doctor if you have:   A fever above 101 F (38.3 C) (taken under the tongue), or a fever or chills lasting more than a day.   Redness at the incision site.   Any fluid or blood draining from the incision, especially if it smells bad.    Severe pain that doesn t improve with pain medicine.      We will call you 2 to 4 days after surgery to review this handout, answer questions and help arrange after-surgery care. If you have questions or concerns, please call [phone] during regular office hours. If you need to call after business hours, call [phone] and ask to page the surgeon on-call.     IMPORTANT:  Prior to your surgical procedure, a nurse will be contacting you to obtain a health history.   If they do not reach you by noon the day prior to your surgery, your surgery will be cancelled. If you have your Pre-Op Surgical Physical (H&P) with the Anesthesia Team (PAC), you are exempt from this call. Phone:  [phone] ([facility]) or [phone] ([place]).

## 2025-02-19 NOTE — TELEPHONE ENCOUNTER
FUTURE VISIT INFORMATION      SURGERY INFORMATION:  Date: 3/14/25  Location: [facility] OR  Surgeon:  Jose Ross MD   Anesthesia Type:  MAC with Local  Procedure: HERNIORRHAPHY, INGUINAL, OPEN     RECORDS REQUESTED FROM:       Primary Care Provider:Ricky Gillette MD     Pertinent Medical History: hypertension, COPD    Most recent EKG+ Tracing: 3/23/22    Most recent ECHO: 5/19/23

## 2025-02-20 DIAGNOSIS — R26.89 BALANCE PROBLEM: Primary | ICD-10-CM

## 2025-03-03 ENCOUNTER — PRE VISIT (OUTPATIENT)
Dept: SURGERY | Facility: CLINIC | Age: 85
End: 2025-03-03

## 2025-03-03 ENCOUNTER — VIRTUAL VISIT (OUTPATIENT)
Dept: SURGERY | Facility: CLINIC | Age: 85
End: 2025-03-03
Payer: MEDICARE

## 2025-03-03 VITALS — WEIGHT: 137 LBS | BODY MASS INDEX: 22.02 KG/M2 | HEIGHT: 66 IN

## 2025-03-03 DIAGNOSIS — K40.90 LEFT INGUINAL HERNIA: ICD-10-CM

## 2025-03-03 DIAGNOSIS — Z01.818 PRE-OP EVALUATION: Primary | ICD-10-CM

## 2025-03-03 PROCEDURE — 98001 SYNCH AUDIO-VIDEO NEW LOW 30: CPT | Performed by: NURSE PRACTITIONER

## 2025-03-03 RX ORDER — CETIRIZINE HYDROCHLORIDE 10 MG/1
10 TABLET ORAL PRN
COMMUNITY

## 2025-03-03 ASSESSMENT — ENCOUNTER SYMPTOMS: SEIZURES: 0

## 2025-03-03 ASSESSMENT — COPD QUESTIONNAIRES
COPD: 1
CAT_SEVERITY: MILD

## 2025-03-03 ASSESSMENT — LIFESTYLE VARIABLES: TOBACCO_USE: 1

## 2025-03-03 ASSESSMENT — PAIN SCALES - GENERAL: PAINLEVEL_OUTOF10: NO PAIN (0)

## 2025-03-03 NOTE — H&P
Pre-Operative H & P     CC:  Preoperative exam to assess for increased cardiopulmonary risk while undergoing surgery and anesthesia.    Date of Encounter: 3/3/2025  Primary Care Physician:  Ricky Gillette     Reason for visit:   Encounter Diagnoses   Name Primary?    Left inguinal hernia     Pre-op evaluation Yes       HPI  Jeannine Rod is a 84 year old female who presents for pre-operative H & P in preparation for  Procedure Information       Case: [id number] Date/Time: 03/14/25 0715    Procedure: HERNIORRHAPHY, INGUINAL, OPEN (Left: Groin)    Anesthesia type: MAC with Local    Diagnosis: Left inguinal hernia [K40.90]    Pre-op diagnosis: Left inguinal hernia [K40.90]    Location: [place] [address] / [facility] [place]    Providers: Jose Ross MD            The patient presents to the PAC virtually today in preparation for the above scheduled procedure with comorbid conditions including HTN, HLD, h/o pulmonary emboli, radiculoplexus neuropathy, osteoarthritis s/p Left KEESHA (2012), low back pain, h/o breast cancer s/p mastectomy (2007) and 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    The patient was seen by Dr. Ross in general surgery consultation for further evaluation of a one year history of a left inguinal mass with the following symptoms of lump.  The patient was found to have a left inguinal hernia.  Dr. Ross counseled the patient of the findings and treatment options.  The patient has now been scheduled for the procedure as listed above.          History is obtained from the patient and chart review    Hx of abnormal bleeding or anti-platelet use: denies     Menstrual history: Patient's last menstrual period was 07/07/1992.:      Past Medical History  Past Medical History:   Diagnosis Date    Arthritis     Benign essential hypertension 10/23/2017    Benign essential hypertension 10/23/2017    Calculus of  gallbladder without cholecystitis 10/6/2016    Gastroesophageal reflux disease with esophagitis 10/6/2016    Hyperlipidemia     Malignant neoplasm (H)     breast bilat mastectomy, no rad, no chemobilat     Pulmonary embolism and infarction 3/9/2012    IMO update changed this record. 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    Raynaud's syndrome     Systemic sclerosis (H) 10/6/2016       Past Surgical History  Past Surgical History:   Procedure Laterality Date    ARTHROPLASTY HIP  3/5/2012    Procedure:ARTHROPLASTY HIP; LEFT TOTAL HIP ARTHROPLASTY (BIOMET)^; Surgeon:SABRINA CALLEJAS; Location: OR    BIOPSY  8/29/2007    Right Breast Biopsy    BIOPSY  9/12/2007    Right Breast Biopsy x2    BIOPSY  9/13/2007    Left Breast Biopsy    BREAST SURGERY  10/2/2007    Bilateral Mastectomy    BREAST SURGERY  3/2008    Breast Reconstruction Surgery    COLONOSCOPY  2011    repeat in 5 years    COLONOSCOPY N/A 6/9/2023    Procedure: Colonoscopy;  Surgeon: Kiel Rasmussen MD;  Location:  GI    COLONOSCOPY N/A 6/9/2023    Procedure: COLONOSCOPY, WITH POLYPECTOMY AND BIOPSY;  Surgeon: Kiel Rasmussen MD;  Location:  GI    ENT SURGERY      salivary gland removal       Prior to Admission Medications  Current Outpatient Medications   Medication Sig Dispense Refill    atorvastatin (LIPITOR) 20 MG tablet TAKE 1 TABLET BY MOUTH EVERY DAY (Patient taking differently: Take 20 mg by mouth every morning.) 90 tablet 2    bimatoprost (LATISSE) 0.03 % external opthalmic solution APPLY 1 DROP TOPICALLY AT BEDTIME TO EYEBROWS (Patient taking differently: Apply topically.) 5 mL 3    calcium-vitamin D (CALTRATE) 600-400 MG-UNIT per tablet Take 1 tablet by mouth daily.      cetirizine (ZYRTEC) 10 MG tablet Take 10 mg by mouth as needed for allergies.      dutasteride (AVODART) 0.5 MG capsule Take 1 capsule (0.5 mg) by mouth daily. (Patient taking differently: Take 0.5 mg by mouth every evening.) 90 capsule 1     fluocinonide (LIDEX) 0.05 % external cream Apply twice daily for 2 weeks to lesions on legs, take 2 weeks off, then repeat if needed 60 g 3    hydroxychloroquine (PLAQUENIL) 200 MG tablet Take 200 mg by mouth 2 times daily 2 tabs daily 5 days per week, 1 tab daily 2 days per week.      ipratropium (ATROVENT) 0.06 % nasal spray 2 SPRAYS INTO BOTH NOSTRILS 4XDAY AS NEEDED(NASAL DRAINAGE THAT TRIGGERS COUGH/THROAT CLEARING) 45 mL 0    ketoconazole (NIZORAL) 2 % external shampoo USe up to 3 times weekly 120 mL 3    Melatonin ER 1 MG TBCR Take 1 mg by mouth nightly as needed      Multiple Vitamin (MULTI-VITAMIN) per tablet Take 1 tablet by mouth daily.      NIFEdipine ER OSMOTIC (PROCARDIA XL) 30 MG 24 hr tablet Take 1 tablet by mouth every evening.      nystatin (MYCOSTATIN) 096238 UNIT/GM external cream Apply topically 2 times daily (Patient taking differently: Apply topically daily as needed.) 30 g 1    tacrolimus (PROTOPIC) 0.1 % external ointment USe twice daily when not using fluocinonide cream on the legs and scalp 120 g 2    triamterene-HCTZ (MAXZIDE-25) 37.5-25 MG tablet TAKE 1 TABLET BY MOUTH EVERY DAY (Patient taking differently: Take 1 tablet by mouth every evening.) 90 tablet 0    clotrimazole (LOTRIMIN) 1 % external cream Apply nightly to buttocks (Patient not taking: Reported on 3/3/2025) 60 g 0    estradiol (ESTRACE) 0.1 MG/GM vaginal cream PLACE 2 GRAMS VAGINALLY TWICE A WEEK (Patient not taking: Reported on 3/3/2025) 42.5 g 10    fluocinolone (SYNALAR) 0.01 % solution Apply topically every other day. 60 mL 3       Allergies  Allergies   Allergen Reactions    Metoclopramide      Other reaction(s): Other - Describe In Comment Field  Severe redness in eyes from the preservative in certain eye medications    No Clinical Screening - See Comments Other (See Comments)     Severe redness in eyes from the preservative in certain eye medications       Social History  Social History     Socioeconomic History     Marital status:      Spouse name: Not on file    Number of children: Not on file    Years of education: Not on file    Highest education level: Not on file   Occupational History    Not on file   Tobacco Use    Smoking status: Former     Current packs/day: 0.00     Types: Cigarettes     Start date: 1962     Quit date: 1982     Years since quittin.1    Smokeless tobacco: Never   Vaping Use    Vaping status: Never Used   Substance and Sexual Activity    Alcohol use: Yes     Alcohol/week: 0.0 standard drinks of alcohol     Comment: red wine socially, 2-3x week    Drug use: No    Sexual activity: Yes     Partners: Male     Birth control/protection: Post-menopausal   Other Topics Concern    Parent/sibling w/ CABG, MI or angioplasty before 65F 55M? Not Asked   Social History Narrative    Not on file     Social Drivers of Health     Financial Resource Strain: Low Risk  (2025)    [facility]    Financial Resource Strain     Difficulty of Paying Living Expenses: 3     Difficulty of Paying Living Expenses: Not on file   Food Insecurity: Not on File (2024)    Received from [facility]    Food Insecurity     Food: 0   Transportation Needs: No Transportation Needs (3/7/2024)    Received from [facility]    Transportation Needs     Does lack of transportation keep you from medical appointments?: 1     Does lack of transportation keep you from work, meetings or getting things that you need?: 1   Physical Activity: Not on File (11/15/2023)    [facility] from [facility]    Physical Activity     Physical Activity: 0   Stress: Not on File (11/15/2023)    [facility] from [facility]    Stress     Stress: 0   Social Connections: Not on File (2024)    Received from [facility]    Social Connections     Connectedness: 0   Interpersonal Safety: Not on file   Housing Stability: Low Risk  (3/7/2024)    Received from [facility]    Housing Stability     What is your housing situation today?: 1       Family History  Family History   Problem Relation Age of Onset    Cancer Mother         lung age 83    Unknown/Adopted Father         91 years    Eye Disorder Father         Macular Degeneration    Breast Cancer Maternal Grandmother     Cancer Paternal Grandfather     Cancer Paternal Uncle     Asthma Brother     C.A.D. Sister         elevated cholesterol       Review of Systems  The complete review of systems is negative other than noted in the HPI or here.   Anesthesia Evaluation   Pt has had prior anesthetic. Type: MAC and General.    No history of anesthetic complications       ROS/MED HX  ENT/Pulmonary:     (+)     BAKARI risk factors,  hypertension,         tobacco use (Quit in 1982), Past use,        mild,  COPD,              Neurologic: Comment: Radiculoplexus polyneuropathy dx 6 years ago now in remission   (-) no seizures, no CVA, no TIA and migraines   Cardiovascular:     (+) Dyslipidemia hypertension- -   -  - -                                 Previous cardiac testing   Echo: Date: 2023 Results:  Echocardiogram with two-dimensional, color and spectral Doppler performed.  ______________________________________________________________________________  Interpretation Summary  Global and regional left ventricular function is normal with an EF of 55-60%.  Global right ventricular function is normal.  No significant valvular abnormalities present.     This study was compared with the study from 2017 .  No significant changes noted.    Stress Test:  Date: Results:    ECG Reviewed:  Date: Results:    Cath:  Date: Results:   (-) taking anticoagulants/antiplatelets   METS/Exercise Tolerance: >4 METS Comment: Walks 1-1.25 miles three times week.    Hematologic:     (+) History of blood clots (After KEESHA, developed VTE and completed anticoagulation.),            (-) anemiaHistory of blood transfusion: Unsure if she has had a  "blood transfusion..   Musculoskeletal: Comment: Participating in physical therapy for past 6 months for a left hip ligament strain.  Per review of record:  \"Chronic lateral left hip pain with history of total hip arthroplasty with MRI showing low-grade tearing of the gluteus minimus and medius tendons with superimposed tendinopathy as well as small bursal effusion over the greater trochanter.\"  (+)  arthritis (s/p left KEESHA 2012),             GI/Hepatic:    (-) GERD and liver disease   Renal/Genitourinary:     (+) renal disease, type: CRI,            Endo:    (-) Type I DM, Type II DM, thyroid disease, chronic steroid usage and obesity   Psychiatric/Substance Use:    (-) psychiatric history, alcohol abuse history and chronic opioid use history   Infectious Disease:  - neg infectious disease ROS     Malignancy:   (+) Malignancy, History of Breast.Breast CA Remission status post Surgery.      Other: Comment: Follows with dermatology for CREST/systemic sclerosis/lichen planus           Virtual visit -  No vitals were obtained    Physical Exam  Constitutional: Awake, alert, cooperative, no apparent distress, and appears stated age.  Eyes: Pupils equal  HENT: Normocephalic  Respiratory: non labored breathing   Neurologic: Awake, alert, oriented to name, place and time.   Neuropsychiatric: Calm, cooperative. Normal affect.      Prior Labs/Diagnostic Studies   All labs and imaging personally reviewed    Latest Reference Range & Units 09/17/24 09:23   Sodium 135 - 145 mmol/L 142   Potassium 3.4 - 5.3 mmol/L 4.6   Chloride 98 - 107 mmol/L 104   Carbon Dioxide (CO2) 22 - 29 mmol/L 24   Urea Nitrogen 8.0 - 23.0 mg/dL 32.5 (H)   Creatinine 0.51 - 0.95 mg/dL 1.03 (H)   GFR Estimate >60 mL/min/1.73m2 54 (L)   Calcium 8.8 - 10.4 mg/dL 9.0   Anion Gap 7 - 15 mmol/L 14   Cholesterol <200 mg/dL 192   Patient Fasting?  Unknown   Glucose 70 - 99 mg/dL 92   HDL Cholesterol >=50 mg/dL 91   Hemoglobin A1C <5.7 % 6.1 (H)   LDL Cholesterol " Calculated <100 mg/dL 88   Non HDL Cholesterol <130 mg/dL 101   Triglycerides <150 mg/dL 66   (H): Data is abnormally high  (L): Data is abnormally low     Latest Reference Range & Units 07/31/23 12:00   WBC 4.0 - 11.0 10e3/uL 8.5   Hemoglobin 11.7 - 15.7 g/dL 12.2   Hematocrit 35.0 - 47.0 % 39.0   Platelet Count 150 - 450 10e3/uL 190   RBC Count 3.80 - 5.20 10e6/uL 4.29   MCV 78 - 100 fL 91   MCH 26.5 - 33.0 pg 28.4   MCHC 31.5 - 36.5 g/dL 31.3 (L)   RDW 10.0 - 15.0 % 13.6   (L): Data is abnormally low    EKG/ stress test - if available please see in ROS above   Echo result w/o MOPS: Interpretation SummaryGlobal and regional left ventricular function is normal with an EF of 55-60%.Global right ventricular function is normal.No significant valvular abnormalities present. This study was compared with the study from 2017 .No significant changes noted.           No data to display                  The patient's records and results personally reviewed by this provider.     Outside records reviewed from: Care Everywhere      Assessment    Jeannine Rod is a 84 year old female seen as a PAC referral for risk assessment and optimization for anesthesia.    Plan/Recommendations  Pt will be optimized for the proposed procedure.  See below for details on the assessment, risk, and preoperative recommendations    NEUROLOGY  - No history of TIA, CVA or seizure    - Radiculoplexus polyneuropathy affecting lower extremities dx ~6 years ago evaluated at [facility]  Reports to be in remission    -Post Op delirium risk factors:  Age    ENT  - No current airway concerns.  Will need to be reassessed day of surgery.  Mallampati: Unable to assess  TM: Unable to assess    CARDIAC  - No history of CAD and Afib    - HTN managed with triamterene-hydrochlorothiazide  Hold DOS    - HLD continue statin DOS     - METS (Metabolic Equivalents)  Patient performs 4 or more METS exercise without symptoms             Total Score: 0      RCRI-Very  "low risk: Class 1 0.4% complication rate             Total Score: 0        PULMONARY  - BAKARI Low Risk             Total Score: 2    BAKARI: Hypertension    BAKARI: Over 50 ys old      - COPD, Well controlled  No inhalers and denies any symptoms    - Tobacco History    History   Smoking Status    Former    Types: Cigarettes   Smokeless Tobacco    Never     DERM  Follows with dermatology for CREST with Raynaud's, systemic sclerosis and lichen planus.  Continue nifedipine and  hydroxychloroquine as prescribed    GI  - left inguinal hernia, symptomatic  Above procedure scheduled    - Denies h/o GERD    - PONV High Risk  Total Score: 3           1 AN PONV: Pt is Female    1 AN PONV: Patient is not a current smoker    1 AN PONV: Intended Post Op Opioids        /RENAL  - CKD 3a  ~ Creatinine/GFR see above   ~ will updated labs DOS   ~ Monitoring renal function during periop period.   ~ Avoidance of nephrotoxins as possible    ENDOCRINE    - BMI: Estimated body mass index is 22.11 kg/m  as calculated from the following:    Height as of this encounter: 1.676 m (5' 6\").    Weight as of this encounter: 62.1 kg (137 lb).  Healthy Weight (BMI 18.5-24.9)    - A1C 6.1 (9/2024)    HEME  VTE Low Risk 0.5%             Total Score: 4    VTE: Greater than 59 yrs old    VTE: Pt history of VTE      - No history of abnormal bleeding or antiplatelet use.    Hemoglobin   Date Value Ref Range Status   07/31/2023 12.2 11.7 - 15.7 g/dL Final   12/02/2020 11.9 11.7 - 15.7 g/dL Final   No known h/o blood transfusion     MSK  - Osteoarthritis of left hip s/p KEESHA (left) in 2012  Now with left hip pain following with sports medicine and physical therapy.   ~ Consideration for careful lifting and postioning techniques       - Patient is NOT Frail             Total Score: 2    Frailty: Slower walking speed    Frailty: Increased exhaustion      Different anesthesia methods/types have been discussed with the patient, but they are aware that the final plan " will be decided by the assigned anesthesia provider on the date of service.      The patient is optimized for their procedure. AVS with information on surgery time/arrival time, meds and NPO status given by nursing staff. No further diagnostic testing indicated.    Please refer to the physical examination documented by the anesthesiologist in the anesthesia record on the day of surgery.    Video-Visit Details    Type of service:  Video Visit    Provider received verbal consent for a Video Visit from the patient? Yes   Video Start Time: 8:00 am   Video End Time:8:20    Originating Location (pt. Location): Home    Distant Location (provider location):  Off-site  Mode of Communication:  Video Conference via [employer]  On the day of service:     Prep time: 10 minutes  Visit time: 20 minutes  Documentation time: 12 minutes  ------------------------------------------  Total time: 42 minutes      FLORENCIO Arthur CNP  Preoperative Assessment Center  [facility]  Clinic and Surgery Center  Phone: [phone]  Fax: [phone]

## 2025-03-03 NOTE — PROGRESS NOTES
Jeannine is a 84 year old who is being evaluated via a billable video visit.    How would you like to obtain your AVS? [employer]  If the video visit is dropped, the invitation should be resent by: Text to cell phone: [phone]

## 2025-03-03 NOTE — PATIENT INSTRUCTIONS
Preparing for Your Surgery      Name:  Jeannine Rod   MRN:  [id number]   :  1940   Today's Date:  3/3/2025         Arriving for surgery:  Surgery date:  3-14-25  Arrival time:  5:45 am    Restrictions due to COVID 19:    Please maintain social distance.  Masking is optional.      parking is available for anyone with mobility limitations or disabilities. (Monday- Friday 7 am- 5 pm)    Please come to:    [facility]  [address] [place] [id number]    Please check in on the 5th floor at the Ambulatory Surgery Center.      What can I eat or drink?    -  You may eat and drink normally until 8 hours prior to arrival  time. (Until 9:45 pm 3-13-25)  -  You may have clear liquids until 2 hours prior to arrival  time. (Until 3:45 am)    Examples of clear liquids:  Water  Clear broth  Juices (apple, white grape, white cranberry  and cider) without pulp  Noncarbonated, powder based beverages  (lemonade and Erik-Aid)  Sodas (Sprite, 7-Up, ginger ale and seltzer)  Coffee or tea (without milk or cream)  Gatorade      Which medicines can I take?    Hold Aspirin for 7 days before surgery.   Hold Multivitamins for 7 days before surgery. Take the last Multivitamin on 3-6-25, and then hold until surgery.  Hold Supplements for 7 days before surgery.  Hold Ibuprofen (Advil, Motrin) for 1 day before surgery--unless otherwise directed by surgeon.  Hold Naproxen (Aleve) for 4 days before surgery.  Acetaminophen (Tylenol) is okay to take if needed.    No alcohol or cannabis products for 24 hours prior to procedure.      -  DO NOT take the following medications the day of surgery:  Calcium-Vitamin D (Caltrate)  Cetirizine (Zyrtec)  No creams, lotions, gels, or ointments on the skin.      -  PLEASE TAKE the following medications the day of surgery:   Atorvastatin (Lipitor)  Hydroxychloroquine (Plaquinil)- if surgery is on the day you'd usually take it  Ipratropium (Atrovent) nasal spray if  needed  Acetaminophen (Tylenol) if needed    How do I prepare myself?  - Please take 2 showers (one the night prior to surgery and one the morning of surgery) using Scrubcare or Hibiclens soap.  You may use your own shampoo and conditioner. No other hair products.     Use this soap only from the neck to your toes. Avoid genital area     Leave the soap on your skin for one minute--then rinse thoroughly.   - Please remove all jewelry and body piercings.  - No lotions, deodorants or fragrance.  - No makeup or fingernail polish.   - Bring your ID and insurance card.    -If you have a Deep Brain Stimulator, a Spinal Cord Stimulator, or any implanted Neuro Device, you must bring the remote to the Surgery Center.         ALL PATIENTS ARE REQUIRED TO HAVE A RESPONSIBLE ADULT TO DRIVE AND BE IN ATTENDANCE WITH THEM FOR 24 HOURS FOLLOWING SURGERY.     Covid testing policy as of 12/06/2022  Your surgeon will notify and schedule you for a COVID test if one is needed before surgery--please direct any questions or COVID symptoms to your surgeon      Questions or Concerns:    -For questions regarding the day of surgery, please contact the Ambulatory Surgery Center at [phone].    -If you have health changes between today and your surgery, please contact your surgeon.     - For questions after surgery, please contact your surgeon's office.

## 2025-03-13 RX ORDER — NALOXONE HYDROCHLORIDE 0.4 MG/ML
0.1 INJECTION, SOLUTION INTRAMUSCULAR; INTRAVENOUS; SUBCUTANEOUS
Status: CANCELLED | OUTPATIENT
Start: 2025-03-13

## 2025-03-13 RX ORDER — DEXAMETHASONE SODIUM PHOSPHATE 10 MG/ML
4 INJECTION, SOLUTION INTRAMUSCULAR; INTRAVENOUS
Status: CANCELLED | OUTPATIENT
Start: 2025-03-13

## 2025-03-13 RX ORDER — OXYCODONE HYDROCHLORIDE 5 MG/1
10 TABLET ORAL
Status: CANCELLED | OUTPATIENT
Start: 2025-03-13

## 2025-03-13 RX ORDER — ONDANSETRON 4 MG/1
4 TABLET, ORALLY DISINTEGRATING ORAL EVERY 30 MIN PRN
Status: CANCELLED | OUTPATIENT
Start: 2025-03-13

## 2025-03-13 RX ORDER — ONDANSETRON 2 MG/ML
4 INJECTION INTRAMUSCULAR; INTRAVENOUS EVERY 30 MIN PRN
Status: CANCELLED | OUTPATIENT
Start: 2025-03-13

## 2025-03-13 RX ORDER — FENTANYL CITRATE 50 UG/ML
25 INJECTION, SOLUTION INTRAMUSCULAR; INTRAVENOUS
Status: CANCELLED | OUTPATIENT
Start: 2025-03-13

## 2025-03-13 RX ORDER — OXYCODONE HYDROCHLORIDE 5 MG/1
5 TABLET ORAL
Status: CANCELLED | OUTPATIENT
Start: 2025-03-13

## 2025-03-14 ENCOUNTER — HOSPITAL ENCOUNTER (OUTPATIENT)
Facility: AMBULATORY SURGERY CENTER | Age: 85
Discharge: HOME | End: 2025-03-14
Attending: SURGERY
Payer: MEDICARE

## 2025-03-14 VITALS
HEART RATE: 70 BPM | SYSTOLIC BLOOD PRESSURE: 121 MMHG | BODY MASS INDEX: 22 KG/M2 | TEMPERATURE: 97.3 F | WEIGHT: 136.91 LBS | HEIGHT: 66 IN | RESPIRATION RATE: 18 BRPM | DIASTOLIC BLOOD PRESSURE: 56 MMHG | OXYGEN SATURATION: 98 %

## 2025-03-14 DIAGNOSIS — K40.90 LEFT INGUINAL HERNIA: Primary | ICD-10-CM

## 2025-03-14 DEVICE — MESH HERNIA ABDOMINAL SELF GRIPPING 15X9CM PROGRIP TEM1509G: Type: IMPLANTABLE DEVICE | Site: GROIN | Status: FUNCTIONAL

## 2025-03-14 RX ORDER — CEFAZOLIN SODIUM 2 G/50ML
2 SOLUTION INTRAVENOUS
Status: COMPLETED | OUTPATIENT
Start: 2025-03-14 | End: 2025-03-14

## 2025-03-14 RX ORDER — HYDROMORPHONE HYDROCHLORIDE 1 MG/ML
0.4 INJECTION, SOLUTION INTRAMUSCULAR; INTRAVENOUS; SUBCUTANEOUS EVERY 5 MIN PRN
Status: DISCONTINUED | OUTPATIENT
Start: 2025-03-14 | End: 2025-03-15 | Stop reason: HOSPADM

## 2025-03-14 RX ORDER — CEFAZOLIN SODIUM 2 G/50ML
2 SOLUTION INTRAVENOUS SEE ADMIN INSTRUCTIONS
Status: DISCONTINUED | OUTPATIENT
Start: 2025-03-14 | End: 2025-03-14 | Stop reason: HOSPADM

## 2025-03-14 RX ORDER — MEPERIDINE HYDROCHLORIDE 25 MG/ML
12.5 INJECTION INTRAMUSCULAR; INTRAVENOUS; SUBCUTANEOUS EVERY 5 MIN PRN
Status: DISCONTINUED | OUTPATIENT
Start: 2025-03-14 | End: 2025-03-15 | Stop reason: HOSPADM

## 2025-03-14 RX ORDER — BUPIVACAINE HYDROCHLORIDE 2.5 MG/ML
INJECTION, SOLUTION INFILTRATION; PERINEURAL PRN
Status: DISCONTINUED | OUTPATIENT
Start: 2025-03-14 | End: 2025-03-14 | Stop reason: HOSPADM

## 2025-03-14 RX ORDER — ONDANSETRON 4 MG/1
4 TABLET, ORALLY DISINTEGRATING ORAL EVERY 30 MIN PRN
Status: DISCONTINUED | OUTPATIENT
Start: 2025-03-14 | End: 2025-03-15 | Stop reason: HOSPADM

## 2025-03-14 RX ORDER — FENTANYL CITRATE 50 UG/ML
25 INJECTION, SOLUTION INTRAMUSCULAR; INTRAVENOUS EVERY 5 MIN PRN
Status: DISCONTINUED | OUTPATIENT
Start: 2025-03-14 | End: 2025-03-15 | Stop reason: HOSPADM

## 2025-03-14 RX ORDER — SODIUM CHLORIDE, SODIUM LACTATE, POTASSIUM CHLORIDE, CALCIUM CHLORIDE 600; 310; 30; 20 MG/100ML; MG/100ML; MG/100ML; MG/100ML
INJECTION, SOLUTION INTRAVENOUS CONTINUOUS
Status: DISCONTINUED | OUTPATIENT
Start: 2025-03-14 | End: 2025-03-15 | Stop reason: HOSPADM

## 2025-03-14 RX ORDER — HYDROXYZINE HYDROCHLORIDE 10 MG/1
10 TABLET, FILM COATED ORAL EVERY 6 HOURS PRN
Status: DISCONTINUED | OUTPATIENT
Start: 2025-03-14 | End: 2025-03-15 | Stop reason: HOSPADM

## 2025-03-14 RX ORDER — FENTANYL CITRATE 50 UG/ML
50 INJECTION, SOLUTION INTRAMUSCULAR; INTRAVENOUS EVERY 5 MIN PRN
Status: DISCONTINUED | OUTPATIENT
Start: 2025-03-14 | End: 2025-03-15 | Stop reason: HOSPADM

## 2025-03-14 RX ORDER — DEXAMETHASONE SODIUM PHOSPHATE 10 MG/ML
4 INJECTION, SOLUTION INTRAMUSCULAR; INTRAVENOUS
Status: DISCONTINUED | OUTPATIENT
Start: 2025-03-14 | End: 2025-03-15 | Stop reason: HOSPADM

## 2025-03-14 RX ORDER — HYDROCODONE BITARTRATE AND ACETAMINOPHEN 5; 325 MG/1; MG/1
1-2 TABLET ORAL EVERY 4 HOURS PRN
Qty: 10 TABLET | Refills: 0 | Status: SHIPPED | OUTPATIENT
Start: 2025-03-14

## 2025-03-14 RX ORDER — LABETALOL HYDROCHLORIDE 5 MG/ML
10 INJECTION, SOLUTION INTRAVENOUS
Status: DISCONTINUED | OUTPATIENT
Start: 2025-03-14 | End: 2025-03-15 | Stop reason: HOSPADM

## 2025-03-14 RX ORDER — HYDROMORPHONE HYDROCHLORIDE 1 MG/ML
0.2 INJECTION, SOLUTION INTRAMUSCULAR; INTRAVENOUS; SUBCUTANEOUS EVERY 5 MIN PRN
Status: DISCONTINUED | OUTPATIENT
Start: 2025-03-14 | End: 2025-03-15 | Stop reason: HOSPADM

## 2025-03-14 RX ORDER — LIDOCAINE 40 MG/G
CREAM TOPICAL
Status: DISCONTINUED | OUTPATIENT
Start: 2025-03-14 | End: 2025-03-14 | Stop reason: HOSPADM

## 2025-03-14 RX ORDER — NALOXONE HYDROCHLORIDE 0.4 MG/ML
0.1 INJECTION, SOLUTION INTRAMUSCULAR; INTRAVENOUS; SUBCUTANEOUS
Status: DISCONTINUED | OUTPATIENT
Start: 2025-03-14 | End: 2025-03-15 | Stop reason: HOSPADM

## 2025-03-14 RX ORDER — SODIUM CHLORIDE, SODIUM LACTATE, POTASSIUM CHLORIDE, CALCIUM CHLORIDE 600; 310; 30; 20 MG/100ML; MG/100ML; MG/100ML; MG/100ML
INJECTION, SOLUTION INTRAVENOUS CONTINUOUS
Status: DISCONTINUED | OUTPATIENT
Start: 2025-03-14 | End: 2025-03-14 | Stop reason: HOSPADM

## 2025-03-14 RX ORDER — ACETAMINOPHEN 325 MG/1
975 TABLET ORAL ONCE
Status: COMPLETED | OUTPATIENT
Start: 2025-03-14 | End: 2025-03-14

## 2025-03-14 RX ORDER — ONDANSETRON 2 MG/ML
4 INJECTION INTRAMUSCULAR; INTRAVENOUS EVERY 30 MIN PRN
Status: DISCONTINUED | OUTPATIENT
Start: 2025-03-14 | End: 2025-03-15 | Stop reason: HOSPADM

## 2025-03-14 RX ADMIN — ACETAMINOPHEN 975 MG: 325 TABLET ORAL at 06:33

## 2025-03-14 NOTE — OP NOTE
Operative report    Preop diagnosis: Inguinal hernia left  Postop diagnosis: Same    Operative procedure: Open mesh repair left inguinal hernia    Surgeon: KEVIN Ross  Anesthesia: IV sedation plus local titration of Marcaine    Indications for procedure: Presents with left inguinal hernia.  Full informed consent was obtain in clinic and reconfirmed in today's preoperative discussion.    Operative findings: Left inguinal indirect hernia.    Procedure: Patient brought to the operating room put under IV sedation left inguinal region widely prepped and draped in usual sterile fashion.  Skin incision over left inguinal crease after injection of Marcaine for adequate anesthetic.  Patient carried down to the external oblique which was then still localized field block with Marcaine.  This was opened to the external ring.  Sac was then taken off of the round ligament which was transected distally.  Was inverted using a 3-0 Vicryl.  Initiated mesh reinforcement of the canal by taking a 10 x 8 cm pariah Junito ProGrip mesh placed internal oblique carried to the midline.  Inferiorly to inguinal ligament.  Will be closed with a running suture of Vicryl and Zynrelief was placed for postop pain control.  In layers Vicryl at skin Steri-Strips applied.    Estimated blood loss: Minimal  Pathology: None  The patient was taken to the recovery room where she was without difficulty or apparent complication.

## 2025-03-17 ENCOUNTER — PATIENT OUTREACH (OUTPATIENT)
Dept: SURGERY | Facility: CLINIC | Age: 85
End: 2025-03-17
Payer: MEDICARE

## 2025-03-17 NOTE — PROGRESS NOTES
RN Post-Op/Post-Discharge Care Coordination Note    Ms. Jeannine Rod is a 84 year old female who underwent open inguinal hernia repair on 3/13 with  Dr. Jose Ross.  Spoke with Patient.    Support  Patient able to care for self independently     Health Status  Nausea/Vomiting: Patient denies nausea/vomiting.  Eating/drinking: Patient is able to eat and drink without any complaints.  Diet:  Regular  Bowel habits: Patient reports having a normal bowel movement.  Drains (MICHAEL): N/A  Fevers/chills: Patient denies any fever or chills.  Incisions: Patient denies any signs and symptoms of infection..  Wound closure:  Steri-strips. Patient reports that she experienced bleeding from her incision and was seen x 2 in the ED at [facility]. Records are available in [facility].  Pain: Minimal to none  New Medications:  Norco    Activity/Restrictions  No lifting in excess of 15-20 pounds for 3-4 weeks    Equipment  None    Pathology reviewed with patient:  N/A    Forms/Letters  No    All of her questions were answered including reviewing restrictions, pathology, and wound care.  She will call this office if she has any further questions and/or concerns.      PO appointment arranged 4/1 at 0930 with Dr. Ross in follow up to procedure and ED visits.    Whom and When to Call  Patient acknowledges understanding of how to manage any medication changes and   when to seek medical care.     Patient advised that if after hour medical concerns arise to please call [phone] and choose option 4 to speak to the physician on call.

## 2025-04-01 ENCOUNTER — OFFICE VISIT (OUTPATIENT)
Dept: SURGERY | Facility: CLINIC | Age: 85
End: 2025-04-01
Payer: MEDICARE

## 2025-04-01 VITALS
DIASTOLIC BLOOD PRESSURE: 78 MMHG | WEIGHT: 139.8 LBS | HEART RATE: 59 BPM | HEIGHT: 66 IN | OXYGEN SATURATION: 94 % | SYSTOLIC BLOOD PRESSURE: 145 MMHG | BODY MASS INDEX: 22.47 KG/M2

## 2025-04-01 DIAGNOSIS — Z98.890 POSTOPERATIVE STATE: Primary | ICD-10-CM

## 2025-04-01 PROCEDURE — 3078F DIAST BP <80 MM HG: CPT | Performed by: SURGERY

## 2025-04-01 PROCEDURE — 3077F SYST BP >= 140 MM HG: CPT | Performed by: SURGERY

## 2025-04-01 PROCEDURE — 1126F AMNT PAIN NOTED NONE PRSNT: CPT | Performed by: SURGERY

## 2025-04-01 PROCEDURE — 99024 POSTOP FOLLOW-UP VISIT: CPT | Performed by: SURGERY

## 2025-04-01 ASSESSMENT — PAIN SCALES - GENERAL: PAINLEVEL_OUTOF10: NO PAIN (0)

## 2025-04-01 NOTE — PATIENT INSTRUCTIONS
You met with Dr. Jose Ross.      Today's visit instructions:    Return to the Surgery Clinic on an as needed basis.        If you have questions please contact Paulina RN or Cleopatra RN during regular clinic hours, Monday through Friday 7:30 AM - 4:00 PM, or you can contact us via [employer] at anytime.       If you have urgent needs after-hours, weekends, or holidays please call the hospital at [phone] and ask to speak with our on-call General Surgery Team.    Appointment [phone]  Nurse Advice (Paulina or Cleopatra): [phone]   Surgery Scheduler (Ramona): [phone]  Billing Customer Service:  [phone]  Fax: [phone]

## 2025-04-01 NOTE — LETTER
4/1/2025       RE: Jeannine Rod  [address]  [place] [address]     Dear Colleague,    Thank you for referring your patient, Jeannine Rod, to the [facility] GENERAL SURGERY [facility]INIC [place] at [facility]. Please see a copy of my visit note below.    Jeannine Rod is status post:  3/14/2025  Herniorrhaphy inguinal (Left) Procedure: HERNIORRHAPHY, INGUINAL, OPEN;  Surgeon: Jose Ross MD;  Location: [facility] OR  Surgery without complications.  Post-op course with bleed from hematoma, now without active drainage.  Incisions examined, no signs of infection. Hematoma/seroma above site.  All of the patients questions were answered. Encouraged hot packs as needed to help resolve hematoma.  Standard post-op instructions and restrictions given.  Follow-up as needed      Again, thank you for allowing me to participate in the care of your patient.      Sincerely,    Jose Ross MD

## 2025-04-01 NOTE — NURSING NOTE
"Chief Complaint   Patient presents with    Post-Op - General Surgery     PO, bleeding after procedure       Vitals:    04/01/25 0909   BP: (!) 145/78   BP Location: Left arm   Patient Position: Sitting   Cuff Size: Adult Regular   Pulse: 59   SpO2: 94%   Weight: 63.4 kg (139 lb 12.8 oz)   Height: 1.676 m (5' 6\")       Body mass index is 22.56 kg/m .                          Sarbjit Griffiths, EMT    "

## 2025-04-01 NOTE — PROGRESS NOTES
Jeannine Rod is status post:  3/14/2025  Herniorrhaphy inguinal (Left) Procedure: HERNIORRHAPHY, INGUINAL, OPEN;  Surgeon: Jose Ross MD;  Location: [facility] OR  Surgery without complications.  Post-op course with bleed from hematoma, now without active drainage.  Incisions examined, no signs of infection. Hematoma/seroma above site.  All of the patients questions were answered. Encouraged hot packs as needed to help resolve hematoma.  Standard post-op instructions and restrictions given.  Follow-up as needed

## 2025-04-14 ENCOUNTER — THERAPY VISIT (OUTPATIENT)
Dept: PHYSICAL THERAPY | Facility: CLINIC | Age: 85
End: 2025-04-14
Attending: STUDENT IN AN ORGANIZED HEALTH CARE EDUCATION/TRAINING PROGRAM
Payer: MEDICARE

## 2025-04-14 ENCOUNTER — OFFICE VISIT (OUTPATIENT)
Dept: ORTHOPEDICS | Facility: CLINIC | Age: 85
End: 2025-04-14
Payer: MEDICARE

## 2025-04-14 ENCOUNTER — ANCILLARY PROCEDURE (OUTPATIENT)
Dept: GENERAL RADIOLOGY | Facility: CLINIC | Age: 85
End: 2025-04-14
Attending: FAMILY MEDICINE
Payer: MEDICARE

## 2025-04-14 DIAGNOSIS — M19.079 ARTHRITIS OF GREAT TOE AT METATARSOPHALANGEAL JOINT: ICD-10-CM

## 2025-04-14 DIAGNOSIS — L84 CALLUS OF FOOT: ICD-10-CM

## 2025-04-14 DIAGNOSIS — M79.671 RIGHT FOOT PAIN: ICD-10-CM

## 2025-04-14 DIAGNOSIS — M76.71 PERONEAL TENDINITIS OF RIGHT LOWER EXTREMITY: Primary | ICD-10-CM

## 2025-04-14 DIAGNOSIS — R26.89 BALANCE PROBLEM: ICD-10-CM

## 2025-04-14 PROCEDURE — 99213 OFFICE O/P EST LOW 20 MIN: CPT | Performed by: FAMILY MEDICINE

## 2025-04-14 PROCEDURE — 73630 X-RAY EXAM OF FOOT: CPT | Mod: RT | Performed by: RADIOLOGY

## 2025-04-14 NOTE — PROGRESS NOTES
Sports Medicine Clinic Visit    PCP: Ricky Gillette Shelby Rod is a 84 year old female who is seen  as self referral presenting with right foot pain.    Patient last 2 weeks has noted discomfort along the lateral side of her ankle and right foot.  She is planning an upcoming trip to [place].  She has been doing some balance exercises from physical therapy that involve plantarflexion and eversion and inversion of her right foot, with a exercise band.  She will notice the lateral foot discomfort especially with walking barefoot.    Location of Pain: right lateral foot  Duration of Pain: 2 weeks   Rating of Pain: 2/10  Pain is better with: Rest  Pain is worse with: Walking barefoot   Additional Features: slight bruising and swelling noted by patient  Treatment so far consists of: Rest  Prior History of related problems: History of neuropathy due to Raynaud    LMP 07/07/1992           Patient consulted podiatry, Dr. Hoffman, in [place], 10/18/2023, for bilateral forefoot discomfort.  Clinic note 10/18/2023.  At that time patient's pain was related to bilateral digital contractures involving the 2nd and 3rd toes, with hammertoes present.  Patient was encouraged to avoid tight footwear, consider stiffer soled shoes to reduce stress on the forefoot during gait push-off, and was given bilateral crest pads for the hammertoes.      Past medical history of breast cancer status post bilateral mastectomy.    03/05/2012 left KEESHA Dr. Paniagua       MRI lumbar spine 6/25/2024:  Impression: Multilevel lumbar degenerative changes within the lumbar  spine, most pronounced at L4-5 and L5-S1, and also disc extrusion at  T12-L1, however there is no high-grade spinal canal or neural  foraminal stenosis.       PMH:  Past Medical History:   Diagnosis Date    Arthritis     Benign essential hypertension 10/23/2017    Benign essential hypertension 10/23/2017    Calculus of gallbladder without cholecystitis 10/6/2016     Gastroesophageal reflux disease with esophagitis 10/6/2016    Hyperlipidemia     Malignant neoplasm (H)     breast bilat mastectomy, no rad, no chemobilat     Pulmonary embolism and infarction 3/9/2012    IMO update changed this record. 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    Raynaud's syndrome     Systemic sclerosis (H) 10/6/2016       Active problem list:  Patient Active Problem List   Diagnosis    Malignant neoplasm of breast (H)    Raynaud's syndrome    Status post hip replacement    Systemic sclerosis (H)    Gastroesophageal reflux disease with esophagitis    Hyperlipidemia LDL goal <100    Calculus of gallbladder without cholecystitis    Radiculoplexus neuropathy    Benign essential hypertension    Atrophic vaginitis    Chronic kidney disease, stage 3 (H)    Chronic obstructive pulmonary disease, unspecified COPD type (H)    History of colonic polyps    Routine general medical examination at a health care facility    Hip pain, left    Weakness of left hip    Left inguinal hernia       FH:  Family History   Problem Relation Age of Onset    Cancer Mother         lung age 83    Unknown/Adopted Father         91 years    Eye Disorder Father         Macular Degeneration    Breast Cancer Maternal Grandmother     Cancer Paternal Grandfather     Cancer Paternal Uncle     Asthma Brother     C.A.D. Sister         elevated cholesterol       SH:  Social History     Socioeconomic History    Marital status:      Spouse name: Not on file    Number of children: Not on file    Years of education: Not on file    Highest education level: Not on file   Occupational History    Not on file   Tobacco Use    Smoking status: Former     Current packs/day: 0.00     Types: Cigarettes     Start date: 1962     Quit date: 1982     Years since quittin.3    Smokeless tobacco: Never   Vaping Use    Vaping status: Never Used   Substance and Sexual Activity    Alcohol use: Yes     Alcohol/week:  0.0 standard drinks of alcohol     Comment: red wine socially, 2-3x week    Drug use: No    Sexual activity: Yes     Partners: Male     Birth control/protection: Post-menopausal   Other Topics Concern    Parent/sibling w/ CABG, MI or angioplasty before 65F 55M? Not Asked   Social History Narrative    Not on file     Social Drivers of Health     Financial Resource Strain: Low Risk  (2/1/2025)    Received from [facility]    Financial Resource Strain     Difficulty of Paying Living Expenses: 3     Difficulty of Paying Living Expenses: Not on file   Food Insecurity: Not on File (9/26/2024)    Received from [employer]    Food Insecurity     Food: 0   Transportation Needs: No Transportation Needs (3/7/2024)    Received from [facility]    Transportation Needs     Does lack of transportation keep you from medical appointments?: 1     Does lack of transportation keep you from work, meetings or getting things that you need?: 1   Physical Activity: Not on File (11/15/2023)    Received from [employer] [facility]    Physical Activity     Physical Activity: 0   Stress: Not on File (11/15/2023)    Received from [facility]    Stress     Stress: 0   Social Connections: Not on File (9/17/2024)    Received from [employer]    Social Connections     Connectedness: 0   Interpersonal Safety: Low Risk  (3/14/2025)    Interpersonal Safety     Do you feel physically and emotionally safe where you currently live?: Yes     Within the past 12 months, have you been hit, slapped, kicked or otherwise physically hurt by someone?: No     Within the past 12 months, have you been humiliated or emotionally abused in other ways by your partner or ex-partner?: No   Housing Stability: Low Risk  (3/7/2024)    Received from [employer]    Housing Stability     What is your housing situation today?: 1       MEDS:  See EMR, reviewed  ALL:  See EMR, reviewed    REVIEW OF  SYSTEMS:  CONSTITUTIONAL:NEGATIVE for fever, chills, change in weight  INTEGUMENTARY/SKIN: NEGATIVE for worrisome rashes, moles or lesions  EYES: NEGATIVE for vision changes or irritation  ENT/MOUTH: NEGATIVE for ear, mouth and throat problems  RESP:NEGATIVE for significant cough or SOB  BREAST: NEGATIVE for masses, tenderness or discharge  CV: NEGATIVE for chest pain, palpitations or peripheral edema  GI: NEGATIVE for nausea, abdominal pain, heartburn, or change in bowel habits  :NEGATIVE for frequency, dysuria, or hematuria  :NEGATIVE for frequency, dysuria, or hematuria  NEURO: NEGATIVE for weakness, dizziness or paresthesias  ENDOCRINE: NEGATIVE for temperature intolerance, skin/hair changes  HEME/ALLERGY/IMMUNE: NEGATIVE for bleeding problems  PSYCHIATRIC: NEGATIVE for changes in mood or affect      Objective: Patient points along the course of the peroneal tendon on the right is very of discomfort.  She is tender over the peroneal tendon as it approaches the lateral malleolus and nontender over the proximal fifth metatarsal on the right.  Nontender over the proximal fourth metatarsal on the right.  Nontender along the Achilles tendon or posterior tibial tendon.  Mildly tender at the first MTP.  She has callus formation noted about the metatarsal heads especially about the lateral side of the foot.  She has an orthotic, for history of pronated heel.    Personally viewed the patient x-rays of the foot that show mild to moderate first MTP DJD.  No significant ankle joint arthritis.  She has signs of an os peroneum.  No acute fracture.    Assessment: Peroneal tendinitis right foot and ankle.  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  History of foot callus.    Plan: Patient indicates she would like to see a podiatrist for the foot callus, when she returns from her trip to [place] in the middle of May.  Referral to podiatry placed.  Patient declines use of a cam walker boot.  She plans on resting for the next  3 weeks from her exercise band, and for her walking she will stick to flat surfaces rather than hills and stairs.  She has a supportive shoe that she plans to wear to [place].  Massage along the peroneal tendon.  Tylenol for discomfort.  She will continue with her arch support and supportive shoe.  Follow-up as needed.

## 2025-04-14 NOTE — LETTER
4/14/2025      RE: Jeannine Rod  [address] [place] [address]     Dear Colleague,    Thank you for referring your patient, Jeannine Rod, to the [facility] SP[facility]IC[facility]IN[facility] [place]. Please see a copy of my visit note below.    Sports Medicine Clinic Visit    PCP: Ricky Gillette    Jeannine Rod is a 84 year old female who is seen  as self referral presenting with right foot pain.    Patient last 2 weeks has noted discomfort along the lateral side of her ankle and right foot.  She is planning an upcoming trip to [place].  She has been doing some balance exercises from physical therapy that involve plantarflexion and eversion and inversion of her right foot, with a exercise band.  She will notice the lateral foot discomfort especially with walking barefoot.    Location of Pain: right lateral foot  Duration of Pain: 2 weeks   Rating of Pain: 2/10  Pain is better with: Rest  Pain is worse with: Walking barefoot   Additional Features: slight bruising and swelling noted by patient  Treatment so far consists of: Rest  Prior History of related problems: History of neuropathy due to Raynaud    LMP 07/07/1992           Patient consulted podiatry, Dr. Hoffman, in [place], 10/18/2023, for bilateral forefoot discomfort.  Clinic note 10/18/2023.  At that time patient's pain was related to bilateral digital contractures involving the 2nd and 3rd toes, with hammertoes present.  Patient was encouraged to avoid tight footwear, consider stiffer soled shoes to reduce stress on the forefoot during gait push-off, and was given bilateral crest pads for the hammertoes.      Past medical history of breast cancer status post bilateral mastectomy.    03/05/2012 left KEESHA Dr. Paniagua       MRI lumbar spine 6/25/2024:  Impression: Multilevel lumbar degenerative changes within the lumbar  spine, most pronounced at L4-5 and L5-S1, and also disc extrusion at  T12-L1, however there is no high-grade  spinal canal or neural  foraminal stenosis.       PMH:  Past Medical History:   Diagnosis Date     Arthritis      Benign essential hypertension 10/23/2017     Benign essential hypertension 10/23/2017     Calculus of gallbladder without cholecystitis 10/6/2016     Gastroesophageal reflux disease with esophagitis 10/6/2016     Hyperlipidemia      Malignant neoplasm (H)     breast bilat mastectomy, no rad, no chemobilat      Pulmonary embolism and infarction 3/9/2012    IMO update changed this record. 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     Raynaud's syndrome      Systemic sclerosis (H) 10/6/2016       Active problem list:  Patient Active Problem List   Diagnosis     Malignant neoplasm of breast (H)     Raynaud's syndrome     Status post hip replacement     Systemic sclerosis (H)     Gastroesophageal reflux disease with esophagitis     Hyperlipidemia LDL goal <100     Calculus of gallbladder without cholecystitis     Radiculoplexus neuropathy     Benign essential hypertension     Atrophic vaginitis     Chronic kidney disease, stage 3 (H)     Chronic obstructive pulmonary disease, unspecified COPD type (H)     History of colonic polyps     Routine general medical examination at a health care facility     Hip pain, left     Weakness of left hip     Left inguinal hernia       FH:  Family History   Problem Relation Age of Onset     Cancer Mother         lung age 83     Unknown/Adopted Father         91 years     Eye Disorder Father         Macular Degeneration     Breast Cancer Maternal Grandmother      Cancer Paternal Grandfather      Cancer Paternal Uncle      Asthma Brother      C.A.D. Sister         elevated cholesterol       SH:  Social History     Socioeconomic History     Marital status:      Spouse name: Not on file     Number of children: Not on file     Years of education: Not on file     Highest education level: Not on file   Occupational History     Not on file   Tobacco  Use     Smoking status: Former     Current packs/day: 0.00     Types: Cigarettes     Start date: 1962     Quit date: 1982     Years since quittin.3     Smokeless tobacco: Never   Vaping Use     Vaping status: Never Used   Substance and Sexual Activity     Alcohol use: Yes     Alcohol/week: 0.0 standard drinks of alcohol     Comment: red wine socially, 2-3x week     Drug use: No     Sexual activity: Yes     Partners: Male     Birth control/protection: Post-menopausal   Other Topics Concern     Parent/sibling w/ CABG, MI or angioplasty before 65F 55M? Not Asked   Social History Narrative     Not on file     Social Drivers of Health     Financial Resource Strain: Low Risk  (2025)    Received from [employer]    Financial Resource Strain      Difficulty of Paying Living Expenses: 3      Difficulty of Paying Living Expenses: Not on file   Food Insecurity: Not on File (2024)    Received from [employer]    Food Insecurity      Food: 0   Transportation Needs: No Transportation Needs (3/7/2024)    Received from [employer]    Transportation Needs      Does lack of transportation keep you from medical appointments?: 1      Does lack of transportation keep you from work, meetings or getting things that you need?: 1   Physical Activity: Not on File (11/15/2023)    Received from [facility]    Physical Activity      Physical Activity: 0   Stress: Not on File (11/15/2023)    Received from [facility]    Stress      Stress: 0   Social Connections: Not on File (2024)    Received from [employer]    Social Connections      Connectedness: 0   Interpersonal Safety: Low Risk  (3/14/2025)    Interpersonal Safety      Do you feel physically and emotionally safe where you currently live?: Yes      Within the past 12 months, have you been hit, slapped, kicked or otherwise physically hurt by someone?: No      Within the past 12 months, have you been  humiliated or emotionally abused in other ways by your partner or ex-partner?: No   Housing Stability: Low Risk  (3/7/2024)    Received from [employer] [facility]    Housing Stability      What is your housing situation today?: 1       MEDS:  See EMR, reviewed  ALL:  See EMR, reviewed    REVIEW OF SYSTEMS:  CONSTITUTIONAL:NEGATIVE for fever, chills, change in weight  INTEGUMENTARY/SKIN: NEGATIVE for worrisome rashes, moles or lesions  EYES: NEGATIVE for vision changes or irritation  ENT/MOUTH: NEGATIVE for ear, mouth and throat problems  RESP:NEGATIVE for significant cough or SOB  BREAST: NEGATIVE for masses, tenderness or discharge  CV: NEGATIVE for chest pain, palpitations or peripheral edema  GI: NEGATIVE for nausea, abdominal pain, heartburn, or change in bowel habits  :NEGATIVE for frequency, dysuria, or hematuria  :NEGATIVE for frequency, dysuria, or hematuria  NEURO: NEGATIVE for weakness, dizziness or paresthesias  ENDOCRINE: NEGATIVE for temperature intolerance, skin/hair changes  HEME/ALLERGY/IMMUNE: NEGATIVE for bleeding problems  PSYCHIATRIC: NEGATIVE for changes in mood or affect      Objective: Patient points along the course of the peroneal tendon on the right is very of discomfort.  She is tender over the peroneal tendon as it approaches the lateral malleolus and nontender over the proximal fifth metatarsal on the right.  Nontender over the proximal fourth metatarsal on the right.  Nontender along the Achilles tendon or posterior tibial tendon.  Mildly tender at the first MTP.  She has callus formation noted about the metatarsal heads especially about the lateral side of the foot.  She has an orthotic, for history of pronated heel.    Personally viewed the patient x-rays of the foot that show mild to moderate first MTP DJD.  No significant ankle joint arthritis.  She has signs of an os peroneum.  No acute fracture.    Assessment: Peroneal tendinitis right foot and ankle.   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  History of foot callus.    Plan: Patient indicates she would like to see a podiatrist for the foot callus, when she returns from her trip to [place] in the middle of May.  Referral to podiatry placed.  Patient declines use of a cam walker boot.  She plans on resting for the next 3 weeks from her exercise band, and for her walking she will stick to flat surfaces rather than hills and stairs.  She has a supportive shoe that she plans to wear to [place].  Massage along the peroneal tendon.  Tylenol for discomfort.  She will continue with her arch support and supportive shoe.  Follow-up as needed.                        Again, thank you for allowing me to participate in the care of your patient.      Sincerely,    Dimitris Meza MD

## 2025-04-14 NOTE — PROGRESS NOTES
PHYSICAL THERAPY EVALUATION  Type of Visit: Evaluation        Fall Risk Screen:  Fall screen completed by: PT  Have you fallen 2 or more times in the past year?: No  Have you fallen and had an injury in the past year?: No  Is patient a fall risk?: Yes  Fall screen comments: FGA 19    Subjective         Presenting condition or subjective complaint:  Was doing Hip PT and then hit a wall.  Reports that if she has to step up onto something she will tip.  After PT has less swerves.  Unable to do heel to toe walking.      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  Reports mountain sickness and couldn't walk - this was about a year ago.  Balance hasn't ever been great.  Has not fallen, but is careful.  Would like to do stairs without holding on.    Dim lit areas seem to be ok.  Is careful on uneven surfaces.  Stride is uneven and this is from the hip tear     Upcoming sports med visit for R sided foot pain.     Date of onset: 02/20/25    Relevant medical history:     Past Medical History:   Diagnosis Date    Arthritis     Benign essential hypertension 10/23/2017    Benign essential hypertension 10/23/2017    Calculus of gallbladder without cholecystitis 10/6/2016    Gastroesophageal reflux disease with esophagitis 10/6/2016    Hyperlipidemia     Malignant neoplasm (H)     breast bilat mastectomy, no rad, no chemobilat     Pulmonary embolism and infarction 3/9/2012    IMO update changed this record. 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  Has been doing PT since summer.     Prior Level of Function  Transfers: Independent  Ambulation: Independent  ADL: Independent  IADL:     Living Environment  Social support:   House  Type of home:     Stairs to  enter the home:         Ramp:     Stairs inside the home:         Help at home:    Equipment owned:       Employment:      Hobbies/Interests:      Patient goals for therapy:      Pain assessment:  Right lateral foot pain     Objective      Cognitive Status Examination  Orientation: Oriented to person, place and time   Level of Consciousness: Alert  Follows Commands and Answers Questions: 100% of the time  Personal Safety and Judgement: Intact  Memory: Intact    OBSERVATION: Arrives ind  INTEGUMENTARY: Intact  POSTURE: Standing Posture: Rounded shoulders, Forward head  PALPATION: NA    TRANSFERS: WFL    WHEELCHAIR MOBILITY: NA    GAIT:   Level of [place]: Independent  Assistive Device(s): None  Gait Deviations:  asymmetric step length, lacks full TKE  Gait Distance: FGA  Stairs: with rail     BALANCE:  Impaired static and dynamic balance, with decreased use of vestibular cues in balance    SPECIAL TESTS  Functional Gait Assessment (FGA) TOTAL SCORE: (MAXIMUM SCORE 30): 19    10 Meter Walk Test (Comfortable)  8.6     Single Leg Stance Right (sec) 1   Single Leg Stance Left (sec) 1   Modified CTSIB Conditions (sec) Cond 1: 30  Cond 2: 30  Cond 4: 30 - marked sway  Cond 5 : 0       SENSATION: LE Sensation WNL    Assessment & Plan   CLINICAL IMPRESSIONS  Medical Diagnosis: Balance Problem    Treatment Diagnosis: Sensory Selection and weighting deficit   Impression/Assessment: Patient is a 84 year old female with  complaints if imbalance.    The following significant findings have been identified: Decreased ROM/flexibility and Impaired balance. These impairments interfere with their ability to perform recreational activities, household mobility, and community mobility as compared to previous level of function.     Clinical Decision Making (Complexity):  Clinical Presentation: Stable/Uncomplicated  Clinical Presentation Rationale: based on medical and personal factors listed in PT evaluation  Clinical Decision Making  (Complexity): Low complexity    PLAN OF CARE  Treatment Interventions:  Interventions: Gait Training, Neuromuscular Re-education, Therapeutic Activity, Therapeutic Exercise, Self-Care/Home Management    Long Term Goals     PT Goal 1  Goal Identifier: HEP  Goal Description: Patient will be independent with HEP to allow for continued improvements in health and wellness upon DC  Rationale: to maximize safety and independence within the community  Target Date: 06/09/25  PT Goal 2  Goal Identifier: FGA  Goal Description: Patient to show improved score to 23  Rationale: to maximize safety and independence within the community  Target Date: 06/09/25  PT Goal 3  Goal Identifier: mCSTIB  Goal Description: Patient to show improved score to 110/120s  Rationale: to maximize safety and independence within the community  Target Date: 06/09/25  PT Goal 4  Goal Identifier: SLS  Goal Description: Patient to show improved SLS time to >5s  Rationale: to maximize safety and independence within the home  Target Date: 06/09/25      Frequency of Treatment: 1 time a week  Duration of Treatment: 8 weeks    Recommended Referrals to Other Professionals:   Education Assessment:   Learner/Method: Patient;Listening  Education Comments: POC and HEP    Risks and benefits of evaluation/treatment have been explained.   Patient/Family/caregiver agrees with Plan of Care.     Evaluation Time:     PT Eval, Low Complexity Minutes (10326): 20       Signing Clinician: Lidia Sin, PT        [facility]                                                                                   OUTPATIENT PHYSICAL THERAPY      PLAN OF TREATMENT FOR OUTPATIENT REHABILITATION   Patient's Last Name, First Name, HARVEY RodJeannine Ryan YOB: 1940   Provider's Name   [facility]   Medical Record No.  [id number]     Onset Date: 02/20/25  Start of Care Date: 04/14/25     Medical Diagnosis:  Balance  Problem      PT Treatment Diagnosis:  Sensory Selection and weighting deficit Plan of Treatment  Frequency/Duration: 1 time a week/ 8 weeks    Certification date from 04/14/25 to 06/09/25         See note for plan of treatment details and functional goals     Lidia Sin, PT                         I CERTIFY THE NEED FOR THESE SERVICES FURNISHED UNDER        THIS PLAN OF TREATMENT AND WHILE UNDER MY CARE     (Physician attestation of this document indicates review and certification of the therapy plan).              Referring Provider:  Rossana Villarreal    Initial Assessment  See Epic Evaluation- Start of Care Date: 04/14/25

## 2025-04-15 ENCOUNTER — PATIENT OUTREACH (OUTPATIENT)
Dept: CARE COORDINATION | Facility: CLINIC | Age: 85
End: 2025-04-15
Payer: MEDICARE

## 2025-04-16 ENCOUNTER — OFFICE VISIT (OUTPATIENT)
Dept: PODIATRY | Facility: CLINIC | Age: 85
End: 2025-04-16
Attending: FAMILY MEDICINE
Payer: MEDICARE

## 2025-04-16 VITALS — WEIGHT: 143.3 LBS | BODY MASS INDEX: 23.13 KG/M2

## 2025-04-16 DIAGNOSIS — Q66.30 VARUS DEFORMITY OF FOOT: ICD-10-CM

## 2025-04-16 DIAGNOSIS — Q82.8 POROKERATOSIS: ICD-10-CM

## 2025-04-16 DIAGNOSIS — M76.71 PERONEAL TENDINITIS, RIGHT: ICD-10-CM

## 2025-04-16 DIAGNOSIS — L84 CALLUS OF FOOT: ICD-10-CM

## 2025-04-16 DIAGNOSIS — M79.671 PAIN IN RIGHT FOOT: ICD-10-CM

## 2025-04-16 DIAGNOSIS — M21.70 LEG LENGTH DISCREPANCY: Primary | ICD-10-CM

## 2025-04-16 DIAGNOSIS — M77.51 OS PERONEUM SYNDROME OF RIGHT FOOT: ICD-10-CM

## 2025-04-16 NOTE — LETTER
4/16/2025      Jeannine Rod  [address]  [place] [address]      Dear Colleague,    Thank you for referring your patient, Jeannine Rod, to the [facility]. Please see a copy of my visit note below.    Subjective: Jeannine Rod is a 84 year old female who presents to clinic for pain to her right foot with a callus. She is being referred by Dr. Meza of sports medicine who is treating her for peroneal tendinitis. She has noted this callus for many years. She has pain with walking/standing, nopain with rest. No history of ulceration. She has custom orthotics and supportive shoes.        EXAM:Vitals: [facility] 07/07/1992   Lower Extremity Focused:    Dermatologic: hyperkeratosis noted bilateral plantar medial 1st metatarsal phalangeal joints, no openings.      Vascular: DP pulse 2/4 right 2/4 left PT pulse 2/4 right 2/4 left.  Pitting edema bilateral lower extremities left ankle greater than right, right lateral forefoot greater than left    Neurologic: Lower extremity sensation is intact to light touch.  No evidence of weakness or contracture in the lower extremities.  No evidence of neuropathy.     Musculoskeletal: Decreased arch height bilateral feet. Toes 2-5 dorsiflexed at the metatarsal phalangeal joints with plantarflexion at the IPJs. Reduced range of motion right 1st metatarsal phalangeal joint with hallux abducted.  Pain with palpation to the fifth metatarsal shaft distal pinpoint pain to the plantar fifth metatarsal phalangeal joint along the hyperkeratotic lesion.  Pain with palpation to the peroneal tendons max tenderness plantar cuboid where the os peroneum is located.  Mild pain with dynamization of the peroneal tendons right.  Upon stance supination of the right foot noted      Radiographs:  I personally reviewed the xrays.     Right foot xr 3 view 4/14/25     Impression:  1. No acute osseous abnormality.  2. Polyarticular degenerative changes including severe  changes at the  second and third tarsometatarsal joints.     TR GOEL   ASSESSMENT:   Lateral column overload right foot secondary to leg length difference left longer than right.   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  Custom orthotics to accommodate leg length difference and minimize strain on peroneal tendons and lateral forefoot. She is at risk of peroneal tendon tear, 5th metatarsal fracture without orthotics.   Dispensed and fit Tri-Lock ankle brace right foot to stabilize ankle and minimize supination secondary to leg length difference.  Upon walking around the clinic the patient noted no pain relief.   For your callus as needed use a pumice stone or file after soaking or bathing apply cream once or twice a day to the lesion to keep the skin soft.  The callus will most likely recur.  We may consider adjustments in the orthotics.  I have personally reviewed all images of her right foot x-rays today noting no fracture.   Follow-up as needed      All questions were answered to patients satisfaction and they will call with further questions or concerns.       Janessa Smith DPM      Again, thank you for allowing me to participate in the care of your patient.        Sincerely,        Janessa Smith DPM    Electronically signed

## 2025-04-16 NOTE — PROGRESS NOTES
Subjective: Jeannine Rod is a 84 year old female who presents to clinic for pain to her right foot with a callus. She is being referred by Dr. Meza of sports medicine who is treating her for peroneal tendinitis. She has noted this callus for many years. She has pain with walking/standing, nopain with rest. No history of ulceration. She has custom orthotics and supportive shoes.        EXAM:Vitals: [facility] 07/07/1992   Lower Extremity Focused:    Dermatologic: hyperkeratosis noted bilateral plantar medial 1st metatarsal phalangeal joints, no openings.      Vascular: DP pulse 2/4 right 2/4 left PT pulse 2/4 right 2/4 left.  Pitting edema bilateral lower extremities left ankle greater than right, right lateral forefoot greater than left    Neurologic: Lower extremity sensation is intact to light touch.  No evidence of weakness or contracture in the lower extremities.  No evidence of neuropathy.     Musculoskeletal: Decreased arch height bilateral feet. Toes 2-5 dorsiflexed at the metatarsal phalangeal joints with plantarflexion at the IPJs. Reduced range of motion right 1st metatarsal phalangeal joint with hallux abducted.  Pain with palpation to the fifth metatarsal shaft distal pinpoint pain to the plantar fifth metatarsal phalangeal joint along the hyperkeratotic lesion.  Pain with palpation to the peroneal tendons max tenderness plantar cuboid where the os peroneum is located.  Mild pain with dynamization of the peroneal tendons right.  Upon stance supination of the right foot noted      Radiographs:  I personally reviewed the xrays.     Right foot xr 3 view 4/14/25     Impression:  1. No acute osseous abnormality.  2. Polyarticular degenerative changes including severe changes at the  second and third tarsometatarsal joints.     TR GOEL   ASSESSMENT:   Lateral column overload right foot secondary to leg length difference left longer than right.   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  Custom orthotics to accommodate leg length difference and minimize strain on peroneal tendons and lateral forefoot. She is at risk of peroneal tendon tear, 5th metatarsal fracture without orthotics.   Dispensed and fit Tri-Lock ankle brace right foot to stabilize ankle and minimize supination secondary to leg length difference.  Upon walking around the clinic the patient noted no pain relief.   For your callus as needed use a pumice stone or file after soaking or bathing apply cream once or twice a day to the lesion to keep the skin soft.  The callus will most likely recur.  We may consider adjustments in the orthotics.  I have personally reviewed all images of her right foot x-rays today noting no fracture.   Follow-up as needed      All questions were answered to patients satisfaction and they will call with further questions or concerns.       Janessa Smith DPM

## 2025-05-08 ENCOUNTER — THERAPY VISIT (OUTPATIENT)
Dept: PHYSICAL THERAPY | Facility: CLINIC | Age: 85
End: 2025-05-08
Payer: MEDICARE

## 2025-05-08 DIAGNOSIS — R26.89 BALANCE PROBLEM: Primary | ICD-10-CM

## 2025-05-22 ENCOUNTER — THERAPY VISIT (OUTPATIENT)
Dept: PHYSICAL THERAPY | Facility: CLINIC | Age: 85
End: 2025-05-22
Payer: MEDICARE

## 2025-05-22 DIAGNOSIS — R26.89 BALANCE PROBLEM: Primary | ICD-10-CM

## 2025-05-23 PROCEDURE — 88312 SPECIAL STAINS GROUP 1: CPT | Mod: TC | Performed by: PHYSICIAN ASSISTANT

## 2025-05-27 ENCOUNTER — TELEPHONE (OUTPATIENT)
Dept: DERMATOLOGY | Facility: CLINIC | Age: 85
End: 2025-05-27
Payer: MEDICARE

## 2025-05-27 NOTE — TELEPHONE ENCOUNTER
5/27 Left Voicemail (1st Attempt) and Sent Mychart (1st Attempt) for the patient to call back and schedule the following:    Appointment type: Return Dermatology  Provider: Brett  Return date: Next Available Time  Specialty phone number: [phone]  Additional appointment(s) needed: na  Additonal Notes: na

## 2025-05-29 ENCOUNTER — THERAPY VISIT (OUTPATIENT)
Dept: PHYSICAL THERAPY | Facility: CLINIC | Age: 85
End: 2025-05-29
Payer: MEDICARE

## 2025-05-29 ENCOUNTER — RESULTS FOLLOW-UP (OUTPATIENT)
Dept: DERMATOLOGY | Facility: CLINIC | Age: 85
End: 2025-05-29

## 2025-05-29 DIAGNOSIS — L40.8 INVERSE PSORIASIS: Primary | ICD-10-CM

## 2025-05-29 DIAGNOSIS — R26.89 BALANCE PROBLEM: Primary | ICD-10-CM

## 2025-05-29 RX ORDER — CALCIPOTRIENE 50 UG/G
OINTMENT TOPICAL
Qty: 120 G | Refills: 4 | Status: SHIPPED | OUTPATIENT
Start: 2025-05-29

## 2025-05-29 RX ORDER — HYDROCORTISONE 25 MG/G
OINTMENT TOPICAL
Qty: 30 G | Refills: 4 | Status: SHIPPED | OUTPATIENT
Start: 2025-05-29

## 2025-06-16 DIAGNOSIS — L43.9 LICHEN PLANUS: ICD-10-CM

## 2025-06-18 NOTE — TELEPHONE ENCOUNTER
Last Written Prescription:  dutasteride (AVODART) 0.5 MG capsule  0.5 mg, DAILY           Summary: Take 1 capsule (0.5 mg) by mouth daily., Disp-90 capsule, R-1, E-Prescribe  Dose, Route, Frequency: 0.5 mg, Oral, DAILYStart: 12/18/2024Ordered On: 12/18/2024Pharmacy: [facility]/pharmacy #[id number] - [place], [place] - [address] Associated: Taking: Long-term: Med Note:    Prescribed: 0.5 mg, Oral, DAILY  Patient taking differently: 0.5 mg Oral EVERY EVENING, Reported on 5/23/2025            Directions: Take 1 capsule (0.5 mg) by mouth daily.  Ordering Department: [facility]  Authorized By: Pat Méndez MD  Dispense: 90 capsule  Refills: 1 ordered       ----------------------  Last Visit Date: 05/23/2025 Office Visit [facility]   Future Visit Date: 2/16/26  ----------------------      Refill decision: Medication refilled per  Medication Refill in Ambulatory Care  policy.   []  If no future appointment scheduled: Route to Clinic Coordinators to contact the pt for appointment.      Refill decision: Medication unable to be refilled by RN due to: Other:    Was last filled by provider not with [facility] anymore, needs new order        Request from pharmacy:  Requested Prescriptions   Pending Prescriptions Disp Refills    dutasteride (AVODART) 0.5 MG capsule [Pharmacy Med Name: DUTASTERIDE 0.5 MG CAPSULE] 90 capsule 1     Sig: TAKE 1 CAPSULE BY MOUTH EVERY DAY       BPH Agents Failed - 6/18/2025  3:24 PM        Failed - Medication is active on med list and the sig matches. RN to manually verify dose and sig if red X/fail.     If the protocol passes (green check), you do not need to verify med dose and sig.    A prescription matches if they are the same clinical intention.    For Example: once daily and every morning are the same.    The protocol can not identify upper and lower case letters as matching and will fail.     For Example: Take 1 tablet (50 mg) by mouth daily     TAKE 1 TABLET (50 MG) BY MOUTH  DAILY    For all fails (red x), verify dose and sig.    If the refill does match what is on file, the RN can still proceed to approve the refill request.       If they do not match, route to the appropriate provider.             Failed - Medication indicated for associated diagnosis     Medication is associated with one or more of the following diagnoses:     Benign prostatic hyperplasia   Male pattern alopecia   Nocturia          Passed - Recent (12 month) or future (90 days) visit with authorizing provider's specialty (provided they have been seen in the past 15 months)     The patient must have completed an in-person or virtual visit within the past 12 months or has a future visit scheduled within the next 90 days with the authorizing provider s specialty.  Urgent care and e-visits do not qualify as an office visit for this protocol.          Passed - Patient is 18 years of age or older        Passed - No active pregnancy on record        Passed - No positive pregnancy test in past 12 months         Nu B, RN  [facility]/Red Flag Triage & Med Refill Team

## 2025-06-19 ENCOUNTER — THERAPY VISIT (OUTPATIENT)
Dept: PHYSICAL THERAPY | Facility: CLINIC | Age: 85
End: 2025-06-19
Payer: MEDICARE

## 2025-06-19 DIAGNOSIS — R26.89 BALANCE PROBLEM: Primary | ICD-10-CM

## 2025-06-19 NOTE — PROGRESS NOTES
Functional Gait Assessment (FGA): The FGA assesses postural stability during various walking tasks.     Gait assistive device used: None     Scores of <22 /30 have been correlated with predicting falls in community-dwelling older adults according to Marcio & Esteban 2010.   Scores of <18 /30 have been correlated with increased risk for falls in patients with Parkinsons Disease according to Luther Cardona Zhou et al 2014.  Minimal Detectable Change for patients with acute/chronic stroke = 4.2 according to Brittany & Flynn 2009  Minimal Detectable Change for patients with vestibular disorder = 8 according to Marcio & Esteban 2010     Assessment (rationale for performing, application to patient s function & care plan): Performed to assess balance with gait. Scored at 25.     (Minutes billed as physical performance test): 8    Modified Clinical Test of Sensory Interaction in Balance (mCTSIB):The mCTSIB provides a gross measure of the ability to use somatosensory, visual, and vestibular feedback for postural control. The test was performed with shoes on. The compliant surface used was a pillow.     Overall findings:     Condition Time Sway Characteristics   Non-Compliant Surface with Eyes Open  30    Non-Compliant Surface with Eyes Closed 30    Compliant Surface with Eyes Open 30    Compliant Surface with Eyes Closed 10      Assessment (rationale for performing, application to patient s function & care plan): Assessment towards goals  Minutes billed as physical performance test: 8

## 2025-06-19 NOTE — PROGRESS NOTES
06/19/25 0500   Appointment Info   Signing clinician's name / credentials Lidia Sin, PT, DPT   Visits Used 5   Medical Diagnosis Balance Problem   PT Tx Diagnosis Sensory Selection and weighting deficit   Progress Note/Certification   Start of Care Date 04/14/25   Onset of illness/injury or Date of Surgery 02/20/25   Therapy Frequency 1 time a week   Predicted Duration 8 weeks   Certification date from 04/14/25   Certification date to 06/09/25   GOALS   PT Goals 2;3;4   PT Goal 1   Goal Identifier HEP   Goal Description Patient will be independent with HEP to allow for continued improvements in health and wellness upon DC   Rationale to maximize safety and independence within the community   Target Date 06/09/25   PT Goal 2   Goal Identifier FGA   Goal Description Patient to show improved score to 23   Rationale to maximize safety and independence within the community   Goal Progress met   Target Date 06/09/25   Date Met 06/19/25   PT Goal 3   Goal Identifier mCSTIB   Goal Description Patient to show improved score to 110/120s   Rationale to maximize safety and independence within the community   Goal Progress nearly met - 100/120s   Target Date 06/09/25   PT Goal 4   Goal Identifier SLS   Goal Description Patient to show improved SLS time to >5s   Rationale to maximize safety and independence within the home   Goal Progress met   Target Date 06/09/25   Date Met 06/19/25   Subjective Report   Subjective Report Feels like she has a good stride. Is being cautious on stairs. Veered off path once Sunday.   Objective Measures   Objective Measures Objective Measure 1;Objective Measure 3;Objective Measure 2   Objective Measure 1   Objective Measure 10mwt   Details 8.04   Objective Measure 2   Objective Measure FGA   Details 25   Objective Measure 3   Objective Measure mCSTIB   Details 100s   Neuromuscular Re-education   Neuromuscular re-ed of mvmt, balance, coord, kinesthetic sense, posture, proprioception minutes  (19529) 10   Neuro Re-ed 1 Final home program   Neuro Re-ed 1 - Details Collaborated with patient on final home program recommending she do static stance with eyes closed on the floor as well as walking with head turns occasionally throughout the week.  Recommended she continue with hamstring stretching as well.  She was also interested in using ankle weights so 2 pound weights were trialed in clinic with no overt imbalance noted.   Eval/Assessments   Assessments Physical Performance Test/Measures   Physical Performance Test/measures   Physical Performance Test/Measurement, Minutes (50149) 20   Physical Performance Test/Measurement Details SLS, mCSTIB, FGA   Skilled Intervention Performed above mentioned tests and discussed results with patient and impact on POC   Education   Learner/Method Patient;Listening;Demonstration;Pictures/Video   Plan   Home program PTrx   Plan for next session DC to HEP   Total Session Time   Timed Code Treatment Minutes 30   Total Treatment Time (sum of timed and untimed services) 30         DISCHARGE  Reason for Discharge: Patient has met all goals.    Equipment Issued:     Discharge Plan: Patient to continue home program.    Referring Provider:  Referred Self

## 2025-06-23 RX ORDER — DUTASTERIDE 0.5 MG/1
1 CAPSULE, LIQUID FILLED ORAL DAILY
Qty: 90 CAPSULE | Refills: 1 | Status: SHIPPED | OUTPATIENT
Start: 2025-06-23

## 2025-06-25 ENCOUNTER — TRANSFERRED RECORDS (OUTPATIENT)
Dept: HEALTH INFORMATION MANAGEMENT | Facility: CLINIC | Age: 85
End: 2025-06-25
Payer: MEDICARE

## (undated) DEVICE — ESU GROUND PAD ADULT W/CORD E7507

## (undated) DEVICE — SU MONOCRYL 4-0 PS-2 27" UND Y426H

## (undated) DEVICE — BLADE KNIFE SURG 10 371110

## (undated) DEVICE — GLOVE BIOGEL PI MICRO SZ 7.5 48575

## (undated) DEVICE — SU VICRYL+ 0 27IN CT-2 VLT VCP334H

## (undated) DEVICE — DRSG STERI STRIP 1/2X4" R1547

## (undated) DEVICE — DRAIN PENROSE 0.25"X18" LATEX FREE GR201

## (undated) DEVICE — PACK MINOR CUSTOM ASC

## (undated) DEVICE — SOL NACL 0.9% IRRIG 500ML BOTTLE 2F7123

## (undated) DEVICE — DRSG GAUZE 4X4" 3033

## (undated) DEVICE — SUPPORTER ATHLETIC LG LATEX 202636

## (undated) DEVICE — DRAPE LAP W/ARMBOARD 29410

## (undated) DEVICE — LINEN TOWEL PACK X5 5464

## (undated) DEVICE — SU VICRYL 2-0 CT-2 27" UND J269H

## (undated) DEVICE — PREP CHLORAPREP 26ML TINTED HI-LITE ORANGE 930815

## (undated) RX ORDER — BUPIVACAINE HYDROCHLORIDE 2.5 MG/ML
INJECTION, SOLUTION EPIDURAL; INFILTRATION; INTRACAUDAL; PERINEURAL
Status: DISPENSED
Start: 2025-03-14

## (undated) RX ORDER — DEXAMETHASONE SODIUM PHOSPHATE 4 MG/ML
INJECTION, SOLUTION INTRA-ARTICULAR; INTRALESIONAL; INTRAMUSCULAR; INTRAVENOUS; SOFT TISSUE
Status: DISPENSED
Start: 2025-03-14

## (undated) RX ORDER — CEFAZOLIN SODIUM 2 G/50ML
SOLUTION INTRAVENOUS
Status: DISPENSED
Start: 2025-03-14

## (undated) RX ORDER — ONDANSETRON 2 MG/ML
INJECTION INTRAMUSCULAR; INTRAVENOUS
Status: DISPENSED
Start: 2025-03-14

## (undated) RX ORDER — BUPIVACAINE HYDROCHLORIDE 5 MG/ML
INJECTION, SOLUTION EPIDURAL; INTRACAUDAL; PERINEURAL
Status: DISPENSED
Start: 2025-03-14

## (undated) RX ORDER — FENTANYL CITRATE 50 UG/ML
INJECTION, SOLUTION INTRAMUSCULAR; INTRAVENOUS
Status: DISPENSED
Start: 2025-03-14

## (undated) RX ORDER — PROPOFOL 10 MG/ML
INJECTION, EMULSION INTRAVENOUS
Status: DISPENSED
Start: 2025-03-14

## (undated) RX ORDER — ACETAMINOPHEN 325 MG/1
TABLET ORAL
Status: DISPENSED
Start: 2025-03-14